# Patient Record
Sex: FEMALE | Race: OTHER | Employment: UNEMPLOYED | URBAN - METROPOLITAN AREA
[De-identification: names, ages, dates, MRNs, and addresses within clinical notes are randomized per-mention and may not be internally consistent; named-entity substitution may affect disease eponyms.]

---

## 2017-02-06 ENCOUNTER — ANESTHESIA EVENT (OUTPATIENT)
Dept: ENDOSCOPY | Age: 37
End: 2017-02-06
Payer: MEDICARE

## 2017-02-07 ENCOUNTER — ANESTHESIA (OUTPATIENT)
Dept: ENDOSCOPY | Age: 37
End: 2017-02-07
Payer: MEDICARE

## 2017-02-07 PROCEDURE — 74011250636 HC RX REV CODE- 250/636

## 2017-02-07 PROCEDURE — 74011000250 HC RX REV CODE- 250

## 2017-02-07 RX ORDER — PROPOFOL 10 MG/ML
INJECTION, EMULSION INTRAVENOUS AS NEEDED
Status: DISCONTINUED | OUTPATIENT
Start: 2017-02-07 | End: 2017-02-07 | Stop reason: HOSPADM

## 2017-02-07 RX ORDER — LIDOCAINE HYDROCHLORIDE 20 MG/ML
INJECTION, SOLUTION EPIDURAL; INFILTRATION; INTRACAUDAL; PERINEURAL AS NEEDED
Status: DISCONTINUED | OUTPATIENT
Start: 2017-02-07 | End: 2017-02-07 | Stop reason: HOSPADM

## 2017-02-07 RX ORDER — GLYCOPYRROLATE 0.2 MG/ML
INJECTION INTRAMUSCULAR; INTRAVENOUS AS NEEDED
Status: DISCONTINUED | OUTPATIENT
Start: 2017-02-07 | End: 2017-02-07 | Stop reason: HOSPADM

## 2017-02-07 RX ADMIN — PROPOFOL 150 MG: 10 INJECTION, EMULSION INTRAVENOUS at 07:53

## 2017-02-07 RX ADMIN — LIDOCAINE HYDROCHLORIDE 40 MG: 20 INJECTION, SOLUTION EPIDURAL; INFILTRATION; INTRACAUDAL; PERINEURAL at 07:44

## 2017-02-07 RX ADMIN — GLYCOPYRROLATE 0.2 MG: 0.2 INJECTION INTRAMUSCULAR; INTRAVENOUS at 07:43

## 2017-02-07 NOTE — ANESTHESIA POSTPROCEDURE EVALUATION
Post-Anesthesia Evaluation and Assessment    Patient: Vandy Sandifer MRN: [id number]  SSN: [id number]    YOB: 1980  Age: 29 y.o. Sex: female       Cardiovascular Function/Vital Signs  Visit Vitals    /65    Pulse 73    Temp 36.8 °C (98.2 °F)    Resp 11    Ht 5' 1\" (1.549 m)    Wt 74.1 kg (163 lb 7 oz)    SpO2 100%    Breastfeeding No    BMI 30.88 kg/m2       Patient is status post general anesthesia for Procedure(s):  ESOPHAGOGASTRODUODENOSCOPY (EGD)  ESOPHAGOGASTRODUODENAL (EGD) BIOPSY. Nausea/Vomiting: None    Postoperative hydration reviewed and adequate. Pain:  Pain Scale 1: Numeric (0 - 10) (02/07/17 0820)  Pain Intensity 1: 0 (02/07/17 0820)   Managed    Neurological Status: At baseline    Mental Status and Level of Consciousness: Arousable    Pulmonary Status:   O2 Device: Room air (02/07/17 0805)   Adequate oxygenation and airway patent    Complications related to anesthesia: None    Post-anesthesia assessment completed.  No concerns    Signed By: Charito Oden MD     February 7, 2017

## 2017-02-21 ENCOUNTER — HOSPITAL ENCOUNTER (OUTPATIENT)
Dept: NUCLEAR MEDICINE | Age: 37
Discharge: HOME | End: 2017-02-21
Attending: SPECIALIST

## 2017-02-21 DIAGNOSIS — K21.9 GERD (GASTROESOPHAGEAL REFLUX DISEASE): ICD-10-CM

## 2017-02-21 DIAGNOSIS — R10.13 EPIGASTRIC ABDOMINAL PAIN: ICD-10-CM

## 2017-02-28 ENCOUNTER — HOSPITAL ENCOUNTER (OUTPATIENT)
Dept: NUCLEAR MEDICINE | Age: 37
Discharge: HOME | End: 2017-02-28
Attending: SPECIALIST
Payer: MEDICARE

## 2017-02-28 VITALS — WEIGHT: 160 LBS | BODY MASS INDEX: 30.23 KG/M2

## 2017-02-28 PROCEDURE — 78227 HEPATOBIL SYST IMAGE W/DRUG: CPT

## 2017-02-28 PROCEDURE — 74011250636 HC RX REV CODE- 250/636

## 2017-02-28 RX ADMIN — SINCALIDE 1.45 MCG: 5 INJECTION, POWDER, LYOPHILIZED, FOR SOLUTION INTRAVENOUS at 10:00

## 2017-03-06 ENCOUNTER — OFFICE VISIT (OUTPATIENT)
Dept: SURGERY | Age: 37
End: 2017-03-06

## 2017-03-06 VITALS
HEART RATE: 68 BPM | SYSTOLIC BLOOD PRESSURE: 121 MMHG | DIASTOLIC BLOOD PRESSURE: 77 MMHG | HEIGHT: 61 IN | OXYGEN SATURATION: 99 % | BODY MASS INDEX: 30.58 KG/M2 | WEIGHT: 162 LBS

## 2017-03-06 DIAGNOSIS — K80.20 CALCULUS OF GALLBLADDER WITHOUT CHOLECYSTITIS WITHOUT OBSTRUCTION: Primary | ICD-10-CM

## 2017-03-06 RX ORDER — RANITIDINE 300 MG/1
300 TABLET ORAL DAILY
COMMUNITY
End: 2018-06-18 | Stop reason: ALTCHOICE

## 2017-03-06 RX ORDER — FLUOXETINE 20 MG/1
20 TABLET ORAL DAILY
COMMUNITY
End: 2018-06-18 | Stop reason: ALTCHOICE

## 2017-03-06 RX ORDER — DEXLANSOPRAZOLE 60 MG/1
60 CAPSULE, DELAYED RELEASE ORAL DAILY
COMMUNITY
End: 2018-06-18 | Stop reason: ALTCHOICE

## 2017-03-06 NOTE — MR AVS SNAPSHOT
Visit Information Date & Time Provider Department Dept. Phone Encounter #  
 3/6/2017  2:10 PM Fred Ahuja MD [facility] [id number] Your Appointments 4/4/2017 10:00 AM  
ESTABLISHED PATIENT with Reuben Sue MD  
Arthritis and [address] [facility] [address]) Appt Note: f/UP  
 [address] [place] [address]  
  
   
 [phone] [id number]  
  
    
 4/6/2017 10:00 AM  
POST OP 10 MIN with Fred Ahuja MD  
[facility] [address] ([address]) Appt Note: Post/op lap sammie on 3/21/17  
 [address], [address] [facility] [address]  
[address] [facility], [address] [facility] [address] [address] Upcoming Health Maintenance Date Due DTaP/Tdap/Td series (1 - Tdap) 7/13/2002 PAP AKA CERVICAL CYTOLOGY 7/13/2002 Pneumococcal 19-64 Highest Risk (3 of 3 - PCV13) 8/29/2017 Allergies as of 3/6/2017  Review Complete On: 3/6/2017 By: Fred Ahuja MD  
  
 Severity Noted Reaction Type Reactions Gluten High 02/29/2012   Side Effect Diarrhea Fiorinal-codeine #3 [Codeine-butalbital-asa-caff]  01/07/2014    Nausea Only Current Immunizations  Reviewed on 10/21/2016 Name Date Influenza Vaccine 8/29/2016, 9/19/2015, 11/17/2014, 10/14/2012 Influenza Vaccine PF 9/30/2013 Pneumococcal Polysaccharide (PPSV-23) 8/29/2016 Pneumococcal Vaccine (Unspecified Type) 10/14/2011 Not reviewed this visit Vitals BP Pulse Height(growth percentile) Weight(growth percentile) SpO2 BMI  
 121/77 68 5' 1\" (1.549 m) 162 lb (73.5 kg) 99% 30.61 kg/m2 OB Status Smoking Status IUD Never Smoker Vitals History BMI and BSA Data Body Mass Index Body Surface Area  
 30.61 kg/m 2 1.78 m 2 Preferred Pharmacy Pharmacy Name Phone [facility]  [facility], [address] [phone] Your Updated Medication List  
  
   
This list is accurate as of: 3/6/17  3:35 PM.  Always use your most recent med list.  
  
  
  
  
 atenolol 50 mg tablet Commonly known as:  TENORMIN Take  by mouth nightly. CELLCEPT 500 mg tablet Generic drug:  mycophenolate Take 1,500 mg by mouth two (2) times a day. DEXILANT 60 mg Cpdb Generic drug:  Dexlansoprazole Take 60 mg by mouth daily. FLUoxetine 20 mg tablet Commonly known as:  PROzac Take 20 mg by mouth daily. hydroxychloroquine 200 mg tablet Commonly known as:  PLAQUENIL  
TAKE ONE TABLET BY MOUTH TWICE DAILY losartan 100 mg tablet Commonly known as:  COZAAR Take 100 mg by mouth nightly. raNITIdine 300 mg tablet Commonly known as:  ZANTAC Take 300 mg by mouth daily. simvastatin 20 mg tablet Commonly known as:  ZOCOR  
TAKE 1 TABLET BY MOUTH ONCE DAILY  
  
 warfarin 5 mg tablet Commonly known as:  COUMADIN Take 6 mg by mouth daily. Patient Instructions Appointment with Dora Denis on 3/15/17 @ 9:45am to start pt on Lovenox. Stop coumadin 5 days prior to surgery. Sop Levenox 24 hrs prior to surgery per Dora Denis NP @ [facility]. Introducing [facility]! Dear Jia Fox: Thank you for requesting a [employer] account. Our records indicate that you already have an active [employer] account. You can access your account anytime at [email or web address] [email or web address] Share. [employer]/Compare And Share Did you know that you can access your hospital and ER discharge instructions at any time in [employer]? You can also review all of your test results from your hospital stay or ER visit. Additional Information If you have questions, please visit the Frequently Asked Questions section of the [employer] website at [email or web address] [employer]/Compare And Share/. Remember, MyChart is NOT to be used for urgent needs. For medical emergencies, dial 911. Now available from your iPhone and Android! Please provide this summary of care documentation to your next provider. Your primary care clinician is listed as Hui Lieberman. If you have any questions after today's visit, please call [phone].

## 2017-03-06 NOTE — LETTER
3/6/2017 3:23 PM 
 
Ms. Braxton Cloud 
[address] [facility] [address] Surgery Instruction Sheet You have been scheduled for surgery on 3/21/17 at 9:15am at [facility]. Please report to the Surgery Center at 7:15am, this is approximately 2 hours prior to your surgery time. The Surgery Center is located on the [address] [place] [address] side of the [facility], just next to the Emergency Room. Reserved parking is available and  parking if lot is full. You will not need to have a pre-op visit before surgery, but the Pre-op Nurse will call you before surgery. The Pre-op nurse will review your medical history, medications and give you additional instructions. They will also confirm your arrival time. Call your physician immediately if you notice a change in your health between the time you saw your physician and the day of surgery. If you take a blood thinner, please let us know. Call your ordering Doctor to make sure you can stop taking it prior to your surgery. STOP YOUR Coumadin 5 DAYS PRIOR TO SURGERY. DO NOT TAKE  IBUPROFEN, ADVIL, MOTRIN, ALEVE, EXCEDRIN, BC POWDER, GOODIES, FISH OIL OR ANY MEDICATION CONTAINING ASPIRIN 5 DAYS PRIOR TO YOUR SURGERY. MAY TAKE TYLENOL. Eat a light dinner the evening before your surgery. DO NOT EAT OR DRINK ANYTHING AFTER MIDNIGHT THE NIGHT BEFORE YOUR SURGERY. This includes water, chewing gum, lifesavers, etc.  The Pre op nurse will check with you about any medication that you may need to take the morning of surgery. Shower with a new bar of anti-bacterial soap (Dial, Safeguard) or solution given to you by Pre-op, the night before surgery. Do not use lotion, powder, deodorant on the skin after showering.   Wear loose, comfortable clothing the day of surgery and bring a container to store your contacts, eyeglasses, dentures, hearing aid, etc.  Do not bring money, valuables, jewelry, etc. to the hospital.   
 
 If you are having outpatient surgery, someone must come with you the morning of surgery to drive you home. You can not drive for 24 hours after any anesthesia. Sometimes it is necessary to stay overnight and leave the next morning. This is still considered outpatient for most insurance deductibles. Someone will still need to drive you home. If you have questions or concerns, please feel free to call /Gui at [phone]. If you need to cancel your surgery, please call as soon as possible. Sincerely, Akua Rolon MD
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
## 2017-03-06 NOTE — PATIENT INSTRUCTIONS
Appointment with Gregorio Barrett on 3/15/17 @ 9:45am to start pt on Lovenox. Stop coumadin 5 days prior to surgery. Sop Levenox 24 hrs prior to surgery per Gregorio Barrett NP @ [facility].

## 2017-03-15 NOTE — PERIOP NOTES
[facility]  Preoperative Instructions        Surgery Date 3/21/17          Time of Arrival 0715    1. On the day of your surgery, please report to the Surgical Services Registration Desk and sign in at your designated time. The Surgery Center is located to the right of the Emergency Room. 2. You must have someone with you to drive you home. You should not drive a car for 24 hours following surgery. Please make arrangements for a friend or family member to stay with you for the first 24 hours after your surgery. 3. Do not have anything to eat or drink (including water, gum, mints, coffee, juice) after midnight 3/20/17              . This may not apply to medications prescribed by your physician. Please note special instructions, if applicable. If you are currently taking Plavix, Coumadin, or other blood-thinning agents, contact your surgeon for instructions. 4. We recommend you do not drink any alcoholic beverages for 24 hours before and after your surgery. 5. Have a list of all current medications, including vitamins, herbal supplements and any other over the counter medications. Stop all Aspirin and non-steroidal anti-inflammatory drugs (I.e. Advil, Aleve), as directed by your surgeon's office. Stop all vitamins and herbal supplements seven days prior to your surgery. 6. Wear comfortable clothes. Wear glasses instead of contacts. Do not bring any money or jewelry. Please bring picture ID, insurance card, and any prearranged co-payment or hospital payment. Do not wear make-up, particularly mascara the morning of your surgery. Do not wear nail polish, particularly if you are having foot /hand surgery. Wear your hair loose or down, no ponytails, buns, rosie pins or clips. All body piercings must be removed.   Please shower with antibacterial soap for three consecutive days before and on the morning of surgery, but do not apply any lotions, powders or deodorants after the shower on the day of surgery. Please use a fresh towels after each shower. Please sleep in clean clothes and change bed linens the night before surgery. Please do not shave for 48 hours prior to surgery. Shaving of the face is acceptable. 7. You should understand that if you do not follow these instructions your surgery may be cancelled. If your physical condition changes (I.e. fever, cold or flu) please contact your surgeon as soon as possible. 8. It is important that you be on time. If a situation occurs where you may be late, please call [phone] (OR Holding Area). 9. If you have any questions and or problems, please call [phone] (Pre-admission Testing). 10. Your surgery time may be subject to change. You will receive a phone call the evening prior if your time changes. 11.  If having outpatient surgery, you must have someone to drive you here, stay with you during the duration of your stay, and to drive you home at time of discharge. Special Instructions:Coumadin per surgeon. MEDICATIONS TO TAKE THE MORNING OF SURGERY WITH A SIP OF WATER:Prozac, Dexilant, Zantac, Plaquenil, Cellcept. I understand a pre-operative phone call will be made to verify my surgery time. In the event that I am not available, I give permission for a message to be left on my answering service and/or with another person?   Yes @ [phone]         ___________________      __________   _________    (Signature of Patient)             (Witness)                (Date and Time)

## 2017-03-20 ENCOUNTER — ANESTHESIA EVENT (OUTPATIENT)
Dept: SURGERY | Age: 37
End: 2017-03-20
Payer: MEDICARE

## 2017-03-20 NOTE — ANESTHESIA PREPROCEDURE EVALUATION
Anesthetic History   No history of anesthetic complications            Review of Systems / Medical History  Patient summary reviewed, nursing notes reviewed and pertinent labs reviewed    Pulmonary                   Neuro/Psych         Psychiatric history    Comments: Depression  Anxiety Cardiovascular    Hypertension: well controlled          Hyperlipidemia    Exercise tolerance: >4 METS     GI/Hepatic/Renal     GERD: well controlled    Renal disease: stones and CRI  PUD and liver disease    Comments: Cholelithiasis  CRI, Stage III  Lupus Nephritis/Diffuse Proliferative Glomerulonephritis  Celiac Disease  Gastritis Endo/Other        Obesity, arthritis and anemia     Other Findings   Comments: Systemic Lupus Erythematosus  Raynaud's Syndrome         Physical Exam    Airway  Mallampati: II  TM Distance: > 6 cm  Neck ROM: normal range of motion   Mouth opening: Normal     Cardiovascular  Regular rate and rhythm,  S1 and S2 normal,  no murmur, click, rub, or gallop             Dental  No notable dental hx       Pulmonary  Breath sounds clear to auscultation               Abdominal  GI exam deferred       Other Findings            Anesthetic Plan    ASA: 3  Anesthesia type: general          Induction: Intravenous  Anesthetic plan and risks discussed with: Patient

## 2017-03-21 ENCOUNTER — APPOINTMENT (OUTPATIENT)
Dept: GENERAL RADIOLOGY | Age: 37
End: 2017-03-21
Attending: SURGERY
Payer: MEDICARE

## 2017-03-21 ENCOUNTER — ANESTHESIA (OUTPATIENT)
Dept: SURGERY | Age: 37
End: 2017-03-21
Payer: MEDICARE

## 2017-03-21 ENCOUNTER — HOSPITAL ENCOUNTER (OUTPATIENT)
Age: 37
Setting detail: OUTPATIENT SURGERY
Discharge: HOME | End: 2017-03-21
Attending: SURGERY | Admitting: SURGERY
Payer: MEDICARE

## 2017-03-21 ENCOUNTER — SURGERY (OUTPATIENT)
Age: 37
End: 2017-03-21

## 2017-03-21 VITALS
BODY MASS INDEX: 30.59 KG/M2 | SYSTOLIC BLOOD PRESSURE: 124 MMHG | OXYGEN SATURATION: 100 % | DIASTOLIC BLOOD PRESSURE: 68 MMHG | WEIGHT: 162.04 LBS | RESPIRATION RATE: 18 BRPM | HEART RATE: 47 BPM | HEIGHT: 61 IN | TEMPERATURE: 98.6 F

## 2017-03-21 LAB
ALBUMIN SERPL BCP-MCNC: 3.5 G/DL (ref 3.5–5)
ALBUMIN/GLOB SERPL: 0.7 {RATIO} (ref 1.1–2.2)
ALP SERPL-CCNC: 61 U/L (ref 45–117)
ALT SERPL-CCNC: 17 U/L (ref 12–78)
ANION GAP BLD CALC-SCNC: 10 MMOL/L (ref 5–15)
AST SERPL W P-5'-P-CCNC: 10 U/L (ref 15–37)
BILIRUB SERPL-MCNC: 0.9 MG/DL (ref 0.2–1)
BUN SERPL-MCNC: 21 MG/DL (ref 6–20)
BUN/CREAT SERPL: 16 (ref 12–20)
CALCIUM SERPL-MCNC: 9.1 MG/DL (ref 8.5–10.1)
CHLORIDE SERPL-SCNC: 108 MMOL/L (ref 97–108)
CO2 SERPL-SCNC: 23 MMOL/L (ref 21–32)
CREAT SERPL-MCNC: 1.28 MG/DL (ref 0.55–1.02)
ERYTHROCYTE [DISTWIDTH] IN BLOOD BY AUTOMATED COUNT: 13 % (ref 11.5–14.5)
GLOBULIN SER CALC-MCNC: 4.8 G/DL (ref 2–4)
GLUCOSE SERPL-MCNC: 85 MG/DL (ref 65–100)
HCG UR QL: NEGATIVE
HCT VFR BLD AUTO: 37.8 % (ref 35–47)
HGB BLD-MCNC: 13.1 G/DL (ref 11.5–16)
INR BLD: 1.3 (ref 0.9–1.2)
LIPASE SERPL-CCNC: 496 U/L (ref 73–393)
MCH RBC QN AUTO: 30.3 PG (ref 26–34)
MCHC RBC AUTO-ENTMCNC: 34.7 G/DL (ref 30–36.5)
MCV RBC AUTO: 87.3 FL (ref 80–99)
PLATELET # BLD AUTO: 226 K/UL (ref 150–400)
POTASSIUM SERPL-SCNC: 4.3 MMOL/L (ref 3.5–5.1)
PROT SERPL-MCNC: 8.3 G/DL (ref 6.4–8.2)
RBC # BLD AUTO: 4.33 M/UL (ref 3.8–5.2)
SODIUM SERPL-SCNC: 141 MMOL/L (ref 136–145)
WBC # BLD AUTO: 7.7 K/UL (ref 3.6–11)

## 2017-03-21 PROCEDURE — 81025 URINE PREGNANCY TEST: CPT

## 2017-03-21 PROCEDURE — 83690 ASSAY OF LIPASE: CPT | Performed by: SURGERY

## 2017-03-21 PROCEDURE — 74011250636 HC RX REV CODE- 250/636

## 2017-03-21 PROCEDURE — 77030011640 HC PAD GRND REM COVD -A: Performed by: SURGERY

## 2017-03-21 PROCEDURE — 74011636320 HC RX REV CODE- 636/320: Performed by: SURGERY

## 2017-03-21 PROCEDURE — 77030020256 HC SOL INJ NACL 0.9%  500ML: Performed by: SURGERY

## 2017-03-21 PROCEDURE — 77030035045 HC TRCR ENDOSC VRSPRT BLDLSS COVD -B: Performed by: SURGERY

## 2017-03-21 PROCEDURE — 85027 COMPLETE CBC AUTOMATED: CPT | Performed by: SURGERY

## 2017-03-21 PROCEDURE — 77030008684 HC TU ET CUF COVD -B: Performed by: NURSE ANESTHETIST, CERTIFIED REGISTERED

## 2017-03-21 PROCEDURE — 76060000033 HC ANESTHESIA 1 TO 1.5 HR: Performed by: SURGERY

## 2017-03-21 PROCEDURE — 77030026438 HC STYL ET INTUB CARD -A: Performed by: NURSE ANESTHETIST, CERTIFIED REGISTERED

## 2017-03-21 PROCEDURE — 77030018875 HC APPL CLP LIG4 J&J -B: Performed by: SURGERY

## 2017-03-21 PROCEDURE — 77030019908 HC STETH ESOPH SIMS -A: Performed by: NURSE ANESTHETIST, CERTIFIED REGISTERED

## 2017-03-21 PROCEDURE — 77030020782 HC GWN BAIR PAWS FLX 3M -B

## 2017-03-21 PROCEDURE — 36415 COLL VENOUS BLD VENIPUNCTURE: CPT | Performed by: SURGERY

## 2017-03-21 PROCEDURE — 77030035048 HC TRCR ENDOSC OPTCL COVD -B: Performed by: SURGERY

## 2017-03-21 PROCEDURE — 77030020263 HC SOL INJ SOD CL0.9% LFCR 1000ML: Performed by: SURGERY

## 2017-03-21 PROCEDURE — 77030008756 HC TU IRR SUC STRY -B: Performed by: SURGERY

## 2017-03-21 PROCEDURE — 77030010507 HC ADH SKN DERMBND J&J -B: Performed by: SURGERY

## 2017-03-21 PROCEDURE — 74011250636 HC RX REV CODE- 250/636: Performed by: ANESTHESIOLOGY

## 2017-03-21 PROCEDURE — 80053 COMPREHEN METABOLIC PANEL: CPT | Performed by: SURGERY

## 2017-03-21 PROCEDURE — 74011250636 HC RX REV CODE- 250/636: Performed by: SURGERY

## 2017-03-21 PROCEDURE — C1758 CATHETER, URETERAL: HCPCS | Performed by: SURGERY

## 2017-03-21 PROCEDURE — 77030002933 HC SUT MCRYL J&J -A: Performed by: SURGERY

## 2017-03-21 PROCEDURE — 77030008603 HC TRCR ENDOSC EPATH J&J -C: Performed by: SURGERY

## 2017-03-21 PROCEDURE — 77030031139 HC SUT VCRL2 J&J -A: Performed by: SURGERY

## 2017-03-21 PROCEDURE — 74011000250 HC RX REV CODE- 250: Performed by: SURGERY

## 2017-03-21 PROCEDURE — 77030032490 HC SLV COMPR SCD KNE COVD -B: Performed by: SURGERY

## 2017-03-21 PROCEDURE — 76210000021 HC REC RM PH II 0.5 TO 1 HR: Performed by: SURGERY

## 2017-03-21 PROCEDURE — 76210000006 HC OR PH I REC 0.5 TO 1 HR: Performed by: SURGERY

## 2017-03-21 PROCEDURE — 77030009852 HC PCH RTVR ENDOSC COVD -B: Performed by: SURGERY

## 2017-03-21 PROCEDURE — 88304 TISSUE EXAM BY PATHOLOGIST: CPT | Performed by: SURGERY

## 2017-03-21 PROCEDURE — 74300 X-RAY BILE DUCTS/PANCREAS: CPT

## 2017-03-21 PROCEDURE — 74011636320 HC RX REV CODE- 636/320

## 2017-03-21 PROCEDURE — 85610 PROTHROMBIN TIME: CPT

## 2017-03-21 PROCEDURE — 74011000250 HC RX REV CODE- 250

## 2017-03-21 PROCEDURE — 76010000149 HC OR TIME 1 TO 1.5 HR: Performed by: SURGERY

## 2017-03-21 RX ORDER — OXYCODONE AND ACETAMINOPHEN 5; 325 MG/1; MG/1
1-2 TABLET ORAL
Qty: 40 TAB | Refills: 0 | Status: SHIPPED | OUTPATIENT
Start: 2017-03-21 | End: 2017-05-31 | Stop reason: ALTCHOICE

## 2017-03-21 RX ORDER — DOCUSATE SODIUM 100 MG/1
100 CAPSULE, LIQUID FILLED ORAL 2 TIMES DAILY
Qty: 30 CAP | Refills: 2 | Status: SHIPPED | OUTPATIENT
Start: 2017-03-21 | End: 2017-04-04

## 2017-03-21 RX ORDER — SUCCINYLCHOLINE CHLORIDE 20 MG/ML
INJECTION INTRAMUSCULAR; INTRAVENOUS AS NEEDED
Status: DISCONTINUED | OUTPATIENT
Start: 2017-03-21 | End: 2017-03-21 | Stop reason: HOSPADM

## 2017-03-21 RX ORDER — SODIUM CHLORIDE, SODIUM LACTATE, POTASSIUM CHLORIDE, CALCIUM CHLORIDE 600; 310; 30; 20 MG/100ML; MG/100ML; MG/100ML; MG/100ML
25 INJECTION, SOLUTION INTRAVENOUS CONTINUOUS
Status: DISCONTINUED | OUTPATIENT
Start: 2017-03-21 | End: 2017-03-21 | Stop reason: HOSPADM

## 2017-03-21 RX ORDER — NEOSTIGMINE METHYLSULFATE 1 MG/ML
INJECTION INTRAVENOUS AS NEEDED
Status: DISCONTINUED | OUTPATIENT
Start: 2017-03-21 | End: 2017-03-21 | Stop reason: HOSPADM

## 2017-03-21 RX ORDER — ROCURONIUM BROMIDE 10 MG/ML
INJECTION, SOLUTION INTRAVENOUS AS NEEDED
Status: DISCONTINUED | OUTPATIENT
Start: 2017-03-21 | End: 2017-03-21 | Stop reason: HOSPADM

## 2017-03-21 RX ORDER — FENTANYL CITRATE 50 UG/ML
INJECTION, SOLUTION INTRAMUSCULAR; INTRAVENOUS AS NEEDED
Status: DISCONTINUED | OUTPATIENT
Start: 2017-03-21 | End: 2017-03-21 | Stop reason: HOSPADM

## 2017-03-21 RX ORDER — SODIUM CHLORIDE 0.9 % (FLUSH) 0.9 %
5-10 SYRINGE (ML) INJECTION EVERY 8 HOURS
Status: DISCONTINUED | OUTPATIENT
Start: 2017-03-21 | End: 2017-03-21 | Stop reason: HOSPADM

## 2017-03-21 RX ORDER — HYDROMORPHONE HYDROCHLORIDE 1 MG/ML
0.2 INJECTION, SOLUTION INTRAMUSCULAR; INTRAVENOUS; SUBCUTANEOUS
Status: DISCONTINUED | OUTPATIENT
Start: 2017-03-21 | End: 2017-03-21 | Stop reason: HOSPADM

## 2017-03-21 RX ORDER — ACETAMINOPHEN 10 MG/ML
INJECTION, SOLUTION INTRAVENOUS AS NEEDED
Status: DISCONTINUED | OUTPATIENT
Start: 2017-03-21 | End: 2017-03-21 | Stop reason: HOSPADM

## 2017-03-21 RX ORDER — DEXAMETHASONE SODIUM PHOSPHATE 4 MG/ML
INJECTION, SOLUTION INTRA-ARTICULAR; INTRALESIONAL; INTRAMUSCULAR; INTRAVENOUS; SOFT TISSUE AS NEEDED
Status: DISCONTINUED | OUTPATIENT
Start: 2017-03-21 | End: 2017-03-21 | Stop reason: HOSPADM

## 2017-03-21 RX ORDER — FENTANYL CITRATE 50 UG/ML
25 INJECTION, SOLUTION INTRAMUSCULAR; INTRAVENOUS
Status: DISCONTINUED | OUTPATIENT
Start: 2017-03-21 | End: 2017-03-21 | Stop reason: HOSPADM

## 2017-03-21 RX ORDER — ONDANSETRON 2 MG/ML
INJECTION INTRAMUSCULAR; INTRAVENOUS AS NEEDED
Status: DISCONTINUED | OUTPATIENT
Start: 2017-03-21 | End: 2017-03-21 | Stop reason: HOSPADM

## 2017-03-21 RX ORDER — PROPOFOL 10 MG/ML
INJECTION, EMULSION INTRAVENOUS AS NEEDED
Status: DISCONTINUED | OUTPATIENT
Start: 2017-03-21 | End: 2017-03-21 | Stop reason: HOSPADM

## 2017-03-21 RX ORDER — CEFAZOLIN SODIUM 1 G/3ML
2 INJECTION, POWDER, FOR SOLUTION INTRAMUSCULAR; INTRAVENOUS ONCE
Status: COMPLETED | OUTPATIENT
Start: 2017-03-21 | End: 2017-03-21

## 2017-03-21 RX ORDER — SODIUM CHLORIDE 0.9 % (FLUSH) 0.9 %
5-10 SYRINGE (ML) INJECTION AS NEEDED
Status: DISCONTINUED | OUTPATIENT
Start: 2017-03-21 | End: 2017-03-21 | Stop reason: HOSPADM

## 2017-03-21 RX ORDER — BUPIVACAINE HYDROCHLORIDE AND EPINEPHRINE 5; 5 MG/ML; UG/ML
30 INJECTION, SOLUTION EPIDURAL; INTRACAUDAL; PERINEURAL ONCE
Status: COMPLETED | OUTPATIENT
Start: 2017-03-21 | End: 2017-03-21

## 2017-03-21 RX ORDER — LIDOCAINE HYDROCHLORIDE 20 MG/ML
INJECTION, SOLUTION EPIDURAL; INFILTRATION; INTRACAUDAL; PERINEURAL AS NEEDED
Status: DISCONTINUED | OUTPATIENT
Start: 2017-03-21 | End: 2017-03-21 | Stop reason: HOSPADM

## 2017-03-21 RX ORDER — DIPHENHYDRAMINE HYDROCHLORIDE 50 MG/ML
12.5 INJECTION, SOLUTION INTRAMUSCULAR; INTRAVENOUS AS NEEDED
Status: DISCONTINUED | OUTPATIENT
Start: 2017-03-21 | End: 2017-03-21 | Stop reason: HOSPADM

## 2017-03-21 RX ORDER — MIDAZOLAM HYDROCHLORIDE 1 MG/ML
INJECTION, SOLUTION INTRAMUSCULAR; INTRAVENOUS AS NEEDED
Status: DISCONTINUED | OUTPATIENT
Start: 2017-03-21 | End: 2017-03-21 | Stop reason: HOSPADM

## 2017-03-21 RX ORDER — LIDOCAINE HYDROCHLORIDE 10 MG/ML
0.1 INJECTION, SOLUTION EPIDURAL; INFILTRATION; INTRACAUDAL; PERINEURAL AS NEEDED
Status: DISCONTINUED | OUTPATIENT
Start: 2017-03-21 | End: 2017-03-21 | Stop reason: HOSPADM

## 2017-03-21 RX ORDER — GLYCOPYRROLATE 0.2 MG/ML
INJECTION INTRAMUSCULAR; INTRAVENOUS AS NEEDED
Status: DISCONTINUED | OUTPATIENT
Start: 2017-03-21 | End: 2017-03-21 | Stop reason: HOSPADM

## 2017-03-21 RX ADMIN — ONDANSETRON 4 MG: 2 INJECTION INTRAMUSCULAR; INTRAVENOUS at 09:55

## 2017-03-21 RX ADMIN — FENTANYL CITRATE 100 MCG: 50 INJECTION, SOLUTION INTRAMUSCULAR; INTRAVENOUS at 09:45

## 2017-03-21 RX ADMIN — SODIUM CHLORIDE, SODIUM LACTATE, POTASSIUM CHLORIDE, AND CALCIUM CHLORIDE: 600; 310; 30; 20 INJECTION, SOLUTION INTRAVENOUS at 10:45

## 2017-03-21 RX ADMIN — MIDAZOLAM HYDROCHLORIDE 2 MG: 1 INJECTION, SOLUTION INTRAMUSCULAR; INTRAVENOUS at 09:36

## 2017-03-21 RX ADMIN — GLYCOPYRROLATE 0.4 MG: 0.2 INJECTION INTRAMUSCULAR; INTRAVENOUS at 10:33

## 2017-03-21 RX ADMIN — LIDOCAINE HYDROCHLORIDE 60 MG: 20 INJECTION, SOLUTION EPIDURAL; INFILTRATION; INTRACAUDAL; PERINEURAL at 09:45

## 2017-03-21 RX ADMIN — FENTANYL CITRATE 50 MCG: 50 INJECTION, SOLUTION INTRAMUSCULAR; INTRAVENOUS at 10:10

## 2017-03-21 RX ADMIN — SUCCINYLCHOLINE CHLORIDE 120 MG: 20 INJECTION INTRAMUSCULAR; INTRAVENOUS at 09:45

## 2017-03-21 RX ADMIN — ROCURONIUM BROMIDE 10 MG: 10 INJECTION, SOLUTION INTRAVENOUS at 09:45

## 2017-03-21 RX ADMIN — IOTHALAMATE MEGLUMINE 28 ML: 600 INJECTION INTRAVASCULAR at 10:45

## 2017-03-21 RX ADMIN — FENTANYL CITRATE 50 MCG: 50 INJECTION, SOLUTION INTRAMUSCULAR; INTRAVENOUS at 10:30

## 2017-03-21 RX ADMIN — SODIUM CHLORIDE, SODIUM LACTATE, POTASSIUM CHLORIDE, AND CALCIUM CHLORIDE 25 ML/HR: 600; 310; 30; 20 INJECTION, SOLUTION INTRAVENOUS at 08:18

## 2017-03-21 RX ADMIN — DEXAMETHASONE SODIUM PHOSPHATE 10 MG: 4 INJECTION, SOLUTION INTRA-ARTICULAR; INTRALESIONAL; INTRAMUSCULAR; INTRAVENOUS; SOFT TISSUE at 09:55

## 2017-03-21 RX ADMIN — PROPOFOL 150 MG: 10 INJECTION, EMULSION INTRAVENOUS at 09:45

## 2017-03-21 RX ADMIN — NEOSTIGMINE METHYLSULFATE 3 MG: 1 INJECTION INTRAVENOUS at 10:33

## 2017-03-21 RX ADMIN — CEFAZOLIN 2 G: 1 INJECTION, POWDER, FOR SOLUTION INTRAMUSCULAR; INTRAVENOUS; PARENTERAL at 09:36

## 2017-03-21 RX ADMIN — ACETAMINOPHEN 1000 MG: 10 INJECTION, SOLUTION INTRAVENOUS at 10:24

## 2017-03-21 RX ADMIN — BUPIVACAINE HYDROCHLORIDE AND EPINEPHRINE BITARTRATE 26 ML: 5; .005 INJECTION, SOLUTION EPIDURAL; INTRACAUDAL; PERINEURAL at 10:43

## 2017-03-21 NOTE — DISCHARGE INSTRUCTIONS
Dr Jayne Ordonez Discharge Instructions after  Laparoscopic Cholecystectomy      Starla Toro [id number] : 1980    Admitted 3/21/2017 Discharged: 3/21/2017     ** restart your Lovenox AND Coumadin tomorrow 3/22/17  ** make an appointment with [facility] on Friday 3/24/17 for an INR check      What to do at Home    Recommended diet: Low Fat Diet for 1 month    Recommended activity: as tolerated, do not drive for 3-5 days while on pain medicine. Follow-up with Dr. Leelee Austin in 2 weeks. Call [phone] for an appointment. Cholecystectomy: What to Expect at [facility]    After your surgery, it is normal to feel weak and tired for several days after you return home. Your belly may be swollen. If you had laparoscopic surgery, you may also have pain in your shoulder for about 24 hours. You may have gas or need to burp a lot at first, and a few people get diarrhea. The diarrhea usually goes away in 2 to 4 weeks, but it may last longer. How quickly you recover depends on whether you had a laparoscopic or open surgery. For a laparoscopic surgery, most people can go back to work or their normal routine in 1 to 2 weeks, but it may take longer, depending on the type of work you do. For an open surgery, it will probably take 4 to 6 weeks before you get back to your normal routine. This care sheet gives you a general idea about how long it will take for you to recover. However, each person recovers at a different pace. Follow the steps below to get better as quickly as possible. How can you care for yourself at home? Activity  Rest when you feel tired. Getting enough sleep will help you recover. Try to walk each day. Start out by walking a little more than you did the day before. Gradually increase the amount you walk. Walking boosts blood flow and helps prevent pneumonia and constipation.   Avoid strenuous activities, such as biking, jogging, weightlifting, and aerobic exercise, for 1-2 weeks. You may shower 24 hours after surgery. Pat the cut (incision) dry. Do not take a bath for the first 2 weeks, or until your doctor tells you it is okay. You may drive when you are no longer taking pain medicine and can quickly move your foot from the gas pedal to the brake. You must also be able to sit comfortably for a long period of time, even if you do not plan to go far. For a laparoscopic surgery, most people can go back to work or their normal routine in 1 to 2 weeks, but it may take longer. For an open surgery, it will probably take 4 to 6 weeks before you get back to your normal routine. Diet  Eat smaller meals more often instead of fewer larger meals. You can eat a normal diet, but avoid eating fatty foods for about 1 month. Fatty foods include hamburger, whole milk, cheese, and many snack foods. If your stomach is upset, try bland, low-fat foods like plain rice, broiled chicken, toast, and yogurt. Drink plenty of fluids (unless your doctor tells you not to). If you have diarrhea, try avoiding spicy foods, dairy products, fatty foods, and alcohol. You can also watch to see if specific foods cause it, and stop eating them. If the diarrhea continues for more than 2 weeks, talk to your doctor. You may notice that your bowel movements are not regular right after your surgery. This is common. Try to avoid constipation and straining with bowel movements. You may want to take a fiber supplement every day. If you have not had a bowel movement after a couple of days, ask your doctor about taking a mild laxative. Medicines  Take pain medicines exactly as directed. If the doctor gave you a prescription medicine for pain, take it as prescribed. If you are not taking a prescription pain medicine, take an over-the-counter medicine such as acetaminophen (Tylenol), ibuprofen (Advil, Motrin), or naproxen (Aleve). Read and follow all instructions on the label.   Do not take two or more pain medicines at the same time unless the doctor told you to. Many pain medicines contain acetaminophen, which is Tylenol. Too much Tylenol can be harmful. If you think your pain medicine is making you sick to your stomach: Take your medicine after meals (unless your doctor tells you not to). Ask your doctor for a different pain medicine. If your doctor prescribed antibiotics, take them as directed. Do not stop taking them just because you feel better. You need to take the full course of antibiotics. Incision care  After 24 to 48 hours, wash the area daily with warm, soapy water, and pat it dry. You may have staples to hold the cut together. Keep them dry until your doctor takes them out. This is usually in 7 to 10 days. Keep the area clean and dry. You may cover it with a gauze bandage if it weeps or rubs against clothing. Change the bandage every day. Ice  To reduce swelling and pain, put ice or a cold pack on your belly for 10 to 15 minutes at a time. Do this every 1 to 2 hours. Put a thin cloth between the ice and your skin. Follow-up care is a key part of your treatment and safety. Be sure to make and go to all appointments, and call your doctor if you are having problems. Its also a good idea to know your test results and keep a list of the medicines you take. When should you call for help? Call 911 anytime you think you may need emergency care. For example, call if:  You pass out (lose consciousness). You have severe trouble breathing. You have sudden chest pain and shortness of breath, or you cough up blood. Call your doctor now or seek immediate medical care if:  You are sick to your stomach and cannot drink fluids. You have pain that does not get better when you take your pain medicine. You have signs of infection, such as: Increased pain, warmth, or excessive (>1inch) redness. Red streaks leading from the incision. Pus draining from the incision.   Swollen lymph nodes in your neck, armpits, or groin. A fever. Your urine turns dark brown or your stool is light-colored or ivy-colored. Your skin turns yellow. Bright red blood has soaked through a large bandage over your incision. You have signs of a blood clot, such as:  Pain in your calf, back of knee, thigh, or groin. Redness and swelling in your leg or groin. You have trouble passing urine or stool, especially if you have mild pain or swelling in your lower belly. Watch closely for any changes in your health, and be sure to contact your doctor if:  You do not have a bowel movement after taking a laxative. DISCHARGE SUMMARY from Nurse    The following personal items are in your possession at time of discharge:    Dental Appliances: None  Visual Aid: Contacts, Glasses (Contacts at home, Glasses to PACU)  Hearing Aids/Status: Does not own  Home Medications: None  Jewelry: Ring (To )  Clothing: Footwear, Socks, Undergarments, Pants, Shirt, Sweater (Sent to PACU)  Other Valuables: Eyeglasses (To PACU)  Personal Items Sent to Safe: Declines          PATIENT INSTRUCTIONS:    After general anesthesia or intravenous sedation, for 24 hours or while taking prescription Narcotics:  · Limit your activities  · Do not drive and operate hazardous machinery  · Do not make important personal or business decisions  · Do  not drink alcoholic beverages  · If you have not urinated within 8 hours after discharge, please contact your surgeon on call. Report the following to your surgeon:  · Excessive pain, swelling, redness or odor of or around the surgical area  · Temperature over 100.5  · Nausea and vomiting lasting longer than 4 hours or if unable to take medications  · Any signs of decreased circulation or nerve impairment to extremity: change in color, persistent  numbness, tingling, coldness or increase pain  · Any questions        What to do at Home:      *  Please give a list of your current medications to your Primary Care Provider.     * Please update this list whenever your medications are discontinued, doses are      changed, or new medications (including over-the-counter products) are added. *  Please carry medication information at all times in case of emergency situations. These are general instructions for a healthy lifestyle:    No smoking/ No tobacco products/ Avoid exposure to second hand smoke    Surgeon General's Warning:  Quitting smoking now greatly reduces serious risk to your health. Obesity, smoking, and sedentary lifestyle greatly increases your risk for illness    A healthy diet, regular physical exercise & weight monitoring are important for maintaining a healthy lifestyle    You may be retaining fluid if you have a history of heart failure or if you experience any of the following symptoms:  Weight gain of 3 pounds or more overnight or 5 pounds in a week, increased swelling in our hands or feet or shortness of breath while lying flat in bed. Please call your doctor as soon as you notice any of these symptoms; do not wait until your next office visit. Recognize signs and symptoms of STROKE:    F-face looks uneven    A-arms unable to move or move unevenly    S-speech slurred or non-existent    T-time-call 911 as soon as signs and symptoms begin-DO NOT go       Back to bed or wait to see if you get better-TIME IS BRAIN. Warning Signs of HEART ATTACK     Call 911 if you have these symptoms:   Chest discomfort. Most heart attacks involve discomfort in the center of the chest that lasts more than a few minutes, or that goes away and comes back. It can feel like uncomfortable pressure, squeezing, fullness, or pain.  Discomfort in other areas of the upper body. Symptoms can include pain or discomfort in one or both arms, the back, neck, jaw, or stomach.  Shortness of breath with or without chest discomfort.  Other signs may include breaking out in a cold sweat, nausea, or lightheadedness.   Don't wait more than five minutes to call 911 - MINUTES MATTER! Fast action can save your life. Calling 911 is almost always the fastest way to get lifesaving treatment. Emergency Medical Services staff can begin treatment when they arrive -- up to an hour sooner than if someone gets to the hospital by car. The discharge information has been reviewed with the patient and caregiver. The patient and caregiver verbalized understanding. Discharge medications reviewed with the patient and caregiver and appropriate educational materials and side effects teaching were provided. How to Care for Your Wound After Its Treated With  DERMABOND* Topical Skin Adhesive  DERMABOND* Topical Skin Adhesive (2-octyl cyanoacrylate) is a sterile, liquid skin adhesive  that holds wound edges together. The film will usually remain in place for 5 to 10 days, then  naturally fall off your skin. The following will answer some of your questions and provide instructions for proper care for your  wound while it is healing:    CHECK WOUND APPEARANCE   Some swelling, redness, and pain are common with all wounds and normally will go away as the  wound heals. If swelling, redness, or pain increases or if the wound feels warm to the touch,  contact a doctor. Also contact a doctor if the wound edges reopen or separate. REPLACE BANDAGES   If your wound is bandaged, keep the bandage dry.  Replace the dressing daily until the adhesive film has fallen off or if the  bandage should become wet, unless otherwise instructed by your  physician.  When changing the dressing, do not place tape directly over the  DERMABOND adhesive film, because removing the tape later may also  remove the film. AVOID TOPICAL MEDICATIONS   Do not apply liquid or ointment medications or any other product to your wound while the  DERMABOND adhesive film is in place. These may loosen the film before your wound is healed.   KEEP WOUND DRY AND PROTECTED   You may occasionally and briefly wet your wound in the shower or bath. Do not soak or scrub  your wound, do not swim, and avoid periods of heavy perspiration until the DERMABOND  adhesive has naturally fallen off. After showering or bathing, gently blot your wound dry with a  soft towel. If a protective dressing is being used, apply a fresh, dry bandage, being sure to keep  the tape off the DERMABOND adhesive film.  Apply a clean, dry bandage over the wound if necessary to protect it.  Protect your wound from injury until the skin has had sufficient time to heal.   Do not scratch, rub, or pick at the DERMABOND adhesive film. This may loosen the film before  your wound is healed.  Protect the wound from prolonged exposure to sunlight or tanning lamps while the film is in  place. If you have any questions or concerns about this product, please consult your doctor. *Trademark ©[employer] 2002      Narcotic-Analgesic/Acetaminophen (By mouth)   Relieves pain. Brand Name(s):Capital w/Codeine, Endocet, Hycet, Lorcet, Lorcet HD, Lorcet Plus, Lortab 10/325, Lortab 5/325, Lortab 7.5/325, Lortab Elixir, Norco, Percocet, Saldaña, Roxicet, Trezix   There may be other brand names for this medicine. When This Medicine Should Not Be Used: You should not use this medicine if you have had an allergic reaction to acetaminophen, codeine, hydrocodone, propoxyphene, or sulfites. You should not use this medicine if you have had an allergic reaction to any other opioid pain medicine. How to Use This Medicine:   Liquid, Tablet, Capsule  · Your doctor will tell you how much medicine to use. Do not use more than directed. · This medicine contains acetaminophen. Read the labels of all other medicines you are using to see if they also contain acetaminophen, or ask your doctor or pharmacist. Abran Sue not use more than 4 grams (4,000 milligrams) total of acetaminophen in one day. · Drink plenty of liquids to help avoid constipation.   If a dose is missed:   · Some of these medicines need to be used on a fixed schedule. If you miss a dose or forget to use your medicine, call your doctor pharmacist for instructions. Do not use extra medicine to make up for a missed dose. How to Store and Dispose of This Medicine:   · Store the medicine in a closed container at room temperature, away from heat, moisture, and direct light. Do not refrigerate or freeze the medicine. · Ask your pharmacist, doctor, or health caregiver about the best way to dispose of any outdated medicine or medicine no longer needed. · Keep all medicine out of the reach of children. Never share your medicine with anyone. Drugs and Foods to Avoid:   Ask your doctor or pharmacist before using any other medicine, including over-the-counter medicines, vitamins, and herbal products. · Make sure your doctor knows if you are using a monoamine oxidase inhibitor (MAOI) medicine, such as Eldepryl®, Marplan®, [place], or Parnate®. Make sure your doctor knows if you are also using a medicine to treat depression, such as amitriptyline, doxepin, nortriptyline, Elavil®, Pamelor®, or Sinequan®. Make sure your doctor knows if you are taking an anticholinergic medicine, such as atropine, methscopolamine, or scopolamine. · Tell your doctor if you use anything else that makes you sleepy. Some examples are allergy medicine, narcotic pain medicine, and alcohol. · Do not drink alcohol while you are using this medicine. Warnings While Using This Medicine:   · Make sure your doctor knows if you are pregnant or breast feeding, or if you have a head injury, or other conditions that may cause an increase in intercranial pressure (pressure inside your head). Make sure your doctor knows if you have severe kidney problems or severe liver problems, or if you have hypothyroidism (lack of thyroid function).  Make sure your doctor knows if you have Frederick's disease (adrenal gland disease), or if you have enlarged prostate or urethral stricture. Make sure your doctor knows if you have any abdominal problems, or if you have lung disease or asthma. · This medicine may make you dizzy or drowsy. Avoid driving, using machines, or anything else that could be dangerous if you are not alert. · This medicine can be habit-forming. Do not use more than your prescribed dose. Call your doctor if you think your medicine is not working. · Tell any doctor or dentist who treats you that you are using this medicine. This medicine may affect certain medical test results. · This medicine may cause constipation, especially with long-term use. Ask your doctor if you should use a laxative to prevent and treat constipation. · When a mother is breastfeeding and takes codeine, there is a very small chance that this medicine could cause serious side effects in the baby. This is because codeine works differently in a few women, so their breast milk contains too much medicine. If you take codeine, be alert for these signs of overdose in your nursing baby: sleeping more than usual, trouble breastfeeding, trouble breathing, or being limp and weak. Call the baby's doctor right away if you think there is a problem. If you cannot talk to the doctor, take the baby to the emergency room or call 911. Possible Side Effects While Using This Medicine:   Call your doctor right away if you notice any of these side effects:  · Allergic reaction: Itching or hives, swelling in your face or hands, swelling or tingling in your mouth or throat, chest tightness, trouble breathing  · Dizziness. · Seeing or hearing things that are not there. · Very slow heartbeat. If you notice these less serious side effects, talk with your doctor:   · Change in how much or how often you urinate. · Cold, clammy skin. · Feeling unusually anxious, excited, fearful, or tired. · Nausea, vomiting, constipation, stomach pain or upset, or heartburn. · Skin rash. · Vision changes.   If you notice other side effects that you think are caused by this medicine, tell your doctor. Call your doctor for medical advice about side effects. You may report side effects to FDA at 2-718-OZE-8631  © 2016 [address] is for End User's use only and may not be sold, redistributed or otherwise used for commercial purposes. The above information is an  only. It is not intended as medical advice for individual conditions or treatments. Talk to your doctor, nurse or pharmacist before following any medical regimen to see if it is safe and effective for you.

## 2017-03-21 NOTE — PERIOP NOTES
Patient: Fred You MRN: [id number]  SSN: [id number]   YOB: 1980  Age: 29 y.o. Sex: female     Patient is status post Procedure(s):  LAPAROSCOPIC CHOLECYSTECTOMY, INTRAOPERATIVE CHOLANGIOGRAMS. Surgeon(s) and Role:     * Rachel Anderson MD - Primary    Local/Dose/Irrigation:  26 LOCAL INJECITON TO ABD PORT SITES                  Peripheral IV 03/21/17 Left Antecubital (Active)   Site Assessment Clean, dry, & intact 3/21/2017  8:17 AM   Phlebitis Assessment 0 3/21/2017  8:17 AM   Infiltration Assessment 0 3/21/2017  8:17 AM   Dressing Status Clean, dry, & intact 3/21/2017  8:17 AM   Dressing Type Tape;Transparent 3/21/2017  8:17 AM   Hub Color/Line Status Green; Infusing 3/21/2017  8:17 AM   Action Taken Blood drawn 3/21/2017  8:17 AM          Orogastric Tube 03/21/17 (Active)                     Dressing/Packing:  Wound Abdomen Mid;Right-DRESSING TYPE: Staples; Topical skin adhesive/glue (03/21/17 1048)  Splint/Cast:  ]    Other:  NONE

## 2017-03-21 NOTE — PERIOP NOTES
1059-Handoff Report from Operating Room to PACU    Report received from [facility] and Juany Hurd CRNA regarding Mark Ashford.      Surgeon(s):  Maksim Lazo MD  And Procedure(s) (LRB):  LAPAROSCOPIC CHOLECYSTECTOMY, INTRAOPERATIVE CHOLANGIOGRAMS (N/A)  confirmed   with allergies and dressings discussed. Anesthesia type, drugs, patient history, complications, estimated blood loss, vital signs, intake and output, and last pain medication, lines, reversal medications and temperature were reviewed. 1140- TRANSFER - OUT REPORT:    Verbal report given to April RN(name) on Mark Ashford  being transferred to Phase II(unit) for routine post - op       Report consisted of patients Situation, Background, Assessment and   Recommendations(SBAR). Information from the following report(s) SBAR, Kardex, OR Summary, Procedure Summary, Intake/Output, MAR and Recent Results was reviewed with the receiving nurse. Opportunity for questions and clarification was provided.       Patient transported with:   Registered Nurse

## 2017-03-21 NOTE — OP NOTES
[facility] OPERATIVE REPORT    3/21/2017    Pre-operative Diagnosis: GALLSTONES    Post-operative Diagnosis: cholelithiasis      OPERATIVE PROCEDURE:  1. Laparoscopic cholecystectomy. 2.  Intraoperative cholangiogram with intraoperative interpritation. Surgeon: Leanna Doll MD    Anesthesia: General plus Local    Estimated Blood Loss: Minimal    FINDINGS:   The patient was noted to have a gallbladder with thin wall, sludge and bile. Intraoperative cholangiogram was obtained. Radiologist was not present during the case. Intraoperative interpretation revealed filling of a normal length cystic duct, a common bile duct, and all proximal and distal structures with free flow of contrast into the duodenum. Initial imagine revealed an air bubble within the duct, but was able to be flushed through with change in position. No additional filling defects noted. The liver appeared normal, and the remaining abdominal cavity appeared normal.    SPECIMEN:    ID Type Source Tests Collected by Time Destination   1 : Jhonathan Mohamud MD 3/21/2017 1024 Pathology        DRAINS:  None. COMPLICATIONS:  None. DESCRIPTION OF PROCEDURE:  After satisfactory induction of general  anesthesia, the patient was kept in the supine position. The patient's  abdomen was prepped and draped sterilely. After infusion of the skin with local anesthetic, a small incision was made  and a supraumbilical 5-mm trocar was placed intra-abdominally under  visualization. Pneumoperitoneum was achieved. The abdomen was explored  with findings noted above. A 12-mm port was placed in the patient's  epigastrium and an additional 5-mm port was placed in the patient's right  abdomen. The gallbladder was retracted superiorly and laterally. The  reflection of the peritoneum was brought down exposing Calot's triangle.   The cystic duct was  from surrounding tissues with the critical view obtained, including the cystic duct and the common bile duct junction. A single clip was placed at the junction of the cystic duct and gallbladder. A small ductotomy was created and intraoperative cholangiogram was obtained with the findings noted above. The  cholangiogram catheter was removed. Three clips were placed just distal to  the ductotomy on the cystic duct and the duct was transected. Next, the  cystic artery was identified,  from its surrounding tissues, 2  clips placed proximally and 1 distally, and was transected as well. Additionally, there were two tiny bleeding vessel draining directly into the  Gallbladder along the bed. Additional clips were used to control this. The spatula-tipped Bovie was used to separate the gallbladder from its bed in  the liver. Once freed, irrigation of the right upper quadrant and final  irrigation running clear, complete hemostasis was noted. Clips noted to be  in place without any extravasation of blood or bile. The pelvis was visualized and  noted to be normal.  Ten mL of 0.5 Marcaine with epinephrine was sprayed over dome of the liver. The gallbladder was retrieved through the epigastric port site using an endopouch. The remaining trocar sites were removed sequentially under visualization, and the pneumoperitoneum was allowed to escape. A figure-of-eight 0 Vicryl suture was used to close the epigastic fascial defect, followed by additional Vicryl in the soft tissue, followed by closure of all skin incisions with subcuticular 4-0 Monocryl and Dermabond. The patient remained stable during my presence in the operating room.       Laura Leon MD

## 2017-03-21 NOTE — IP AVS SNAPSHOT
[address] [facility] 
[phone] Patient: Ariela Dow 
MRN: [id number] LNU:[birth year] You are allergic to the following Allergen Reactions Gluten Diarrhea Fiorinal-Codeine #3 (Codeine-Butalbital-Asa-Caff) Nausea Only Recent Documentation Height Weight BMI OB Status Smoking Status 1.549 m 73.5 kg 30.62 kg/m2 IUD Never Smoker Emergency Contacts Name Discharge Info Relation Home Work Mobile [address] CAREGIVER [3] Spouse [3] [phone] Juli Frazier  Parent [1] [phone] Tresa Frazier  Other Relative [6] [phone] About your hospitalization You were admitted on:  March 21, 2017 You last received care in the:  [facility] PACU You were discharged on:  March 21, 2017 Unit phone number:  [phone] Why you were hospitalized Your primary diagnosis was:  Not on File Providers Seen During Your Hospitalizations Provider Role Specialty Primary office phone Pa Ortega MD Attending Provider General Surgery [phone] Your Primary Care Physician (PCP) Primary Care Physician Office Phone Office Fax [facility] [phone] Follow-up Information Follow up With Details Comments Contact Info Spenser Ames MD   [address] [facility] [address] [facility] [address] 
[phone] Your Appointments Tuesday April 04, 2017 10:00 AM EDT  
ESTABLISHED PATIENT with Frankey Krabbe, MD  
Arthritis and [address] [facility]) [address] [place] [address]  
[phone] Thursday April 06, 2017 10:00 AM EDT  
POST OP 10 MIN with Pa Ortega MD  
[facility] [address] ([facility] [address] [facility] [address], [address] [facility]  
[phone] Current Discharge Medication List  
  
START taking these medications Dose & Instructions Dispensing Information Comments Morning Noon Evening Bedtime  
 docusate sodium 100 mg capsule Commonly known as:  Bianca Okeefe Your last dose was: Your next dose is:    
   
   
 Dose:  100 mg Take 1 Cap by mouth two (2) times a day for 14 days. Stop taking if you have diarrhea Quantity:  30 Cap Refills:  2  
     
   
   
   
  
 oxyCODONE-acetaminophen 5-325 mg per tablet Commonly known as:  PERCOCET Your last dose was: Your next dose is:    
   
   
 Dose:  1-2 Tab Take 1-2 Tabs by mouth every four (4) hours as needed for Pain. Max Daily Amount: 12 Tabs. Quantity:  40 Tab Refills:  0 CONTINUE these medications which have CHANGED Dose & Instructions Dispensing Information Comments Morning Noon Evening Bedtime  
 hydroxychloroquine 200 mg tablet Commonly known as:  PLAQUENIL What changed:  See the new instructions. Your last dose was: Your next dose is: TAKE ONE TABLET BY MOUTH TWICE DAILY Quantity:  60 Tab Refills:  9 CONTINUE these medications which have NOT CHANGED Dose & Instructions Dispensing Information Comments Morning Noon Evening Bedtime  
 atenolol 50 mg tablet Commonly known as:  TENORMIN Your last dose was: Your next dose is: Take  by mouth nightly. Refills:  0  
     
   
   
   
  
 CELLCEPT 500 mg tablet Generic drug:  mycophenolate Your last dose was: Your next dose is:    
   
   
 Dose:  1500 mg Take 1,500 mg by mouth two (2) times a day. Refills:  0 DEXILANT 60 mg Cpdb Generic drug:  Dexlansoprazole Your last dose was: Your next dose is:    
   
   
 Dose:  60 mg Take 60 mg by mouth daily. Refills:  0 FLUoxetine 20 mg tablet Commonly known as:  PROzac Your last dose was: Your next dose is:    
   
   
 Dose:  20 mg Take 20 mg by mouth daily. Refills:  0  
     
   
   
   
  
 losartan 100 mg tablet Commonly known as:  COZAAR Your last dose was: Your next dose is:    
   
   
 Dose:  100 mg Take 100 mg by mouth nightly. Refills:  0 LOVENOX SC Your last dose was: Your next dose is:    
   
   
 by SubCUTAneous route once. Refills:  0  
     
   
   
   
  
 raNITIdine 300 mg tablet Commonly known as:  ZANTAC Your last dose was: Your next dose is:    
   
   
 Dose:  300 mg Take 300 mg by mouth daily. Refills:  0  
     
   
   
   
  
 simvastatin 20 mg tablet Commonly known as:  ZOCOR Your last dose was: Your next dose is: TAKE 1 TABLET BY MOUTH ONCE DAILY Refills:  3  
     
   
   
   
  
 warfarin 5 mg tablet Commonly known as:  COUMADIN Your last dose was: Your next dose is:    
   
   
 Dose:  6 mg Take 6 mg by mouth daily. Refills:  0 Where to Get Your Medications These medications were sent to [address], [address] Phone:  [phone]  
  docusate sodium 100 mg capsule Information on where to get these meds will be given to you by the nurse or doctor. ! Ask your nurse or doctor about these medications  
  oxyCODONE-acetaminophen 5-325 mg per tablet Discharge Instructions Dr Reginald Garcia Discharge Instructions after Laparoscopic Cholecystectomy Cornelia Morin [id number] : 1980 Admitted 3/21/2017 Discharged: 3/21/2017 ** restart your Lovenox AND Coumadin tomorrow 3/22/17 ** make an appointment with [facility] on Friday 3/24/17 for an INR check What to do at [facility] Recommended diet: Low Fat Diet for 1 month Recommended activity: as tolerated, do not drive for 3-5 days while on pain medicine. Follow-up with Dr. Elif Carrera in 2 weeks. Call [phone] for an appointment. Cholecystectomy: What to Expect at [facility] Your Recovery After your surgery, it is normal to feel weak and tired for several days after you return home. Your belly may be swollen. If you had laparoscopic surgery, you may also have pain in your shoulder for about 24 hours. You may have gas or need to burp a lot at first, and a few people get diarrhea. The diarrhea usually goes away in 2 to 4 weeks, but it may last longer. How quickly you recover depends on whether you had a laparoscopic or open surgery. For a laparoscopic surgery, most people can go back to work or their normal routine in 1 to 2 weeks, but it may take longer, depending on the type of work you do. For an open surgery, it will probably take 4 to 6 weeks before you get back to your normal routine. This care sheet gives you a general idea about how long it will take for you to recover. However, each person recovers at a different pace. Follow the steps below to get better as quickly as possible. How can you care for yourself at home? Activity Rest when you feel tired. Getting enough sleep will help you recover. Try to walk each day. Start out by walking a little more than you did the day before. Gradually increase the amount you walk. Walking boosts blood flow and helps prevent pneumonia and constipation. Avoid strenuous activities, such as biking, jogging, weightlifting, and aerobic exercise, for 1-2 weeks. You may shower 24 hours after surgery. Pat the cut (incision) dry. Do not take a bath for the first 2 weeks, or until your doctor tells you it is okay. You may drive when you are no longer taking pain medicine and can quickly move your foot from the gas pedal to the brake.  You must also be able to sit comfortably for a long period of time, even if you do not plan to go far. For a laparoscopic surgery, most people can go back to work or their normal routine in 1 to 2 weeks, but it may take longer. For an open surgery, it will probably take 4 to 6 weeks before you get back to your normal routine. Diet Eat smaller meals more often instead of fewer larger meals. You can eat a normal diet, but avoid eating fatty foods for about 1 month. Fatty foods include hamburger, whole milk, cheese, and many snack foods. If your stomach is upset, try bland, low-fat foods like plain rice, broiled chicken, toast, and yogurt. Drink plenty of fluids (unless your doctor tells you not to). If you have diarrhea, try avoiding spicy foods, dairy products, fatty foods, and alcohol. You can also watch to see if specific foods cause it, and stop eating them. If the diarrhea continues for more than 2 weeks, talk to your doctor. You may notice that your bowel movements are not regular right after your surgery. This is common. Try to avoid constipation and straining with bowel movements. You may want to take a fiber supplement every day. If you have not had a bowel movement after a couple of days, ask your doctor about taking a mild laxative. Medicines Take pain medicines exactly as directed. If the doctor gave you a prescription medicine for pain, take it as prescribed. If you are not taking a prescription pain medicine, take an over-the-counter medicine such as acetaminophen (Tylenol), ibuprofen (Advil, Motrin), or naproxen (Aleve). Read and follow all instructions on the label. Do not take two or more pain medicines at the same time unless the doctor told you to. Many pain medicines contain acetaminophen, which is Tylenol. Too much Tylenol can be harmful. If you think your pain medicine is making you sick to your stomach: Take your medicine after meals (unless your doctor tells you not to). Ask your doctor for a different pain medicine. If your doctor prescribed antibiotics, take them as directed. Do not stop taking them just because you feel better. You need to take the full course of antibiotics. Incision care After 24 to 48 hours, wash the area daily with warm, soapy water, and pat it dry. You may have staples to hold the cut together. Keep them dry until your doctor takes them out. This is usually in 7 to 10 days. Keep the area clean and dry. You may cover it with a gauze bandage if it weeps or rubs against clothing. Change the bandage every day. Ice To reduce swelling and pain, put ice or a cold pack on your belly for 10 to 15 minutes at a time. Do this every 1 to 2 hours. Put a thin cloth between the ice and your skin. Follow-up care is a key part of your treatment and safety. Be sure to make and go to all appointments, and call your doctor if you are having problems. Its also a good idea to know your test results and keep a list of the medicines you take. When should you call for help? Call 911 anytime you think you may need emergency care. For example, call if: You pass out (lose consciousness). You have severe trouble breathing. You have sudden chest pain and shortness of breath, or you cough up blood. Call your doctor now or seek immediate medical care if: 
You are sick to your stomach and cannot drink fluids. You have pain that does not get better when you take your pain medicine. You have signs of infection, such as: Increased pain, warmth, or excessive (>1inch) redness. Red streaks leading from the incision. Pus draining from the incision. Swollen lymph nodes in your neck, armpits, or groin. A fever. Your urine turns dark brown or your stool is light-colored or ivy-colored. Your skin turns yellow. Bright red blood has soaked through a large bandage over your incision. You have signs of a blood clot, such as: Pain in your calf, back of knee, thigh, or groin. Redness and swelling in your leg or groin. You have trouble passing urine or stool, especially if you have mild pain or swelling in your lower belly. Watch closely for any changes in your health, and be sure to contact your doctor if: You do not have a bowel movement after taking a laxative. DISCHARGE SUMMARY from Nurse The following personal items are in your possession at time of discharge: 
 
Dental Appliances: None Visual Aid: Contacts, Glasses (Contacts at home, Glasses to PACU) Hearing Aids/Status: Does not own Home Medications: None Jewelry: Ring (To ) Clothing: Footwear, Socks, Undergarments, Pants, Shirt, Sweater (Sent to PACU) Other Valuables: Eyeglasses (To PACU) Personal Items Sent to Safe: Declines PATIENT INSTRUCTIONS: 
 
After general anesthesia or intravenous sedation, for 24 hours or while taking prescription Narcotics: · Limit your activities · Do not drive and operate hazardous machinery · Do not make important personal or business decisions · Do  not drink alcoholic beverages · If you have not urinated within 8 hours after discharge, please contact your surgeon on call. Report the following to your surgeon: 
· Excessive pain, swelling, redness or odor of or around the surgical area · Temperature over 100.5 · Nausea and vomiting lasting longer than 4 hours or if unable to take medications · Any signs of decreased circulation or nerve impairment to extremity: change in color, persistent  numbness, tingling, coldness or increase pain · Any questions What to do at Home: *  Please give a list of your current medications to your Primary Care Provider. *  Please update this list whenever your medications are discontinued, doses are 
    changed, or new medications (including over-the-counter products) are added.  
 
*  Please carry medication information at all times in case of emergency situations. These are general instructions for a healthy lifestyle: No smoking/ No tobacco products/ Avoid exposure to second hand smoke Surgeon General's Warning:  Quitting smoking now greatly reduces serious risk to your health. Obesity, smoking, and sedentary lifestyle greatly increases your risk for illness A healthy diet, regular physical exercise & weight monitoring are important for maintaining a healthy lifestyle You may be retaining fluid if you have a history of heart failure or if you experience any of the following symptoms:  Weight gain of 3 pounds or more overnight or 5 pounds in a week, increased swelling in our hands or feet or shortness of breath while lying flat in bed. Please call your doctor as soon as you notice any of these symptoms; do not wait until your next office visit. Recognize signs and symptoms of STROKE: 
 
F-face looks uneven A-arms unable to move or move unevenly S-speech slurred or non-existent T-time-call 911 as soon as signs and symptoms begin-DO NOT go Back to bed or wait to see if you get better-TIME IS BRAIN. Warning Signs of HEART ATTACK Call 911 if you have these symptoms: 
? Chest discomfort. Most heart attacks involve discomfort in the center of the chest that lasts more than a few minutes, or that goes away and comes back. It can feel like uncomfortable pressure, squeezing, fullness, or pain. ? Discomfort in other areas of the upper body. Symptoms can include pain or discomfort in one or both arms, the back, neck, jaw, or stomach. ? Shortness of breath with or without chest discomfort. ? Other signs may include breaking out in a cold sweat, nausea, or lightheadedness. Don't wait more than five minutes to call [address]! Fast action can save your life. Calling 911 is almost always the fastest way to get lifesaving treatment.  Emergency Medical Services staff can begin treatment when they arrive  up to an hour sooner than if someone gets to the hospital by car. The discharge information has been reviewed with the patient and caregiver. The patient and caregiver verbalized understanding. Discharge medications reviewed with the patient and caregiver and appropriate educational materials and side effects teaching were provided. How to Care for Your Wound After Its Treated With DERMABOND* Topical Skin Adhesive DERMABOND* Topical Skin Adhesive (2-octyl cyanoacrylate) is a sterile, liquid skin adhesive 
that holds wound edges together. The film will usually remain in place for 5 to 10 days, then 
naturally fall off your skin. The following will answer some of your questions and provide instructions for proper care for your 
wound while it is healing: CHECK WOUND APPEARANCE 
 Some swelling, redness, and pain are common with all wounds and normally will go away as the 
wound heals. If swelling, redness, or pain increases or if the wound feels warm to the touch, 
contact a doctor. Also contact a doctor if the wound edges reopen or separate. REPLACE BANDAGES 
 If your wound is bandaged, keep the bandage dry.  Replace the dressing daily until the adhesive film has fallen off or if the 
bandage should become wet, unless otherwise instructed by your 
physician.  When changing the dressing, do not place tape directly over the DERMABOND adhesive film, because removing the tape later may also 
remove the film. AVOID TOPICAL MEDICATIONS  Do not apply liquid or ointment medications or any other product to your wound while the DERMABOND adhesive film is in place. These may loosen the film before your wound is healed. KEEP WOUND DRY AND PROTECTED  You may occasionally and briefly wet your wound in the shower or bath.  Do not soak or scrub 
your wound, do not swim, and avoid periods of heavy perspiration until the DERMABOND 
 adhesive has naturally fallen off. After showering or bathing, gently blot your wound dry with a 
soft towel. If a protective dressing is being used, apply a fresh, dry bandage, being sure to keep 
the tape off the DERMABOND adhesive film.  Apply a clean, dry bandage over the wound if necessary to protect it.  Protect your wound from injury until the skin has had sufficient time to heal. 
 Do not scratch, rub, or pick at the DERMABOND adhesive film. This may loosen the film before 
your wound is healed.  Protect the wound from prolonged exposure to sunlight or tanning lamps while the film is in 
place. If you have any questions or concerns about this product, please consult your doctor. *Trademark ©[employer] 2002 Narcotic-Analgesic/Acetaminophen (By mouth) Relieves pain. Brand Name(s):Capital w/Codeine, Endocet, Hycet, Lorcet, Lorcet HD, Lorcet Plus, Lortab 10/325, Lortab 5/325, Lortab 7.5/325, Lortab Elixir, Norco, Percocet, [place], [place], Trezix There may be other brand names for this medicine. When This Medicine Should Not Be Used: You should not use this medicine if you have had an allergic reaction to acetaminophen, codeine, hydrocodone, propoxyphene, or sulfites. You should not use this medicine if you have had an allergic reaction to any other opioid pain medicine. How to Use This Medicine:  
Liquid, Tablet, Capsule · Your doctor will tell you how much medicine to use. Do not use more than directed. · This medicine contains acetaminophen. Read the labels of all other medicines you are using to see if they also contain acetaminophen, or ask your doctor or pharmacist. Jonah Sheryl not use more than 4 grams (4,000 milligrams) total of acetaminophen in one day. · Drink plenty of liquids to help avoid constipation. If a dose is missed: · Some of these medicines need to be used on a fixed schedule.  If you miss a dose or forget to use your medicine, call your doctor pharmacist for instructions. Do not use extra medicine to make up for a missed dose. How to Store and Dispose of This Medicine: · Store the medicine in a closed container at room temperature, away from heat, moisture, and direct light. Do not refrigerate or freeze the medicine. · Ask your pharmacist, doctor, or health caregiver about the best way to dispose of any outdated medicine or medicine no longer needed. · Keep all medicine out of the reach of children. Never share your medicine with anyone. Drugs and Foods to Avoid: Ask your doctor or pharmacist before using any other medicine, including over-the-counter medicines, vitamins, and herbal products. · Make sure your doctor knows if you are using a monoamine oxidase inhibitor (MAOI) medicine, such as Eldepryl®, Marplan®, [place], or Parnate®. Make sure your doctor knows if you are also using a medicine to treat depression, such as amitriptyline, doxepin, nortriptyline, Elavil®, Pamelor®, or Sinequan®. Make sure your doctor knows if you are taking an anticholinergic medicine, such as atropine, methscopolamine, or scopolamine. · Tell your doctor if you use anything else that makes you sleepy. Some examples are allergy medicine, narcotic pain medicine, and alcohol. · Do not drink alcohol while you are using this medicine. Warnings While Using This Medicine: · Make sure your doctor knows if you are pregnant or breast feeding, or if you have a head injury, or other conditions that may cause an increase in intercranial pressure (pressure inside your head). Make sure your doctor knows if you have severe kidney problems or severe liver problems, or if you have hypothyroidism (lack of thyroid function). Make sure your doctor knows if you have [place]'s disease (adrenal gland disease), or if you have enlarged prostate or urethral stricture. Make sure your doctor knows if you have any abdominal problems, or if you have lung disease or asthma. · This medicine may make you dizzy or drowsy. Avoid driving, using machines, or anything else that could be dangerous if you are not alert. · This medicine can be habit-forming. Do not use more than your prescribed dose. Call your doctor if you think your medicine is not working. · Tell any doctor or dentist who treats you that you are using this medicine. This medicine may affect certain medical test results. · This medicine may cause constipation, especially with long-term use. Ask your doctor if you should use a laxative to prevent and treat constipation. · When a mother is breastfeeding and takes codeine, there is a very small chance that this medicine could cause serious side effects in the baby. This is because codeine works differently in a few women, so their breast milk contains too much medicine. If you take codeine, be alert for these signs of overdose in your nursing baby: sleeping more than usual, trouble breastfeeding, trouble breathing, or being limp and weak. Call the baby's doctor right away if you think there is a problem. If you cannot talk to the doctor, take the baby to the emergency room or call 911. Possible Side Effects While Using This Medicine:  
Call your doctor right away if you notice any of these side effects: · Allergic reaction: Itching or hives, swelling in your face or hands, swelling or tingling in your mouth or throat, chest tightness, trouble breathing · Dizziness. · Seeing or hearing things that are not there. · Very slow heartbeat. If you notice these less serious side effects, talk with your doctor: · Change in how much or how often you urinate. · Cold, clammy skin. · Feeling unusually anxious, excited, fearful, or tired. · Nausea, vomiting, constipation, stomach pain or upset, or heartburn. · Skin rash. · Vision changes. If you notice other side effects that you think are caused by this medicine, tell your doctor. Call your doctor for medical advice about side effects. You may report side effects to FDA at 9-324-FDA-3634 © 2016 [address] is for End User's use only and may not be sold, redistributed or otherwise used for commercial purposes. The above information is an  only. It is not intended as medical advice for individual conditions or treatments. Talk to your doctor, nurse or pharmacist before following any medical regimen to see if it is safe and effective for you. Discharge Orders None Introducing [facility] [place] [facility]! Dear Ramin Lopez: Thank you for requesting a [employer] account. Our records indicate that you have previously registered for a [employer] account but its currently inactive. Please call our [employer] support line at [phone]. Additional Information If you have questions, please visit the Frequently Asked Questions section of the [employer] website at [email or web address] Remember, [employer] is NOT to be used for urgent needs. For medical emergencies, dial 911. Now available from your iPhone and Android! General Information Please provide this summary of care documentation to your next provider. Patient Signature:  ____________________________________________________________ Date:  ____________________________________________________________  
  
[employer] Provider Signature:  ____________________________________________________________ Date:  ____________________________________________________________

## 2017-03-21 NOTE — ANESTHESIA POSTPROCEDURE EVALUATION
Post-Anesthesia Evaluation and Assessment    Patient: Vandy Sandifer MRN: [id number]  SSN: [id number]    YOB: 1980  Age: 29 y.o. Sex: female       Cardiovascular Function/Vital Signs  Visit Vitals    /61    Pulse (!) 48    Temp 36.7 °C (98 °F)    Resp 20    Ht 5' 1\" (1.549 m)    Wt 73.5 kg (162 lb 0.6 oz)    SpO2 100%    BMI 30.62 kg/m2       Patient is status post general anesthesia for Procedure(s):  LAPAROSCOPIC CHOLECYSTECTOMY, INTRAOPERATIVE CHOLANGIOGRAMS. Nausea/Vomiting: None    Postoperative hydration reviewed and adequate. Pain:  Pain Scale 1: Numeric (0 - 10) (03/21/17 1130)  Pain Intensity 1: 2 (03/21/17 1130)   Managed    Neurological Status:   Neuro (WDL): Exceptions to WDL (03/21/17 1059)  Neuro  Neurologic State: Drowsy (03/21/17 1059)  Orientation Level: Oriented X4 (03/21/17 1059)  Cognition: Follows commands (03/21/17 1059)  Speech: Clear (03/21/17 1059)  LUE Motor Response: Purposeful (03/21/17 1059)  LLE Motor Response: Purposeful (03/21/17 1059)  RUE Motor Response: Purposeful (03/21/17 1059)  RLE Motor Response: Purposeful (03/21/17 1059)   At baseline    Mental Status and Level of Consciousness: Arousable    Pulmonary Status:   O2 Device: Room air (03/21/17 1130)   Adequate oxygenation and airway patent    Complications related to anesthesia: None    Post-anesthesia assessment completed.  No concerns    Signed By: Pilar Neal MD     March 21, 2017

## 2017-03-21 NOTE — INTERVAL H&P NOTE
H&P Update:  Fred You was seen and examined. History and physical has been reviewed. The patient has been examined.  There have been no significant clinical changes since the completion of the originally dated History and Physical.    Signed By: Rachel Anderson MD     March 21, 2017 8:50 AM

## 2017-03-21 NOTE — PERIOP NOTES
0818:  Dr. Noah Duran at bedside    5586:  Phoned for patients  to come to bedside    0853:  Dr. Júnior Herron at bedside. He is aware of labs.
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
## 2017-03-23 ENCOUNTER — HOSPITAL ENCOUNTER (OUTPATIENT)
Dept: GENERAL RADIOLOGY | Age: 37
Discharge: HOME | End: 2017-03-23
Attending: SURGERY

## 2017-03-30 ENCOUNTER — TELEPHONE (OUTPATIENT)
Dept: SURGERY | Age: 37
End: 2017-03-30

## 2017-03-30 NOTE — TELEPHONE ENCOUNTER
Pt c/o small red rash where dermabond is peeling off. S/P lap sammie on 3/21/17. Katherene Means No drainage noted. Pt denies itching or any other symptoms of allergic reaction. Instructed to to take benadryl as directed on label. If symptoms worsen or no better call the office back immediately.

## 2017-04-06 ENCOUNTER — OFFICE VISIT (OUTPATIENT)
Dept: SURGERY | Age: 37
End: 2017-04-06

## 2017-04-06 VITALS
SYSTOLIC BLOOD PRESSURE: 120 MMHG | HEART RATE: 59 BPM | DIASTOLIC BLOOD PRESSURE: 75 MMHG | WEIGHT: 162 LBS | BODY MASS INDEX: 30.58 KG/M2 | HEIGHT: 61 IN

## 2017-04-06 DIAGNOSIS — Z09 POSTOPERATIVE EXAMINATION: Primary | ICD-10-CM

## 2017-04-06 NOTE — MR AVS SNAPSHOT
Visit Information Date & Time Provider Department Dept. Phone Encounter #  
 4/6/2017 10:00 AM Clark Vivas MD [facility] [id number] Your Appointments 4/6/2017 10:00 AM  
POST OP 10 MIN with Clark Vivas MD  
[facility] Dr. Javi Breen [facility] ([address]) Appt Note: Post/op lap sammie on 3/21/17  
 [address], [address] [facility], [address] [address]  
[address], [address], [address] [facility] [address] [address]  
  
    
 5/31/2017 10:00 AM  
ESTABLISHED PATIENT with Jerald Gaytan MD  
Arthritis and [address]) Appt Note: f/UP; f/u  
 [address] [facility] [address]  
[phone]  
  
   
 [address] [facility] [address] [address] Upcoming Health Maintenance Date Due DTaP/Tdap/Td series (1 - Tdap) 7/13/2002 PAP AKA CERVICAL CYTOLOGY 7/13/2002 Pneumococcal 19-64 Highest Risk (3 of 3 - PCV13) 8/29/2017 Allergies as of 4/6/2017  Review Complete On: 3/21/2017 By: Augusta Marin Severity Noted Reaction Type Reactions Gluten High 02/29/2012   Side Effect Diarrhea Fiorinal-codeine #3 [Codeine-butalbital-asa-caff]  01/07/2014    Nausea Only Current Immunizations  Reviewed on 10/21/2016 Name Date Influenza Vaccine 8/29/2016, 9/19/2015, 11/17/2014, 10/14/2012 Influenza Vaccine PF 9/30/2013 Pneumococcal Polysaccharide (PPSV-23) 8/29/2016 Pneumococcal Vaccine (Unspecified Type) 10/14/2011 Not reviewed this visit Vitals OB Status Smoking Status IUD Never Smoker Your Updated Medication List  
  
   
This list is accurate as of: 4/6/17  9:59 AM.  Always use your most recent med list.  
  
  
  
  
 atenolol 50 mg tablet Commonly known as:  TENORMIN Take  by mouth nightly. CELLCEPT 500 mg tablet Generic drug:  mycophenolate Take 1,500 mg by mouth two (2) times a day. DEXILANT 60 mg Cpdb Generic drug:  Dexlansoprazole Take 60 mg by mouth daily. FLUoxetine 20 mg tablet Commonly known as:  PROzac Take 20 mg by mouth daily. hydroxychloroquine 200 mg tablet Commonly known as:  PLAQUENIL  
TAKE ONE TABLET BY MOUTH TWICE DAILY losartan 100 mg tablet Commonly known as:  COZAAR Take 100 mg by mouth nightly. LOVENOX SC  
by SubCUTAneous route once. oxyCODONE-acetaminophen 5-325 mg per tablet Commonly known as:  PERCOCET Take 1-2 Tabs by mouth every four (4) hours as needed for Pain. Max Daily Amount: 12 Tabs. raNITIdine 300 mg tablet Commonly known as:  ZANTAC Take 300 mg by mouth daily. simvastatin 20 mg tablet Commonly known as:  ZOCOR  
TAKE 1 TABLET BY MOUTH ONCE DAILY  
  
 warfarin 5 mg tablet Commonly known as:  COUMADIN Take 6 mg by mouth daily. Introducing hospitals & HEALTH SERVICES! Dear Effie Saba: Thank you for requesting a [employer] account. Our records indicate that you have previously registered for a [employer] account but its currently inactive. Please call our [employer] support line at [phone]. Additional Information If you have questions, please visit the Frequently Asked Questions section of the Fashion[employer] website at [email or web address] Remember, Fashionchick is NOT to be used for urgent needs. For medical emergencies, dial 911. Now available from your iPhone and Android! Please provide this summary of care documentation to your next provider. Your primary care clinician is listed as Hui Lieberman. If you have any questions after today's visit, please call [phone].

## 2017-04-06 NOTE — PROGRESS NOTES
Chief Complaint   Patient presents with    Surgical Follow-up     lap sammie on 03/21     IOC: normal  Path:   Cholelithiasis and mild chronic cholecystitis with cholesterolosis       Tolerating PO  No pain    complete resolution of preoperative symptoms  BMs normal    Physical Exam:   Abdominal exam: soft  non-distended  appropriatly tender. Wound: clean, dry, no drainage    Doing well  Continue restricted activity.   Follow-up: paramjit Whyte MD FACS

## 2017-04-06 NOTE — PROGRESS NOTES
Visit Vitals    /75 (BP 1 Location: Right arm, BP Patient Position: Sitting)    Pulse (!) 59    Ht 5' 1\" (1.549 m)    Wt 73.5 kg (162 lb)    BMI 30.61 kg/m2       1. Have you been to the ER, urgent care clinic since your last visit? Hospitalized since your last visit? yes 03/21    2. Have you seen or consulted any other health care providers outside of the [address] [place] [address] since your last visit? Include any pap smears or colon screening.  Rhuematology,Nephrology,Cancer [facility] for Coumadin

## 2017-05-31 ENCOUNTER — OFFICE VISIT (OUTPATIENT)
Dept: RHEUMATOLOGY | Age: 37
End: 2017-05-31

## 2017-05-31 VITALS
OXYGEN SATURATION: 97 % | SYSTOLIC BLOOD PRESSURE: 122 MMHG | HEIGHT: 61 IN | HEART RATE: 62 BPM | WEIGHT: 163 LBS | TEMPERATURE: 98.2 F | BODY MASS INDEX: 30.78 KG/M2 | RESPIRATION RATE: 18 BRPM | DIASTOLIC BLOOD PRESSURE: 82 MMHG

## 2017-05-31 DIAGNOSIS — M32.14 SYSTEMIC LUPUS ERYTHEMATOSUS WITH GLOMERULAR DISEASE, UNSPECIFIED SLE TYPE (HCC): Primary | Chronic | ICD-10-CM

## 2017-05-31 DIAGNOSIS — Z79.60 LONG-TERM USE OF IMMUNOSUPPRESSANT MEDICATION: ICD-10-CM

## 2017-05-31 DIAGNOSIS — R53.83 FATIGUE, UNSPECIFIED TYPE: ICD-10-CM

## 2017-05-31 DIAGNOSIS — Z79.52 LONG TERM (CURRENT) USE OF SYSTEMIC STEROIDS: ICD-10-CM

## 2017-05-31 DIAGNOSIS — H34.231 BRANCH RETINAL ARTERY OCCLUSION, RIGHT: ICD-10-CM

## 2017-05-31 DIAGNOSIS — R13.10 DYSPHAGIA, UNSPECIFIED TYPE: ICD-10-CM

## 2017-05-31 DIAGNOSIS — M25.571 ACUTE RIGHT ANKLE PAIN: ICD-10-CM

## 2017-05-31 RX ORDER — WARFARIN 6 MG/1
TABLET ORAL
Refills: 2 | COMMUNITY
Start: 2017-05-13 | End: 2018-06-14

## 2017-05-31 RX ORDER — KETOCONAZOLE 20 MG/G
CREAM TOPICAL
COMMUNITY
Start: 2017-03-06 | End: 2018-03-21

## 2017-05-31 RX ORDER — KETOCONAZOLE 20 MG/ML
SHAMPOO TOPICAL
Refills: 6 | COMMUNITY
Start: 2017-03-06 | End: 2018-03-21

## 2017-05-31 RX ORDER — METHYLPREDNISOLONE 4 MG/1
TABLET ORAL
Qty: 1 DOSE PACK | Refills: 0 | Status: SHIPPED | OUTPATIENT
Start: 2017-05-31 | End: 2017-07-10

## 2017-05-31 NOTE — MR AVS SNAPSHOT
Visit Information Date & Time Provider Department Dept. Phone Encounter #  
 5/31/2017 10:00 AM Jerald Gaytan MD [facility] [id number] Follow-up Instructions Return in about 3 months (around 8/31/2017). Upcoming Health Maintenance Date Due DTaP/Tdap/Td series (1 - Tdap) 7/13/2002 PAP AKA CERVICAL CYTOLOGY 7/13/2002 INFLUENZA AGE 9 TO ADULT 8/1/2017 Pneumococcal 19-64 Highest Risk (3 of 3 - PCV13) 8/29/2017 Allergies as of 5/31/2017  Review Complete On: 5/31/2017 By: Jerald Gaytan MD  
  
 Severity Noted Reaction Type Reactions Gluten High 02/29/2012   Side Effect Diarrhea Fiorinal-codeine #3 [Codeine-butalbital-asa-caff]  01/07/2014    Nausea Only Current Immunizations  Reviewed on 5/31/2017 Name Date Influenza Vaccine 8/29/2016, 9/19/2015, 11/17/2014, 10/14/2012 Influenza Vaccine PF 9/30/2013 Pneumococcal Polysaccharide (PPSV-23) 8/29/2016 Pneumococcal Vaccine (Unspecified Type) 10/14/2011 Reviewed by Rogelio Pan LPN on 3/72/8895 at 25:05 AM  
You Were Diagnosed With   
  
 Codes Comments Systemic lupus erythematosus with glomerular disease, unspecified SLE type    -  Primary ICD-10-CM: M32.14 ICD-9-CM: 710.0, 583.81 Long-term use of immunosuppressant medication     ICD-10-CM: Z79.899 ICD-9-CM: V58.69 Branch retinal artery occlusion, right     ICD-10-CM: H34.231 ICD-9-CM: 362.32 Long term (current) use of systemic steroids     ICD-10-CM: Z79.52 
ICD-9-CM: V58.65 Fatigue, unspecified type     ICD-10-CM: R53.83 ICD-9-CM: 780.79 Dysphagia, unspecified type     ICD-10-CM: R13.10 ICD-9-CM: 787.20 Acute right ankle pain     ICD-10-CM: M25.571 ICD-9-CM: 719.47, 338.19 Vitals BP Pulse Temp Resp Height(growth percentile) Weight(growth percentile)  122/82 (BP 1 Location: Right arm, BP Patient Position: Sitting) 62 98.2 °F (36.8 °C) (Oral) 18 5' 1\" (1.549 m) 163 lb (73.9 kg) SpO2 BMI OB Status Smoking Status 97% 30.8 kg/m2 IUD Never Smoker BMI and BSA Data Body Mass Index Body Surface Area  
 30.8 kg/m 2 1.78 m 2 Preferred Pharmacy Pharmacy Name Phone [facility] [address] [facility], [address] [phone] Your Updated Medication List  
  
   
This list is accurate as of: 5/31/17 10:45 AM.  Always use your most recent med list.  
  
  
  
  
 atenolol 50 mg tablet Commonly known as:  TENORMIN Take  by mouth nightly. CELLCEPT 500 mg tablet Generic drug:  mycophenolate Take 1,500 mg by mouth two (2) times a day. DEXILANT 60 mg Cpdb Generic drug:  Dexlansoprazole Take 60 mg by mouth daily. FLUoxetine 20 mg tablet Commonly known as:  PROzac Take 20 mg by mouth daily. hydroxychloroquine 200 mg tablet Commonly known as:  PLAQUENIL  
TAKE ONE TABLET BY MOUTH TWICE DAILY * ketoconazole 2 % topical cream  
Commonly known as:  NIZORAL * ketoconazole 2 % shampoo Commonly known as:  NIZORAL  
USE AS DIRECTED EVERY OTHER DAY - MASSAGE INTO SCALP FOR 2- 3 MINUTES THEN RINSE EXTERNALLY  
  
 losartan 100 mg tablet Commonly known as:  COZAAR Take 100 mg by mouth nightly. methylPREDNISolone 4 mg tablet Commonly known as:  Tilman Blew Take each day's dose in AM with food  
  
 raNITIdine 300 mg tablet Commonly known as:  ZANTAC Take 300 mg by mouth daily. simvastatin 20 mg tablet Commonly known as:  ZOCOR  
TAKE 1 TABLET BY MOUTH ONCE DAILY * warfarin 5 mg tablet Commonly known as:  COUMADIN Take 6 mg by mouth daily. * warfarin 6 mg tablet Commonly known as:  COUMADIN  
TAKE AS DIRECTED BY PROVIDER BASED ON INR  
  
 * Notice: This list has 4 medication(s) that are the same as other medications prescribed for you.  Read the directions carefully, and ask your doctor or other care provider to review them with you. Prescriptions Sent to Pharmacy Refills  
 methylPREDNISolone (MEDROL DOSEPACK) 4 mg tablet 0 Sig: Take each day's dose in AM with food Class: Normal  
 Pharmacy: [address], [address] [place] [address] #: [phone] We Performed the Following CBC WITH AUTOMATED DIFF [97173 CPT(R)] COMPLEMENT, C3 [id number] CPT(R)] COMPLEMENT, C4 [id number] CPT(R)] CORTISOL, AM [id number] CPT(R)] METABOLIC PANEL, COMPREHENSIVE [40480 CPT(R)] PROT+CREATU (RANDOM) [id number] CPT(R)] SED RATE (ESR) [id number] CPT(R)] TSH REFLEX TO T4 [[id number] Custom] URINALYSIS W/ RFLX MICROSCOPIC [94105 CPT(R)] Follow-up Instructions Return in about 3 months (around 8/31/2017). To-Do List   
 05/31/2017 Imaging:  DEXA BONE DENSITY STUDY AXIAL   
  
 05/31/2017 Imaging:  XR ANKLE RT MIN 3 V Patient Instructions Lower Plaquenil to 200 mg AM and 100 mg PM 
 
Labs and xray today Steroid dose pack if no clear fracture. Use soft brace when walking (ankle). Call if not improving Review thyroid exam with PCP Introducing [facility] & HEALTH SERVICES! Dear Keely Lamar: Thank you for requesting a [employer] account. Our records indicate that you have previously registered for a [employer] account but its currently inactive. Please call our Bean[employer] support line at [phone]. Additional Information If you have questions, please visit the Frequently Asked Questions section of the Bean[employer] website at [email or web address] Remember, Bean[employer] is NOT to be used for urgent needs. For medical emergencies, dial 911. Now available from your iPhone and Android! Please provide this summary of care documentation to your next provider. Your primary care clinician is listed as Hui Lieberman. If you have any questions after today's visit, please call [phone].

## 2017-05-31 NOTE — PROGRESS NOTES
HISTORY OF PRESENT ILLNESS  Audrey Chiu is a 29 y.o. female. HPI Patient presents for follow up of SLE. Since last visit, she had a laparoscopic cholecystectomy. Abdominal pain and reflux have improved. The lupus is \"fine. \" She has intermittent pain in the hands with current pain over the right foot (past 2 weeks). She has pain with prolonged walking. No prior trauma reported. No AM stiffness is noted. She has right foot swelling. She has not had edema. She notes intermittent right eye blurred vision (no change). She has not had a malar rash recently. She has intermittent sicca symptoms and uses artifical tears. Raynaud's symptoms are mild at present. She notes persistent, stable fatigue. She notes intermittent dysphagia for pills. She has taken Plaquenil 200 mg twice daily. She has been taking 1500 mg BID CellCept. Regarding warfarin, she is taking 6 mg daily (gets checked next week). Current Outpatient Prescriptions   Medication Sig Dispense Refill    ketoconazole (NIZORAL) 2 % topical cream       ketoconazole (NIZORAL) 2 % shampoo USE AS DIRECTED EVERY OTHER DAY - MASSAGE INTO SCALP FOR 2- 3 MINUTES THEN RINSE EXTERNALLY  6    warfarin (COUMADIN) 6 mg tablet TAKE AS DIRECTED BY PROVIDER BASED ON INR  2    FLUoxetine (PROZAC) 20 mg tablet Take 20 mg by mouth daily.  Dexlansoprazole (DEXILANT) 60 mg CpDB Take 60 mg by mouth daily.  raNITIdine (ZANTAC) 300 mg tablet Take 300 mg by mouth daily.  simvastatin (ZOCOR) 20 mg tablet TAKE 1 TABLET BY MOUTH ONCE DAILY  3    losartan (COZAAR) 100 mg tablet Take 100 mg by mouth nightly.  hydroxychloroquine (PLAQUENIL) 200 mg tablet TAKE ONE TABLET BY MOUTH TWICE DAILY  (Patient taking differently: TAKE ONE TABLET BY MOUTH TWICE DAILY) 60 Tab 9    atenolol (TENORMIN) 50 mg tablet Take  by mouth nightly.  mycophenolate (CELLCEPT) 500 mg tablet Take 1,500 mg by mouth two (2) times a day.       warfarin (COUMADIN) 5 mg tablet Take 6 mg by mouth daily. Allergies   Allergen Reactions    Gluten Diarrhea    Fiorinal-Codeine #3 [Codeine-Butalbital-Asa-Caff] Nausea Only       Review of Systems   Constitutional: Negative for fever. Eyes: Negative for blurred vision. Cardiovascular: Negative for leg swelling. Gastrointestinal: Negative for abdominal pain. Skin: Negative for rash. Physical Exam   Vitals reviewed. Constitutional: She is oriented to person, place, and time. She appears well-developed and well-nourished. No distress.    HENT:    Mouth/Throat: Oropharynx is clear. No oropharyngeal exudate.    Eyes: Conjunctivae are normal.    Neck: Normal range of motion. Neck supple. Mild, diffuse thyromegaly present.    Cardiovascular: Regular rhythm. No murmurs  No edema; no cyanosis    Pulmonary/Chest: Effort normal. No respiratory distress. Good air movement. Lungs clear  Abdominal: Soft. She exhibits no distension. There is no tenderness. No organomegaly   Musculoskeletal:   -Trace swelling and tenderness medial aspect right ankle with FROM  -mild hyperextension of elbows; hyperextension of fingers at pip joints (reducible). peripheral joints otherwise FROM  -gait normal   Lymphadenopathy:   She has no cervical adenopathy. Neurological: She is alert and oriented to person, place, and time. She exhibits normal muscle tone. symmetric  Skin: Skin is warm and dry. No malar erythema;    -skin velvety in texture. Psychiatric: She has a normal mood and affect.  Judgment normal.  Lab Results   Component Value Date/Time    WBC 7.7 03/21/2017 08:09 AM    WBC 6.5 05/29/2012 04:26 PM    HGB 13.1 03/21/2017 08:09 AM    HCT 37.8 03/21/2017 08:09 AM    PLATELET 131 44/73/8063 08:09 AM    MCV 87.3 03/21/2017 08:09 AM     Lab Results   Component Value Date/Time    Sodium 141 03/21/2017 08:09 AM    Potassium 4.3 03/21/2017 08:09 AM    Chloride 108 03/21/2017 08:09 AM    CO2 23 03/21/2017 08:09 AM    Anion gap 10 03/21/2017 08:09 AM    Glucose 85 03/21/2017 08:09 AM    BUN 21 03/21/2017 08:09 AM    Creatinine 1.28 03/21/2017 08:09 AM    BUN/Creatinine ratio 16 03/21/2017 08:09 AM    GFR est AA 58 03/21/2017 08:09 AM    GFR est non-AA 47 03/21/2017 08:09 AM    Calcium 9.1 03/21/2017 08:09 AM    Bilirubin, total 0.9 03/21/2017 08:09 AM    AST (SGOT) 10 03/21/2017 08:09 AM    Alk. phosphatase 61 03/21/2017 08:09 AM    Protein, total 8.3 03/21/2017 08:09 AM    Albumin 3.5 03/21/2017 08:09 AM    Globulin 4.8 03/21/2017 08:09 AM    A-G Ratio 0.7 03/21/2017 08:09 AM    ALT (SGPT) 17 03/21/2017 08:09 AM     Lab Results   Component Value Date/Time    Sed rate (ESR) 21 10/07/2015 12:37 PM     Lab Results   Component Value Date/Time    TSH 2.100 12/02/2013 09:31 AM       ASSESSMENT and PLAN    ICD-10-CM ICD-9-CM    1. Systemic lupus erythematosus with glomerular disease, unspecified SLE type: Diagnosed 2001. Arthritis, malar rash and nephritis. Marked flare with proteinuria during pregnancy in 2011. Positive anti-cardiolipin antibodies/ lupus anticoagulant. In 2013 she had an increase in proteinuria (6+ grams per day equivalent). While initial biopsy showed stage II nephritis, most recent biopsy (1/14) showed Class IV nephritis. She remains on Plaquenil twice daily. Prednisone stopped from 5 mg daily dose last year. CellCept dose is 1500 mg BID; she had received 4 doses of cytoxan i.v., with last dose 4/22/14. M32.14 710.0 COMPLEMENT, C3  CellCept 1500 mg BID  Plaquenil to 200 mg AM and 100 mg PM  Caution in the sun reviewed     583.81 COMPLEMENT, C4      METABOLIC PANEL, COMPREHENSIVE      URINALYSIS W/ RFLX MICROSCOPIC      CBC WITH AUTOMATED DIFF      SED RATE (ESR)      TSH REFLEX TO T4      PROT+CREATU (RANDOM)      XR ANKLE RT MIN 3 V      DEXA BONE DENSITY STUDY AXIAL   2. Long-term use of immunosuppressant medication Z79.899 V58.69 Plaquenil eye monitoring  CBC and CMP   3.  Branch retinal artery occlusion, right H34.231 362.32 Continue current warfarin and monitoring per PCP   4. Long term (current) use of systemic steroids Z79.52 V58.65 CORTISOL, AM  Calcium 1200 mg daily total  Vitamin D3 2000 units daily      DEXA BONE DENSITY STUDY AXIAL   5. Fatigue, unspecified type: stable and persistent R53.83 780.79 TSH REFLEX TO T4  PFTS if not improving      CORTISOL, AM   6. Dysphagia, unspecified type R13.10 787.20 Review with PCP   7. Acute right ankle pain: recent onset.  Slight swelling M25.571 719.47 XR ANKLE RT MIN 3 V  If no abnormalities, consider medrol pack  If persistent, evaluate with orthopedics or podiatry     338.19

## 2017-05-31 NOTE — PATIENT INSTRUCTIONS
Lower Plaquenil to 200 mg AM and 100 mg PM    Labs and xray today    Steroid dose pack if no clear fracture. Use soft brace when walking (ankle).  Call if not improving    Review thyroid exam with PCP

## 2017-06-01 NOTE — PROGRESS NOTES
Mildly reduced kidney function, stable over past 6 months. Persistent protein in the urine. I do not have a value more recent than September: yesterday's check showed increased urine protein. Please forward to renal (Dr. Whit Pope) and have patient review with her (let us know if change in plan for this). Her cortisol is low: this is the stress steroid (and likely related to prior prednisone use). This may contribute to some of fatigue. It would be worth reviewing with endocrinology to assist with any necessary steroid treatment until body's own cortisol improves over time (perhaps months to year).

## 2017-06-02 NOTE — PROGRESS NOTES
Called and spoke with the patient. Informed them of the results. The patient verbalized understanding of the results. Labs forwarded to Dr. Connie Duarte. Pt requested labs be sent to Dr. Anjelica Means as well.

## 2017-06-07 ENCOUNTER — HOSPITAL ENCOUNTER (OUTPATIENT)
Dept: BONE DENSITY | Age: 37
Discharge: HOME | End: 2017-06-07
Attending: INTERNAL MEDICINE
Payer: MEDICARE

## 2017-06-07 DIAGNOSIS — Z79.52 LONG TERM (CURRENT) USE OF SYSTEMIC STEROIDS: ICD-10-CM

## 2017-06-07 DIAGNOSIS — M32.14 SYSTEMIC LUPUS ERYTHEMATOSUS WITH GLOMERULAR DISEASE, UNSPECIFIED SLE TYPE (HCC): Chronic | ICD-10-CM

## 2017-06-07 PROCEDURE — 77080 DXA BONE DENSITY AXIAL: CPT

## 2017-07-10 ENCOUNTER — HOSPITAL ENCOUNTER (EMERGENCY)
Age: 37
Discharge: HOME | End: 2017-07-10
Attending: FAMILY MEDICINE

## 2017-07-10 VITALS
RESPIRATION RATE: 18 BRPM | DIASTOLIC BLOOD PRESSURE: 84 MMHG | OXYGEN SATURATION: 98 % | TEMPERATURE: 98.7 F | HEART RATE: 90 BPM | HEIGHT: 60 IN | BODY MASS INDEX: 32.02 KG/M2 | WEIGHT: 163.1 LBS | SYSTOLIC BLOOD PRESSURE: 122 MMHG

## 2017-07-10 DIAGNOSIS — M62.838 NECK MUSCLE SPASM: Primary | ICD-10-CM

## 2017-07-10 DIAGNOSIS — R07.89 CHEST WALL PAIN: ICD-10-CM

## 2017-07-10 DIAGNOSIS — G44.209 TENSION HEADACHE: ICD-10-CM

## 2017-07-10 RX ORDER — ACETAMINOPHEN 500 MG
500 TABLET ORAL
Qty: 30 TAB | Refills: 0 | Status: SHIPPED | OUTPATIENT
Start: 2017-07-10

## 2017-07-10 RX ORDER — METHOCARBAMOL 500 MG/1
500 TABLET, FILM COATED ORAL
Qty: 20 TAB | Refills: 0 | Status: SHIPPED | OUTPATIENT
Start: 2017-07-10 | End: 2018-03-21

## 2017-07-10 NOTE — DISCHARGE INSTRUCTIONS
Musculoskeletal Chest Pain: Care Instructions  Your Care Instructions  Chest pain is not always a sign that something is wrong with your heart or that you have another serious problem. The doctor thinks your chest pain is caused by strained muscles or ligaments, inflamed chest cartilage, or another problem in your chest, rather than by your heart. You may need more tests to find the cause of your chest pain. Follow-up care is a key part of your treatment and safety. Be sure to make and go to all appointments, and call your doctor if you are having problems. Its also a good idea to know your test results and keep a list of the medicines you take. How can you care for yourself at home? · Take pain medicines exactly as directed. ¨ If the doctor gave you a prescription medicine for pain, take it as prescribed. ¨ If you are not taking a prescription pain medicine, ask your doctor if you can take an over-the-counter medicine. · Rest and protect the sore area. · Stop, change, or take a break from any activity that may be causing your pain or soreness. · Put ice or a cold pack on the sore area for 10 to 20 minutes at a time. Try to do this every 1 to 2 hours for the next 3 days (when you are awake) or until the swelling goes down. Put a thin cloth between the ice and your skin. · After 2 or 3 days, apply a heating pad set on low or a warm cloth to the area that hurts. Some doctors suggest that you go back and forth between hot and cold. · Do not wrap or tape your ribs for support. This may cause you to take smaller breaths, which could increase your risk of lung problems. · Mentholated creams such as Bengay or Icy Hot may soothe sore muscles. Follow the instructions on the package. · Follow your doctor's instructions for exercising. · Gentle stretching and massage may help you get better faster. Stretch slowly to the point just before pain begins, and hold the stretch for at least 15 to 30 seconds.  Do this 3 or 4 times a day. Stretch just after you have applied heat. · As your pain gets better, slowly return to your normal activities. Any increased pain may be a sign that you need to rest a while longer. When should you call for help? Call 911 anytime you think you may need emergency care. For example, call if:  · You have chest pain or pressure. This may occur with:  ¨ Sweating. ¨ Shortness of breath. ¨ Nausea or vomiting. ¨ Pain that spreads from the chest to the neck, jaw, or one or both shoulders or arms. ¨ Dizziness or lightheadedness. ¨ A fast or uneven pulse. After calling 911, chew 1 adult-strength aspirin. Wait for an ambulance. Do not try to drive yourself. · You have sudden chest pain and shortness of breath, or you cough up blood. Call your doctor now or seek immediate medical care if:  · You have any trouble breathing. · Your chest pain gets worse. · Your chest pain occurs consistently with exercise and is relieved by rest.  Watch closely for changes in your health, and be sure to contact your doctor if:  · Your chest pain does not get better after 1 week. Where can you learn more? Go to http://kodak-dodie.info/. Enter V293 in the search box to learn more about \"Musculoskeletal Chest Pain: Care Instructions. \"  Current as of: March 20, 2017  Content Version: 11.3  © 8185-0315 [employer]. Care instructions adapted under license by [employer] (which disclaims liability or warranty for this information). If you have questions about a medical condition or this instruction, always ask your healthcare professional. [place] [address] any warranty or liability for your use of this information. Tension Headache: Care Instructions  Your Care Instructions  Most headaches are tension headaches. These headaches tend to happen again, especially if you are under stress.  A tension headache may cause pain or a feeling of pressure all over your head. You probably can't pinpoint the center of the pain. If you keep getting tension headaches, the best thing you can do to limit them is to find out what is causing them and then make changes in those areas. Follow-up care is a key part of your treatment and safety. Be sure to make and go to all appointments, and call your doctor if you are having problems. Its also a good idea to know your test results and keep a list of the medicines you take. How can you care for yourself at home? · Rest in a quiet, dark room with a cool cloth on your forehead until your headache is gone. Close your eyes, and try to relax or go to sleep. Don't watch TV or read. Avoid using the computer. · Use a warm, moist towel or a heating pad set on low to relax tight shoulder and neck muscles. · Have someone gently massage your neck and shoulders. · Take pain medicines exactly as directed. ¨ If the doctor gave you a prescription medicine for pain, take it as prescribed. ¨ If you are not taking a prescription pain medicine, ask your doctor if you can take an over-the-counter medicine. · Be careful not to take pain medicine more often than the instructions allow, because you may get worse or more frequent headaches when the medicine wears off. · If you get another tension headache, stop what you are doing and sit quietly for a moment. Close your eyes and breathe slowly. Try to relax your head and neck muscles. · Do not ignore new symptoms that occur with a headache, such as fever, weakness or numbness, vision changes, or confusion. These may be signs of a more serious problem. To help prevent headaches  · Keep a headache diary so you can figure out what triggers your headaches. Avoiding triggers may help you prevent headaches. Record when each headache began, how long it lasted, and what the pain was like (throbbing, aching, stabbing, or dull).  List anything that may have triggered the headache, such as being physically or emotionally stressed or being anxious or depressed. Other possible triggers are hunger, anger, fatigue, poor posture, and muscle strain. · Find healthy ways to deal with stress. Headaches are most common during or right after stressful times. Take time to relax before and after you do something that has caused a headache in the past.  · Exercise daily to relieve stress. Relaxation exercises may help reduce tension. · Get plenty of sleep. · Eat regularly and well. Long periods without food can trigger a headache. · Treat yourself to a massage. Some people find that massages are very helpful in relieving tension. · Try to keep your muscles relaxed by keeping good posture. Check your jaw, face, neck, and shoulder muscles for tension, and try to relax them. When sitting at a desk, change positions often, and stretch for 30 seconds each hour. · Reduce eyestrain from computers by blinking frequently and looking away from the computer screen every so often. Make sure you have proper eyewear and that your monitor is set up properly, about an arms length away. When should you call for help? Call 911 anytime you think you may need emergency care. For example, call if:  · You have signs of a stroke. These may include:  ¨ Sudden numbness, paralysis, or weakness in your face, arm, or leg, especially on only one side of your body. ¨ Sudden vision changes. ¨ Sudden trouble speaking. ¨ Sudden confusion or trouble understanding simple statements. ¨ Sudden problems with walking or balance. ¨ A sudden, severe headache that is different from past headaches. Call your doctor now or seek immediate medical care if:  · You have new or worse nausea and vomiting. · You have a new or higher fever. · Your headache gets much worse. Watch closely for changes in your health, and be sure to contact your doctor if:  · You are not getting better after 2 days (48 hours). Where can you learn more?   Go to http://olga.info/. Enter 84 [id number] in the search box to learn more about \"Tension Headache: Care Instructions. \"  Current as of: October 14, 2016  Content Version: 11.3  © 3081-7734 [employer]. Care instructions adapted under license by [employer] (which disclaims liability or warranty for this information). If you have questions about a medical condition or this instruction, always ask your healthcare professional. [place] [address] any warranty or liability for your use of this information. Neck Spasm: Care Instructions  Your Care Instructions  A neck spasm is sudden tightness and pain in your neck muscles. A spasm may be caused by some activities or repeated movements. For example, you may be more likely to have a neck spasm if you slouch, paint a ceiling, work at a computer, or sleep with your neck twisted. But the cause isn't always clear. Home treatment includes using heat or ice, taking over-the-counter (OTC) pain medicines, and avoiding activities that may lead to neck pain. Gentle stretching, or treatments such as massage or manipulation, may also help ease a neck spasm. For a neck spasm that doesn't get better with home care, your doctor may prescribe medicine. He or she may also suggest exercise or physical therapy to help strengthen or relax your neck muscles. Follow-up care is a key part of your treatment and safety. Be sure to make and go to all appointments, and call your doctor if you are having problems. It's also a good idea to know your test results and keep a list of the medicines you take. How can you care for yourself at home? · To relieve pain, use heat or ice (whichever feels better) on the affected area. ¨ Put a warm water bottle, a heating pad set on low, or a warm cloth on your neck. Put a thin cloth between the heating pad and your skin. Do not go to sleep with a heating pad on your skin.   ¨ Try ice or a cold pack on the area for 10 to 20 minutes at a time. Put a thin cloth between the ice and your skin. · Ask your doctor if you can take acetaminophen (such as Tylenol) or nonsteroidal anti-inflammatory drugs, such as ibuprofen or naproxen. Your doctor can prescribe stronger medicines if needed. Be safe with medicines. Read and follow all instructions on the label. · Stretch your muscles every day, especially before and after exercise and at bedtime. Regular stretching can help relax your muscles. · Try to find a pillow and a position in bed that help improve your night's rest.  · Try to stay active. It's best to start activity slowly. If an exercise makes your pain worse, stop doing it. When should you call for help? Call 911 anytime you think you may need emergency care. For example, call if:  · You are unable to move an arm or a leg at all. Call your doctor now or seek immediate medical care if:  · You have new or worse symptoms in your arms, legs, belly, or buttocks. Symptoms may include:  ¨ Numbness or tingling. ¨ Weakness. ¨ Pain. · You lose bladder or bowel control. Watch closely for changes in your health, and be sure to contact your doctor if:  · You do not get better as expected. Where can you learn more? Go to http://kodak-dodie.info/. Enter F933 in the search box to learn more about \"Neck Spasm: Care Instructions. \"  Current as of: March 21, 2017  Content Version: 11.3  © 2715-5177 [employer]. Care instructions adapted under license by [employer] (which disclaims liability or warranty for this information). If you have questions about a medical condition or this instruction, always ask your healthcare professional. [place] [address] any warranty or liability for your use of this information.

## 2017-07-10 NOTE — UC NOTE
Spoke with Trey Paredes from [facility], states pts insurance will not cover medications prescribed today. Spoke with Dr. Avi Leon, prescription changed to Flexeril, 5 mg PO TID, quantity 20 tablets.
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
## 2017-07-12 ENCOUNTER — OFFICE VISIT (OUTPATIENT)
Dept: ENDOCRINOLOGY | Age: 37
End: 2017-07-12

## 2017-07-12 VITALS
BODY MASS INDEX: 31.98 KG/M2 | HEART RATE: 80 BPM | DIASTOLIC BLOOD PRESSURE: 82 MMHG | HEIGHT: 60 IN | SYSTOLIC BLOOD PRESSURE: 120 MMHG | WEIGHT: 162.9 LBS

## 2017-07-12 DIAGNOSIS — E27.49 HYPOCORTISOLEMIA (HCC): Primary | ICD-10-CM

## 2017-07-12 NOTE — PATIENT INSTRUCTIONS
ACTH STIMULATION TEST  We would like to confirm that your adrenal gland is functioning well. Your adrenal gland produces a hormone called cortisol which is required for various critical functions. You have two adrenal glands, one on each side within the abdomen just above the kidneys. They are under the control of your pituitary gland. When your body needs more cortisol, a message from the pituitary gland (ACTH) is released which stimulates the secretion of more cortisol from the adrenal glands. A deficiency in cortisol can result in vague and unclear symptoms including worsening fatigue, weakness, and can result in abnormalities in your electrolytes. We will do the following test to check if your adrenal glands can be stimulated properly. Report to the outpatient infusion center located in [address] [place] [address] at [facility] [address]. (You will receive a call from them to schedule this, if you don't hear from them within 1 week, please let us know)    Step 1. You will have baseline blood test for cortisol & ACTH collected. Any other labs we ordered should also be collected at this time, since the steps below will only be collections for cortisol again. Step 2. Only after a baseline cortisol level is collected, you will then receive an injection of a synthetic ACTH to stimulate your adrenal glands. Step 3. You will then have your cortisol level collected again 30 minutes after the ACTH injection. This is the second cortisol collection. Step 4. You will then have your cortisol level collected again 60 minutes after the ACTH injection (30 minutes after you prior one). This is the third cortisol collection. After the testing is completed, I will discuss the results with you when I receive it. Abnormal results may suggest that you need a supplemental daily glucocorticoid.      *It is important that you be familiar with the steps above before you report to the infusion center, as you will want to make sure your test is being conducted as described here. Also it is critical to make sure the nurse has collected the ACTH level together with the cortisol and any other labs needed during step 1. Plan to return to clinic after 3 months,   Feel free to call with concerns,   We will notify you of your results,     Judie Andrea.  [address] [facility] Endocrinology  [address] [place] [address]

## 2017-07-12 NOTE — MR AVS SNAPSHOT
Visit Information Date & Time Provider Department Dept. Phone Encounter #  
 7/12/2017  1:30 PM Anurag Doss, [address] [facility] Diabetes and Endocrinology [phone] [id number] Follow-up Instructions Return in about 3 months (around 10/12/2017). Your Appointments 11/1/2017 10:00 AM  
ESTABLISHED PATIENT with Ankita Ernandez MD  
Arthritis and [address] [facility] [address] [place] [address] Road) Appt Note: follow up  
 [address] [facility] [address]  
[phone]  
  
   
 [address] [facility] [address] [address] Upcoming Health Maintenance Date Due DTaP/Tdap/Td series (1 - Tdap) 7/13/2002 PAP AKA CERVICAL CYTOLOGY 7/13/2002 INFLUENZA AGE 9 TO ADULT 8/1/2017 Pneumococcal 19-64 Highest Risk (3 of 3 - PCV13) 8/29/2017 Allergies as of 7/12/2017  Review Complete On: 7/12/2017 By: Anurag Doss MD  
  
 Severity Noted Reaction Type Reactions Gluten High 02/29/2012   Side Effect Diarrhea Fiorinal-codeine #3 [Codeine-butalbital-asa-caff]  01/07/2014    Nausea Only Current Immunizations  Reviewed on 5/31/2017 Name Date Influenza Vaccine 8/29/2016, 9/19/2015, 11/17/2014, 10/14/2012 Influenza Vaccine PF 9/30/2013 Pneumococcal Polysaccharide (PPSV-23) 8/29/2016 Pneumococcal Vaccine (Unspecified Type) 10/14/2011 Not reviewed this visit You Were Diagnosed With   
  
 Codes Comments Hypocortisolemia ([facility]    -  Primary ICD-10-CM: E27.49 
ICD-9-CM: 255.41 Vitals BP Pulse Height(growth percentile) Weight(growth percentile) BMI OB Status 120/82 (BP 1 Location: Left arm, BP Patient Position: Sitting) 80 5' (1.524 m) 162 lb 14.4 oz (73.9 kg) 31.81 kg/m2 IUD Smoking Status Never Smoker BMI and BSA Data Body Mass Index Body Surface Area  
 31.81 kg/m 2 1.77 m 2 Preferred Pharmacy Pharmacy Name Phone [facility]  [address] [phone] Your Updated Medication List  
  
   
This list is accurate as of: 7/12/17  2:08 PM.  Always use your most recent med list.  
  
  
  
  
 acetaminophen 500 mg tablet Commonly known as:  [address] Take 1 Tab by mouth every six (6) hours as needed for Pain. atenolol 50 mg tablet Commonly known as:  TENORMIN Take  by mouth nightly. CELLCEPT 500 mg tablet Generic drug:  mycophenolate Take 1,500 mg by mouth two (2) times a day. DEXILANT 60 mg Cpdb Generic drug:  Dexlansoprazole Take 60 mg by mouth daily. FLUoxetine 20 mg tablet Commonly known as:  PROzac Take 20 mg by mouth daily. hydroxychloroquine 200 mg tablet Commonly known as:  PLAQUENIL Take 1 tab po qam and 1/2 tab po qpm  
  
 * ketoconazole 2 % topical cream  
Commonly known as:  NIZORAL * ketoconazole 2 % shampoo Commonly known as:  NIZORAL  
USE AS DIRECTED EVERY OTHER DAY - MASSAGE INTO SCALP FOR 2- 3 MINUTES THEN RINSE EXTERNALLY  
  
 losartan 100 mg tablet Commonly known as:  COZAAR Take 100 mg by mouth nightly. methocarbamol 500 mg tablet Commonly known as:  ROBAXIN Take 1 Tab by mouth three (3) times daily as needed (muscle spasm). raNITIdine 300 mg tablet Commonly known as:  ZANTAC Take 300 mg by mouth daily. simvastatin 20 mg tablet Commonly known as:  ZOCOR  
TAKE 1 TABLET BY MOUTH ONCE DAILY  
  
 warfarin 6 mg tablet Commonly known as:  COUMADIN  
TAKE AS DIRECTED BY PROVIDER BASED ON INR  
  
 * Notice: This list has 2 medication(s) that are the same as other medications prescribed for you. Read the directions carefully, and ask your doctor or other care provider to review them with you. Follow-up Instructions Return in about 3 months (around 10/12/2017). Patient Instructions ACTH STIMULATION TEST 
 We would like to confirm that your adrenal gland is functioning well. Your adrenal gland produces a hormone called cortisol which is required for various critical functions. You have two adrenal glands, one on each side within the abdomen just above the kidneys. They are under the control of your pituitary gland. When your body needs more cortisol, a message from the pituitary gland (ACTH) is released which stimulates the secretion of more cortisol from the adrenal glands. A deficiency in cortisol can result in vague and unclear symptoms including worsening fatigue, weakness, and can result in abnormalities in your electrolytes. We will do the following test to check if your adrenal glands can be stimulated properly. Report to the outpatient infusion center located in [address] [place] [address] at [facility]. (You will receive a call from them to schedule this, if you don't hear from them within 1 week, please let us know) Step 1. You will have baseline blood test for cortisol & ACTH collected. Any other labs we ordered should also be collected at this time, since the steps below will only be collections for cortisol again. Step 2. Only after a baseline cortisol level is collected, you will then receive an injection of a synthetic ACTH to stimulate your adrenal glands. Step 3. You will then have your cortisol level collected again 30 minutes after the ACTH injection. This is the second cortisol collection. Step 4. You will then have your cortisol level collected again 60 minutes after the ACTH injection (30 minutes after you prior one). This is the third cortisol collection. After the testing is completed, I will discuss the results with you when I receive it. Abnormal results may suggest that you need a supplemental daily glucocorticoid.   
 
*It is important that you be familiar with the steps above before you report to the infusion center, as you will want to make sure your test is being conducted as described here. Also it is critical to make sure the nurse has collected the ACTH level together with the cortisol and any other labs needed during step 1. Plan to return to clinic after 3 months, Feel free to call with concerns, We will notify you of your results, Diamond Saxena. [address] [facility] [address] [facility] SERVICES! Dear Tiana Bowman: Thank you for requesting a [employer] account. Our records indicate that you have previously registered for a [employer] account but its currently inactive. Please call our [employer] support line at [phone]. Additional Information If you have questions, please visit the Frequently Asked Questions section of the [employer] website at [email or web address] Remember, [employer] is NOT to be used for urgent needs. For medical emergencies, dial 911. Now available from your iPhone and Android! Please provide this summary of care documentation to your next provider. Your primary care clinician is listed as 535Kylah Lieberman. If you have any questions after today's visit, please call [phone].
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
## 2017-07-13 LAB
ALBUMIN SERPL-MCNC: 4.2 G/DL (ref 3.5–5.5)
ALBUMIN/GLOB SERPL: 1.4 {RATIO} (ref 1.2–2.2)
ALP SERPL-CCNC: 54 IU/L (ref 39–117)
ALT SERPL-CCNC: 17 IU/L (ref 0–32)
APPEARANCE UR: ABNORMAL
AST SERPL-CCNC: 13 IU/L (ref 0–40)
BACTERIA #/AREA URNS HPF: ABNORMAL /[HPF]
BASOPHILS # BLD AUTO: 0 X10E3/UL (ref 0–0.2)
BASOPHILS NFR BLD AUTO: 0 %
BILIRUB SERPL-MCNC: 0.4 MG/DL (ref 0–1.2)
BILIRUB UR QL STRIP: NEGATIVE
BUN SERPL-MCNC: 25 MG/DL (ref 6–20)
BUN/CREAT SERPL: 19 (ref 9–23)
C3 SERPL-MCNC: 129 MG/DL (ref 82–167)
C4 SERPL-MCNC: 19 MG/DL (ref 14–44)
CALCIUM SERPL-MCNC: 9.3 MG/DL (ref 8.7–10.2)
CASTS URNS MICRO: ABNORMAL
CASTS URNS QL MICRO: PRESENT /LPF
CHLORIDE SERPL-SCNC: 101 MMOL/L (ref 96–106)
CO2 SERPL-SCNC: 21 MMOL/L (ref 18–29)
COLOR UR: YELLOW
CORTIS AM PEAK SERPL-MCNC: 4.9 UG/DL (ref 6.2–19.4)
CREAT SERPL-MCNC: 1.3 MG/DL (ref 0.57–1)
CREAT UR-MCNC: 116.6 MG/DL
EOSINOPHIL # BLD AUTO: 0.2 X10E3/UL (ref 0–0.4)
EOSINOPHIL NFR BLD AUTO: 3 %
EPI CELLS #/AREA URNS HPF: ABNORMAL /HPF
ERYTHROCYTE [DISTWIDTH] IN BLOOD BY AUTOMATED COUNT: 13.9 % (ref 12.3–15.4)
ERYTHROCYTE [SEDIMENTATION RATE] IN BLOOD BY WESTERGREN METHOD: 18 MM/HR (ref 0–32)
GLOBULIN SER CALC-MCNC: 3.1 G/DL (ref 1.5–4.5)
GLUCOSE SERPL-MCNC: 79 MG/DL (ref 65–99)
GLUCOSE UR QL: NEGATIVE
HCT VFR BLD AUTO: 37.1 % (ref 34–46.6)
HGB BLD-MCNC: 12.2 G/DL (ref 11.1–15.9)
HGB UR QL STRIP: ABNORMAL
IMM GRANULOCYTES # BLD: 0 X10E3/UL (ref 0–0.1)
IMM GRANULOCYTES NFR BLD: 0 %
KETONES UR QL STRIP: NEGATIVE
LEUKOCYTE ESTERASE UR QL STRIP: NEGATIVE
LYMPHOCYTES # BLD AUTO: 1.7 X10E3/UL (ref 0.7–3.1)
LYMPHOCYTES NFR BLD AUTO: 25 %
MCH RBC QN AUTO: 29.5 PG (ref 26.6–33)
MCHC RBC AUTO-ENTMCNC: 32.9 G/DL (ref 31.5–35.7)
MCV RBC AUTO: 90 FL (ref 79–97)
MICRO URNS: ABNORMAL
MONOCYTES # BLD AUTO: 0.6 X10E3/UL (ref 0.1–0.9)
MONOCYTES NFR BLD AUTO: 8 %
MUCOUS THREADS URNS QL MICRO: PRESENT
NEUTROPHILS # BLD AUTO: 4.5 X10E3/UL (ref 1.4–7)
NEUTROPHILS NFR BLD AUTO: 64 %
NITRITE UR QL STRIP: NEGATIVE
PH UR STRIP: 5.5 [PH] (ref 5–7.5)
PLATELET # BLD AUTO: 258 X10E3/UL (ref 150–379)
POTASSIUM SERPL-SCNC: 5.2 MMOL/L (ref 3.5–5.2)
PROT SERPL-MCNC: 7.3 G/DL (ref 6–8.5)
PROT UR QL STRIP: ABNORMAL
PROT UR-MCNC: 302.4 MG/DL
PROT/CREAT UR: 2593 MG/G CREAT (ref 0–200)
RBC # BLD AUTO: 4.14 X10E6/UL (ref 3.77–5.28)
RBC #/AREA URNS HPF: ABNORMAL /HPF
SODIUM SERPL-SCNC: 136 MMOL/L (ref 134–144)
SP GR UR: 1.02 (ref 1–1.03)
TSH SERPL DL<=0.005 MIU/L-ACNC: 1.61 UIU/ML (ref 0.45–4.5)
UROBILINOGEN UR STRIP-MCNC: 0.2 MG/DL (ref 0.2–1)
WBC # BLD AUTO: 7 X10E3/UL (ref 3.4–10.8)
WBC #/AREA URNS HPF: ABNORMAL /HPF

## 2017-07-31 ENCOUNTER — HOSPITAL ENCOUNTER (OUTPATIENT)
Dept: INFUSION THERAPY | Age: 37
Discharge: HOME | End: 2017-07-31

## 2017-07-31 NOTE — PROGRESS NOTES
[facility] Progress Note    Patient did not show for her 8:00am Cortisol Stim Test today.

## 2017-09-23 ENCOUNTER — HOSPITAL ENCOUNTER (OUTPATIENT)
Dept: MRI IMAGING | Age: 37
Discharge: HOME | End: 2017-09-23
Attending: ORTHOPAEDIC SURGERY
Payer: MEDICARE

## 2017-09-23 DIAGNOSIS — M76.821 POSTERIOR TIBIAL TENDON DYSFUNCTION, RIGHT: ICD-10-CM

## 2017-09-23 PROCEDURE — 73721 MRI JNT OF LWR EXTRE W/O DYE: CPT

## 2018-03-21 ENCOUNTER — OFFICE VISIT (OUTPATIENT)
Dept: FAMILY MEDICINE CLINIC | Age: 38
End: 2018-03-21

## 2018-03-21 VITALS
RESPIRATION RATE: 19 BRPM | DIASTOLIC BLOOD PRESSURE: 95 MMHG | SYSTOLIC BLOOD PRESSURE: 132 MMHG | HEART RATE: 73 BPM | TEMPERATURE: 99.2 F | BODY MASS INDEX: 31.8 KG/M2 | WEIGHT: 162 LBS | OXYGEN SATURATION: 99 % | HEIGHT: 60 IN

## 2018-03-21 DIAGNOSIS — R31.9 HEMATURIA, UNSPECIFIED TYPE: ICD-10-CM

## 2018-03-21 DIAGNOSIS — R35.0 URINARY FREQUENCY: Primary | ICD-10-CM

## 2018-03-21 DIAGNOSIS — N89.8 VAGINAL DRYNESS: ICD-10-CM

## 2018-03-21 DIAGNOSIS — R30.9 URINARY PAIN: ICD-10-CM

## 2018-03-21 LAB
BILIRUB UR QL STRIP: NEGATIVE
GLUCOSE UR-MCNC: NEGATIVE MG/DL
KETONES P FAST UR STRIP-MCNC: NEGATIVE MG/DL
PH UR STRIP: 5.5 [PH] (ref 4.6–8)
PROT UR QL STRIP: NORMAL
SP GR UR STRIP: 1.02 (ref 1–1.03)
UA UROBILINOGEN AMB POC: NORMAL (ref 0.2–1)
URINALYSIS CLARITY POC: CLEAR
URINALYSIS COLOR POC: YELLOW
URINE BLOOD POC: NORMAL
URINE LEUKOCYTES POC: NEGATIVE
URINE NITRITES POC: NEGATIVE

## 2018-03-21 RX ORDER — NITROFURANTOIN 25; 75 MG/1; MG/1
100 CAPSULE ORAL 2 TIMES DAILY
Qty: 10 CAP | Refills: 0 | Status: SHIPPED | OUTPATIENT
Start: 2018-03-21 | End: 2018-06-12

## 2018-03-21 NOTE — PROGRESS NOTES
S: Kinjal Ramsey is a 40 y.o. female who presents for urinary frequency    Assessment/Plan:   1. UTI w/ hematuria  -dx w/UTI last month at GYN office (unsure what abx she was given)  -UA = mod blood  - rx: macrobid bid x5 days  -advised to increase water intake to at least 64oz day  -Urine culture sent. Will call with results. 2. Vaginal dryness  -discussed using moisturizers; lubricants during intercourse    RTC - pt wants to establish care and will RTC for CPE     HPI:  Has kidneys issues - lupus nephritis, sees urology for this  P takes cellcept, warfarin and plaquenil  Hx of lupus, choley 2017  Had a UTI last month - went to OB/GYN - gave her abx ( not sure what and no recent abx in med list)  Would like to establish care d/t PCP no longer takes insurance    +Fever/Chills  No N/V  +Pain/burning  No Abnormal discharge  No itching  + Frequency  + Cloudy/strong smelling urine  +Bloody urine  +UTI in past  +CVA tenderness  +Abdominal pain  Sexually active -yes  Pain with sex - discomfort w/penetration; happens not just when she has UTI  Pt is eating and drinking without problems. No LMP recorded. Patient is not currently having periods (Reason: IUD). . 2011 put in originally - IUD was changed last year      Review of Systems:  - Cardiovascular: no chest pain or palpitations  - Respiratory: no cough or shortness of breath  - Integumentary: no rashes  - : no vaginal discharge, itching, dyspareunia, or recent changes in cycle.     Social history:   Nutrition:   Physical:   Social:  Occupation:   Tobacco:  Drug:   Alcohol:     PHQ over the last two weeks 3/21/2018   Little interest or pleasure in doing things Not at all   Feeling down, depressed or hopeless Not at all   Total Score PHQ 2 0       I reviewed the following:   Past Medical History:   Diagnosis Date    Anemia NEC     Anxiety disorder 3/10/2014    Arthritis     Branch retinal artery occlusion approx 2013    as of 5/26/16 resolved and on Coumadin  Calculus of kidney     Celiac disease     Chronic kidney disease     stage ll    Chronic renal insufficiency, stage II (mild)     secondary to lupus nephritis:  Dr Ramírez Reusing    Depression, major, recurrent ([facility] [address] 3/10/2014    Diffuse proliferative lupus glomerulonephritis ([facility] [address]     Dr Ramírez Reusing     Fatigue     intermittent due to lupus    Gastritis     as of 5/26/16 asymptomatic    GERD (gastroesophageal reflux disease)     Hypertension     Long term current use of anticoagulant therapy     Lupus     as of 5/26/16 \"aching and swelling of joints\" per patient; Dr Milagros Toro (in cc)    Lupus     Memory loss     as of 5/26/16 pt states \"its not that bad, I forget where I put stuff\"    Ovarian cyst     as of 5/26/16; Dr Eliel Crespo Pregnancy induced hypertension     PUD (peptic ulcer disease)        Current Outpatient Prescriptions   Medication Sig Dispense Refill    acetaminophen (ACETAMINOPHEN EXTRA STRENGTH) 500 mg tablet Take 1 Tab by mouth every six (6) hours as needed for Pain. 30 Tab 0    hydroxychloroquine (PLAQUENIL) 200 mg tablet Take 1 tab po qam and 1/2 tab po qpm 45 Tab 9    warfarin (COUMADIN) 6 mg tablet TAKE AS DIRECTED BY PROVIDER BASED ON INR  2    FLUoxetine (PROZAC) 20 mg tablet Take 20 mg by mouth daily.  Dexlansoprazole (DEXILANT) 60 mg CpDB Take 60 mg by mouth daily.  raNITIdine (ZANTAC) 300 mg tablet Take 300 mg by mouth daily.  simvastatin (ZOCOR) 20 mg tablet TAKE 1 TABLET BY MOUTH ONCE DAILY  3    atenolol (TENORMIN) 50 mg tablet Take  by mouth nightly.  mycophenolate (CELLCEPT) 500 mg tablet Take 1,500 mg by mouth two (2) times a day.  methocarbamol (ROBAXIN) 500 mg tablet Take 1 Tab by mouth three (3) times daily as needed (muscle spasm).  20 Tab 0    ketoconazole (NIZORAL) 2 % topical cream       ketoconazole (NIZORAL) 2 % shampoo USE AS DIRECTED EVERY OTHER DAY - MASSAGE INTO SCALP FOR 2- 3 MINUTES THEN RINSE EXTERNALLY  6    losartan (COZAAR) 100 mg tablet Take 100 mg by mouth nightly. Allergies   Allergen Reactions    Gluten Diarrhea    Fiorinal-Codeine #3 [Codeine-Butalbital-Asa-Caff] Nausea Only       O: VS:   Visit Vitals    BP (!) 132/95 (BP 1 Location: Left arm, BP Patient Position: Sitting)    Pulse 73    Temp 99.2 °F (37.3 °C) (Oral)    Resp 19    Ht 5' (1.524 m)    Wt 162 lb (73.5 kg)    SpO2 99%    BMI 31.64 kg/m2       Data Reviewed:   UA: mod blood    PAIN: No complaints of pain today. GENERAL: Berdie Busing is in no acute distress. Non-toxic. Well nourished. Well developed. Appropriately groomed. RESP: Unlabored without SOB. Speaking in full sentences. Breath sounds are symmetrical bilaterally. Clear to auscultation to all fields. No wheezes. No rales or rhonchi. CV: normal rate. Regular rhythm. S1, S2 audible. No murmur noted. No rubs, clicks or gallops noted. ABDOMEN: Flat without bulges or pulsations. Soft and nondistended. No tenderness on palpation. No rebound, rigidity or guarding. BACK: No visible deformities or curvature. Full ROM. No pain on palpation of the spinous processes in the cervical, thoracic, lumbar, sacral regions. CVA tenderness: none  PSYCH: appropriate behavior, dress and thought processes. Good eye contact. Clear and coherent speech. Full affect. Good insight.  ________________________________________________________________  Patient education was done. Advised on nutrition, tobacco, alcohol. Counseling included discussion of diagnosis, differentials, treatment options, prescribed treatment, warning signs, including signs of kidney infections, and follow up. Medication risks/benefits, interactions and alternatives discussed with patient.      Patient verbalized understanding and agreed to plan of care. Follow up in 1-2 weeks if symptoms progress or do not improve.

## 2018-03-21 NOTE — PATIENT INSTRUCTIONS
1) To prevent UTIs   · Drink extra water and other fluids for the next day or two. This may help wash out the bacteria that are causing the infection. (If you have kidney, heart, or liver disease and have to limit fluids, talk with your doctor before you increase your fluid intake.)  You should be drinking at least 64 oz of water daily, on average. · Avoid drinks that are carbonated or have caffeine. They can irritate the bladder. · Urinate often. Try to empty your bladder each time. · To relieve pain, take a hot bath or lay a heating pad set on low over your lower belly or genital area. Never go to sleep with a heating pad in place. · Urinate when you need to. · Urinate right after you have sex. · Change sanitary pads often. · Avoid douches, bubble baths, feminine hygiene sprays, and other feminine hygiene products that have deodorants. · After going to the bathroom, wipe from front to back    2) VAGINAL DRYNESS   Estrogen levels decrease with menopause and is a major contributor to vaginal dryness, itching, burning, discomfort and pain during intercourse or other sexual activity. (These symptoms are referred to as vaginal atrophy). Unlike hot flashes which generally improve with time, vaginal atrophy typically worsens with time due to aging and lack of estrogen over time. Treatment options:   · Vaginal lubricants - reduce discomfort with sexual activity by reducing friction. Water soluble products are best (ie [employer], Astroglide, Sliquid, Good Clean Love, Uberlube)  · Vaginal moisturizers line the wall of the vagina and maintain vaginal moisture. Effective products include Rephresh, Replens and [place] Liquibeads. · Regular sexual stimulation promotes blood flow and secretions.   Sexual stimulation with a partner, alone, or with a device (such as a vibrator) can improve vaginal health  · \"Outercourse\" - alternative methods of sexual intimacy such as extended caressing, mutual masturbation and massage instead of vaginal penetration  · Pelvic Floor Exercises can both strengthen weak vaginal muscles and relax tight ones. · Vaginal estrogen therapy is a low dose local estrogen applied directly to the vagina to increase thickness and elasticity of vaginal tissues, restore a healthy vaginal pH, increase vaginal secretions and relieve vaginal dryness and discomfort with sexual activity. Improvements occur over several months, but usually can be felt within a few weeks. Government approved low does vaginal estrogen are available by prescription as creams (used 2-3 times a week), tablet (2x week) or a vaginal ring (changed every 3 months). All are highly effective. Note: not all vaginal symptoms are related to menopause. Yeast infections, allergic reactions and skin conditions can affect the vagina, so consult your healthcare provider if symptoms do not improve with treatment.

## 2018-03-21 NOTE — MR AVS SNAPSHOT
[facility] 
 
 
 [address] 
[address] [place] [address] 
[phone] Patient: Kelechi Garcia 
MRN: [id number] Visit Information Date & Time Provider Department Dept. Phone Encounter #  
 3/21/2018  1:40 PM Jaclyn Millard NP [facility] [phone] [id number] Your Appointments 3/21/2018  1:40 PM  
New Patient with Jaclyn Millard NP [facility] ([facility]) Appt Note: possible uti [address] [facility] [address]  
[phone]  
  
   
 [address] [facility] [place] [address] [place] [address] Upcoming Health Maintenance Date Due DTaP/Tdap/Td series (1 - Tdap) 7/13/2002 PAP AKA CERVICAL CYTOLOGY 7/13/2002 Influenza Age 5 to Adult 8/1/2017 Pneumococcal 19-64 Highest Risk (3 of 3 - PCV13) 8/29/2017 Allergies as of 3/21/2018  Review Complete On: 3/21/2018 By: Jaclyn Millard NP Severity Noted Reaction Type Reactions Gluten High 02/29/2012   Side Effect Diarrhea Fiorinal-codeine #3 [Codeine-butalbital-asa-caff]  01/07/2014    Nausea Only Current Immunizations  Reviewed on 5/31/2017 Name Date Influenza Vaccine 8/29/2016, 9/19/2015, 11/17/2014, 10/14/2012 Influenza Vaccine PF 9/30/2013 Pneumococcal Polysaccharide (PPSV-23) 8/29/2016 Pneumococcal Vaccine (Unspecified Type) 10/14/2011 Not reviewed this visit You Were Diagnosed With   
  
 Codes Comments Urinary frequency    -  Primary ICD-10-CM: R35.0 ICD-9-CM: 788.41 Urinary pain     ICD-10-CM: R30.9 ICD-9-CM: 788.1 Hematuria, unspecified type     ICD-10-CM: R31.9 ICD-9-CM: 599.70 Vitals BP Pulse Temp Resp Height(growth percentile) Weight(growth percentile) (!) 132/95 (BP 1 Location: Left arm, BP Patient Position: Sitting) 73 99.2 °F (37.3 °C) (Oral) 19 5' (1.524 m) 162 lb (73.5 kg) SpO2 BMI OB Status Smoking Status 99% 31.64 kg/m2 IUD Never Smoker BMI and BSA Data Body Mass Index Body Surface Area  
 31.64 kg/m 2 1.76 m 2 Preferred Pharmacy Pharmacy Name Phone [facility], [address] [phone] Your Updated Medication List  
  
   
This list is accurate as of 3/21/18 11:57 AM.  Always use your most recent med list.  
  
  
  
  
 acetaminophen 500 mg tablet Commonly known as:  [address] Take 1 Tab by mouth every six (6) hours as needed for Pain. atenolol 50 mg tablet Commonly known as:  TENORMIN Take  by mouth nightly. CELLCEPT 500 mg tablet Generic drug:  mycophenolate Take 1,500 mg by mouth two (2) times a day. DEXILANT 60 mg Cpdb Generic drug:  Dexlansoprazole Take 60 mg by mouth daily. FLUoxetine 20 mg tablet Commonly known as:  PROzac Take 20 mg by mouth daily. hydroxychloroquine 200 mg tablet Commonly known as:  PLAQUENIL Take 1 tab po qam and 1/2 tab po qpm  
  
 * ketoconazole 2 % topical cream  
Commonly known as:  NIZORAL * ketoconazole 2 % shampoo Commonly known as:  NIZORAL  
USE AS DIRECTED EVERY OTHER DAY - MASSAGE INTO SCALP FOR 2- 3 MINUTES THEN RINSE EXTERNALLY  
  
 losartan 100 mg tablet Commonly known as:  COZAAR Take 100 mg by mouth nightly. methocarbamol 500 mg tablet Commonly known as:  ROBAXIN Take 1 Tab by mouth three (3) times daily as needed (muscle spasm). nitrofurantoin (macrocrystal-monohydrate) 100 mg capsule Commonly known as:  MACROBID Take 1 Cap by mouth two (2) times a day. raNITIdine 300 mg tablet Commonly known as:  ZANTAC Take 300 mg by mouth daily. simvastatin 20 mg tablet Commonly known as:  ZOCOR  
TAKE 1 TABLET BY MOUTH ONCE DAILY  
  
 warfarin 6 mg tablet Commonly known as:  COUMADIN  
TAKE AS DIRECTED BY PROVIDER BASED ON INR  
  
 * Notice: This list has 2 medication(s) that are the same as other medications prescribed for you. Read the directions carefully, and ask your doctor or other care provider to review them with you. Prescriptions Sent to Pharmacy Refills  
 nitrofurantoin, macrocrystal-monohydrate, (MACROBID) 100 mg capsule 0 Sig: Take 1 Cap by mouth two (2) times a day. Class: Normal  
 Pharmacy: [address], [address] [place] [address] #: [phone] Route: Oral  
  
We Performed the Following AMB POC URINALYSIS DIP STICK AUTO W/O MICRO [11606 CPT(R)] CULTURE, URINE [id number] CPT(R)] Patient Instructions 1) To prevent UTIs · Drink extra water and other fluids for the next day or two. This may help wash out the bacteria that are causing the infection. (If you have kidney, heart, or liver disease and have to limit fluids, talk with your doctor before you increase your fluid intake.)  You should be drinking at least 64 oz of water daily, on average. · Avoid drinks that are carbonated or have caffeine. They can irritate the bladder. · Urinate often. Try to empty your bladder each time. · To relieve pain, take a hot bath or lay a heating pad set on low over your lower belly or genital area. Never go to sleep with a heating pad in place. · Urinate when you need to. · Urinate right after you have sex. · Change sanitary pads often. · Avoid douches, bubble baths, feminine hygiene sprays, and other feminine hygiene products that have deodorants. · After going to the bathroom, wipe from front to back 2) VAGINAL DRYNESS Estrogen levels decrease with menopause and is a major contributor to vaginal dryness, itching, burning, discomfort and pain during intercourse or other sexual activity. (These symptoms are referred to as vaginal atrophy).    Unlike hot flashes which generally improve with time, vaginal atrophy typically worsens with time due to aging and lack of estrogen over time. Treatment options: · Vaginal lubricants - reduce discomfort with sexual activity by reducing friction. Water soluble products are best (ie [employer], Astroglide, Sliquid, Good Clean Love, [place]) · Vaginal moisturizers line the wall of the vagina and maintain vaginal moisture. Effective products include Rephresh, Replens and [place] Liquibeads. · Regular sexual stimulation promotes blood flow and secretions. Sexual stimulation with a partner, alone, or with a device (such as a vibrator) can improve vaginal health · \"Outercourse\" - alternative methods of sexual intimacy such as extended caressing, mutual masturbation and massage instead of vaginal penetration · Pelvic Floor Exercises can both strengthen weak vaginal muscles and relax tight ones. · Vaginal estrogen therapy is a low dose local estrogen applied directly to the vagina to increase thickness and elasticity of vaginal tissues, restore a healthy vaginal pH, increase vaginal secretions and relieve vaginal dryness and discomfort with sexual activity. Improvements occur over several months, but usually can be felt within a few weeks. Government approved low does vaginal estrogen are available by prescription as creams (used 2-3 times a week), tablet (2x week) or a vaginal ring (changed every 3 months). All are highly effective. Note: not all vaginal symptoms are related to menopause. Yeast infections, allergic reactions and skin conditions can affect the vagina, so consult your healthcare provider if symptoms do not improve with treatment. Introducing [address]! Dear Margot Mckeon: Thank you for requesting a [employer] account. Our records indicate that you have previously registered for a [employer] account but its currently inactive. Please call our [employer] support line at [phone]. Additional Information If you have questions, please visit the Frequently Asked Questions section of the [employer] website at [email or web address] Remember, [employer] is NOT to be used for urgent needs. For medical emergencies, dial 911. Now available from your iPhone and Android! Please provide this summary of care documentation to your next provider. Your primary care clinician is listed as Hui Lieberman. If you have any questions after today's visit, please call [phone].

## 2018-03-21 NOTE — PROGRESS NOTES
Chief Complaint   Patient presents with    Urinary Burning     x 1 week along woth cramping    Urgency       [place] Gabriela  Identified pt with two pt identifiers(name and ). Chief Complaint   Patient presents with    Urinary Burning     x 1 week along woth cramping    Urgency       1. Have you been to the ER, urgent care clinic since your last visit? No  Hospitalized since your last visit? NO    2. Have you seen or consulted any other health care providers outside of the [address] [place] [address] since your last visit? Include any pap smears or colon screening. NO    Today's provider has been notified of reason for visit, vitals and flowsheets obtained on patients.      Patient received paperwork for advance directive during previous visit but has not completed at this time     Reviewed record In preparation for visit, huddled with provider and have obtained necessary documentation      Health Maintenance Due   Topic    DTaP/Tdap/Td series (1 - Tdap)    PAP AKA CERVICAL CYTOLOGY     Influenza Age 5 to Adult     Pneumococcal 19-64 Highest Risk (3 of 3 - PCV13)       Wt Readings from Last 3 Encounters:   18 162 lb (73.5 kg)   17 162 lb 14.4 oz (73.9 kg)   07/10/17 163 lb 1.6 oz (74 kg)     Temp Readings from Last 3 Encounters:   18 99.2 °F (37.3 °C) (Oral)   07/10/17 98.7 °F (37.1 °C)   17 98.2 °F (36.8 °C) (Oral)     BP Readings from Last 3 Encounters:   18 (!) 132/95   17 120/82   07/10/17 122/84     Pulse Readings from Last 3 Encounters:   18 73   17 80   07/10/17 90     Vitals:    18 1123   BP: (!) 132/95   Pulse: 73   Resp: 19   Temp: 99.2 °F (37.3 °C)   TempSrc: Oral   SpO2: 99%   Weight: 162 lb (73.5 kg)   Height: 5' (1.524 m)   PainSc:   0 - No pain         Learning Assessment:  :     Learning Assessment 2014   PRIMARY LEARNER Patient Patient   HIGHEST LEVEL OF EDUCATION - PRIMARY LEARNER  GRADUATED HIGH SCHOOL OR GED GRADUATED HIGH SCHOOL OR GED   BARRIERS PRIMARY LEARNER NONE NONE   CO-LEARNER CAREGIVER No No   PRIMARY LANGUAGE ENGLISH ENGLISH    NEED No -   LEARNER PREFERENCE PRIMARY LISTENING PICTURES     READING -   LEARNING SPECIAL TOPICS none -   ANSWERED BY patient patient   RELATIONSHIP SELF SELF       Depression Screening:  :     No flowsheet data found. Fall Risk Assessment:  :     No flowsheet data found. Abuse Screening:  :     No flowsheet data found. ADL Screening:  :     ADL Assessment 3/21/2018   Feeding yourself No Help Needed   Getting from bed to chair No Help Needed   Getting dressed No Help Needed   Bathing or showering No Help Needed   Walk across the room (includes cane/walker) No Help Needed   Using the telphone No Help Needed   Taking your medications No Help Needed   Preparing meals No Help Needed   Managing money (expenses/bills) No Help Needed   Moderately strenuous housework (laundry) No Help Needed   Shopping for personal items (toiletries/medicines) No Help Needed   Shopping for groceries No Help Needed   Driving No Help Needed   Climbing a flight of stairs No Help Needed   Getting to places beyond walking distances No Help Needed                 Medication reconciliation up to date and corrected with patient at this time.

## 2018-03-22 LAB — BACTERIA UR CULT: NO GROWTH

## 2018-06-12 ENCOUNTER — APPOINTMENT (OUTPATIENT)
Dept: CT IMAGING | Age: 38
DRG: 920 | End: 2018-06-12
Attending: EMERGENCY MEDICINE
Payer: MEDICARE

## 2018-06-12 ENCOUNTER — HOSPITAL ENCOUNTER (INPATIENT)
Age: 38
LOS: 2 days | Discharge: HOME | DRG: 920 | End: 2018-06-14
Attending: EMERGENCY MEDICINE | Admitting: INTERNAL MEDICINE
Payer: MEDICARE

## 2018-06-12 DIAGNOSIS — S37.019A PERINEPHRIC HEMATOMA: ICD-10-CM

## 2018-06-12 DIAGNOSIS — D62 ACUTE BLOOD LOSS ANEMIA: Primary | ICD-10-CM

## 2018-06-12 DIAGNOSIS — R33.9 URINARY RETENTION: ICD-10-CM

## 2018-06-12 DIAGNOSIS — R31.0 GROSS HEMATURIA: ICD-10-CM

## 2018-06-12 PROBLEM — K21.9 GERD (GASTROESOPHAGEAL REFLUX DISEASE): Status: ACTIVE | Noted: 2018-06-12

## 2018-06-12 PROBLEM — Z86.718 HISTORY OF THROMBOEMBOLISM: Status: ACTIVE | Noted: 2018-06-12

## 2018-06-12 LAB
ALBUMIN SERPL-MCNC: 2.6 G/DL (ref 3.5–5)
ALBUMIN/GLOB SERPL: 0.8 {RATIO} (ref 1.1–2.2)
ALP SERPL-CCNC: 48 U/L (ref 45–117)
ALT SERPL-CCNC: 44 U/L (ref 12–78)
ANION GAP SERPL CALC-SCNC: 9 MMOL/L (ref 5–15)
APPEARANCE UR: CLEAR
APTT PPP: 28.2 SEC (ref 22.1–32)
AST SERPL-CCNC: 23 U/L (ref 15–37)
BACTERIA URNS QL MICRO: ABNORMAL /HPF
BASOPHILS # BLD: 0.1 K/UL (ref 0–0.1)
BASOPHILS NFR BLD: 0 % (ref 0–1)
BILIRUB SERPL-MCNC: 0.4 MG/DL (ref 0.2–1)
BILIRUB UR QL CFM: NEGATIVE
BUN SERPL-MCNC: 23 MG/DL (ref 6–20)
BUN/CREAT SERPL: 15 (ref 12–20)
CALCIUM SERPL-MCNC: 7.7 MG/DL (ref 8.5–10.1)
CHLORIDE SERPL-SCNC: 110 MMOL/L (ref 97–108)
CO2 SERPL-SCNC: 21 MMOL/L (ref 21–32)
COLOR UR: ABNORMAL
CREAT SERPL-MCNC: 1.53 MG/DL (ref 0.55–1.02)
DIFFERENTIAL METHOD BLD: ABNORMAL
EOSINOPHIL # BLD: 0.3 K/UL (ref 0–0.4)
EOSINOPHIL NFR BLD: 2 % (ref 0–7)
EPITH CASTS URNS QL MICRO: ABNORMAL /LPF
ERYTHROCYTE [DISTWIDTH] IN BLOOD BY AUTOMATED COUNT: 13.9 % (ref 11.5–14.5)
GLOBULIN SER CALC-MCNC: 3.1 G/DL (ref 2–4)
GLUCOSE SERPL-MCNC: 119 MG/DL (ref 65–100)
GLUCOSE UR STRIP.AUTO-MCNC: NEGATIVE MG/DL
HCG UR QL: NEGATIVE
HCT VFR BLD AUTO: 21.9 % (ref 35–47)
HCT VFR BLD AUTO: 23.6 % (ref 35–47)
HCT VFR BLD AUTO: 25.7 % (ref 35–47)
HGB BLD-MCNC: 7.3 G/DL (ref 11.5–16)
HGB BLD-MCNC: 7.9 G/DL (ref 11.5–16)
HGB BLD-MCNC: 8.6 G/DL (ref 11.5–16)
HGB UR QL STRIP: ABNORMAL
IMM GRANULOCYTES # BLD: 0.1 K/UL (ref 0–0.04)
IMM GRANULOCYTES NFR BLD AUTO: 1 % (ref 0–0.5)
INR PPP: 1 (ref 0.9–1.1)
KETONES UR QL STRIP.AUTO: ABNORMAL MG/DL
LACTATE SERPL-SCNC: 0.8 MMOL/L (ref 0.4–2)
LEUKOCYTE ESTERASE UR QL STRIP.AUTO: NEGATIVE
LYMPHOCYTES # BLD: 1.7 K/UL (ref 0.8–3.5)
LYMPHOCYTES NFR BLD: 12 % (ref 12–49)
MCH RBC QN AUTO: 30.3 PG (ref 26–34)
MCHC RBC AUTO-ENTMCNC: 33.5 G/DL (ref 30–36.5)
MCV RBC AUTO: 90.5 FL (ref 80–99)
MONOCYTES # BLD: 1 K/UL (ref 0–1)
MONOCYTES NFR BLD: 7 % (ref 5–13)
NEUTS SEG # BLD: 11.1 K/UL (ref 1.8–8)
NEUTS SEG NFR BLD: 78 % (ref 32–75)
NITRITE UR QL STRIP.AUTO: POSITIVE
NRBC # BLD: 0 K/UL (ref 0–0.01)
NRBC BLD-RTO: 0 PER 100 WBC
PH UR STRIP: 6.5 [PH] (ref 5–8)
PLATELET # BLD AUTO: 263 K/UL (ref 150–400)
PMV BLD AUTO: 8.6 FL (ref 8.9–12.9)
POTASSIUM SERPL-SCNC: 4.4 MMOL/L (ref 3.5–5.1)
PROT SERPL-MCNC: 5.7 G/DL (ref 6.4–8.2)
PROT UR STRIP-MCNC: 100 MG/DL
PROTHROMBIN TIME: 10 SEC (ref 9–11.1)
RBC # BLD AUTO: 2.84 M/UL (ref 3.8–5.2)
RBC #/AREA URNS HPF: >100 /HPF (ref 0–5)
SODIUM SERPL-SCNC: 140 MMOL/L (ref 136–145)
SP GR UR REFRACTOMETRY: 1.02 (ref 1–1.03)
THERAPEUTIC RANGE,PTTT: NORMAL SECS (ref 58–77)
UA: UC IF INDICATED,UAUC: ABNORMAL
UROBILINOGEN UR QL STRIP.AUTO: 1 EU/DL (ref 0.2–1)
WBC # BLD AUTO: 14.2 K/UL (ref 3.6–11)
WBC URNS QL MICRO: ABNORMAL /HPF (ref 0–4)

## 2018-06-12 PROCEDURE — 77030034849

## 2018-06-12 PROCEDURE — 74011250636 HC RX REV CODE- 250/636: Performed by: EMERGENCY MEDICINE

## 2018-06-12 PROCEDURE — 51798 US URINE CAPACITY MEASURE: CPT

## 2018-06-12 PROCEDURE — 85610 PROTHROMBIN TIME: CPT | Performed by: EMERGENCY MEDICINE

## 2018-06-12 PROCEDURE — 51702 INSERT TEMP BLADDER CATH: CPT

## 2018-06-12 PROCEDURE — 36415 COLL VENOUS BLD VENIPUNCTURE: CPT | Performed by: INTERNAL MEDICINE

## 2018-06-12 PROCEDURE — 85018 HEMOGLOBIN: CPT | Performed by: INTERNAL MEDICINE

## 2018-06-12 PROCEDURE — 81025 URINE PREGNANCY TEST: CPT

## 2018-06-12 PROCEDURE — 85025 COMPLETE CBC W/AUTO DIFF WBC: CPT | Performed by: EMERGENCY MEDICINE

## 2018-06-12 PROCEDURE — 74011250637 HC RX REV CODE- 250/637: Performed by: INTERNAL MEDICINE

## 2018-06-12 PROCEDURE — 77030018836 HC SOL IRR NACL ICUM -A

## 2018-06-12 PROCEDURE — 74011000258 HC RX REV CODE- 258: Performed by: EMERGENCY MEDICINE

## 2018-06-12 PROCEDURE — 81001 URINALYSIS AUTO W/SCOPE: CPT | Performed by: EMERGENCY MEDICINE

## 2018-06-12 PROCEDURE — 99285 EMERGENCY DEPT VISIT HI MDM: CPT

## 2018-06-12 PROCEDURE — 65660000000 HC RM CCU STEPDOWN

## 2018-06-12 PROCEDURE — 85730 THROMBOPLASTIN TIME PARTIAL: CPT | Performed by: EMERGENCY MEDICINE

## 2018-06-12 PROCEDURE — 96367 TX/PROPH/DG ADDL SEQ IV INF: CPT

## 2018-06-12 PROCEDURE — 86923 COMPATIBILITY TEST ELECTRIC: CPT | Performed by: EMERGENCY MEDICINE

## 2018-06-12 PROCEDURE — 86900 BLOOD TYPING SEROLOGIC ABO: CPT | Performed by: EMERGENCY MEDICINE

## 2018-06-12 PROCEDURE — 74011250636 HC RX REV CODE- 250/636: Performed by: INTERNAL MEDICINE

## 2018-06-12 PROCEDURE — 74176 CT ABD & PELVIS W/O CONTRAST: CPT

## 2018-06-12 PROCEDURE — 80053 COMPREHEN METABOLIC PANEL: CPT | Performed by: EMERGENCY MEDICINE

## 2018-06-12 PROCEDURE — 83605 ASSAY OF LACTIC ACID: CPT | Performed by: EMERGENCY MEDICINE

## 2018-06-12 PROCEDURE — 87086 URINE CULTURE/COLONY COUNT: CPT | Performed by: EMERGENCY MEDICINE

## 2018-06-12 PROCEDURE — 96365 THER/PROPH/DIAG IV INF INIT: CPT

## 2018-06-12 RX ORDER — PANTOPRAZOLE SODIUM 40 MG/1
40 TABLET, DELAYED RELEASE ORAL
Status: DISCONTINUED | OUTPATIENT
Start: 2018-06-13 | End: 2018-06-14 | Stop reason: HOSPADM

## 2018-06-12 RX ORDER — HYDROXYCHLOROQUINE SULFATE 200 MG/1
100 TABLET, FILM COATED ORAL
Status: DISCONTINUED | OUTPATIENT
Start: 2018-06-12 | End: 2018-06-14 | Stop reason: HOSPADM

## 2018-06-12 RX ORDER — FLUOXETINE HYDROCHLORIDE 20 MG/1
20 CAPSULE ORAL DAILY
Status: DISCONTINUED | OUTPATIENT
Start: 2018-06-13 | End: 2018-06-14 | Stop reason: HOSPADM

## 2018-06-12 RX ORDER — HYDRALAZINE HYDROCHLORIDE 20 MG/ML
20 INJECTION INTRAMUSCULAR; INTRAVENOUS
Status: DISCONTINUED | OUTPATIENT
Start: 2018-06-12 | End: 2018-06-14 | Stop reason: HOSPADM

## 2018-06-12 RX ORDER — ACETAMINOPHEN 325 MG/1
650 TABLET ORAL
Status: DISCONTINUED | OUTPATIENT
Start: 2018-06-12 | End: 2018-06-14 | Stop reason: HOSPADM

## 2018-06-12 RX ORDER — SODIUM CHLORIDE 9 MG/ML
75 INJECTION, SOLUTION INTRAVENOUS CONTINUOUS
Status: DISCONTINUED | OUTPATIENT
Start: 2018-06-12 | End: 2018-06-13

## 2018-06-12 RX ORDER — ATENOLOL 25 MG/1
25 TABLET ORAL DAILY
Status: DISCONTINUED | OUTPATIENT
Start: 2018-06-13 | End: 2018-06-13

## 2018-06-12 RX ORDER — SODIUM CHLORIDE 0.9 % (FLUSH) 0.9 %
5-10 SYRINGE (ML) INJECTION EVERY 8 HOURS
Status: DISCONTINUED | OUTPATIENT
Start: 2018-06-12 | End: 2018-06-14 | Stop reason: HOSPADM

## 2018-06-12 RX ORDER — CLONIDINE HYDROCHLORIDE 0.1 MG/1
0.2 TABLET ORAL
Status: DISCONTINUED | OUTPATIENT
Start: 2018-06-12 | End: 2018-06-14 | Stop reason: HOSPADM

## 2018-06-12 RX ORDER — HYDROXYCHLOROQUINE SULFATE 200 MG/1
200 TABLET, FILM COATED ORAL
Status: DISCONTINUED | OUTPATIENT
Start: 2018-06-13 | End: 2018-06-14 | Stop reason: HOSPADM

## 2018-06-12 RX ORDER — PRAVASTATIN SODIUM 40 MG/1
40 TABLET ORAL
Status: DISCONTINUED | OUTPATIENT
Start: 2018-06-12 | End: 2018-06-14 | Stop reason: HOSPADM

## 2018-06-12 RX ORDER — SODIUM CHLORIDE 0.9 % (FLUSH) 0.9 %
5-10 SYRINGE (ML) INJECTION AS NEEDED
Status: DISCONTINUED | OUTPATIENT
Start: 2018-06-12 | End: 2018-06-14 | Stop reason: HOSPADM

## 2018-06-12 RX ORDER — HYDROCODONE BITARTRATE AND ACETAMINOPHEN 5; 325 MG/1; MG/1
1 TABLET ORAL
Status: DISCONTINUED | OUTPATIENT
Start: 2018-06-12 | End: 2018-06-14 | Stop reason: HOSPADM

## 2018-06-12 RX ORDER — ONDANSETRON 2 MG/ML
4 INJECTION INTRAMUSCULAR; INTRAVENOUS
Status: DISCONTINUED | OUTPATIENT
Start: 2018-06-12 | End: 2018-06-14 | Stop reason: HOSPADM

## 2018-06-12 RX ORDER — MYCOPHENOLATE MOFETIL 250 MG/1
1500 CAPSULE ORAL 2 TIMES DAILY
Status: DISCONTINUED | OUTPATIENT
Start: 2018-06-12 | End: 2018-06-14 | Stop reason: HOSPADM

## 2018-06-12 RX ORDER — FAMOTIDINE 20 MG/1
20 TABLET, FILM COATED ORAL
Status: DISCONTINUED | OUTPATIENT
Start: 2018-06-12 | End: 2018-06-14 | Stop reason: HOSPADM

## 2018-06-12 RX ADMIN — FAMOTIDINE 20 MG: 20 TABLET ORAL at 17:33

## 2018-06-12 RX ADMIN — SODIUM CHLORIDE 75 ML/HR: 900 INJECTION, SOLUTION INTRAVENOUS at 12:04

## 2018-06-12 RX ADMIN — PRAVASTATIN SODIUM 40 MG: 40 TABLET ORAL at 21:38

## 2018-06-12 RX ADMIN — MYCOPHENOLATE MOFETIL 1500 MG: 250 CAPSULE ORAL at 18:31

## 2018-06-12 RX ADMIN — SODIUM CHLORIDE 1000 ML: 900 INJECTION, SOLUTION INTRAVENOUS at 04:39

## 2018-06-12 RX ADMIN — CEFTRIAXONE SODIUM 1 G: 1 INJECTION, POWDER, FOR SOLUTION INTRAMUSCULAR; INTRAVENOUS at 08:10

## 2018-06-12 RX ADMIN — Medication 10 ML: at 17:32

## 2018-06-12 RX ADMIN — Medication 10 ML: at 21:38

## 2018-06-12 NOTE — CONSULTS
Urology Consult    Subjective:     Date of Consultation:  June 12, 2018    Referring Physician: Dennise Boucher    Reason for Consultation:  hematuria    Patient Name: Trupti Encarnacion  MRN: [id number]    History of Present Illness:   Lexis Campbell is a 40 y.o.  female who presents with left sided back pain and noticing blood in urine. As per patient, she had a left renal  biopsy done at [facility] 1 week ago and she was observed overnight after developing gross hematuria requiring a transfusion. She was discharged the next day. Pt had been doing fine since then but today she woke up from sleep at 3 am with left sided back pain, 7/10 in intensity, sharp, non radiating and lasted for 10 minutes. Pt also reported noticing blood in urine since then and in ED she noted to stopped urinating and had urinary retention with 480ml of urine. In ED pt also noted to be anemic and CT I personally visualized showed left moderate side perinephric hematoma. No prior history of hematuria. Not taking ASA or any anticoagulants. Currently pain free.      Past Medical History:   Diagnosis Date    Anemia NEC     Anxiety disorder 3/10/2014    Arthritis     Branch retinal artery occlusion approx 2013    as of 5/26/16 resolved and on Coumadin    Calculus of kidney     Celiac disease     Chronic kidney disease     stage ll    Chronic renal insufficiency, stage II (mild)     secondary to lupus nephritis:  Dr Johnie Vazquez    Depression, major, recurrent ([facility] [address] 3/10/2014    Diffuse proliferative lupus glomerulonephritis ([facility] [address]     Dr Johnie Vazquez     Fatigue     intermittent due to lupus    Gastritis     as of 5/26/16 asymptomatic    GERD (gastroesophageal reflux disease)     Hypertension     Long term current use of anticoagulant therapy     Lupus     as of 5/26/16 \"aching and swelling of joints\" per patient; Dr Jeremias Daniels (in cc)    Lupus     Memory loss     as of 5/26/16 pt states \"its not that bad, I forget where I put stuff\"    Ovarian cyst     as of 16; Dr Olga Payne Pregnancy induced hypertension     PUD (peptic ulcer disease)       Past Surgical History:   Procedure Laterality Date    HX ACL RECONSTRUCTION Left approx     HX  SECTION      x1    HX ORTHOPAEDIC Right     hand to thumb (thumb would pop out of place so they placed a ligament around it) per pt    HX OTHER SURGICAL  14    CT Guided Renal Biopsy with Conscious Sedation    HX OTHER SURGICAL      kidney biopsy; benign    HX OTHER SURGICAL Right 10/22/14    right Posterior Interosseous Nerve Neurectomy, right Anterior Interosseous Nerve Neurectomy       Family History   Problem Relation Age of Onset   [facility] Glaucoma Father     Hypertension Father       Social History   Substance Use Topics    Smoking status: Never Smoker    Smokeless tobacco: Never Used    Alcohol use No     Allergies   Allergen Reactions    Gluten Diarrhea    Fiorinal-Codeine #3 [Codeine-Butalbital-Asa-Caff] Nausea Only      Prior to Admission medications    Medication Sig Start Date End Date Taking? Authorizing Provider   acetaminophen (ACETAMINOPHEN EXTRA STRENGTH) 500 mg tablet Take 1 Tab by mouth every six (6) hours as needed for Pain. 7/10/17  Yes Rosario Hughes MD   hydroxychloroquine (PLAQUENIL) 200 mg tablet Take 1 tab po qam and 1/2 tab po qpm 17  Yes Karli Jalloh PA-C   warfarin (COUMADIN) 6 mg tablet TAKE AS DIRECTED BY PROVIDER BASED ON INR 17  Yes Historical Provider   simvastatin (ZOCOR) 20 mg tablet TAKE 1 TABLET BY MOUTH ONCE DAILY 10/9/16  Yes Historical Provider   losartan (COZAAR) 100 mg tablet Take 100 mg by mouth nightly. Yes Historical Provider   atenolol (TENORMIN) 50 mg tablet Take  by mouth nightly. Yes Historical Provider   mycophenolate (CELLCEPT) 500 mg tablet Take 1,500 mg by mouth two (2) times a day. Yes Historical Provider   FLUoxetine (PROZAC) 20 mg tablet Take 20 mg by mouth daily.     Historical Provider   Dexlansoprazole (DEXILANT) 60 mg CpDB Take 60 mg by mouth daily. Historical Provider   raNITIdine (ZANTAC) 300 mg tablet Take 300 mg by mouth daily. Historical Provider         Review of Systems:  A comprehensive review of systems was negative except for that written in the HPI. Objective:     Data Review (Labs):    Recent Labs      18   0503  18   0431   WBC   --   14.2*   HGB   --   8.6*   MCV   --   90.5   PLT   --   263   NA  140   --    K  4.4   --    CREA  1.53*   --    BUN  23*   --    ALB  2.6*   --    TBILI  0.4   --    SGOT  23   --    ALT  44   --    AP  48   --    INR   --   1.0   IPVITALS(8:)Temp (24hrs), Av °F (37.2 °C), Min:99 °F (37.2 °C), Max:99 °F (37.2 °C)    Temp (24hrs), Av °F (37.2 °C), Min:99 °F (37.2 °C), Max:99 °F (37.2 °C)  RRIOLAST3     Physical Exam:            General:    alert, cooperative, no distress, appears stated age                     Skin:  Normal.   Lymph nodes:  Cervical, supraclavicular, and axillary nodes normal.             Abdomen[de-identified]  soft, non-tender. Bowel sounds normal. No masses,  no organomegaly             Genitalia:  defer exam          Extremities:  negative       Swenson unable to be irrigated. Nursing staff replaced with 22f 3 way but had to remove secondary to discomfort.      Assessment:     Active Problems:    Systemic lupus erythematosus ([address] (2011)      Hypertension (2011)      Depression, major, recurrent ([address] (3/10/2014)      Acute blood loss anemia (2018)      GERD (gastroesophageal reflux disease) (2018)      History of thromboembolism (2018)          Left renal bleed secondary to percutaneous renal biopsy with gross hematuria, clot retention    Plan:     Leave swenson out for now  Timed voids  Follow hg  May need IR angelique embolization if bleeding persists    Signed By: Ximena Baker MD                         2018

## 2018-06-12 NOTE — ROUTINE PROCESS
TRANSFER - OUT REPORT:    Verbal report given to WILBUR Shukla(name) on John Paul Godinez  being transferred to Oncology(unit) for routine progression of care       Report consisted of patients Situation, Background, Assessment and   Recommendations(SBAR). Information from the following report(s) SBAR, ED Summary, [facility] and Recent Results was reviewed with the receiving nurse. Lines:   Peripheral IV 06/12/18 Right Antecubital (Active)   Site Assessment Clean, dry, & intact 6/12/2018  4:30 AM   Phlebitis Assessment 0 6/12/2018  4:30 AM   Infiltration Assessment 0 6/12/2018  4:30 AM   Dressing Status Clean, dry, & intact 6/12/2018  4:30 AM   Dressing Type 4 X 4 6/12/2018  4:30 AM       Peripheral IV 06/12/18 Left Antecubital (Active)   Site Assessment Clean, dry, & intact 6/12/2018  4:47 AM   Phlebitis Assessment 0 6/12/2018  4:47 AM   Infiltration Assessment 0 6/12/2018  4:47 AM   Dressing Status Clean, dry, & intact 6/12/2018  4:47 AM   Dressing Type 4 X 4 6/12/2018  4:47 AM        Opportunity for questions and clarification was provided.

## 2018-06-12 NOTE — ED NOTES
Patient arrives to ED via squad. She is A&O x4. States that she was awoken from her sleep with a sharp severe left flank pain. She felt the urge to urinate and took herself to the bathroom and states that she urinated mina blood without clots. Denies pain on arrival.  Patient had a left kidney biopsy approx 1 week ago and has stage II kidney disease. She has not received results at this time. Denies any complications with her recovery up until now. States that she usually takes coumadin for hx of blood clot in her eye secondary to her chronic lupus. She stopped her coumadin per MD orders 1 week prior to biopsy and was due to restart today. Denies urge to urinate at this time.   No history of kidney stones or ever experiencing symptoms like this before

## 2018-06-12 NOTE — PROGRESS NOTES
Hematuria post left renal bx at [facility]  Was transfused and sent home  Now presents with hematuria, clot passage    Abd benign, no CVAT    CT reviewed-left renal bleed with clot in bladder.      12 f swenson draining clear, unable to irrigate it  Attempted replacement with 22f but she did not tolerate    Will leave swenson out and have pt vpid  NPO in case needs clot evac or IR embolization  Bladder scan post void

## 2018-06-12 NOTE — CONSULTS
Nephrology Consult Note     Seamus Tee     [facility]. [facility]              Phone - [phone]   Patient: Santos Pedraza   YOB: 1980    Date- 6/12/2018  MRN: [id number]   CONSULTING PHYSICIAN: DR. PAT          REASON FOR CONSULTATION: ARF , HEMATURIA  ADMIT DATE:6/12/2018 PATIENT PCP:None     ASSESSMENT:   · moody DUE TO Hypotension, anemia  · Hematuria - left perinephric hematoma after renal bx  · Anemia due to blood loss  · ckd 2  · H/o diffuse proliferative lupus nephritis - h/o cytoxan therapy in past currently CELLCEPT  · UTI  · Proteinuria- worsening requiring repeat bx last week  · H/o lupus  · H/o severe pre eclampsia in July 2011  · Retinal blood clot in 8/12, , requiring coumadin therapy  · Active Problems:  ·   Systemic lupus erythematosus ([facility] [address] (5/19/2011)  ·   ·   Hypertension (6/30/2011)  ·   ·   Depression, major, recurrent ([facility] [address] (3/10/2014)  ·   ·   Acute blood loss anemia (6/12/2018)  ·   ·   GERD (gastroesophageal reflux disease) (6/12/2018)  ·   ·   History of thromboembolism (6/12/2018)  ·   ·   PLAN:   · Continue ivf  · Consult urology  · Check cbc every 8 hours  · If she continue to bleed she will need embolisation by IR  · Hold bp meds  · Continue cellcept  · Hold losartan   · Daily bmp    [x] High complexity decision making was performed  [x] Patient is at high-risk of decompensation with multiple organ involvement    Subjective:   HPI: Santos Pedraza is a 40 y.o.  female. She came to er with hematuria. She had renal bx done last week at [facility]. Her renal bx was complicated with hematuria and renal hematoma. She was discharged home after prbc tx and her hematuria cleared up. She had recurrence of hematuria and she came back to er  Her hb is low  She was hypotensive in er which responded to nacl bolus  Her urine is positive for nitrites  She c/o left sided abdo. Pain  No vomiting  No sob, chest pain    Her hb is 8.3 and cr. 1.5  Her cr.  Is close to 1.2- 1.3  She is on cellcept for her lupus nephritis. She had renal bx done last week for eval for worsening proteinuria  She has received cytoxan in past      Review of Systems:  A 11 point review of system was performed today. Pertinent positives and negatives are mentioned in the HPI. The reminder of the ROS is negative and noncontributory.     Past Medical History:   Diagnosis Date    Anemia NEC     Anxiety disorder 3/10/2014    Arthritis     Branch retinal artery occlusion approx     as of 16 resolved and on Coumadin    Calculus of kidney     Celiac disease     Chronic kidney disease     stage ll    Chronic renal insufficiency, stage II (mild)     secondary to lupus nephritis:  Dr Luther Koch    Depression, major, recurrent ([facility] [address] 3/10/2014    Diffuse proliferative lupus glomerulonephritis ([facility] [address]     Dr Luther Koch     Fatigue     intermittent due to lupus    Gastritis     as of 16 asymptomatic    GERD (gastroesophageal reflux disease)     Hypertension     Long term current use of anticoagulant therapy     Lupus     as of 16 \"aching and swelling of joints\" per patient; Dr Dalia Chacon (in cc)    Lupus     Memory loss     as of 16 pt states \"its not that bad, I forget where I put stuff\"    Ovarian cyst     as of 16; Dr Dirk Grover Pregnancy induced hypertension     PUD (peptic ulcer disease)       Past Surgical History:   Procedure Laterality Date    HX ACL RECONSTRUCTION Left approx     HX  SECTION      x1    HX ORTHOPAEDIC Right     hand to thumb (thumb would pop out of place so they placed a ligament around it) per pt    HX OTHER SURGICAL  14    CT Guided Renal Biopsy with Conscious Sedation    HX OTHER SURGICAL      kidney biopsy; benign    HX OTHER SURGICAL Right 10/22/14    right Posterior Interosseous Nerve Neurectomy, right Anterior Interosseous Nerve Neurectomy       Prior to Admission medications    Medication Sig Start Date End Date Taking? Authorizing Provider   acetaminophen (ACETAMINOPHEN EXTRA STRENGTH) 500 mg tablet Take 1 Tab by mouth every six (6) hours as needed for Pain. 7/10/17  Yes Lonnie Cameron MD   hydroxychloroquine (PLAQUENIL) 200 mg tablet Take 1 tab po qam and 1/2 tab po qpm 6/22/17  Yes Karli Jalloh PA-C   warfarin (COUMADIN) 6 mg tablet TAKE AS DIRECTED BY PROVIDER BASED ON INR 5/13/17  Yes Historical Provider   simvastatin (ZOCOR) 20 mg tablet TAKE 1 TABLET BY MOUTH ONCE DAILY 10/9/16  Yes Historical Provider   losartan (COZAAR) 100 mg tablet Take 100 mg by mouth nightly. Yes Historical Provider   atenolol (TENORMIN) 50 mg tablet Take  by mouth nightly. Yes Historical Provider   mycophenolate (CELLCEPT) 500 mg tablet Take 1,500 mg by mouth two (2) times a day. Yes Historical Provider   FLUoxetine (PROZAC) 20 mg tablet Take 20 mg by mouth daily. Historical Provider   Dexlansoprazole (DEXILANT) 60 mg CpDB Take 60 mg by mouth daily. Historical Provider   raNITIdine (ZANTAC) 300 mg tablet Take 300 mg by mouth daily.     Historical Provider     Allergies   Allergen Reactions    Gluten Diarrhea    Fiorinal-Codeine #3 [Codeine-Butalbital-Asa-Caff] Nausea Only      Social History   Substance Use Topics    Smoking status: Never Smoker    Smokeless tobacco: Never Used    Alcohol use No      Family History   Problem Relation Age of Onset    Glaucoma Father     Hypertension Father         Objective:    Vitals:    Vitals:    06/12/18 0735 06/12/18 0800 06/12/18 0803 06/12/18 0936   BP: 104/71 104/77     Pulse: 82 75 79 70   Resp: 23 18 17 14   Temp:       SpO2: 99%  97% 100%   Weight:       Height:         I&O's:     Physical Exam:  General:Alert, No distress,   Eyes:No scleral icterus, No conjunctival pallor  Neck:Supple,no mass palpable,no thyromegaly  Lungs:Clears to auscultation Bilaterally, normal respiratory effort  CVS:RRR, S1 S2 normal,  No rub,  Abdomen:Soft, Non tender, No hepatosplenomegaly  Extremities: no LE edema  Skin:No rash or lesions, Warm and DRY   Psych: appropriate affect    :  No swenson  Musculoskeletal : no redness, no joint tenderness  NEURO: Normal Speech, Non focal    CODE STATUS:  Care Plan discussed with:  patient and nurse  Chart reviewed.      TOTAL TIME: 74Minutes   y Reviewed previous records   y Discussion with patient and/or family and questions answered   y >50% of visit spent in counseling and coordination of care        ECG[de-identified] Rev:no  Xray/CT/US/MRI REV:yes  FINDINGS:   LUNG BASES: 3 mm right lower lobe pulmonary nodule. INCIDENTALLY IMAGED HEART AND MEDIASTINUM: Unremarkable. LIVER: No mass or biliary dilatation. GALLBLADDER: Surgically absent. SPLEEN: No mass. PANCREAS: No mass or ductal dilatation. ADRENALS: Unremarkable. KIDNEYS/URETERS: No mass, calculus, or hydronephrosis. High attenuation material  within the left renal pelvis. Moderate size left perinephric hematoma. STOMACH: Unremarkable. SMALL BOWEL: No dilatation or wall thickening. COLON: No dilatation or wall thickening. APPENDIX: Normal.  PERITONEUM: No ascites or pneumoperitoneum. RETROPERITONEUM: No lymphadenopathy or aortic aneurysm. REPRODUCTIVE ORGANS: Intrauterine device within the uterus. 2.5 cm right ovarian  cyst.  URINARY BLADDER: Contains layering high attenuation material.  BONES: No destructive bone lesion. ADDITIONAL COMMENTS: N/A     IMPRESSION  IMPRESSION:  Moderate size left perinephric hematoma. High attenuation material within the  left renal pelvis and urinary bladder likely represents clot. No urinary tract  calculi or obstruction. Right ovarian cyst is likely physiologic.       Lab Data Personally Reviewed: (see below)    CBC: 6/12/2018: HCT 25.7 %* (Ref range: 35.0 - 47.0 %); HCT 21.9 %* (Ref range: 35.0 - 47.0 %); HCT 23.6 %* (Ref range: 35.0 - 47.0 %); HGB 8.6 g/dL* (Ref range: 11.5 - 16.0 g/dL); HGB 7.3 g/dL* (Ref range: 11.5 - 16.0 g/dL);  HGB 7.9 g/dL* (Ref range: 11.5 - 16.0 g/dL); PLATELET 571 K/uL (Ref range: 150 - 400 K/uL); RBC 2.84 M/uL* (Ref range: 3.80 - 5.20 M/uL); WBC 14.2 K/uL* (Ref range: 3.6 - 11.0 K/uL)  6/13/2018: HCT 19.2 %* (Ref range: 35.0 - 47.0 %); HCT 22.0 %* (Ref range: 35.0 - 47.0 %); HGB 6.4 g/dL* (Ref range: 11.5 - 16.0 g/dL); HGB 7.2 g/dL* (Ref range: 11.5 - 16.0 g/dL); PLATELET 968 K/uL (Ref range: 150 - 400 K/uL); RBC 2.12 M/uL* (Ref range: 3.80 - 5.20 M/uL); WBC 8.7 K/uL (Ref range: 3.6 - 11.0 K/uL)   BMP: 6/12/2018: BUN 23 MG/DL* (Ref range: 6 - 20 MG/DL); Calcium 7.7 MG/DL* (Ref range: 8.5 - 10.1 MG/DL); Chloride 110 mmol/L* (Ref range: 97 - 108 mmol/L); CO2 21 mmol/L (Ref range: 21 - 32 mmol/L); Creatinine 1.53 MG/DL* (Ref range: 0.55 - 1.02 MG/DL); Glucose 119 mg/dL* (Ref range: 65 - 100 mg/dL); Potassium 4.4 mmol/L (Ref range: 3.5 - 5.1 mmol/L); Sodium 140 mmol/L (Ref range: 136 - 145 mmol/L)  6/13/2018: BUN 16 MG/DL (Ref range: 6 - 20 MG/DL); Calcium 7.6 MG/DL* (Ref range: 8.5 - 10.1 MG/DL); Chloride 114 mmol/L* (Ref range: 97 - 108 mmol/L); CO2 20 mmol/L* (Ref range: 21 - 32 mmol/L); Creatinine 1.31 MG/DL* (Ref range: 0.55 - 1.02 MG/DL); Glucose 91 mg/dL (Ref range: 65 - 100 mg/dL); Potassium 4.3 mmol/L (Ref range: 3.5 - 5.1 mmol/L);  Sodium 143 mmol/L (Ref range: 136 - 145 mmol/L)  Recent Labs      06/12/18   0503  06/12/18   0431   NA  140   --    K  4.4   --    CL  110*   --    CO2  21   --    GLU  119*   --    BUN  23*   --    CREA  1.53*   --    CA  7.7*   --    ALB  2.6*   --    SGOT  23   --    ALT  44   --    INR   --   1.0     Recent Labs      06/12/18 0431   WBC  14.2*   HGB  8.6*   HCT  25.7*   PLT  263     Lab Results   Component Value Date/Time    Color RED 06/12/2018 05:36 AM    Appearance CLEAR 06/12/2018 05:36 AM    Specific gravity 1.018 06/12/2018 05:36 AM    Specific gravity 1.007 11/04/2016 04:37 AM    pH (UA) 6.5 06/12/2018 05:36 AM    Protein 100 (A) 06/12/2018 05:36 AM    Glucose NEGATIVE  06/12/2018 05:36 AM    Ketone TRACE (A) 06/12/2018 05:36 AM    Bilirubin Negative 05/31/2017 11:37 AM    Urobilinogen 1.0 06/12/2018 05:36 AM    Nitrites POSITIVE (A) 06/12/2018 05:36 AM    Leukocyte Esterase NEGATIVE  06/12/2018 05:36 AM    Epithelial cells FEW 06/12/2018 05:36 AM    Bacteria 1+ (A) 06/12/2018 05:36 AM    WBC 0-4 06/12/2018 05:36 AM    RBC >100 (H) 06/12/2018 05:36 AM     Lab Results   Component Value Date/Time    AST (SGOT) 23 06/12/2018 05:03 AM    Alk. phosphatase 48 06/12/2018 05:03 AM    Protein, total 5.7 (L) 06/12/2018 05:03 AM    Albumin 2.6 (L) 06/12/2018 05:03 AM    Globulin 3.1 06/12/2018 05:03 AM     Lab Results   Component Value Date/Time    INR 1.0 06/12/2018 04:31 AM    Prothrombin time 10.0 06/12/2018 04:31 AM    aPTT 28.2 06/12/2018 04:31 AM      No results found for: IRON, FE, TIBC, IBCT, PSAT, FERR  Lab Results   Component Value Date/Time    Culture result: MIXED UROGENITAL RITA ISOLATED 11/04/2016 04:37 AM    Culture result: MIXED SKIN RITA ISOLATED 05/20/2014 03:30 PM    Culture result:  04/28/2014 09:29 AM     STREPTOCOCCI, BETA HEMOLYTIC GROUP B (PREDOMINATING) . Penicillin and ampicillin are drugs of choice for treatment of beta-hemolytic streptococcal infections. Susceptibility testing of penicillins and beta-lactams approved by the FDA for treatment of beta-hemolytic streptococcal infections need not be performed routinely, because nonsusceptible isolates are extremely rare. CLSI 2012  MIXED SKIN RITA ISOLATED (10,000 COLONIES/mL )     Prior to Admission Medications   Prescriptions Last Dose Informant Patient Reported? Taking? Dexlansoprazole (DEXILANT) 60 mg CpDB Not Taking at Unknown time  Yes No   Sig: Take 60 mg by mouth daily. FLUoxetine (PROZAC) 20 mg tablet Not Taking at Unknown time  Yes No   Sig: Take 20 mg by mouth daily.    acetaminophen (ACETAMINOPHEN EXTRA STRENGTH) 500 mg tablet 6/11/2018 at Unknown time  No Yes   Sig: Take 1 Tab by mouth every six (6) hours as needed for Pain. atenolol (TENORMIN) 50 mg tablet 6/11/2018 at 2100  Yes Yes   Sig: Take  by mouth nightly. hydroxychloroquine (PLAQUENIL) 200 mg tablet 6/11/2018 at 0900  No Yes   Sig: Take 1 tab po qam and 1/2 tab po qpm   losartan (COZAAR) 100 mg tablet 6/11/2018 at 2100  Yes Yes   Sig: Take 100 mg by mouth nightly. mycophenolate (CELLCEPT) 500 mg tablet 6/11/2018 at 2100  Yes Yes   Sig: Take 1,500 mg by mouth two (2) times a day. raNITIdine (ZANTAC) 300 mg tablet Not Taking at Unknown time  Yes No   Sig: Take 300 mg by mouth daily. simvastatin (ZOCOR) 20 mg tablet 6/11/2018 at 2100  Yes Yes   Sig: TAKE 1 TABLET BY MOUTH ONCE DAILY   warfarin (COUMADIN) 6 mg tablet 5/27/2018 at Unknown time  Yes Yes   Sig: TAKE AS DIRECTED BY PROVIDER BASED ON INR      Facility-Administered Medications: None     Imaging:    Medications list Personally Reviewed   [x]      Yes     []               No    Thank you for allowing us to participate in the care this patient. We will follow patient with you. Signed By: Jessica Phelps MD  [facility]  [facility] [place] [address], [facility], [address]  Phone - [phone]         Fax - [phone] [facility] Office  [address] [place] [address]  Phone - [phone]        Fax - [phone]     www. [email or web address]

## 2018-06-12 NOTE — H&P
Hospitalist Admission Note    NAME: Gema Sue   :  1980   MRN:  [id number]     Date/Time:  2018 8:53 AM    Patient PCP: None  ______________________________________________________________________  Given the patient's current clinical presentation, I have a high level of concern for decompensation if discharged from the emergency department. Complex decision making was performed, which includes reviewing the patient's available past medical records, laboratory results, and x-ray films. My assessment of this patient's clinical condition and my plan of care is as follows. Assessment / Plan:  Acute blood loss anemia POA  Hypotension (episode of SBP in 80s per ED physician)  In settings of Hematuria and Moderate size left perinephric hematoma  Post Renal BiopSY for Lupus  Admit  Monitor H&H  Urology consult  ED consulted nephrology already for recommendation  PRn transfusion  IVF  UA with +1 baceria but no leukocyte estrase or WBCs, doesn't look infected.  Given IV rocephin in ED, f/u urine cultures  CT A/P with Moderate size left perinephric hematoma    Urinary retention with 480ml of urine due to Cloth related to hematuria  Will order simple irrigation till urine clears up  Urology consult as above    Mild Nani with baseline CKD3  Recent Cr of 1.16 at [place] doctor's  IVF and monitor lytes    Systemic lupus erythematosus  Continue home meds including cellcept    Hypertension   Hold BP meds today due to episode of hypotension  Will resume atenolol tomorrow at half dose with BP and HR parameters  Holding ARBs  PRN nitropaste    Depression  Continue antidepressants    GERD (gastroesophageal reflux disease)   Continue PPIs and H2 blockers    History of thromboembolism in eye  She report abnormal hypercoagulable workup but doesn't remember exact Dx  Follows Dr Anais Pinto as an OP  Holding coumadin since biopsy, will continue to hold due to above    Code Status: Full  Surrogate Decision Maker:     DVT Prophylaxis: SCDs    Baseline: functional      Subjective:   CHIEF COMPLAINT: back pain and blood in urine    HISTORY OF PRESENT ILLNESS:     Dru Richardson is a 40 y.o.  female who presents with left sided back pain and noticing blood in urine. As per patient, she had a biopsy done at [place] [facility]'s 1 week ago and she was observed overnight and discharged home next day. Pt had been doing fine since then but today she woke up from sleep at 3 am with left sided back pain, 7/10 in intensity, sharp, non radiating and lasted for 10 minutes. Pt also reported noticing blood in urine since then and in ED she noted to stopped urinating and had urinary retention with 480ml of urine. In ED pt also noted to be anemic and CT showed left moderate side perinephric hematoma. We were asked to admit for work up and evaluation of the above problems.      Past Medical History:   Diagnosis Date    Anemia NEC     Anxiety disorder 3/10/2014    Arthritis     Branch retinal artery occlusion approx 2013    as of 5/26/16 resolved and on Coumadin    Calculus of kidney     Celiac disease     Chronic kidney disease     stage ll    Chronic renal insufficiency, stage II (mild)     secondary to lupus nephritis:  Dr Claudette Meeks    Depression, major, recurrent ([facility] [address] 3/10/2014    Diffuse proliferative lupus glomerulonephritis ([facility] [address]     Dr Claudette Meeks     Fatigue     intermittent due to lupus    Gastritis     as of 5/26/16 asymptomatic    GERD (gastroesophageal reflux disease)     Hypertension     Long term current use of anticoagulant therapy     Lupus     as of 5/26/16 \"aching and swelling of joints\" per patient; Dr Vonda Duggan (in cc)    Lupus     Memory loss     as of 5/26/16 pt states \"its not that bad, I forget where I put stuff\"    Ovarian cyst     as of 5/26/16; Dr Tisha Montano Pregnancy induced hypertension     PUD (peptic ulcer disease)         Past Surgical History:   Procedure Laterality Date    HX ACL RECONSTRUCTION Left approx     HX  SECTION      x1    HX ORTHOPAEDIC Right     hand to thumb (thumb would pop out of place so they placed a ligament around it) per pt    HX OTHER SURGICAL  14    CT Guided Renal Biopsy with Conscious Sedation    HX OTHER SURGICAL      kidney biopsy; benign    HX OTHER SURGICAL Right 10/22/14    right Posterior Interosseous Nerve Neurectomy, right Anterior Interosseous Nerve Neurectomy        Social History   Substance Use Topics    Smoking status: Never Smoker    Smokeless tobacco: Never Used    Alcohol use No        Family History   Problem Relation Age of Onset    Glaucoma Father     Hypertension Father      Allergies   Allergen Reactions    Gluten Diarrhea    Fiorinal-Codeine #3 [Codeine-Butalbital-Asa-Caff] Nausea Only        Prior to Admission medications    Medication Sig Start Date End Date Taking? Authorizing Provider   acetaminophen (ACETAMINOPHEN EXTRA STRENGTH) 500 mg tablet Take 1 Tab by mouth every six (6) hours as needed for Pain. 7/10/17   Lisseth Hdz MD   hydroxychloroquine (PLAQUENIL) 200 mg tablet Take 1 tab po qam and 1/2 tab po qpm 17   Karli Jalloh PA-C   warfarin (COUMADIN) 6 mg tablet TAKE AS DIRECTED BY PROVIDER BASED ON INR 17   Historical Provider   FLUoxetine (PROZAC) 20 mg tablet Take 20 mg by mouth daily. Historical Provider   Dexlansoprazole (DEXILANT) 60 mg CpDB Take 60 mg by mouth daily. Historical Provider   raNITIdine (ZANTAC) 300 mg tablet Take 300 mg by mouth daily. Historical Provider   simvastatin (ZOCOR) 20 mg tablet TAKE 1 TABLET BY MOUTH ONCE DAILY 10/9/16   Historical Provider   losartan (COZAAR) 100 mg tablet Take 100 mg by mouth nightly. Historical Provider   atenolol (TENORMIN) 50 mg tablet Take  by mouth nightly. Historical Provider   mycophenolate (CELLCEPT) 500 mg tablet Take 1,500 mg by mouth two (2) times a day.     Historical Provider       REVIEW OF SYSTEMS:     I am not able to complete the review of systems because: The patient is intubated and sedated    The patient has altered mental status due to his acute medical problems    The patient has baseline aphasia from prior stroke(s)    The patient has baseline dementia and is not reliable historian    The patient is in acute medical distress and unable to provide information           Total of 12 systems reviewed as follows:       POSITIVE= underlined text  Negative = text not underlined  General:  fever, chills, sweats, generalized weakness, weight loss/gain,      loss of appetite   Eyes:    blurred vision, eye pain, loss of vision, double vision  ENT:    rhinorrhea, pharyngitis   Respiratory:   cough, sputum production, SOB, CORBETT, wheezing, pleuritic pain   Cardiology:   chest pain, palpitations, orthopnea, PND, edema, syncope   Gastrointestinal:  abdominal pain , N/V, diarrhea, dysphagia, constipation, bleeding   Genitourinary:  frequency, urgency, dysuria, hematuria, incontinence, urinary retention  Muskuloskeletal :  arthralgia, myalgia, back pain  Hematology:  easy bruising, nose or gum bleeding, lymphadenopathy   Dermatological: rash, ulceration, pruritis, color change / jaundice  Endocrine:   hot flashes or polydipsia   Neurological:  headache, dizziness, confusion, focal weakness, paresthesia,     Speech difficulties, memory loss, gait difficulty  Psychological: Feelings of anxiety, depression, agitation    Objective:   VITALS:    Visit Vitals    /70 (BP 1 Location: Left arm, BP Patient Position: At rest)    Pulse 74    Temp 99 °F (37.2 °C)    Resp 18    Ht 5' 1\" (1.549 m)    Wt 72.6 kg (160 lb)    SpO2 100%    BMI 30.23 kg/m2       PHYSICAL EXAM:    General:    Alert, cooperative, no distress, appears stated age.      HEENT: Atraumatic, anicteric sclerae, pink conjunctivae     No oral ulcers, mucosa moist, throat clear, dentition fair  Neck:  Supple, symmetrical,  thyroid: non tender  Lungs:   Clear to auscultation bilaterally. No Wheezing or Rhonchi. No rales. Chest wall:  No tenderness  No Accessory muscle use. Heart:   Regular  rhythm,  No  murmur   No edema  Abdomen:   Soft, non-tender. Not distended. Bowel sounds normal  Extremities: No cyanosis. No clubbing,      Skin turgor normal, Capillary refill normal, Radial dial pulse 2+  Skin:     Not pale. Not Jaundiced  No rashes   Psych:  Good insight. Not depressed. Not anxious or agitated. Neurologic: EOMs intact. No facial asymmetry. No aphasia or slurred speech. Symmetrical strength, Sensation grossly intact. Alert and oriented X 4.     _______________________________________________________________________  Care Plan discussed with:    Comments   Patient y    Family      RN y    Care Manager                    Consultant:  [facility] ED physician   _______________________________________________________________________  Expected  Disposition:   Home with Family y   HH/PT/OT/RN    SNF/LTC    SUMIT    ________________________________________________________________________  TOTAL TIME: 61 Minutes    Critical Care Provided     Minutes non procedure based      Comments    y Reviewed previous records   >50% of visit spent in counseling and coordination of care y Discussion with patient and family questions answered       ________________________________________________________________________  Signed: Robin Jamil MD    Procedures: see electronic medical records for all procedures/Xrays and details which were not copied into this note but were reviewed prior to creation of Plan.     LAB DATA REVIEWED:    Recent Results (from the past 24 hour(s))   CBC WITH AUTOMATED DIFF    Collection Time: 06/12/18  4:31 AM   Result Value Ref Range    WBC 14.2 (H) 3.6 - 11.0 K/uL    RBC 2.84 (L) 3.80 - 5.20 M/uL    HGB 8.6 (L) 11.5 - 16.0 g/dL    HCT 25.7 (L) 35.0 - 47.0 %    MCV 90.5 80.0 - 99.0 FL    MCH 30.3 26.0 - 34.0 PG    MCHC 33.5 30.0 - 36.5 g/dL RDW 13.9 11.5 - 14.5 %    PLATELET 859 803 - 930 K/uL    MPV 8.6 (L) 8.9 - 12.9 FL    NRBC 0.0 0  WBC    ABSOLUTE NRBC 0.00 0.00 - 0.01 K/uL    NEUTROPHILS 78 (H) 32 - 75 %    LYMPHOCYTES 12 12 - 49 %    MONOCYTES 7 5 - 13 %    EOSINOPHILS 2 0 - 7 %    BASOPHILS 0 0 - 1 %    IMMATURE GRANULOCYTES 1 (H) 0.0 - 0.5 %    ABS. NEUTROPHILS 11.1 (H) 1.8 - 8.0 K/UL    ABS. LYMPHOCYTES 1.7 0.8 - 3.5 K/UL    ABS. MONOCYTES 1.0 0.0 - 1.0 K/UL    ABS. EOSINOPHILS 0.3 0.0 - 0.4 K/UL    ABS. BASOPHILS 0.1 0.0 - 0.1 K/UL    ABS. IMM. GRANS. 0.1 (H) 0.00 - 0.04 K/UL    DF AUTOMATED     TYPE & SCREEN    Collection Time: 06/12/18  4:31 AM   Result Value Ref Range    Crossmatch Expiration 06/15/2018     ABO/Rh(D) Jose Manuel Rad POSITIVE     Antibody screen NEG    PROTHROMBIN TIME + INR    Collection Time: 06/12/18  4:31 AM   Result Value Ref Range    INR 1.0 0.9 - 1.1      Prothrombin time 10.0 9.0 - 11.1 sec   PTT    Collection Time: 06/12/18  4:31 AM   Result Value Ref Range    aPTT 28.2 22.1 - 32.0 sec    aPTT, therapeutic range     58.0 - 77.0 SECS   LACTIC ACID    Collection Time: 06/12/18  4:48 AM   Result Value Ref Range    Lactic acid 0.8 0.4 - 2.0 MMOL/L   METABOLIC PANEL, COMPREHENSIVE    Collection Time: 06/12/18  5:03 AM   Result Value Ref Range    Sodium 140 136 - 145 mmol/L    Potassium 4.4 3.5 - 5.1 mmol/L    Chloride 110 (H) 97 - 108 mmol/L    CO2 21 21 - 32 mmol/L    Anion gap 9 5 - 15 mmol/L    Glucose 119 (H) 65 - 100 mg/dL    BUN 23 (H) 6 - 20 MG/DL    Creatinine 1.53 (H) 0.55 - 1.02 MG/DL    BUN/Creatinine ratio 15 12 - 20      GFR est AA 47 (L) >60 ml/min/1.73m2    GFR est non-AA 38 (L) >60 ml/min/1.73m2    Calcium 7.7 (L) 8.5 - 10.1 MG/DL    Bilirubin, total 0.4 0.2 - 1.0 MG/DL    ALT (SGPT) 44 12 - 78 U/L    AST (SGOT) 23 15 - 37 U/L    Alk.  phosphatase 48 45 - 117 U/L    Protein, total 5.7 (L) 6.4 - 8.2 g/dL    Albumin 2.6 (L) 3.5 - 5.0 g/dL    Globulin 3.1 2.0 - 4.0 g/dL    A-G Ratio 0.8 (L) 1.1 - 2.2     HCG URINE, QL. - POC    Collection Time: 06/12/18  5:34 AM   Result Value Ref Range    Pregnancy test,urine (POC) NEGATIVE  NEG     URINALYSIS W/ REFLEX CULTURE    Collection Time: 06/12/18  5:36 AM   Result Value Ref Range    Color RED      Appearance CLEAR CLEAR      Specific gravity 1.018 1.003 - 1.030      pH (UA) 6.5 5.0 - 8.0      Protein 100 (A) NEG mg/dL    Glucose NEGATIVE  NEG mg/dL    Ketone TRACE (A) NEG mg/dL    Blood LARGE (A) NEG      Urobilinogen 1.0 0.2 - 1.0 EU/dL    Nitrites POSITIVE (A) NEG      Leukocyte Esterase NEGATIVE  NEG      Bilirubin UA, confirm NEGATIVE  NEG      WBC 0-4 0 - 4 /hpf    RBC >100 (H) 0 - 5 /hpf    Epithelial cells FEW FEW /lpf    Bacteria 1+ (A) NEG /hpf    UA:UC IF INDICATED URINE CULTURE ORDERED (A) CNI

## 2018-06-12 NOTE — ED PROVIDER NOTES
EMERGENCY DEPARTMENT HISTORY AND PHYSICAL EXAM          Date: 2018  Patient Name: Jaskaran Gee    History of Presenting Illness     Chief Complaint   Patient presents with    Blood in Urine    Back Pain       History Provided By: Patient    HPI: Jaskaran Gee is a 40 y.o. female, pmhx lupus / anemia / CKD, who presents ambulatory to the ED c/o sudden onset sharp transient L flank pain that woke her from her sleep PTA this morning. Pt reports additional hematuria and urinary frequency, stating \"I felt like I couldn't stop urinating. \" She denies any modifying factors. Pt notes she has a hx of DVT and reports she was taken off of Coumadin on 18 prior to having a L kidney biopsy on 18. Pt reports having a follow up with her nephrologist on 18. She states she was advised to start taking the Coumadin again later today. Pt denies any recent medications for her current sxs. She otherwise specifically denies any recent fever, chills, nausea, vomiting, diarrhea, CP, SOB, lightheadedness, dizziness, numbness, weakness, tingling, BLE swelling, HA, heart palpitations, dysuria, changes in BM, changes in PO intake, melena, hematochezia, cough, or congestion. PCP: None  Oncology: Deedee Orozco  Nephrology: Ana Loja  Rheumatology: Selina Fitzpatrick MD    PMHx: Significant for lupus, anemia, celiac disease, anxiety, depression, chronic renal insufficiency, arthritis, PUD, GERD, CKD, ovarian cyst  PSHx: Significant for , renal biopsy, ACL reconstruction, orthopedic surgery  Social Hx: -tobacco, -EtOH, -Illicit Drugs    There are no other complaints, changes, or physical findings at this time.      Current Facility-Administered Medications   Medication Dose Route Frequency Provider Last Rate Last Dose    cefTRIAXone (ROCEPHIN) 1 g in 0.9% sodium chloride (MBP/ADV) 50 mL  1 g IntraVENous NOW  MD Rafia         Current Outpatient Prescriptions   Medication Sig Dispense Refill    nitrofurantoin, macrocrystal-monohydrate, (MACROBID) 100 mg capsule Take 1 Cap by mouth two (2) times a day. 10 Cap 0    acetaminophen (ACETAMINOPHEN EXTRA STRENGTH) 500 mg tablet Take 1 Tab by mouth every six (6) hours as needed for Pain. 30 Tab 0    hydroxychloroquine (PLAQUENIL) 200 mg tablet Take 1 tab po qam and 1/2 tab po qpm 45 Tab 9    warfarin (COUMADIN) 6 mg tablet TAKE AS DIRECTED BY PROVIDER BASED ON INR  2    FLUoxetine (PROZAC) 20 mg tablet Take 20 mg by mouth daily.  Dexlansoprazole (DEXILANT) 60 mg CpDB Take 60 mg by mouth daily.  raNITIdine (ZANTAC) 300 mg tablet Take 300 mg by mouth daily.  simvastatin (ZOCOR) 20 mg tablet TAKE 1 TABLET BY MOUTH ONCE DAILY  3    losartan (COZAAR) 100 mg tablet Take 100 mg by mouth nightly.  atenolol (TENORMIN) 50 mg tablet Take  by mouth nightly.  mycophenolate (CELLCEPT) 500 mg tablet Take 1,500 mg by mouth two (2) times a day.          Past History     Past Medical History:  Past Medical History:   Diagnosis Date    Anemia NEC     Anxiety disorder 3/10/2014    Arthritis     Branch retinal artery occlusion approx 2013    as of 5/26/16 resolved and on Coumadin    Calculus of kidney     Celiac disease     Chronic kidney disease     stage ll    Chronic renal insufficiency, stage II (mild)     secondary to lupus nephritis:  Dr Yulisa Hannah    Depression, major, recurrent ([facility] [address] 3/10/2014    Diffuse proliferative lupus glomerulonephritis ([facility] [address]     Dr Yulisa Hannah     Fatigue     intermittent due to lupus    Gastritis     as of 5/26/16 asymptomatic    GERD (gastroesophageal reflux disease)     Hypertension     Long term current use of anticoagulant therapy     Lupus     as of 5/26/16 \"aching and swelling of joints\" per patient; Dr Delores Teran (in cc)    Lupus     Memory loss     as of 5/26/16 pt states \"its not that bad, I forget where I put stuff\"    Ovarian cyst     as of 5/26/16; Dr Narciso Echavarria Pregnancy induced hypertension     PUD (peptic ulcer disease)        Past Surgical History:  Past Surgical History:   Procedure Laterality Date    HX ACL RECONSTRUCTION Left approx     HX  SECTION      x1    HX ORTHOPAEDIC Right     hand to thumb (thumb would pop out of place so they placed a ligament around it) per pt    HX OTHER SURGICAL  14    CT Guided Renal Biopsy with Conscious Sedation    HX OTHER SURGICAL      kidney biopsy; benign    HX OTHER SURGICAL Right 10/22/14    right Posterior Interosseous Nerve Neurectomy, right Anterior Interosseous Nerve Neurectomy        Family History:  Family History   Problem Relation Age of Onset   [address] [facility] Gl[facility] Father     Hypertension Father        Social History:  Social History   Substance Use Topics    Smoking status: Never Smoker    Smokeless tobacco: Never Used    Alcohol use No       Allergies: Allergies   Allergen Reactions    Gluten Diarrhea    Fiorinal-Codeine #3 [Codeine-Butalbital-Asa-Caff] Nausea Only         Review of Systems   Review of Systems   Constitutional: Negative for chills and fever. HENT: Negative for congestion, ear pain, rhinorrhea and sore throat. Respiratory: Negative for cough and shortness of breath. Cardiovascular: Negative for chest pain, palpitations and leg swelling. Gastrointestinal: Negative for abdominal pain, constipation, diarrhea, nausea and vomiting. No melena  No hematochezia   Endocrine: Negative for polyuria. Genitourinary: Positive for flank pain (L flank), frequency and hematuria. Negative for dysuria. Neurological: Negative for dizziness, weakness, light-headedness, numbness and headaches. No tingling   All other systems reviewed and are negative. Physical Exam   Physical Exam   Nursing note and vitals reviewed.     General appearance - Overweight, well appearing, and in no distress  Eyes - pupils equal and reactive, extraocular eye movements intact  ENT - mucous membranes moist, pharynx normal without lesions  Neck - supple, no significant adenopathy; non-tender to palpation  Chest - clear to auscultation, no wheezes, rales or rhonchi; non-tender to palpation  Heart - normal rate and regular rhythm, S1 and S2 normal, no murmurs noted  Abdomen - soft, mild L flank tenderness to palpation, nondistended, no masses or organomegaly  Musculoskeletal - no joint tenderness, deformity or swelling; normal ROM  Extremities - peripheral pulses normal, no pedal edema  Skin - normal coloration and turgor, no rashes  Neurological - alert, oriented x3, normal speech, no focal findings or movement disorder noted  Written by Ila Schuler ED Scribe, as dictated by Raudel Marquis MD        Diagnostic Study Results     Labs -     Recent Results (from the past 12 hour(s))   CBC WITH AUTOMATED DIFF    Collection Time: 06/12/18  4:31 AM   Result Value Ref Range    WBC 14.2 (H) 3.6 - 11.0 K/uL    RBC 2.84 (L) 3.80 - 5.20 M/uL    HGB 8.6 (L) 11.5 - 16.0 g/dL    HCT 25.7 (L) 35.0 - 47.0 %    MCV 90.5 80.0 - 99.0 FL    MCH 30.3 26.0 - 34.0 PG    MCHC 33.5 30.0 - 36.5 g/dL    RDW 13.9 11.5 - 14.5 %    PLATELET 559 094 - 333 K/uL    MPV 8.6 (L) 8.9 - 12.9 FL    NRBC 0.0 0  WBC    ABSOLUTE NRBC 0.00 0.00 - 0.01 K/uL    NEUTROPHILS 78 (H) 32 - 75 %    LYMPHOCYTES 12 12 - 49 %    MONOCYTES 7 5 - 13 %    EOSINOPHILS 2 0 - 7 %    BASOPHILS 0 0 - 1 %    IMMATURE GRANULOCYTES 1 (H) 0.0 - 0.5 %    ABS. NEUTROPHILS 11.1 (H) 1.8 - 8.0 K/UL    ABS. LYMPHOCYTES 1.7 0.8 - 3.5 K/UL    ABS. MONOCYTES 1.0 0.0 - 1.0 K/UL    ABS. EOSINOPHILS 0.3 0.0 - 0.4 K/UL    ABS. BASOPHILS 0.1 0.0 - 0.1 K/UL    ABS. IMM.  GRANS. 0.1 (H) 0.00 - 0.04 K/UL    DF AUTOMATED     TYPE & SCREEN    Collection Time: 06/12/18  4:31 AM   Result Value Ref Range    Crossmatch Expiration 06/15/2018     ABO/Rh(D) T[place] Norah POSITIVE     Antibody screen NEG    PROTHROMBIN TIME + INR    Collection Time: 06/12/18  4:31 AM   Result Value Ref Range    INR 1.0 0.9 - 1.1      Prothrombin time 10.0 9.0 - 11.1 sec   PTT    Collection Time: 06/12/18  4:31 AM   Result Value Ref Range    aPTT 28.2 22.1 - 32.0 sec    aPTT, therapeutic range     58.0 - 77.0 SECS   LACTIC ACID    Collection Time: 06/12/18  4:48 AM   Result Value Ref Range    Lactic acid 0.8 0.4 - 2.0 MMOL/L   METABOLIC PANEL, COMPREHENSIVE    Collection Time: 06/12/18  5:03 AM   Result Value Ref Range    Sodium 140 136 - 145 mmol/L    Potassium 4.4 3.5 - 5.1 mmol/L    Chloride 110 (H) 97 - 108 mmol/L    CO2 21 21 - 32 mmol/L    Anion gap 9 5 - 15 mmol/L    Glucose 119 (H) 65 - 100 mg/dL    BUN 23 (H) 6 - 20 MG/DL    Creatinine 1.53 (H) 0.55 - 1.02 MG/DL    BUN/Creatinine ratio 15 12 - 20      GFR est AA 47 (L) >60 ml/min/1.73m2    GFR est non-AA 38 (L) >60 ml/min/1.73m2    Calcium 7.7 (L) 8.5 - 10.1 MG/DL    Bilirubin, total 0.4 0.2 - 1.0 MG/DL    ALT (SGPT) 44 12 - 78 U/L    AST (SGOT) 23 15 - 37 U/L    Alk.  phosphatase 48 45 - 117 U/L    Protein, total 5.7 (L) 6.4 - 8.2 g/dL    Albumin 2.6 (L) 3.5 - 5.0 g/dL    Globulin 3.1 2.0 - 4.0 g/dL    A-G Ratio 0.8 (L) 1.1 - 2.2     HCG URINE, QL. - POC    Collection Time: 06/12/18  5:34 AM   Result Value Ref Range    Pregnancy test,urine (POC) NEGATIVE  NEG     URINALYSIS W/ REFLEX CULTURE    Collection Time: 06/12/18  5:36 AM   Result Value Ref Range    Color RED      Appearance CLEAR CLEAR      Specific gravity 1.018 1.003 - 1.030      pH (UA) 6.5 5.0 - 8.0      Protein 100 (A) NEG mg/dL    Glucose NEGATIVE  NEG mg/dL    Ketone TRACE (A) NEG mg/dL    Blood LARGE (A) NEG      Urobilinogen 1.0 0.2 - 1.0 EU/dL    Nitrites POSITIVE (A) NEG      Leukocyte Esterase NEGATIVE  NEG      Bilirubin UA, confirm NEGATIVE  NEG      WBC 0-4 0 - 4 /hpf    RBC >100 (H) 0 - 5 /hpf    Epithelial cells FEW FEW /lpf    Bacteria 1+ (A) NEG /hpf    UA:UC IF INDICATED URINE CULTURE ORDERED (A) CNI         Radiologic Studies -   CT ABD PELV WO CONT   Final Result        CT Results  (Last 48 hours)               06/12/18 0553  CT ABD PELV WO CONT Final result    Impression:  IMPRESSION:   Moderate size left perinephric hematoma. High attenuation material within the   left renal pelvis and urinary bladder likely represents clot. No urinary tract   calculi or obstruction. Right ovarian cyst is likely physiologic. Narrative:  EXAM:  CT ABD PELV WO CONT       INDICATION: Flank pain, stone disease suspected; hematuria, recent kidney bx,   anemia         COMPARISON: None       CONTRAST:  None. TECHNIQUE:    Thin axial images were obtained through the abdomen and pelvis. Coronal and   sagittal reconstructions were generated. Oral contrast was not administered. CT   dose reduction was achieved through use of a standardized protocol tailored for   this examination and automatic exposure control for dose modulation. The absence of intravenous contrast material reduces the sensitivity for   evaluation of the solid parenchymal organs of the abdomen. FINDINGS:    LUNG BASES: 3 mm right lower lobe pulmonary nodule. INCIDENTALLY IMAGED HEART AND MEDIASTINUM: Unremarkable. LIVER: No mass or biliary dilatation. GALLBLADDER: Surgically absent. SPLEEN: No mass. PANCREAS: No mass or ductal dilatation. ADRENALS: Unremarkable. KIDNEYS/URETERS: No mass, calculus, or hydronephrosis. High attenuation material   within the left renal pelvis. Moderate size left perinephric hematoma. STOMACH: Unremarkable. SMALL BOWEL: No dilatation or wall thickening. COLON: No dilatation or wall thickening. APPENDIX: Normal.   PERITONEUM: No ascites or pneumoperitoneum. RETROPERITONEUM: No lymphadenopathy or aortic aneurysm. REPRODUCTIVE ORGANS: Intrauterine device within the uterus. 2.5 cm right ovarian   cyst.   URINARY BLADDER: Contains layering high attenuation material.   BONES: No destructive bone lesion.    ADDITIONAL COMMENTS: N/A               CXR Results  (Last 48 hours)    None            Medical Decision Making   I am the first provider for this patient. I reviewed the vital signs, available nursing notes, past medical history, past surgical history, family history and social history. Vital Signs-Reviewed the patient's vital signs. Patient Vitals for the past 12 hrs:   Temp Pulse Resp BP SpO2   06/12/18 0710 - 74 18 114/70 100 %   06/12/18 0645 - 77 - 112/76 100 %   06/12/18 0630 - 82 - 121/80 94 %   06/12/18 0600 - 83 - 117/77 100 %   06/12/18 0515 - 75 - 116/75 -   06/12/18 0500 - 76 - 106/80 -   06/12/18 0430 - - - 95/55 100 %   06/12/18 0415 - - - 104/67 100 %   06/12/18 0413 99 °F (37.2 °C) 79 18 104/67 100 %       Pulse Oximetry Analysis - 100% on RA    Cardiac Monitor:   Rate: 86bpm  Rhythm: Normal Sinus Rhythm       Records Reviewed: Nursing Notes, Old Medical Records, Previous Radiology Studies and Previous Laboratory Studies    Provider Notes (Medical Decision Making):     DDx: kidney stone, UTI, pyelonephritis, renal hematoma    ED Course:   Initial assessment performed. The patients presenting problems have been discussed, and they are in agreement with the care plan formulated and outlined with them. I have encouraged them to ask questions as they arise throughout their visit. PROGRESS NOTE:  4:32 AM  Pt reevaluated. Pt hypotensive to 80 systolic on initial exam. Will give IVF and continue to monitor. Written by Rance Cooks, ED Scribe, as dictated by Roxana Benitez MD    PROGRESS NOTE:  6:18 AM  Pt reevaluated. Pt updated all available lab and imaging findings at this time. She states her pain is improved, but not resolved. Discussed need for further evaluation with CT abd. Pt agrees with plan as. She now states she had blood in her urine with a low HGB post-op. Pt now reports she was admitted for observation and a blood transfusion after having surgery on 6/04/18.  Will obtain medical records from [facility].   Written by Rance Cooks, ED Scribe, as dictated by Roxana Benitez MD    PROGRESS NOTE:  7:29 AM  Per chart review from [address] [place] [address] pt admitted for outpatient biopsy on 6/04/18 with HGB 13.6 in pre-op. Pt noted to have HGB of 11 following biopsy with hematuria on reevaluation. [facility] obtained CT abdomen that reported a L lower renal hematoma surrounding the lower pole of the L kidney. Subsequent angiogram notes \"Patent L renal artery with no stenosis, no evidence of extravasation or pseudoaneurysm in the L kidney\". Pt admitted for observation and given 1 unit PRBCs with a HGB of 9 the following day prior to discharge. Will consult with nephrology to discuss further disposition. Written by Rance Cooks, ED Scribe, as dictated by Roxana Benitez MD    CONSULT NOTE:   7:44 AM  Augusta Ge MD spoke with Escobar Briscoe MD,   Specialty: Nephrology  Discussed pt's hx, disposition, and available diagnostic and imaging results. Reviewed care plans. Consultant advises admission through hospitalist.   Written by Rance Cooks, ED Scribe, as dictated by Roxana Benitez MD.    CONSULT NOTE:   7:54 AM  Roxana Benitez MD spoke with Dr. Jm Gomez,   Specialty: Hospitalist  Discussed pt's hx, disposition, and available diagnostic and imaging results. Reviewed care plans. Consultant will evaluate pt for admission. Written by Rance Cooks, ED Scribe, as dictated by Augusta Morataya MD        Diagnosis     Clinical Impression:   1. Acute blood loss anemia    2. Perinephric hematoma    3. Gross hematuria    4. Urinary retention        PLAN:  1. Admit to Hospitalist, serial hgb, ivf, swenson     Disposition:    ADMISSION NOTE:  7:54 AM  Patient is being admitted to the hospital by Dr. Jm Gomez. The results of their tests and reasons for their admission have been discussed with them and/or available family. They convey agreement and understanding for the need to be admitted and for their admission diagnosis.    Written by XIAO Epperson, as dictated by Augusta ABARCA MD Rafia.          Attestations: This note is prepared by Lorin Enrique, acting as Scribe for MD Augusta Tirado MD : The scribe's documentation has been prepared under my direction and personally reviewed by me in its entirety. I confirm that the note above accurately reflects all work, treatment, procedures, and medical decision making performed by me. This note will not be viewable in [address]

## 2018-06-12 NOTE — ED NOTES
In room to hang fluids, patient states \"I feel better now\". Color has returned to her face. BP 97/68.

## 2018-06-12 NOTE — CONSULTS
Seamus Tee     NAME:Kimberley Ochoa  MMN:[id number]   BZL:[birth year]           Pt seen and examined  arf  ckd   Lupus nephritis  Renal hematoma - post bx  Hematuria  Urinary retention    Plan  h and h q 8  ivf  Hold losartan                        Katia [facility]  [facility]  [address], [address]  [place], [address]  Phone - [phone]         Fax - [phone] [facility] Office  [address] [place] [address]  Phone - [phone]        Fax - [phone]     www. [facility]

## 2018-06-12 NOTE — PROGRESS NOTES
Hg 7.3  Pt feels a bit \"funny\"  No pain  Voiding well, passing old clots      Type and cross 2 units-available if needed  Watch VS as well as hg closely

## 2018-06-12 NOTE — IP AVS SNAPSHOT
[address] [facility] 
[phone] Patient: Nehal Colvin 
MRN: [id number] ODR:8/17/6706 You are allergic to the following Allergen Reactions Gluten Diarrhea Fiorinal-Codeine #3 (Codeine-Butalbital-Asa-Caff) Nausea Only Recent Documentation Height Weight BMI OB Status Smoking Status 1.549 m 72.6 kg 30.23 kg/m2 IUD Never Smoker Emergency Contacts  (Rel.) Home Phone Work Phone Mobile Phone Deven Ochoa [phone] -- [phone] Catalina Nguyễn (Mother) [phone] -- -- Nikki Arellano (Sister) [phone] -- [phone] About your hospitalization You were admitted on:  June 12, 2018 You last received care in the:  [address] You were discharged on:  June 14, 2018 Why you were hospitalized Your primary diagnosis was:  Not on File Your diagnoses also included:  Acute Blood Loss Anemia, Systemic Lupus Erythematosus (Hcc), Hypertension, Depression, Major, Recurrent (Hcc), Gerd (Gastroesophageal Reflux Disease), History Of Thromboembolism Providers Seen During Your Hospitalization Provider Specialty Primary office phone April Milton Lemon MD Emergency Medicine [phone] Maria Dolores Zapata MD Internal Medicine [phone] Juan Luis Castillo MD Hospitalist [phone] Your Primary Care Physician (PCP) Primary Care Physician Office Phone Office Fax NONE ** None ** ** None ** Follow-up Information Follow up With Details Comments Contact Info MD Mehrdad Valerio [address] [facility] 
[phone] None   None ([phone]) Patient stated that they have no PCP  Kae Emms, NP Go on 6/18/2018 Hospital follow-up scheduled at 2:00pm, Please arrive 15 minutes early with Insurance ID and copayment ( If you have questions or need to reschedule please call Gabbie Ramos [address] [place] [address] 
[phone] Appointments for Next 14 days 6/18/2018  2:00 PM  ROUTINE CARE [address] My Medications STOP taking these medications   
 warfarin 6 mg tablet Commonly known as:  COUMADIN  
   
  
  
TAKE these medications as instructed Instructions Each Dose to Equal  
 Morning Noon Evening Bedtime  
 acetaminophen 500 mg tablet Commonly known as:  [address] Your last dose was: Your next dose is: Take 1 Tab by mouth every six (6) hours as needed for Pain. 500 mg  
    
   
   
   
  
 atenolol 50 mg tablet Commonly known as:  TENORMIN Your last dose was: Your next dose is: Take  by mouth nightly. CELLCEPT 500 mg tablet Generic drug:  mycophenolate Your last dose was: Your next dose is: Take 1,500 mg by mouth two (2) times a day. 1500 mg DEXILANT 60 mg Cpdb Generic drug:  Dexlansoprazole Your last dose was: Your next dose is: Take 60 mg by mouth daily. 60 mg  
    
   
   
   
  
 ferrous sulfate 325 mg (65 mg iron) tablet Your last dose was: Your next dose is: Take 1 Tab by mouth two (2) times daily (with meals). 325 mg FLUoxetine 20 mg tablet Commonly known as:  PROzac Your last dose was: Your next dose is: Take 20 mg by mouth daily. 20 mg  
    
   
   
   
  
 folic acid 1 mg tablet Commonly known as:  [employer] Your last dose was: Your next dose is: Take 1 Tab by mouth daily. 1 mg HYDROcodone-acetaminophen 5-325 mg per tablet Commonly known as:  Alba Costello Your last dose was: Your next dose is: Take 1 Tab by mouth every six (6) hours as needed. Max Daily Amount: 4 Tabs. 1 Tab  
    
   
   
   
  
 hydroxychloroquine 200 mg tablet Commonly known as:  PLAQUENIL Your last dose was: Your next dose is: Take 1 tab po qam and 1/2 tab po qpm  
     
   
   
   
  
 losartan 100 mg tablet Commonly known as:  COZAAR Your last dose was: Your next dose is: Take 100 mg by mouth nightly. 100 mg  
    
   
   
   
  
 raNITIdine 300 mg tablet Commonly known as:  ZANTAC Your last dose was: Your next dose is: Take 300 mg by mouth daily. 300 mg  
    
   
   
   
  
 simvastatin 20 mg tablet Commonly known as:  ZOCOR Your last dose was: Your next dose is: TAKE 1 TABLET BY MOUTH ONCE DAILY Where to Get Your Medications Information on where to get these meds will be given to you by the nurse or doctor. ! Ask your nurse or doctor about these medications  
  ferrous sulfate 325 mg (65 mg iron) tablet  
 folic acid 1 mg tablet HYDROcodone-acetaminophen 5-325 mg per tablet Discharge Instructions HOSPITALIST DISCHARGE INSTRUCTIONS 
NAME: Azalia Newman  
:  1980 MRN:  [id number] Date/Time:  2018 9:03 AM 
 
ADMIT DATE: 2018 DISCHARGE DATE: 2018 DIAGNOSIS:  Anemia, Hematuria s/p Kidney Biopsy (Perinephric Hematoma),  BERNARD on CKD, SLE, HTN, Depression, GERD, Hypercoagulable state, Obesity. MEDICATIONS: 
  
 
         As per medication reconciliation · It is important that you take the medication exactly as they are prescribed. · Keep your medication in the bottles provided by the pharmacist and keep a list of the medication names, dosages, and times to be taken in your wallet. · Do not take other medications without consulting your doctor. Pain Management: per above medications What to do at [facility] Recommended diet:  Cardiac Diet Recommended activity: Activity as tolerated and No driving while on analgesics If you experience any of the following symptoms then please call your primary care physician or return to the emergency room if you cannot get hold of your doctor: 
Fever, chills, nausea, vomiting, diarrhea, change in mentation, falling, bleeding, shortness of breath. Follow Up: 
 PCP you are to call and set up an appointment to see them in 2 week. F/U Urology F/U Nephrology F/U Hematology Information obtained by : 
I understand that if any problems occur once I am at home I am to contact my physician. I understand and acknowledge receipt of the instructions indicated above. Physician's or R.N.'s Signature                                                                  Date/Time Patient or Representative Signature                                                          Date/Time Discharge Orders None [employer] Announcement We are excited to announce that we are making your provider's discharge notes available to you in [employer]. You will see these notes when they are completed and signed by the physician that discharged you from your recent hospital stay. If you have any questions or concerns about any information you see in [employer], please call the Health Information Department where you were seen or reach out to your Primary Care Provider for more information about your plan of care. Introducing \[facility] [place] [facility]\"" & HEALTH SERVICES!    
 Carol Navarrete introduces [employer] patient portal. Now you can access parts of your medical record, email your doctor's office, and request medication refills online. 1. In your internet browser, go to [email or web address] 2. Click on the First Time User? Click Here link in the Sign In box. You will see the New Member Sign Up page. 3. Enter your [employer] Access Code exactly as it appears below. You will not need to use this code after youve completed the sign-up process. If you do not sign up before the expiration date, you must request a new code. · Gam[employer] Access Code: [email or web address] Expires: 9/10/2018  4:12 AM 
 
4. Enter the last four digits of your Social Security Number (xxxx) and Date of Birth (mm/dd/yyyy) as indicated and click Submit. You will be taken to the next sign-up page. 5. Create a Gamemaster ID. This will be your Gam[employer]aster login ID and cannot be changed, so think of one that is secure and easy to remember. 6. Create a Gamemaster password. You can change your password at any time. 7. Enter your Password Reset Question and Answer. This can be used at a later time if you forget your password. 8. Enter your e-mail address. You will receive e-mail notification when new information is available in [address] 9. Click Sign Up. You can now view and download portions of your medical record. 10. Click the Download Summary menu link to download a portable copy of your medical information. If you have questions, please visit the Frequently Asked Questions section of the [employer] website. Remember, [employer] is NOT to be used for urgent needs. For medical emergencies, dial 911. Now available from your iPhone and Android! General Information Please provide this summary of care documentation to your next provider. Patient Signature:  ____________________________________________________________ Date:  ____________________________________________________________  
  
Darinel Shackle Provider Signature:  ____________________________________________________________ Date:  ____________________________________________________________

## 2018-06-12 NOTE — ED NOTES
Bedside and Verbal shift change report given to Xiomy Jaime RN (oncoming nurse) by Harvey Anaya RN (offgoing nurse). Report included the following information SBAR, ED Summary, MAR and Recent Results.

## 2018-06-12 NOTE — PROGRESS NOTES
2798: Report received from Ivon Eagle, [address], [facility], ED SUMMARY, [facility], RECENT RESULTS were discussed.     Livia Trotter RN

## 2018-06-12 NOTE — ED NOTES
Post-void bladder scan performed. 483mL of urine noted in bladder. Dr. Hiren Bentley notified and verbal orders given for indwelling swenson catheter.

## 2018-06-13 ENCOUNTER — APPOINTMENT (OUTPATIENT)
Dept: CT IMAGING | Age: 38
DRG: 920 | End: 2018-06-13
Attending: UROLOGY
Payer: MEDICARE

## 2018-06-13 LAB
ALBUMIN SERPL-MCNC: 2.5 G/DL (ref 3.5–5)
ALBUMIN/GLOB SERPL: 0.8 {RATIO} (ref 1.1–2.2)
ALP SERPL-CCNC: 44 U/L (ref 45–117)
ALT SERPL-CCNC: 44 U/L (ref 12–78)
ANION GAP SERPL CALC-SCNC: 9 MMOL/L (ref 5–15)
AST SERPL-CCNC: 25 U/L (ref 15–37)
BACTERIA SPEC CULT: NORMAL
BASOPHILS # BLD: 0 K/UL (ref 0–0.1)
BASOPHILS NFR BLD: 0 % (ref 0–1)
BILIRUB SERPL-MCNC: 0.4 MG/DL (ref 0.2–1)
BUN SERPL-MCNC: 16 MG/DL (ref 6–20)
BUN/CREAT SERPL: 12 (ref 12–20)
CALCIUM SERPL-MCNC: 7.6 MG/DL (ref 8.5–10.1)
CC UR VC: NORMAL
CHLORIDE SERPL-SCNC: 114 MMOL/L (ref 97–108)
CO2 SERPL-SCNC: 20 MMOL/L (ref 21–32)
CREAT SERPL-MCNC: 1.31 MG/DL (ref 0.55–1.02)
DIFFERENTIAL METHOD BLD: ABNORMAL
EOSINOPHIL # BLD: 0.1 K/UL (ref 0–0.4)
EOSINOPHIL NFR BLD: 2 % (ref 0–7)
ERYTHROCYTE [DISTWIDTH] IN BLOOD BY AUTOMATED COUNT: 14.5 % (ref 11.5–14.5)
GLOBULIN SER CALC-MCNC: 3.2 G/DL (ref 2–4)
GLUCOSE SERPL-MCNC: 91 MG/DL (ref 65–100)
HCT VFR BLD AUTO: 19.2 % (ref 35–47)
HCT VFR BLD AUTO: 22 % (ref 35–47)
HCT VFR BLD AUTO: 23.5 % (ref 35–47)
HGB BLD-MCNC: 6.4 G/DL (ref 11.5–16)
HGB BLD-MCNC: 7.2 G/DL (ref 11.5–16)
HGB BLD-MCNC: 7.8 G/DL (ref 11.5–16)
IMM GRANULOCYTES # BLD: 0.1 K/UL (ref 0–0.04)
IMM GRANULOCYTES NFR BLD AUTO: 1 % (ref 0–0.5)
LYMPHOCYTES # BLD: 1.5 K/UL (ref 0.8–3.5)
LYMPHOCYTES NFR BLD: 17 % (ref 12–49)
MCH RBC QN AUTO: 30.2 PG (ref 26–34)
MCHC RBC AUTO-ENTMCNC: 33.3 G/DL (ref 30–36.5)
MCV RBC AUTO: 90.6 FL (ref 80–99)
MONOCYTES # BLD: 0.8 K/UL (ref 0–1)
MONOCYTES NFR BLD: 9 % (ref 5–13)
NEUTS SEG # BLD: 6.3 K/UL (ref 1.8–8)
NEUTS SEG NFR BLD: 72 % (ref 32–75)
NRBC # BLD: 0 K/UL (ref 0–0.01)
NRBC BLD-RTO: 0 PER 100 WBC
PLATELET # BLD AUTO: 178 K/UL (ref 150–400)
PMV BLD AUTO: 8.8 FL (ref 8.9–12.9)
POTASSIUM SERPL-SCNC: 4.3 MMOL/L (ref 3.5–5.1)
PROT SERPL-MCNC: 5.7 G/DL (ref 6.4–8.2)
RBC # BLD AUTO: 2.12 M/UL (ref 3.8–5.2)
SERVICE CMNT-IMP: NORMAL
SODIUM SERPL-SCNC: 143 MMOL/L (ref 136–145)
WBC # BLD AUTO: 8.7 K/UL (ref 3.6–11)

## 2018-06-13 PROCEDURE — 85025 COMPLETE CBC W/AUTO DIFF WBC: CPT | Performed by: INTERNAL MEDICINE

## 2018-06-13 PROCEDURE — P9016 RBC LEUKOCYTES REDUCED: HCPCS | Performed by: EMERGENCY MEDICINE

## 2018-06-13 PROCEDURE — 74176 CT ABD & PELVIS W/O CONTRAST: CPT

## 2018-06-13 PROCEDURE — 30233N1 TRANSFUSION OF NONAUTOLOGOUS RED BLOOD CELLS INTO PERIPHERAL VEIN, PERCUTANEOUS APPROACH: ICD-10-PCS | Performed by: INTERNAL MEDICINE

## 2018-06-13 PROCEDURE — 36415 COLL VENOUS BLD VENIPUNCTURE: CPT | Performed by: INTERNAL MEDICINE

## 2018-06-13 PROCEDURE — 74011250637 HC RX REV CODE- 250/637: Performed by: INTERNAL MEDICINE

## 2018-06-13 PROCEDURE — 74011000258 HC RX REV CODE- 258: Performed by: INTERNAL MEDICINE

## 2018-06-13 PROCEDURE — 74011250636 HC RX REV CODE- 250/636: Performed by: INTERNAL MEDICINE

## 2018-06-13 PROCEDURE — 85018 HEMOGLOBIN: CPT | Performed by: INTERNAL MEDICINE

## 2018-06-13 PROCEDURE — 36430 TRANSFUSION BLD/BLD COMPNT: CPT

## 2018-06-13 PROCEDURE — 65660000000 HC RM CCU STEPDOWN

## 2018-06-13 PROCEDURE — 80053 COMPREHEN METABOLIC PANEL: CPT | Performed by: INTERNAL MEDICINE

## 2018-06-13 RX ADMIN — FAMOTIDINE 20 MG: 20 TABLET ORAL at 17:18

## 2018-06-13 RX ADMIN — HYDROXYCHLOROQUINE SULFATE 200 MG: 200 TABLET, FILM COATED ORAL at 10:46

## 2018-06-13 RX ADMIN — MYCOPHENOLATE MOFETIL 1500 MG: 250 CAPSULE ORAL at 10:46

## 2018-06-13 RX ADMIN — MYCOPHENOLATE MOFETIL 1500 MG: 250 CAPSULE ORAL at 17:19

## 2018-06-13 RX ADMIN — FLUOXETINE 20 MG: 20 CAPSULE ORAL at 10:46

## 2018-06-13 RX ADMIN — Medication 10 ML: at 15:50

## 2018-06-13 RX ADMIN — Medication 10 ML: at 05:51

## 2018-06-13 RX ADMIN — ACETAMINOPHEN 650 MG: 325 TABLET ORAL at 17:22

## 2018-06-13 RX ADMIN — HYDROXYCHLOROQUINE SULFATE 100 MG: 200 TABLET, FILM COATED ORAL at 17:18

## 2018-06-13 RX ADMIN — PRAVASTATIN SODIUM 40 MG: 40 TABLET ORAL at 21:33

## 2018-06-13 RX ADMIN — Medication 10 ML: at 21:33

## 2018-06-13 RX ADMIN — PANTOPRAZOLE SODIUM 40 MG: 40 TABLET, DELAYED RELEASE ORAL at 10:46

## 2018-06-13 RX ADMIN — ACETAMINOPHEN 650 MG: 325 TABLET ORAL at 10:50

## 2018-06-13 RX ADMIN — CEFTRIAXONE SODIUM 1 G: 1 INJECTION, POWDER, FOR SOLUTION INTRAMUSCULAR; INTRAVENOUS at 10:47

## 2018-06-13 NOTE — PROGRESS NOTES
Hospitalist Progress Note    NAME: Joel Hartman   :  1980   MRN:  [id number]       Assessment / Plan:  Acute blood loss anemia POA: HH dropping, will transfuse 1 unit PRBC today and monitor. Hypotension: BP is improved but still in the low side, monitor. Hematuria and Moderate size left perinephric hematoma: as per Urology repeat CT today to evaluate size of hematoma if increasing will need IR intervention., U/A is dirty will c/w CTX and F/U cultures. Post Renal Biopsy for Lupus: Renal in the case, F/U Pathology  Urinary retention: Urology int he case, monitor  Mild Nani with baseline CKD3: creatinine trending down, monitor  Systemic lupus erythematosus Continue home meds including cellcept  Hypertension  Hold BP meds today due to episode of hypotension  Depression Continue antidepressants  GERD (gastroesophageal reflux disease)  Continue PPIs and H2 blockers  History of thromboembolism in eye She report abnormal hypercoagulable workup but doesn't remember exact Dx Follows Dr Musa Vital as an OP,Holding coumadin since biopsy, will continue to hold due to above  Obesity: BMI 30.23, counseled  Surrogate Decision Maker:   Baseline: functional  Code status: Full  Prophylaxis: SCD's  Recommended Disposition: Home w/Family     Subjective:     Chief Complaint / Reason for Physician Visit  \"I feel OK\". Discussed with RN events overnight. Review of Systems:  Symptom Y/N Comments  Symptom Y/N Comments   Fever/Chills    Chest Pain     Poor Appetite    Edema     Cough    Abdominal Pain     Sputum    Joint Pain     SOB/CORBETT    Pruritis/Rash     Nausea/vomit    Tolerating PT/OT     Diarrhea    Tolerating Diet y    Constipation    Other       Could NOT obtain due to:      Objective:     VITALS:   Last 24hrs VS reviewed since prior progress note.  Most recent are:  Patient Vitals for the past 24 hrs:   Temp Pulse Resp BP SpO2   18 0744 98.6 °F (37 °C) 87 16 99/60 100 %   18 2341 98.7 °F (37.1 °C) 81 16 111/71 100 %   06/12/18 1918 98.7 °F (37.1 °C) 89 16 102/66 100 %   06/12/18 1714 - - - 109/67 -   06/12/18 1520 98.6 °F (37 °C) 79 14 117/70 100 %   06/12/18 0936 - 70 14 - 100 %       Intake/Output Summary (Last 24 hours) at 06/13/18 0828  Last data filed at 06/12/18 1205   Gross per 24 hour   Intake             1.25 ml   Output                0 ml   Net             1.25 ml        PHYSICAL EXAM:  General: WD, WN. Alert, cooperative, no acute distress    EENT:  EOMI. Anicteric, pale sclerae. MMM  Resp:  Coarse BS  CV:  Regular  rhythm,  No edema  GI:  Soft, Non distended, Non tender.  +Bowel sounds  Neurologic:  Alert and oriented X 3, normal speech,   Psych:   Good insight. Not anxious nor agitated  Skin:  No rashes. No jaundice    Reviewed most current lab test results and cultures  YES  Reviewed most current radiology test results   YES  Review and summation of old records today    NO  Reviewed patient's current orders and MAR    YES  PMH/SH reviewed - no change compared to H&P  ________________________________________________________________________  Care Plan discussed with:    Comments   Patient y    Family  y    RN y    Care Manager     Consultant  y Urology                     Multidiciplinary team rounds were held today with , nursing, pharmacist and clinical coordinator. Patient's plan of care was discussed; medications were reviewed and discharge planning was addressed.      ________________________________________________________________________  Total NON critical care TIME: 35   Minutes    Total CRITICAL CARE TIME Spent:   Minutes non procedure based      Comments   >50% of visit spent in counseling and coordination of care y    ________________________________________________________________________  Vance Draper MD     Procedures: see electronic medical records for all procedures/Xrays and details which were not copied into this note but were reviewed prior to creation of Plan.      LABS:  I reviewed today's most current labs and imaging studies.   Pertinent labs include:  Recent Labs      06/13/18   0515  06/12/18   2231  06/12/18   1439  06/12/18   0431   WBC  8.7   --    --   14.2*   HGB  6.4*  7.9*  7.3*  8.6*   HCT  19.2*  23.6*  21.9*  25.7*   PLT  178   --    --   263     Recent Labs      06/13/18   0515  06/12/18   0503  06/12/18   0431   NA  143  140   --    K  4.3  4.4   --    CL  114*  110*   --    CO2  20*  21   --    GLU  91  119*   --    BUN  16  23*   --    CREA  1.31*  1.53*   --    CA  7.6*  7.7*   --    ALB  2.5*  2.6*   --    TBILI  0.4  0.4   --    SGOT  25  23   --    ALT  44  44   --    INR   --    --   1.0       Signed: Britney De León MD

## 2018-06-13 NOTE — PROGRESS NOTES
No complaints  Denies dizziness or palpatations    HD stable  Voiding well, no hematuria    Hg 6    Transfuse x 1 unit  Re CT to determine size of hematoma  May need IR input  Follow hg

## 2018-06-13 NOTE — PROGRESS NOTES
Nephrology Progress Note     Seamus Tee     [email or web address]                  Phone - [phone]     Patient: Kaiser Lopez     YOB: 1980      Date- 6/13/2018     CC: Follow up for arf, hematuria          Subjective: Interval History:   -  Hb dropping  bp stable  Cr. Improved  No more gross hematuria. Urology input noted. Stewart removed due to pain  S/P CT abdo this am  ROS-No c/o sob, fever. No c/o nausea or vomiting  No c/o chest pain     Assessment & Plan: BERNARD due to pre renal factors- hypotension, anemia  Improved cr  Stop ivf    Hematuria, left perinephric hematoma after renal bx at [facility]  Urology input appreciated  Check hb now. If hb low consult IR for intervention  REPEAT CT showed same size hematoma  ? Lab error with drop in HB    H/o lupus nephritis  Continue cellcept    H/o hypertension - bp low on admission  Hold bp meds    H/o abnormal hypercoag work up  Coumadin on hold    [address] discussed with: patient and dr. Arvin Ward  Review of Systems: Pertinent items are noted in HPI. Physical Exam:   GEN: NAD  NECK- Supple, no thyromegaly  RESP: Clear b/l, no wheezing, No accessory muscle use  CVS: RRR,S1,S2    SKIN: No Rash, No Jaundice  ABDO: soft , non tender, No hepatosplenomegaly  EXT: No Edema   NEURO: non focal, normal speech    Objective:   Patient Vitals for the past 8 hrs:   BP Temp Pulse Resp SpO2   06/13/18 1107 131/86 98.1 °F (36.7 °C) 85 16 100 %   06/13/18 0744 99/60 98.6 °F (37 °C) 87 16 100 %     Last 3 Recorded Weights in this Encounter    06/12/18 0413   Weight: 72.6 kg (160 lb)        06/11 1901 - 06/13 0700  In: 1.3 [I.V.:1.3]  Out: -   Chart reviewed. Pertinent Notes reviewed.        Medications list  reviewed   Current Facility-Administered Medications   Medication    cefTRIAXone (ROCEPHIN) 1 g in 0.9% sodium chloride (MBP/ADV) 50 mL    pantoprazole (PROTONIX) tablet 40 mg    FLUoxetine (PROzac) capsule 20 mg    hydroxychloroquine (PLAQUENIL) tablet 200 mg    hydroxychloroquine (PLAQUENIL) tablet 100 mg    mycophenolate mofetil (CELLCEPT) capsule 1,500 mg    famotidine (PEPCID) tablet 20 mg    pravastatin (PRAVACHOL) tablet 40 mg    nitroglycerin (NITROBID) 2 % ointment 1 Inch    sodium chloride (NS) flush 5-10 mL    sodium chloride (NS) flush 5-10 mL    acetaminophen (TYLENOL) tablet 650 mg    ondansetron (ZOFRAN) injection 4 mg    HYDROcodone-acetaminophen (NORCO) 5-325 mg per tablet 1 Tab    hydrALAZINE (APRESOLINE) 20 mg/mL injection 20 mg    cloNIDine HCl (CATAPRES) tablet 0.2 mg           Data Review :  Recent Labs      06/13/18   0515  06/12/18   0503  06/12/18   0431   NA  143  140   --    K  4.3  4.4   --    CL  114*  110*   --    CO2  20*  21   --    GLU  91  119*   --    BUN  16  23*   --    CREA  1.31*  1.53*   --    CA  7.6*  7.7*   --    ALB  2.5*  2.6*   --    SGOT  25  23   --    ALT  44  44   --    INR   --    --   1.0     Recent Labs      06/13/18   0515  06/12/18   2231  06/12/18   1439  06/12/18   0431   WBC  8.7   --    --   14.2*   HGB  6.4*  7.9*  7.3*  8.6*   HCT  19.2*  23.6*  21.9*  25.7*   PLT  178   --    --   263     Lab Results   Component Value Date/Time    Specimen Description: URINE 04/28/2014 09:29 AM    Specimen Description: URINE 06/30/2011 09:30 AM    Specimen Description: URINE 05/19/2011 01:22 PM     No results for input(s): FE, TIBC, PSAT, FERR in the last 72 hours.   Lab Results   Component Value Date/Time    Sodium urine, random 101 07/01/2011 12:15 PM    Creatinine, urine 116.6 05/31/2017 11:37 AM       Mariely Malcolm MD                             [facility]                                 [email or web address]  [address], [address]  [place], [address] [place]  Phone - [phone]         Fax - [phone]  [facility]  [address] [address]  Phone - [phone]        Fax - [phone]

## 2018-06-13 NOTE — PROGRESS NOTES
Oncology Nursing Communication Tool  6:55 PM  6/13/2018     Bedside shift change report given to Johana Morales RN (incoming nurse) by Rosa Mora (outgoing nurse) on Noe Saravia. Report included the following information SBAR, Kardex, Intake/Output, MAR and Recent Results. Shift Summary: transfused 1U RBCs      Issues for physician to address: Oncology Shift Note   Admission Date 6/12/2018   Admission Diagnosis Acute blood loss anemia   Code Status Full Code   Consults IP CONSULT TO NEPHROLOGY  IP CONSULT TO UROLOGY      Cardiac Monitoring [] Yes [] No      Purposeful Hourly Rounding [] Yes    Iesha Score Total Score: 0   Iesha score 3 or > [] Bed Alarm [] Avasys [] 1:1 sitter [] Patient refused (Place signed refusal form in chart)      Pain Managed [] Yes [] No    Key Pain Meds             acetaminophen (ACETAMINOPHEN EXTRA STRENGTH) 500 mg tablet  (Taking) Take 1 Tab by mouth every six (6) hours as needed for Pain. Influenza Vaccine Received Flu Vaccine for Current Season (usually Sept-March): Not Flu Season           Oxygen needs?  [] Room air Oxygen @  []1L    []2L    []3L   []4L    []5L   []6L     Use home O2? [] Yes [] No  Perform O2 challenge test using  smartphrase (.oxygenchallenge)      Last bowel movement    bowel movement      Urinary Catheter [REMOVED] Urinary Catheter 06/12/18 2- way-Indications for Use: Acute urinary retention/bladder outlet obstruction           LDAs               Peripheral IV 06/12/18 Right Antecubital (Active)   Site Assessment Clean, dry, & intact 6/13/2018  3:00 PM   Phlebitis Assessment 0 6/13/2018  3:00 PM   Infiltration Assessment 0 6/13/2018  3:00 PM   Dressing Status Clean, dry, & intact 6/13/2018  3:00 PM   Dressing Type Tape;Transparent 6/13/2018  3:00 PM   Hub Color/Line Status Pink;Patent 6/13/2018  3:00 PM       Peripheral IV 06/12/18 Left Antecubital (Active)   Site Assessment Clean, dry, & intact 6/13/2018  3:00 PM Phlebitis Assessment 0 6/13/2018  3:00 PM   Infiltration Assessment 0 6/13/2018  3:00 PM   Dressing Status Clean, dry, & intact 6/13/2018  3:00 PM   Dressing Type Tape;Transparent 6/13/2018  3:00 PM   Hub Color/Line Status Pink; Infusing 6/13/2018  3:00 PM                         Readmission Risk Assessment Tool Score Low Risk            12       Total Score        3 Has Seen PCP in Last 6 Months (Yes=3, No=0)    9 Pt. Coverage (Medicare=5 , Medicaid, or Self-Pay=4)        Criteria that do not apply:    . Living with Significant Other. Assisted Living. LTAC. SNF.  or   Rehab    Patient Length of Stay (>5 days = 3)    IP Visits Last 12 Months (1-3=4, 4=9, >4=11)    Charlson Comorbidity Score (Age + Comorbid Conditions)       Expected Length of Stay 3d 0h   Actual Length of Stay [address]

## 2018-06-14 VITALS
HEART RATE: 92 BPM | WEIGHT: 160 LBS | DIASTOLIC BLOOD PRESSURE: 87 MMHG | OXYGEN SATURATION: 100 % | TEMPERATURE: 98.7 F | BODY MASS INDEX: 30.21 KG/M2 | HEIGHT: 61 IN | RESPIRATION RATE: 16 BRPM | SYSTOLIC BLOOD PRESSURE: 118 MMHG

## 2018-06-14 LAB
ABO + RH BLD: NORMAL
ALBUMIN SERPL-MCNC: 2.8 G/DL (ref 3.5–5)
ALBUMIN/GLOB SERPL: 0.8 {RATIO} (ref 1.1–2.2)
ALP SERPL-CCNC: 52 U/L (ref 45–117)
ALT SERPL-CCNC: 45 U/L (ref 12–78)
ANION GAP SERPL CALC-SCNC: 8 MMOL/L (ref 5–15)
AST SERPL-CCNC: 24 U/L (ref 15–37)
BASOPHILS # BLD: 0 K/UL (ref 0–0.1)
BASOPHILS NFR BLD: 0 % (ref 0–1)
BILIRUB SERPL-MCNC: 0.5 MG/DL (ref 0.2–1)
BLD PROD TYP BPU: NORMAL
BLOOD GROUP ANTIBODIES SERPL: NORMAL
BPU ID: NORMAL
BUN SERPL-MCNC: 12 MG/DL (ref 6–20)
BUN/CREAT SERPL: 10 (ref 12–20)
CALCIUM SERPL-MCNC: 8.2 MG/DL (ref 8.5–10.1)
CHLORIDE SERPL-SCNC: 112 MMOL/L (ref 97–108)
CO2 SERPL-SCNC: 19 MMOL/L (ref 21–32)
CREAT SERPL-MCNC: 1.25 MG/DL (ref 0.55–1.02)
CROSSMATCH RESULT,%XM: NORMAL
DIFFERENTIAL METHOD BLD: ABNORMAL
EOSINOPHIL # BLD: 0.2 K/UL (ref 0–0.4)
EOSINOPHIL NFR BLD: 2 % (ref 0–7)
ERYTHROCYTE [DISTWIDTH] IN BLOOD BY AUTOMATED COUNT: 14.1 % (ref 11.5–14.5)
GLOBULIN SER CALC-MCNC: 3.5 G/DL (ref 2–4)
GLUCOSE SERPL-MCNC: 93 MG/DL (ref 65–100)
HCT VFR BLD AUTO: 22.8 % (ref 35–47)
HCT VFR BLD AUTO: 22.8 % (ref 35–47)
HGB BLD-MCNC: 7.7 G/DL (ref 11.5–16)
HGB BLD-MCNC: 7.7 G/DL (ref 11.5–16)
IMM GRANULOCYTES # BLD: 0.1 K/UL (ref 0–0.04)
IMM GRANULOCYTES NFR BLD AUTO: 1 % (ref 0–0.5)
LYMPHOCYTES # BLD: 1.3 K/UL (ref 0.8–3.5)
LYMPHOCYTES NFR BLD: 14 % (ref 12–49)
MAGNESIUM SERPL-MCNC: 1.9 MG/DL (ref 1.6–2.4)
MCH RBC QN AUTO: 30.4 PG (ref 26–34)
MCHC RBC AUTO-ENTMCNC: 33.8 G/DL (ref 30–36.5)
MCV RBC AUTO: 90.1 FL (ref 80–99)
MONOCYTES # BLD: 0.8 K/UL (ref 0–1)
MONOCYTES NFR BLD: 8 % (ref 5–13)
NEUTS SEG # BLD: 7.1 K/UL (ref 1.8–8)
NEUTS SEG NFR BLD: 75 % (ref 32–75)
NRBC # BLD: 0 K/UL (ref 0–0.01)
NRBC BLD-RTO: 0 PER 100 WBC
PLATELET # BLD AUTO: 136 K/UL (ref 150–400)
POTASSIUM SERPL-SCNC: 4.4 MMOL/L (ref 3.5–5.1)
PROT SERPL-MCNC: 6.3 G/DL (ref 6.4–8.2)
RBC # BLD AUTO: 2.53 M/UL (ref 3.8–5.2)
RBC MORPH BLD: ABNORMAL
SODIUM SERPL-SCNC: 139 MMOL/L (ref 136–145)
SPECIMEN EXP DATE BLD: NORMAL
STATUS OF UNIT,%ST: NORMAL
UNIT DIVISION, %UDIV: 0
WBC # BLD AUTO: 9.5 K/UL (ref 3.6–11)

## 2018-06-14 PROCEDURE — 74011250636 HC RX REV CODE- 250/636: Performed by: INTERNAL MEDICINE

## 2018-06-14 PROCEDURE — 85018 HEMOGLOBIN: CPT | Performed by: INTERNAL MEDICINE

## 2018-06-14 PROCEDURE — 36415 COLL VENOUS BLD VENIPUNCTURE: CPT | Performed by: INTERNAL MEDICINE

## 2018-06-14 PROCEDURE — 80053 COMPREHEN METABOLIC PANEL: CPT | Performed by: INTERNAL MEDICINE

## 2018-06-14 PROCEDURE — 76937 US GUIDE VASCULAR ACCESS: CPT

## 2018-06-14 PROCEDURE — 74011250637 HC RX REV CODE- 250/637: Performed by: INTERNAL MEDICINE

## 2018-06-14 PROCEDURE — 74011000258 HC RX REV CODE- 258: Performed by: INTERNAL MEDICINE

## 2018-06-14 PROCEDURE — 85025 COMPLETE CBC W/AUTO DIFF WBC: CPT | Performed by: INTERNAL MEDICINE

## 2018-06-14 PROCEDURE — 83735 ASSAY OF MAGNESIUM: CPT | Performed by: INTERNAL MEDICINE

## 2018-06-14 RX ORDER — HYDROCODONE BITARTRATE AND ACETAMINOPHEN 5; 325 MG/1; MG/1
1 TABLET ORAL
Qty: 20 TAB | Refills: 0 | Status: SHIPPED | OUTPATIENT
Start: 2018-06-14 | End: 2018-10-10 | Stop reason: ALTCHOICE

## 2018-06-14 RX ORDER — LANOLIN ALCOHOL/MO/W.PET/CERES
325 CREAM (GRAM) TOPICAL 2 TIMES DAILY WITH MEALS
Qty: 60 TAB | Refills: 1 | Status: SHIPPED | OUTPATIENT
Start: 2018-06-14 | End: 2019-09-24 | Stop reason: ALTCHOICE

## 2018-06-14 RX ORDER — FOLIC ACID 1 MG/1
1 TABLET ORAL DAILY
Qty: 30 TAB | Refills: 1 | Status: SHIPPED | OUTPATIENT
Start: 2018-06-14 | End: 2018-10-10 | Stop reason: ALTCHOICE

## 2018-06-14 RX ADMIN — Medication 10 ML: at 05:18

## 2018-06-14 RX ADMIN — IRON SUCROSE 200 MG: 20 INJECTION, SOLUTION INTRAVENOUS at 12:06

## 2018-06-14 RX ADMIN — PANTOPRAZOLE SODIUM 40 MG: 40 TABLET, DELAYED RELEASE ORAL at 09:02

## 2018-06-14 RX ADMIN — MYCOPHENOLATE MOFETIL 1500 MG: 250 CAPSULE ORAL at 09:02

## 2018-06-14 RX ADMIN — HYDROXYCHLOROQUINE SULFATE 200 MG: 200 TABLET, FILM COATED ORAL at 09:02

## 2018-06-14 RX ADMIN — Medication 10 ML: at 12:09

## 2018-06-14 RX ADMIN — FLUOXETINE 20 MG: 20 CAPSULE ORAL at 09:02

## 2018-06-14 RX ADMIN — CEFTRIAXONE SODIUM 1 G: 1 INJECTION, POWDER, FOR SOLUTION INTRAMUSCULAR; INTRAVENOUS at 12:04

## 2018-06-14 NOTE — PROGRESS NOTES
Nephrology Progress Note     Seamus Tee     www. [facility]                  Phone - [phone]     Patient: Jade Ball     YOB: 1980      Date- 6/14/2018     CC: Follow up for arf, hematuria          Subjective: Interval History:   -  Hb stable today at 7.7  bp stable  Cr. Improved  No more gross hematuria. Urology input noted. Stewart removed due to pain    The patient is feeling well and denies any complaints       Assessment & Plan: BERNARD due to pre renal factors- hypotension, anemia  Renal function back to baseline    Hematuria, left perinephric hematoma after renal bx at [facility]  Urology input appreciated. H/o lupus nephritis. Final biopsy report has not been received yet. Continue cellcept    H/o hypertension - bp low on admission    H/o abnormal hypercoag work up  Coumadin on hold    PLAN:     Will give 200 mg IV iron prior to discharge. Told the patient to take OTC iron as tolerated: 1-2 tabs bid. I will recheck her CBC next week. Told her to call the office, so that we can reschedule her F/U berta./        Care Plan discussed with: patient and her nurse  Review of Systems: Pertinent items are noted in HPI. Physical Exam:   GEN: middle-aged woman in no distress  NECK- Supple, no thyromegaly  RESP: lungs clear; No accessory muscle use  CVS: RRR;   SKIN: No Rash, No Jaundice  ABDO: soft , non tender, No hepatosplenomegaly  EXT: No Edema   NEURO: non focal, normal speech    Objective:   Patient Vitals for the past 8 hrs:   BP Temp Pulse Resp SpO2   06/14/18 0727 125/87 99.4 °F (37.4 °C) 95 16 100 %   06/14/18 0307 136/79 98.1 °F (36.7 °C) 92 16 100 %     Last 3 Recorded Weights in this Encounter    06/12/18 0413   Weight: 72.6 kg (160 lb)           Chart reviewed. Pertinent Notes reviewed.        Medications list  reviewed   Current Facility-Administered Medications   Medication    iron sucrose (VENOFER) 100 mg iron/5 mL injection 200 mg    cefTRIAXone (ROCEPHIN) 1 g in 0.9% sodium chloride (MBP/ADV) 50 mL    pantoprazole (PROTONIX) tablet 40 mg    FLUoxetine (PROzac) capsule 20 mg    hydroxychloroquine (PLAQUENIL) tablet 200 mg    hydroxychloroquine (PLAQUENIL) tablet 100 mg    mycophenolate mofetil (CELLCEPT) capsule 1,500 mg    famotidine (PEPCID) tablet 20 mg    pravastatin (PRAVACHOL) tablet 40 mg    nitroglycerin (NITROBID) 2 % ointment 1 Inch    sodium chloride (NS) flush 5-10 mL    sodium chloride (NS) flush 5-10 mL    acetaminophen (TYLENOL) tablet 650 mg    ondansetron (ZOFRAN) injection 4 mg    HYDROcodone-acetaminophen (NORCO) 5-325 mg per tablet 1 Tab    hydrALAZINE (APRESOLINE) 20 mg/mL injection 20 mg    cloNIDine HCl (CATAPRES) tablet 0.2 mg           Data Review :  Recent Labs      06/14/18   0254  06/13/18   0515  06/12/18   0503  06/12/18   0431   NA  139  143  140   --    K  4.4  4.3  4.4   --    CL  112*  114*  110*   --    CO2  19*  20*  21   --    GLU  93  91  119*   --    BUN  12  16  23*   --    CREA  1.25*  1.31*  1.53*   --    CA  8.2*  7.6*  7.7*   --    MG  1.9   --    --    --    ALB  2.8*  2.5*  2.6*   --    SGOT  24  25  23   --    ALT  45  44  44   --    INR   --    --    --   1.0     Recent Labs      06/14/18   0254  06/13/18   1901  06/13/18   1137  06/13/18   0515   06/12/18   0431   WBC  9.5   --    --   8.7   --   14.2*   HGB  7.7*  7.7*  7.8*  7.2*  6.4*   < >  8.6*   HCT  22.8*  22.8*  23.5*  22.0*  19.2*   < >  25.7*   PLT  136*   --    --   178   --   263    < > = values in this interval not displayed. Lab Results   Component Value Date/Time    Specimen Description: URINE 04/28/2014 09:29 AM    Specimen Description: URINE 06/30/2011 09:30 AM    Specimen Description: URINE 05/19/2011 01:22 PM     No results for input(s): FE, TIBC, PSAT, FERR in the last 72 hours.   Lab Results   Component Value Date/Time    Sodium urine, random 101 07/01/2011 12:15 PM    Creatinine, urine 116.6 05/31/2017 11:37 AM       Kallie Chambers, [address] [place] [address] [facility]                                 [email or web address]  [facility] [address], [address]  [place], [address]  Phone - [phone]         Fax - [phone]  [facility] Office  [address] [place] [address]  Phone - [phone]        Fax - [phone]

## 2018-06-14 NOTE — DISCHARGE SUMMARY
Hospitalist Discharge Summary     Patient ID:  Ry Crespo  [id number]  70 y.o.  1980    PCP on record: None    Admit date: 6/12/2018  Discharge date and time: 6/14/2018      DISCHARGE DIAGNOSIS:    Anemia, Hematuria s/p Kidney Biopsy (Perinephric Hematoma),  BERNARD on CKD, SLE, HTN, Depression, GERD, Hypercoagulable state, Obesity. CONSULTATIONS:  IP CONSULT TO NEPHROLOGY  IP CONSULT TO UROLOGY    Excerpted HPI from H&P of Marti Montanez MD:  Jesús Mahan is a 40 y.o.  female who presents with left sided back pain and noticing blood in urine. As per patient, she had a biopsy done at [facility] 1 week ago and she was observed overnight and discharged home next day. Pt had been doing fine since then but today she woke up from sleep at 3 am with left sided back pain, 7/10 in intensity, sharp, non radiating and lasted for 10 minutes. Pt also reported noticing blood in urine since then and in ED she noted to stopped urinating and had urinary retention with 480ml of urine. In ED pt also noted to be anemic and CT showed left moderate side perinephric hematoma. We were asked to admit for work up and evaluation of the above problems. ______________________________________________________________________  DISCHARGE SUMMARY/HOSPITAL COURSE:  for full details see H&P, daily progress notes, labs, consult notes. Acute blood loss anemia POA: HH dropping, will transfuse 1 unit PRBC today and monitor. HH so far stable will Dc/ on Iron and Folic Acid, no further bleeding  Hypotension: BP is improved may resume BP meds  Hematuria and Moderate size left perinephric hematoma: as per Urology repeat CT today to evaluate size of hematoma if increasing will need IR intervention., U/A is dirty will c/w CTX and F/U cultures so far no growth. Repeated CT shows no increase in size of hematoma.   Post Renal Biopsy for Lupus: Renal in the case, F/U Pathology  Urinary retention: Urology int he case, monitor  Mild Nani with baseline CKD3: creatinine trending down, monitor  Systemic lupus erythematosus Continue home meds including cellcept  Hypertension  resume BP meds on D/C  Depression Continue antidepressants  GERD (gastroesophageal reflux disease)  Continue PPIs and H2 blockers  History of thromboembolism in eye She report abnormal hypercoagulable workup but doesn't remember exact Dx Follows Dr Agustin Douglas as an OP,Holding coumadin since biopsy, will continue to hold due to above, Coumadin need to be held for 1 week and may resume after that  She will F/U with Hematology this week  Obesity: BMI 30.23, counseled  Surrogate Decision Maker:   Baseline: functional  Code status: Full  Prophylaxis: SCD's  Recommended Disposition: Home w/Family    D/c Home and F/U with PCP, Urology, Nephrology and Hematology  _______________________________________________________________________  Patient seen and examined by me on discharge day. Pertinent Findings:  Gen:    Not in distress  Chest: Clear lungs  CVS:   Regular rhythm. No edema  Abd:  Soft, not distended, not tender  Neuro:  Alert, GCS 15  _______________________________________________________________________  DISCHARGE MEDICATIONS:   Current Discharge Medication List      START taking these medications    Details   HYDROcodone-acetaminophen (NORCO) 5-325 mg per tablet Take 1 Tab by mouth every six (6) hours as needed. Max Daily Amount: 4 Tabs. Qty: 20 Tab, Refills: 0    Associated Diagnoses: Perinephric hematoma      ferrous sulfate 325 mg (65 mg iron) tablet Take 1 Tab by mouth two (2) times daily (with meals). Qty: 60 Tab, Refills: 1      folic acid (FOLVITE) 1 mg tablet Take 1 Tab by mouth daily. Qty: 30 Tab, Refills: 1         CONTINUE these medications which have NOT CHANGED    Details   acetaminophen (ACETAMINOPHEN EXTRA STRENGTH) 500 mg tablet Take 1 Tab by mouth every six (6) hours as needed for Pain.   Qty: 30 Tab, Refills: 0 hydroxychloroquine (PLAQUENIL) 200 mg tablet Take 1 tab po qam and 1/2 tab po qpm  Qty: 45 Tab, Refills: 9      simvastatin (ZOCOR) 20 mg tablet TAKE 1 TABLET BY MOUTH ONCE DAILY  Refills: 3      losartan (COZAAR) 100 mg tablet Take 100 mg by mouth nightly. atenolol (TENORMIN) 50 mg tablet Take  by mouth nightly. mycophenolate (CELLCEPT) 500 mg tablet Take 1,500 mg by mouth two (2) times a day. FLUoxetine (PROZAC) 20 mg tablet Take 20 mg by mouth daily. Dexlansoprazole (DEXILANT) 60 mg CpDB Take 60 mg by mouth daily. raNITIdine (ZANTAC) 300 mg tablet Take 300 mg by mouth daily. STOP taking these medications       warfarin (COUMADIN) 6 mg tablet Comments:   Reason for Stopping:               My Recommended Diet, Activity, Wound Care, and follow-up labs are listed in the patient's Discharge Insturctions which I have personally completed and reviewed.     ______________________________________________________________________    Risk of deterioration: Moderate    Condition at Discharge:  Stable  ______________________________________________________________________    Disposition  Home with family, no needs  ______________________________________________________________________    Care Plan discussed with:   Patient, Family, RN, Care Manager, Consultant    ______________________________________________________________________    Code Status: Full Code  ______________________________________________________________________      Follow up with:   PCP : None  Follow-up Information     Follow up With Details Comments Contact Stephie Hollins. [facility], [address] [facility] [address]  [phone]      None   None (209) Patient stated that they have no PCP        F/U PCP  F/U Urology  F/U Nephrology  F/U Hematology        Total time in minutes spent coordinating this discharge (includes going over instructions, follow-up, prescriptions, and preparing report for sign off to her PCP) :  35 minutes    Signed:  Francois Mayfield MD

## 2018-06-14 NOTE — ROUTINE PROCESS
PCP [facility] appt scheduled with VERONICA Rodarte on 6/18/2018 at 2:00pm . Appt added to [address], [address] Leung Specialist

## 2018-06-14 NOTE — PROGRESS NOTES
Patient awaiting discharge. Needs to be seen by urology before patient is able to be discharged per Doctor Isela Nelson. Called Dr. Marcin Hollis office to advise. The nurse is to page Dr. Joseph Mcdaniel. Will wait for his return call.

## 2018-06-14 NOTE — PROGRESS NOTES
No complaints    HD stable    Urine clear    Hg stable    Home today  Avoid anticoagulants, push fluids

## 2018-06-14 NOTE — DISCHARGE INSTRUCTIONS
HOSPITALIST DISCHARGE INSTRUCTIONS  NAME: Trupti Encarnacion   :  1980   MRN:  [id number]     Date/Time:  2018 9:03 AM    ADMIT DATE: 2018     DISCHARGE DATE: 2018     DIAGNOSIS:  Anemia, Hematuria s/p Kidney Biopsy (Perinephric Hematoma),  BERNARD on CKD, SLE, HTN, Depression, GERD, Hypercoagulable state, Obesity. MEDICATIONS:                As per medication reconciliation    · It is important that you take the medication exactly as they are prescribed. · Keep your medication in the bottles provided by the pharmacist and keep a list of the medication names, dosages, and times to be taken in your wallet. · Do not take other medications without consulting your doctor. Pain Management: per above medications    What to do at Home    Recommended diet:  Cardiac Diet    Recommended activity: Activity as tolerated and No driving while on analgesics    If you experience any of the following symptoms then please call your primary care physician or return to the emergency room if you cannot get hold of your doctor:  Fever, chills, nausea, vomiting, diarrhea, change in mentation, falling, bleeding, shortness of breath. Follow Up:   PCP you are to call and set up an appointment to see them in 2 week. F/U Urology  F/U Nephrology  F/U Hematology      Information obtained by :  I understand that if any problems occur once I am at home I am to contact my physician. I understand and acknowledge receipt of the instructions indicated above.                                                                                                                                            Physician's or R.N.'s Signature                                                                  Date/Time                                                                                                                                              Patient or Representative Signature Date/Time

## 2018-06-14 NOTE — PROGRESS NOTES
Reason for Admission:   Anemia - lupus                   RRAT Score:          8           Plan for utilizing home health:      No current needs for home health - patient independent with all ADL's and IADL's. Has follow-up appts scheduled and transportation                    Likelihood of Readmission:  Low per RRAT score                         Transition of Care Plan:                    37 y.o.   female who presents with left sided back pain and noticing blood in urine. As per patient, she had a biopsy done at [facility] 1 week ago and she was observed overnight and discharged home next day. Patient has follow-up urology appt for biopsy results scheduled for today - 6/14/2018 at 1:20pm and want to make that appointment. Patient is independent at home - no DME - family supportive and can provide assistance as needed. Patient uses [facility] on [address] for pharmacy and has [employer] so minimal co-pays for medications. Patient denies any concerns for discharge. Patient states she has seen MD at [facility] in the past and would like to follow-up there - will attempt to make PCP follow-up appt and place on AVS for patient. Care Management Interventions  PCP Verified by CM: Yes ([employer])  Mode of Transport at Discharge: Other (see comment) (FAmily to transport home)  Transition of Care Consult (CM Consult):  Other, Discharge Planning (Initial Assessment and D/C planning)  MyChart Signup: No  Discharge Durable Medical Equipment: No  Physical Therapy Consult: No  Occupational Therapy Consult: No  Speech Therapy Consult: No  Confirm Follow Up Transport: Self (Patient drives herself - family to assist if needed)  Plan discussed with Pt/Family/Caregiver: Yes  Discharge Location  Discharge Placement: Home (Home)    Evern Boas, RN, BSN, [facility]   - Medical Oncology  [phone]

## 2018-06-18 ENCOUNTER — OFFICE VISIT (OUTPATIENT)
Dept: FAMILY MEDICINE CLINIC | Age: 38
End: 2018-06-18

## 2018-06-18 VITALS
TEMPERATURE: 98.3 F | BODY MASS INDEX: 32.45 KG/M2 | HEIGHT: 61 IN | DIASTOLIC BLOOD PRESSURE: 92 MMHG | RESPIRATION RATE: 17 BRPM | OXYGEN SATURATION: 95 % | WEIGHT: 171.9 LBS | SYSTOLIC BLOOD PRESSURE: 130 MMHG | HEART RATE: 97 BPM

## 2018-06-18 DIAGNOSIS — D62 ACUTE BLOOD LOSS ANEMIA: ICD-10-CM

## 2018-06-18 DIAGNOSIS — R31.9 HEMATURIA, UNSPECIFIED TYPE: ICD-10-CM

## 2018-06-18 DIAGNOSIS — K21.9 GASTROESOPHAGEAL REFLUX DISEASE WITHOUT ESOPHAGITIS: ICD-10-CM

## 2018-06-18 DIAGNOSIS — R51.9 ACUTE NONINTRACTABLE HEADACHE, UNSPECIFIED HEADACHE TYPE: ICD-10-CM

## 2018-06-18 DIAGNOSIS — M32.14 LUPUS NEPHRITIS (HCC): ICD-10-CM

## 2018-06-18 RX ORDER — ENOXAPARIN SODIUM 100 MG/ML
INJECTION SUBCUTANEOUS
Refills: 0 | COMMUNITY
Start: 2018-05-24 | End: 2018-06-18 | Stop reason: ALTCHOICE

## 2018-06-18 RX ORDER — WARFARIN 6 MG/1
6 TABLET ORAL
COMMUNITY
End: 2018-06-18 | Stop reason: ALTCHOICE

## 2018-06-18 NOTE — PATIENT INSTRUCTIONS
Gastroesophageal Reflux Disease (GERD): Care Instructions  Your Care Instructions    Gastroesophageal reflux disease (GERD) is the backward flow of stomach acid into the esophagus. The esophagus is the tube that leads from your throat to your stomach. A one-way valve prevents the stomach acid from moving up into this tube. When you have GERD, this valve does not close tightly enough. If you have mild GERD symptoms including heartburn, you may be able to control the problem with antacids or over-the-counter medicine. Changing your diet, losing weight, and making other lifestyle changes can also help reduce symptoms. Follow-up care is a key part of your treatment and safety. Be sure to make and go to all appointments, and call your doctor if you are having problems. It's also a good idea to know your test results and keep a list of the medicines you take. How can you care for yourself at home? · Take your medicines exactly as prescribed. Call your doctor if you think you are having a problem with your medicine. · Your doctor may recommend over-the-counter medicine. For mild or occasional indigestion, antacids, such as Tums, Gaviscon, Mylanta, or Maalox, may help. Your doctor also may recommend over-the-counter acid reducers, such as Pepcid AC, Tagamet HB, Zantac 75, or Prilosec. Read and follow all instructions on the label. If you use these medicines often, talk with your doctor. · Change your eating habits. ¨ It's best to eat several small meals instead of two or three large meals. ¨ After you eat, wait 2 to 3 hours before you lie down. ¨ Chocolate, mint, and alcohol can make GERD worse. ¨ Spicy foods, foods that have a lot of acid (like tomatoes and oranges), and coffee can make GERD symptoms worse in some people. If your symptoms are worse after you eat a certain food, you may want to stop eating that food to see if your symptoms get better.   · Do not smoke or chew tobacco. Smoking can make GERD worse. If you need help quitting, talk to your doctor about stop-smoking programs and medicines. These can increase your chances of quitting for good. · If you have GERD symptoms at night, raise the head of your bed 6 to 8 inches by putting the frame on blocks or placing a foam wedge under the head of your mattress. (Adding extra pillows does not work.)  · Do not wear tight clothing around your middle. · Lose weight if you need to. Losing just 5 to 10 pounds can help. When should you call for help? Call your doctor now or seek immediate medical care if:  ? · You have new or different belly pain. ? · Your stools are black and tarlike or have streaks of blood. ? Watch closely for changes in your health, and be sure to contact your doctor if:  ? · Your symptoms have not improved after 2 days. ? · Food seems to catch in your throat or chest.   Where can you learn more? Go to http://kodak-dodie.info/. Enter E840 in the search box to learn more about \"Gastroesophageal Reflux Disease (GERD): Care Instructions. \"  Current as of: May 12, 2017  Content Version: 11.4  © 7283-3461 [employer]. Care instructions adapted under license by [employer] (which disclaims liability or warranty for this information). If you have questions about a medical condition or this instruction, always ask your healthcare professional. [address] any warranty or liability for your use of this information. Lupus: Care Instructions  Your Care Instructions  Lupus is a long-term disease that can cause inflammation, pain, and tissue damage in your body. It is an autoimmune disease. This means the immune system attacks its own tissues. Lupus may cause problems with your skin, kidneys, heart, lungs, nerves, or blood cells. This information is about systemic lupus erythematosus (SLE). SLE is the most common and most serious type of lupus.  But there are other types of lupus, such as discoid or cutaneous lupus, drug-induced systemic lupus, and  lupus. When you have lupus symptoms, you are having flares or relapses. When your symptoms get better, you are in remission. Lupus may get worse very quickly. There is no way to tell when a flare will happen or how bad it will be. When you have a lupus flare, you may have new symptoms as well as symptoms you have had in the past.  Learn your body's signs of a flare, such as joint pain, a rash, a fever, or being more tired. When you see any of these signs, take steps to control your symptoms. Follow-up care is a key part of your treatment and safety. Be sure to make and go to all appointments, and call your doctor if you are having problems. It's also a good idea to know your test results and keep a list of the medicines you take. How can you care for yourself at home? Reduce stress and tiredness  · Keep your daily schedule as simple as possible. · Keep your list of things to do as short as you can. · Exercise regularly. A daily walk or swim, for example, can lower stress, clear your head, improve your mood, and help fight tiredness. · Use meditation, yoga, or guided imagery to relax. · Get plenty of rest. Some people with lupus need up to 12 hours of sleep every night. · Pace yourself. Do not do too many activities. · Ask others for help. Do not try to do everything yourself. · Take short breaks from your usual activities. Think about cutting down on work hours when your symptoms are severe. · If you think that depression or anxiety is making you feel more tired, talk to your doctor, a mental health professional, or both. Take care of your skin  · Ask your doctor about the use of corticosteroid creams for skin symptoms. · If you are bothered by the way a lupus rash looks on your face or if you have scars from lupus, you can try makeup, such as Covermark, to cover the rash or scars.   · Stay out of the sun, especially when the sun's rays are the strongest, usually between 10 a.m. and 4 p.m. If you must be in the sun, cover your arms and legs, and wear a hat. Make sure to use a broad-spectrum sunscreen that has a sun protection factor (SPF) of 50 or higher. Put more sunscreen on after swimming, sweating, or toweling off. Practice good self-care  · Learn more about lupus and how to take care of yourself. · Take your medicines exactly as prescribed. Call your doctor if you have any problems with your medicine. · Do not smoke. If you need help quitting, talk to your doctor about stop-smoking programs and medicines. These can increase your chances of quitting for good. · Eat a healthy, balanced diet. A balanced diet includes whole grains, dairy, fruits and vegetables, and protein. Eat a variety of foods from each of those groups so you get all the nutrients you need. · Avoid other people who are sick with colds or the flu. These illnesses can cause lupus flares. Talk to your doctor about flu shots and pneumococcal vaccinations. If you do get sick or think you are getting an infection, talk with your doctor so you can treat your symptoms right away. · Brush and floss your teeth each day. See your dentist two times a year. · Get regular eye exams. · Build a support system of family, friends, and health professionals. When should you call for help? Call 911 anytime you think you may need emergency care. For example, call if:  ? · You have symptoms of a heart attack. These may include:  ¨ Chest pain or pressure, or a strange feeling in the chest.  ¨ Sweating. ¨ Shortness of breath. ¨ Nausea or vomiting. ¨ Pain, pressure, or a strange feeling in the back, neck, jaw, or upper belly or in one or both shoulders or arms. ¨ Lightheadedness or sudden weakness. ¨ A fast or irregular heartbeat. ? After you call 911, the  may tell you to chew 1 adult-strength or 2 to 4 low-dose aspirin. Wait for an ambulance.  Do not try to drive yourself. ?Call your doctor now or seek immediate medical care if:  ? · You are short of breath. ? · You have blood in your urine or are urinating less often and in smaller amounts than usual.   ? · You have a fever. ? · You feel depressed or notice any changes in your behavior or thinking. ? · You are dizzy or have muscle weakness. ? · You have swelling of the lower legs or feet. ? Watch closely for changes in your health, and be sure to contact your doctor if:  ? · Your symptoms get worse or you develop any new symptoms. These may include aching or swollen joints, increased fatigue, loss of appetite, hair loss, skin rashes, or new sores in your mouth or nose. Where can you learn more? Go to http://kodak-dodie.info/. Enter Z104 in the search box to learn more about \"Lupus: Care Instructions. \"  Current as of: October 31, 2016  Content Version: 11.4  © 9320-4112 [employer]. Care instructions adapted under license by [employer] (which disclaims liability or warranty for this information). If you have questions about a medical condition or this instruction, always ask your healthcare professional. [address] any warranty or liability for your use of this information.

## 2018-06-18 NOTE — PROGRESS NOTES
Hospitalized for: Acute Blood Loss, Anemia, Hematuria s/p Kidney Biopsy (Perinephric Hematoma), BERNARD on CKD, SLE, HTN, Depresson, GERD, Hypercoag State, Obesity    Pt presents today feeling well today. She has a slight headache. She has intermittent fatigue with activity. She reports no other complaints today in the office. Admission on 06/12/2018, Discharged on 06/14/2018   Component Date Value Ref Range Status    WBC 06/12/2018 14.2* 3.6 - 11.0 K/uL Final    Comment: Due to mathematical rounding between the [address], and the new [employer] Hematology analyzers, the reported automated differential may vary by up to +/- 0.5% per cell line. This finding may produce a result that is 100% +/- 3%, which is clinically insignificant.  RBC 06/12/2018 2.84* 3.80 - 5.20 M/uL Final    HGB 06/12/2018 8.6* 11.5 - 16.0 g/dL Final    HCT 06/12/2018 25.7* 35.0 - 47.0 % Final    MCV 06/12/2018 90.5  80.0 - 99.0 FL Final    MCH 06/12/2018 30.3  26.0 - 34.0 PG Final    MCHC 06/12/2018 33.5  30.0 - 36.5 g/dL Final    RDW 06/12/2018 13.9  11.5 - 14.5 % Final    PLATELET 58/69/4551 763  150 - 400 K/uL Final    MPV 06/12/2018 8.6* 8.9 - 12.9 FL Final    NRBC 06/12/2018 0.0  0  WBC Final    ABSOLUTE NRBC 06/12/2018 0.00  0.00 - 0.01 K/uL Final    NEUTROPHILS 06/12/2018 78* 32 - 75 % Final    LYMPHOCYTES 06/12/2018 12  12 - 49 % Final    MONOCYTES 06/12/2018 7  5 - 13 % Final    EOSINOPHILS 06/12/2018 2  0 - 7 % Final    BASOPHILS 06/12/2018 0  0 - 1 % Final    IMMATURE GRANULOCYTES 06/12/2018 1* 0.0 - 0.5 % Final    ABS. NEUTROPHILS 06/12/2018 11.1* 1.8 - 8.0 K/UL Final    ABS. LYMPHOCYTES 06/12/2018 1.7  0.8 - 3.5 K/UL Final    ABS. MONOCYTES 06/12/2018 1.0  0.0 - 1.0 K/UL Final    ABS. EOSINOPHILS 06/12/2018 0.3  0.0 - 0.4 K/UL Final    ABS. BASOPHILS 06/12/2018 0.1  0.0 - 0.1 K/UL Final    ABS. IMM.  GRANS. 06/12/2018 0.1* 0.00 - 0.04 K/UL Final    DF 06/12/2018 AUTOMATED    Final    Crossmatch Expiration 06/12/2018 06/15/2018   Final    ABO/Rh(D) 06/12/2018 O POSITIVE   Final    Antibody screen 06/12/2018 NEG   Final    Unit number 06/12/2018 [id number]   Final    Blood component type 06/12/2018 RC LR   Final    Unit division 06/12/2018 00   Final    Status of unit 06/12/2018 TRANSFUSED   Final    Crossmatch result 06/12/2018 Compatible   Final    INR 06/12/2018 1.0  0.9 - 1.1   Final    A single therapeutic range for Vit K antagonists may not be optimal for all indications - see June, 2008 issue of Chest, American College of Chest Physicians Evidence-Based Clinical Practice Guidelines, 8th Edition.  Prothrombin time 06/12/2018 10.0  9.0 - 11.1 sec Final    aPTT 06/12/2018 28.2  22.1 - 32.0 sec Final    In addition to factor deficiency, monitoring heparin therapy, etc., evaluation of a prolonged aPTT result should include consideration of preanalytic variables such as heparin flush contamination, specimen integrity issues, etc.    aPTT, therapeutic range 06/12/2018      58.0 - 77.0 SECS Final    Lactic acid 06/12/2018 0.8  0.4 - 2.0 MMOL/L Final    Color 06/12/2018 RED    Final    Color Reference Range: Straw, Yellow or Dark Yellow    Appearance 06/12/2018 CLEAR  CLEAR   Final    Specific gravity 06/12/2018 1.018  1.003 - 1.030   Final    pH (UA) 06/12/2018 6.5  5.0 - 8.0   Final    Protein 06/12/2018 100* NEG mg/dL Final    Glucose 06/12/2018 NEGATIVE   NEG mg/dL Final    Reflex testing of urine samples from children less than two years of age for reducing substances has been discontinued. If the clinical setting warrants, urine reducing substance testing is available upon specific physician order.     Ketone 06/12/2018 TRACE* NEG mg/dL Final    Blood 06/12/2018 LARGE* NEG   Final    Urobilinogen 06/12/2018 1.0  0.2 - 1.0 EU/dL Final    Nitrites 06/12/2018 POSITIVE* NEG   Final    Leukocyte Esterase 06/12/2018 NEGATIVE   NEG   Final    Bilirubin UA, confirm 06/12/2018 NEGATIVE   NEG   Final    WBC 06/12/2018 0-4  0 - 4 /hpf Final    RBC 06/12/2018 >100* 0 - 5 /hpf Final    Epithelial cells 06/12/2018 FEW  FEW /lpf Final    Epithelial cell category consists of squamous cells and /or transitional urothelial cells. Renal tubular cells, if present, are separately identified as such.  Bacteria 06/12/2018 1+* NEG /hpf Final    UA:UC IF INDICATED 06/12/2018 URINE CULTURE ORDERED* CNI   Final    Sodium 06/12/2018 140  136 - 145 mmol/L Final    Potassium 06/12/2018 4.4  3.5 - 5.1 mmol/L Final    Chloride 06/12/2018 110* 97 - 108 mmol/L Final    CO2 06/12/2018 21  21 - 32 mmol/L Final    Anion gap 06/12/2018 9  5 - 15 mmol/L Final    Glucose 06/12/2018 119* 65 - 100 mg/dL Final    BUN 06/12/2018 23* 6 - 20 MG/DL Final    Creatinine 06/12/2018 1.53* 0.55 - 1.02 MG/DL Final    BUN/Creatinine ratio 06/12/2018 15  12 - 20   Final    GFR est AA 06/12/2018 47* >60 ml/min/1.73m2 Final    GFR est non-AA 06/12/2018 38* >60 ml/min/1.73m2 Final    Comment: Estimated GFR is calculated using the IDMS-traceable Modification of Diet in Renal Disease (MDRD) Study equation, reported for both  Americans (GFRAA) and non- Americans (GFRNA), and normalized to 1.73m2 body surface area. The physician must decide which value applies to the patient. The MDRD study equation should only be used in individuals age 25 or older. It has not been validated for the following: pregnant women, patients with serious comorbid conditions, or on certain medications, or persons with extremes of body size, muscle mass, or nutritional status.  Calcium 06/12/2018 7.7* 8.5 - 10.1 MG/DL Final    Bilirubin, total 06/12/2018 0.4  0.2 - 1.0 MG/DL Final    ALT (SGPT) 06/12/2018 44  12 - 78 U/L Final    AST (SGOT) 06/12/2018 23  15 - 37 U/L Final    Alk.  phosphatase 06/12/2018 48  45 - 117 U/L Final    Protein, total 06/12/2018 5.7* 6.4 - 8.2 g/dL Final    Albumin 06/12/2018 2.6* 3.5 - 5.0 g/dL Final    Globulin 06/12/2018 3.1  2.0 - 4.0 g/dL Final    A-G Ratio 06/12/2018 0.8* 1.1 - 2.2   Final    Pregnancy test,urine (POC) 06/12/2018 NEGATIVE   NEG   Final    Special Requests: 06/12/2018     Final                    Value:NO SPECIAL REQUESTS  Reflexed from [id number]      Lowpoint Count 06/12/2018     Final                    Value:<10,000  COLONIES/mL      Culture result: 06/12/2018 NO SIGNIFICANT GROWTH    Final    HGB 06/12/2018 7.3* 11.5 - 16.0 g/dL Final    HCT 06/12/2018 21.9* 35.0 - 47.0 % Final    HGB 06/12/2018 7.9* 11.5 - 16.0 g/dL Final    HCT 06/12/2018 23.6* 35.0 - 47.0 % Final    Sodium 06/13/2018 143  136 - 145 mmol/L Final    Potassium 06/13/2018 4.3  3.5 - 5.1 mmol/L Final    Chloride 06/13/2018 114* 97 - 108 mmol/L Final    CO2 06/13/2018 20* 21 - 32 mmol/L Final    Anion gap 06/13/2018 9  5 - 15 mmol/L Final    Glucose 06/13/2018 91  65 - 100 mg/dL Final    BUN 06/13/2018 16  6 - 20 MG/DL Final    Creatinine 06/13/2018 1.31* 0.55 - 1.02 MG/DL Final    BUN/Creatinine ratio 06/13/2018 12  12 - 20   Final    GFR est AA 06/13/2018 56* >60 ml/min/1.73m2 Final    GFR est non-AA 06/13/2018 46* >60 ml/min/1.73m2 Final    Calcium 06/13/2018 7.6* 8.5 - 10.1 MG/DL Final    Bilirubin, total 06/13/2018 0.4  0.2 - 1.0 MG/DL Final    ALT (SGPT) 06/13/2018 44  12 - 78 U/L Final    AST (SGOT) 06/13/2018 25  15 - 37 U/L Final    Alk. phosphatase 06/13/2018 44* 45 - 117 U/L Final    Protein, total 06/13/2018 5.7* 6.4 - 8.2 g/dL Final    Albumin 06/13/2018 2.5* 3.5 - 5.0 g/dL Final    Globulin 06/13/2018 3.2  2.0 - 4.0 g/dL Final    A-G Ratio 06/13/2018 0.8* 1.1 - 2.2   Final    WBC 06/13/2018 8.7  3.6 - 11.0 K/uL Final    Comment: Due to mathematical rounding between the [address], and the new [employer] Hematology analyzers, the reported automated differential may vary by up to +/- 0.5% per cell line.   This finding may produce a result that is 100% +/- 3%, which is clinically insignificant.  RBC 06/13/2018 2.12* 3.80 - 5.20 M/uL Final    HGB 06/13/2018 6.4* 11.5 - 16.0 g/dL Final    HCT 06/13/2018 19.2* 35.0 - 47.0 % Final    MCV 06/13/2018 90.6  80.0 - 99.0 FL Final    MCH 06/13/2018 30.2  26.0 - 34.0 PG Final    MCHC 06/13/2018 33.3  30.0 - 36.5 g/dL Final    RDW 06/13/2018 14.5  11.5 - 14.5 % Final    PLATELET 27/07/7488 363  150 - 400 K/uL Final    MPV 06/13/2018 8.8* 8.9 - 12.9 FL Final    NRBC 06/13/2018 0.0  0  WBC Final    ABSOLUTE NRBC 06/13/2018 0.00  0.00 - 0.01 K/uL Final    NEUTROPHILS 06/13/2018 72  32 - 75 % Final    LYMPHOCYTES 06/13/2018 17  12 - 49 % Final    MONOCYTES 06/13/2018 9  5 - 13 % Final    EOSINOPHILS 06/13/2018 2  0 - 7 % Final    BASOPHILS 06/13/2018 0  0 - 1 % Final    IMMATURE GRANULOCYTES 06/13/2018 1* 0.0 - 0.5 % Final    ABS. NEUTROPHILS 06/13/2018 6.3  1.8 - 8.0 K/UL Final    ABS. LYMPHOCYTES 06/13/2018 1.5  0.8 - 3.5 K/UL Final    ABS. MONOCYTES 06/13/2018 0.8  0.0 - 1.0 K/UL Final    ABS. EOSINOPHILS 06/13/2018 0.1  0.0 - 0.4 K/UL Final    ABS. BASOPHILS 06/13/2018 0.0  0.0 - 0.1 K/UL Final    ABS. IMM. GRANS. 06/13/2018 0.1* 0.00 - 0.04 K/UL Final    DF 06/13/2018 AUTOMATED    Final    HGB 06/13/2018 7.2* 11.5 - 16.0 g/dL Final    HCT 06/13/2018 22.0* 35.0 - 47.0 % Final    HGB 06/13/2018 7.8* 11.5 - 16.0 g/dL Final    HCT 06/13/2018 23.5* 35.0 - 47.0 % Final    WBC 06/14/2018 9.5  3.6 - 11.0 K/uL Final    Comment: Due to mathematical rounding between the [address], and the new [employer] Hemat[facility] analyzers, the reported automated differential may vary by up to +/- 0.5% per cell line. This finding may produce a result that is 100% +/- 3%, which is clinically insignificant.       RBC 06/14/2018 2.53* 3.80 - 5.20 M/uL Final    HGB 06/14/2018 7.7* 11.5 - 16.0 g/dL Final    HCT 06/14/2018 22.8* 35.0 - 47.0 % Final    MCV 06/14/2018 90.1  80.0 - 99.0 FL Final    MCH 06/14/2018 30.4  26.0 - 34.0 PG Final    MCHC 06/14/2018 33.8  30.0 - 36.5 g/dL Final    RDW 06/14/2018 14.1  11.5 - 14.5 % Final    PLATELET 09/87/9410 340* 150 - 400 K/uL Final    RESULTS VERIFIED BY SMEAR    NRBC 06/14/2018 0.0  0  WBC Final    ABSOLUTE NRBC 06/14/2018 0.00  0.00 - 0.01 K/uL Final    NEUTROPHILS 06/14/2018 75  32 - 75 % Final    LYMPHOCYTES 06/14/2018 14  12 - 49 % Final    MONOCYTES 06/14/2018 8  5 - 13 % Final    EOSINOPHILS 06/14/2018 2  0 - 7 % Final    BASOPHILS 06/14/2018 0  0 - 1 % Final    IMMATURE GRANULOCYTES 06/14/2018 1* 0.0 - 0.5 % Final    ABS. NEUTROPHILS 06/14/2018 7.1  1.8 - 8.0 K/UL Final    ABS. LYMPHOCYTES 06/14/2018 1.3  0.8 - 3.5 K/UL Final    ABS. MONOCYTES 06/14/2018 0.8  0.0 - 1.0 K/UL Final    ABS. EOSINOPHILS 06/14/2018 0.2  0.0 - 0.4 K/UL Final    ABS. BASOPHILS 06/14/2018 0.0  0.0 - 0.1 K/UL Final    ABS. IMM. GRANS. 06/14/2018 0.1* 0.00 - 0.04 K/UL Final    DF 06/14/2018 SMEAR SCANNED    Final    RBC COMMENTS 06/14/2018 NORMOCYTIC, NORMOCHROMIC    Final    Magnesium 06/14/2018 1.9  1.6 - 2.4 mg/dL Final    Sodium 06/14/2018 139  136 - 145 mmol/L Final    Potassium 06/14/2018 4.4  3.5 - 5.1 mmol/L Final    Chloride 06/14/2018 112* 97 - 108 mmol/L Final    CO2 06/14/2018 19* 21 - 32 mmol/L Final    Anion gap 06/14/2018 8  5 - 15 mmol/L Final    Glucose 06/14/2018 93  65 - 100 mg/dL Final    BUN 06/14/2018 12  6 - 20 MG/DL Final    Creatinine 06/14/2018 1.25* 0.55 - 1.02 MG/DL Final    BUN/Creatinine ratio 06/14/2018 10* 12 - 20   Final    GFR est AA 06/14/2018 59* >60 ml/min/1.73m2 Final    GFR est non-AA 06/14/2018 48* >60 ml/min/1.73m2 Final    Calcium 06/14/2018 8.2* 8.5 - 10.1 MG/DL Final    Bilirubin, total 06/14/2018 0.5  0.2 - 1.0 MG/DL Final    ALT (SGPT) 06/14/2018 45  12 - 78 U/L Final    AST (SGOT) 06/14/2018 24  15 - 37 U/L Final    Alk.  phosphatase 06/14/2018 52  45 - 117 U/L Final    Protein, total 06/14/2018 6.3* 6.4 - 8.2 g/dL Final    Albumin 06/14/2018 2.8* 3.5 - 5.0 g/dL Final    Globulin 06/14/2018 3.5  2.0 - 4.0 g/dL Final    A-G Ratio 06/14/2018 0.8* 1.1 - 2.2   Final    HGB 06/14/2018 7.7* 11.5 - 16.0 g/dL Final    HCT 06/14/2018 22.8* 35.0 - 47.0 % Final   Office Visit on 03/21/2018   Component Date Value Ref Range Status    Color (UA POC) 03/21/2018 Yellow   Final    Clarity (UA POC) 03/21/2018 Clear   Final    Glucose (UA POC) 03/21/2018 Negative  Negative Final    Bilirubin (UA POC) 03/21/2018 Negative  Negative Final    Ketones (UA POC) 03/21/2018 Negative  Negative Final    Specific gravity (UA POC) 03/21/2018 1.025  1.001 - 1.035 Final    Blood (UA POC) 03/21/2018   Negative Final    moderate    pH (UA POC) 03/21/2018 5.5  4.6 - 8.0 Final    Protein (UA POC) 03/21/2018   Negative Final    >300 mg/dl    Urobilinogen (UA POC) 03/21/2018 0.2 mg/dL  0.2 - 1 Final    Nitrites (UA POC) 03/21/2018 Negative  Negative Final    Leukocyte esterase (UA POC) 03/21/2018 Negative  Negative Final    Urine Culture, Routine 03/21/2018 No growth   Final       Medication Review:  Discrepencies: none  Pt's family  manages medications at home. Patient uses pill box: yes    Patient Active Problem List   Diagnosis Code    Pregnancy Z34.90    Systemic lupus erythematosus ([facility] [address] M32.9    Lupus nephritis. Chronic non-nephrotic proteinuria for years through 3/11. Now proteinuria up to 12 grams. M32.14    Hypertension I10    Acute renal failure secondary to lupus nephritis and possibly ATN as well.   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    Hypermobility syndrome M35.7    Branch retinal artery occlusion H34.239  Depression, major, recurrent ([facility] F33.9    Anxiety disorder F41.9    Diplopia H53.2    Carpal instability of left wrist with volar intercalated segment instability M25.332    Carpal instability of right wrist M25.331    Raynaud's syndrome I73.00    Lupus L93.0    Acute blood loss anemia D62    GERD (gastroesophageal reflux disease) K21.9    History of thromboembolism Z86.718     Patient Active Problem List    Diagnosis Date Noted    Acute blood loss anemia 06/12/2018    GERD (gastroesophageal reflux disease) 06/12/2018    History of thromboembolism 06/12/2018    Lupus     Raynaud's syndrome 10/07/2015    Carpal instability of right wrist 11/06/2014    Carpal instability of left wrist with volar intercalated segment instability 09/05/2014    Diplopia 08/08/2014    Depression, major, recurrent ([facility] 03/10/2014    Anxiety disorder 03/10/2014    Branch retinal artery occlusion 06/28/2013    Hypermobility syndrome 09/06/2012    Lupus nephritis ([facility] 06/26/2012    Long-term use of immunosuppressant medication 05/29/2012    Current use of steroid medication 05/29/2012    Anemia 07/11/2011    Cholestasis of pregnancy 07/06/2011    Hypertension, malignant 07/06/2011    Nephrotic syndrome 07/06/2011    Lupus nephritis. Chronic non-nephrotic proteinuria for years through 3/11. Now proteinuria up to 12 grams. 06/30/2011    Hypertension 06/30/2011    Acute renal failure secondary to lupus nephritis and possibly ATN as well. In 3/11 the Bun/creat was 10/0.7 06/30/2011    Pregnancy 05/19/2011    Systemic lupus erythematosus ([facility] 05/19/2011     Current Outpatient Prescriptions   Medication Sig Dispense Refill    HYDROcodone-acetaminophen (NORCO) 5-325 mg per tablet Take 1 Tab by mouth every six (6) hours as needed. Max Daily Amount: 4 Tabs. 20 Tab 0    ferrous sulfate 325 mg (65 mg iron) tablet Take 1 Tab by mouth two (2) times daily (with meals).  60 Tab 1    folic acid (FOLVITE) 1 mg tablet Take 1 Tab by mouth daily. 30 Tab 1    acetaminophen (ACETAMINOPHEN EXTRA STRENGTH) 500 mg tablet Take 1 Tab by mouth every six (6) hours as needed for Pain. 30 Tab 0    hydroxychloroquine (PLAQUENIL) 200 mg tablet Take 1 tab po qam and 1/2 tab po qpm 45 Tab 9    simvastatin (ZOCOR) 20 mg tablet TAKE 1 TABLET BY MOUTH ONCE DAILY  3    losartan (COZAAR) 100 mg tablet Take 100 mg by mouth nightly.  atenolol (TENORMIN) 50 mg tablet Take  by mouth nightly.  mycophenolate (CELLCEPT) 500 mg tablet Take 1,500 mg by mouth two (2) times a day.        Allergies   Allergen Reactions    Gluten Diarrhea    Fiorinal-Codeine #3 [Codeine-Butalbital-Asa-Caff] Nausea Only     Past Medical History:   Diagnosis Date    Anemia NEC     Anxiety disorder 3/10/2014    Arthritis     Branch retinal artery occlusion approx     as of 17 resolved and on Coumadin    Calculus of kidney     Celiac disease     Chronic kidney disease     stage ll    Chronic renal insufficiency, stage II (mild)     secondary to lupus nephritis:  Dr Jesus Tim    Depression, major, recurrent ([facility] 3/10/2014    Diffuse proliferative lupus glomerulonephritis ([facility]     Dr Jesus Tim     Fatigue     intermittent due to lupus    Gastritis     as of 17 asymptomatic    GERD (gastroesophageal reflux disease)     Hypertension     Long term current use of anticoagulant therapy     Lupus     as of 16 \"aching and swelling of joints\" per patient; Dr Emy Sánchez (in cc)    Lupus     Memory loss     as of 16 pt states \"its not that bad, I forget where I put stuff\"    Ovarian cyst     as of 16; Dr Mertha Schilder Pregnancy induced hypertension     PUD (peptic ulcer disease)      Past Surgical History:   Procedure Laterality Date    HX ACL RECONSTRUCTION Left approx     HX  SECTION      x1    HX ORTHOPAEDIC Right     hand to thumb (thumb would pop out of place so they placed a ligament around it) per pt    HX OTHER SURGICAL  1/7/14    CT Guided Renal Biopsy with Conscious Sedation    HX OTHER SURGICAL  2012    kidney biopsy; benign    HX OTHER SURGICAL Right 10/22/14    right Posterior Interosseous Nerve Neurectomy, right Anterior Interosseous Nerve Neurectomy      Family History   Problem Relation Age of Onset   Marvel Kirkpatrick Glaucoma Father     Hypertension Father      Social History   Substance Use Topics    Smoking status: Never Smoker    Smokeless tobacco: Never Used    Alcohol use No        Care Management:  Was home health ordered: no, home with family  Was DME ordered: NA  Is there informal support at home: Yes  Barriers to care (homelessness, transportation, financial) Yes  Medication adjustments:  1. Stopped Coumadin, Lovenox    Biggest Risk for Readmission: Multiple Comobidities    Items to follow-up on:   -Seeing Dr. Robert Kunz tomorrow (Renal)  -Seeing Hematology on Wednesday, ? Restart blood thinners eventually.  -Seeing Urology Dr. Laury Stroud - has not made an appointment yet, will give referral today. Diagnoses and all orders for this visit:    1. Transition of care performed with sharing of clinical summary    2. Acute blood loss anemia    3. Lupus nephritis ([facility]    4. Gastroesophageal reflux disease without esophagitis    5. Hematuria, unspecified type  -     REFERRAL TO UROLOGY    6. Acute nonintractable headache, unspecified headache type    Follow-up Disposition:  Return in about 4 weeks (around 7/16/2018) for F/U with FREDRICK Singh. Pt to take PRN Tylenol for HAs, pt will f/u with Nephrology to discuss other options tomorrow.

## 2018-06-18 NOTE — MR AVS SNAPSHOT
[address] [place] [address] 
[address] [place] [address] 
[phone] Patient: Elroy Carrera 
MRN: [id number] Visit Information Date & Time Provider Department Dept. Phone Encounter #  
 6/18/2018  2:00 PM Steve Shook NP [facility] [address] [phone] [id number] Follow-up Instructions Return in about 4 weeks (around 7/16/2018) for F/U with FREDRICK Vela. Upcoming Health Maintenance Date Due DTaP/Tdap/Td series (1 - Tdap) 7/13/2002 Pneumococcal 19-64 Highest Risk (3 of 3 - PCV13) 7/30/2019* PAP AKA CERVICAL CYTOLOGY 11/30/2019* Influenza Age 5 to Adult 8/1/2018 *Topic was postponed. The date shown is not the original due date. Allergies as of 6/18/2018  Review Complete On: 6/18/2018 By: Steve Shook NP Severity Noted Reaction Type Reactions Gluten High 02/29/2012   Side Effect Diarrhea Fiorinal-codeine #3 [Codeine-butalbital-asa-caff]  01/07/2014    Nausea Only Current Immunizations  Reviewed on 6/18/2018 Name Date Influenza Vaccine 8/29/2016, 9/19/2015, 11/17/2014, 10/14/2012 Influenza Vaccine PF 9/30/2013 Pneumococcal Polysaccharide (PPSV-23) 8/29/2016 Pneumococcal Vaccine (Unspecified Type) 10/14/2011 Reviewed by Elli Bee LPN on 0/43/7241 at  2:34 PM  
You Were Diagnosed With   
  
 Codes Comments Transition of care performed with sharing of clinical summary    -  Primary ICD-10-CM: Z91.89 ICD-9-CM: V15.89 Acute blood loss anemia     ICD-10-CM: D62 
ICD-9-CM: 285.1 Lupus nephritis ([facility] [address]     ICD-10-CM: M32.14 ICD-9-CM: 710.0, 583.81 Gastroesophageal reflux disease without esophagitis     ICD-10-CM: K21.9 ICD-9-CM: 530.81 Hematuria, unspecified type     ICD-10-CM: R31.9 ICD-9-CM: 599.70 Acute nonintractable headache, unspecified headache type     ICD-10-CM: R51 ICD-9-CM: 034. 0 Vitals BP Pulse Temp Resp Height(growth percentile) (!) 130/92 (BP 1 Location: Right arm, BP Patient Position: Sitting) 97 98.3 °F (36.8 °C) (Temporal) 17 5' 1\" (1.549 m) Weight(growth percentile) SpO2 BMI OB Status Smoking Status 171 lb 14.4 oz (78 kg) 95% 32.48 kg/m2 IUD Never Smoker BMI and BSA Data Body Mass Index Body Surface Area  
 32.48 kg/m 2 1.83 m 2 Preferred Pharmacy Pharmacy Name Phone [facility], [address] [phone] Your Updated Medication List  
  
   
This list is accurate as of 6/18/18  3:51 PM.  Always use your most recent med list.  
  
  
  
  
 acetaminophen 500 mg tablet Commonly known as:  [address] Take 1 Tab by mouth every six (6) hours as needed for Pain. atenolol 50 mg tablet Commonly known as:  TENORMIN Take  by mouth nightly. CELLCEPT 500 mg tablet Generic drug:  mycophenolate Take 1,500 mg by mouth two (2) times a day. ferrous sulfate 325 mg (65 mg iron) tablet Take 1 Tab by mouth two (2) times daily (with meals). folic acid 1 mg tablet Commonly known as:  [employer] Take 1 Tab by mouth daily. HYDROcodone-acetaminophen 5-325 mg per tablet Commonly known as:  Dacia Batman Take 1 Tab by mouth every six (6) hours as needed. Max Daily Amount: 4 Tabs. hydroxychloroquine 200 mg tablet Commonly known as:  PLAQUENIL Take 1 tab po qam and 1/2 tab po qpm  
  
 losartan 100 mg tablet Commonly known as:  COZAAR Take 100 mg by mouth nightly. simvastatin 20 mg tablet Commonly known as:  ZOCOR  
TAKE 1 TABLET BY MOUTH ONCE DAILY We Performed the Following REFERRAL TO UROLOGY [HZR062 Custom] Follow-up Instructions Return in about 4 weeks (around 7/16/2018) for F/U with FREDRICK Overton. Referral Information Referral ID Referred By Referred To  
  
 [id number] [facility] [address] [place], [address] [facility] Visits Status Start Date End Date 1 New Request 6/18/18 6/18/19 If your referral has a status of pending review or denied, additional information will be sent to support the outcome of this decision. Patient Instructions Gastroesophageal Reflux Disease (GERD): Care Instructions Your Care Instructions Gastroesophageal reflux disease (GERD) is the backward flow of stomach acid into the esophagus. The esophagus is the tube that leads from your throat to your stomach. A one-way valve prevents the stomach acid from moving up into this tube. When you have GERD, this valve does not close tightly enough. If you have mild GERD symptoms including heartburn, you may be able to control the problem with antacids or over-the-counter medicine. Changing your diet, losing weight, and making other lifestyle changes can also help reduce symptoms. Follow-up care is a key part of your treatment and safety. Be sure to make and go to all appointments, and call your doctor if you are having problems. It's also a good idea to know your test results and keep a list of the medicines you take. How can you care for yourself at home? · Take your medicines exactly as prescribed. Call your doctor if you think you are having a problem with your medicine. · Your doctor may recommend over-the-counter medicine. For mild or occasional indigestion, antacids, such as Tums, Gaviscon, Mylanta, or Maalox, may help. Your doctor also may recommend over-the-counter acid reducers, such as Pepcid AC, Tagamet HB, Zantac 75, or Prilosec. Read and follow all instructions on the label. If you use these medicines often, talk with your doctor. · Change your eating habits. ¨ It's best to eat several small meals instead of two or three large meals. ¨ After you eat, wait 2 to 3 hours before you lie down. ¨ Chocolate, mint, and alcohol can make GERD worse. ¨ Spicy foods, foods that have a lot of acid (like tomatoes and oranges), and coffee can make GERD symptoms worse in some people. If your symptoms are worse after you eat a certain food, you may want to stop eating that food to see if your symptoms get better. · Do not smoke or chew tobacco. Smoking can make GERD worse. If you need help quitting, talk to your doctor about stop-smoking programs and medicines. These can increase your chances of quitting for good. · If you have GERD symptoms at night, raise the head of your bed 6 to 8 inches by putting the frame on blocks or placing a foam wedge under the head of your mattress. (Adding extra pillows does not work.) · Do not wear tight clothing around your middle. · Lose weight if you need to. Losing just 5 to 10 pounds can help. When should you call for help? Call your doctor now or seek immediate medical care if: 
? · You have new or different belly pain. ? · Your stools are black and tarlike or have streaks of blood. ? Watch closely for changes in your health, and be sure to contact your doctor if: 
? · Your symptoms have not improved after 2 days. ? · Food seems to catch in your throat or chest.  
Where can you learn more? Go to http://kodak-dodie.info/. Enter X895 in the search box to learn more about \"Gastroesophageal Reflux Disease (GERD): Care Instructions. \" Current as of: May 12, 2017 Content Version: 11.4 © 3424-3194 [employer]. Care instructions adapted under license by [employer] (which disclaims liability or warranty for this information). If you have questions about a medical condition or this instruction, always ask your healthcare professional. [place] [address] any warranty or liability for your use of this information. Lupus: Care Instructions Your Care Instructions Lupus is a long-term disease that can cause inflammation, pain, and tissue damage in your body. It is an autoimmune disease. This means the immune system attacks its own tissues. Lupus may cause problems with your skin, kidneys, heart, lungs, nerves, or blood cells. This information is about systemic lupus erythematosus (SLE). SLE is the most common and most serious type of lupus. But there are other types of lupus, such as discoid or cutaneous lupus, drug-induced systemic lupus, and  lupus. When you have lupus symptoms, you are having flares or relapses. When your symptoms get better, you are in remission. Lupus may get worse very quickly. There is no way to tell when a flare will happen or how bad it will be. When you have a lupus flare, you may have new symptoms as well as symptoms you have had in the past. 
Learn your body's signs of a flare, such as joint pain, a rash, a fever, or being more tired. When you see any of these signs, take steps to control your symptoms. Follow-up care is a key part of your treatment and safety. Be sure to make and go to all appointments, and call your doctor if you are having problems. It's also a good idea to know your test results and keep a list of the medicines you take. How can you care for yourself at home? Reduce stress and tiredness · Keep your daily schedule as simple as possible. · Keep your list of things to do as short as you can. · Exercise regularly. A daily walk or swim, for example, can lower stress, clear your head, improve your mood, and help fight tiredness. · Use meditation, yoga, or guided imagery to relax. · Get plenty of rest. Some people with lupus need up to 12 hours of sleep every night. · Pace yourself. Do not do too many activities. · Ask others for help. Do not try to do everything yourself. · Take short breaks from your usual activities. Think about cutting down on work hours when your symptoms are severe.  
· If you think that depression or anxiety is making you feel more tired, talk to your doctor, a mental health professional, or both. Take care of your skin · Ask your doctor about the use of corticosteroid creams for skin symptoms. · If you are bothered by the way a lupus rash looks on your face or if you have scars from lupus, you can try makeup, such as Covermark, to cover the rash or scars. · Stay out of the sun, especially when the sun's rays are the strongest, usually between 10 a.m. and 4 p.m. If you must be in the sun, cover your arms and legs, and wear a hat. Make sure to use a broad-spectrum sunscreen that has a sun protection factor (SPF) of 50 or higher. Put more sunscreen on after swimming, sweating, or toweling off. Practice good self-care · Learn more about lupus and how to take care of yourself. · Take your medicines exactly as prescribed. Call your doctor if you have any problems with your medicine. · Do not smoke. If you need help quitting, talk to your doctor about stop-smoking programs and medicines. These can increase your chances of quitting for good. · Eat a healthy, balanced diet. A balanced diet includes whole grains, dairy, fruits and vegetables, and protein. Eat a variety of foods from each of those groups so you get all the nutrients you need. · Avoid other people who are sick with colds or the flu. These illnesses can cause lupus flares. Talk to your doctor about flu shots and pneumococcal vaccinations. If you do get sick or think you are getting an infection, talk with your doctor so you can treat your symptoms right away. · Brush and floss your teeth each day. See your dentist two times a year. · Get regular eye exams. · Build a support system of family, friends, and health professionals. When should you call for help? Call 911 anytime you think you may need emergency care. For example, call if: 
? · You have symptoms of a heart attack. These may include: ¨ Chest pain or pressure, or a strange feeling in the chest. 
¨ Sweating. ¨ Shortness of breath. ¨ Nausea or vomiting. ¨ Pain, pressure, or a strange feeling in the back, neck, jaw, or upper belly or in one or both shoulders or arms. ¨ Lightheadedness or sudden weakness. ¨ A fast or irregular heartbeat. ? After you call 911, the  may tell you to chew 1 adult-strength or 2 to 4 low-dose aspirin. Wait for an ambulance. Do not try to drive yourself. ?Call your doctor now or seek immediate medical care if: 
? · You are short of breath. ? · You have blood in your urine or are urinating less often and in smaller amounts than usual.  
? · You have a fever. ? · You feel depressed or notice any changes in your behavior or thinking. ? · You are dizzy or have muscle weakness. ? · You have swelling of the lower legs or feet. ? Watch closely for changes in your health, and be sure to contact your doctor if: 
? · Your symptoms get worse or you develop any new symptoms. These may include aching or swollen joints, increased fatigue, loss of appetite, hair loss, skin rashes, or new sores in your mouth or nose. Where can you learn more? Go to http://kodak-dodie.info/. Enter C960 in the search box to learn more about \"Lupus: Care Instructions. \" Current as of: October 31, 2016 Content Version: 11.4 © 7803-4890 [employer]. Care instructions adapted under license by [employer] (which disclaims liability or warranty for this information). If you have questions about a medical condition or this instruction, always ask your healthcare professional. [place] [address] any warranty or liability for your use of this information. Introducing [address]! Amelie Hutson introduces Tier 3 patient portal. Now you can access parts of your medical record, email your doctor's office, and request medication refills online. 1. In your internet browser, go to [email or web address] 2. Click on the First Time User? Click Here link in the Sign In box. You will see the New Member Sign Up page. 3. Enter your Snapflow Access Code exactly as it appears below. You will not need to use this code after youve completed the sign-up process. If you do not sign up before the expiration date, you must request a new code. · Snapflow Access Code: [email or web address] Expires: 9/10/2018  4:12 AM 
 
4. Enter the last four digits of your Social Security Number (xxxx) and Date of Birth (mm/dd/yyyy) as indicated and click Submit. You will be taken to the next sign-up page. 5. Create a Snapflow ID. This will be your Snapflow login ID and cannot be changed, so think of one that is secure and easy to remember. 6. Create a Snapflow password. You can change your password at any time. 7. Enter your Password Reset Question and Answer. This can be used at a later time if you forget your password. 8. Enter your e-mail address. You will receive e-mail notification when new information is available in [address] 9. Click Sign Up. You can now view and download portions of your medical record. 10. Click the Download Summary menu link to download a portable copy of your medical information. If you have questions, please visit the Frequently Asked Questions section of the Snapflow website. Remember, Snapflow is NOT to be used for urgent needs. For medical emergencies, dial 911. Now available from your iPhone and Android! Please provide this summary of care documentation to your next provider. Your primary care clinician is listed as Olivia Arriola. If you have any questions after today's visit, please call [phone].

## 2018-06-18 NOTE — PROGRESS NOTES
Aster Ochoa  Identified pt with two pt identifiers(name and ). Chief Complaint   Patient presents with    Follow-up     Rm 6: [facility] [address] [facility] for acute blood loss 18-18        1. Have you been to the ER, urgent care clinic since your last visit? Hospitalized since your last visit? no    2. Have you seen or consulted any other health care providers outside of the [address] [place] [address] since your last visit? Include any pap smears or colon screening. no    Today's provider has been notified of reason for visit, vitals and flowsheets obtained on patients.      Patient received paperwork for advance directive during previous visit but has not completed at this time     Reviewed record In preparation for visit, huddled with provider and have obtained necessary documentation      Health Maintenance Due   Topic    DTaP/Tdap/Td series (1 - Tdap)       Wt Readings from Last 3 Encounters:   18 171 lb 14.4 oz (78 kg)   18 160 lb (72.6 kg)   18 162 lb (73.5 kg)     Temp Readings from Last 3 Encounters:   18 98.3 °F (36.8 °C) (Temporal)   18 98.7 °F (37.1 °C)   18 99.2 °F (37.3 °C) (Oral)     BP Readings from Last 3 Encounters:   18 (!) 130/92   18 118/87   18 (!) 132/95     Pulse Readings from Last 3 Encounters:   18 97   18 92   18 73     Vitals:    18 1439   BP: (!) 130/92   Pulse: 97   Resp: 17   Temp: 98.3 °F (36.8 °C)   TempSrc: Temporal   SpO2: 95%   Weight: 171 lb 14.4 oz (78 kg)   Height: 5' 1\" (1.549 m)   PainSc:   2   PainLoc: Head         Learning Assessment:  :     Learning Assessment 2014   PRIMARY LEARNER Patient Patient   HIGHEST LEVEL OF EDUCATION - PRIMARY LEARNER  GRADUATED HIGH SCHOOL OR GED GRADUATED HIGH SCHOOL OR GED   BARRIERS PRIMARY LEARNER NONE NONE   CO-LEARNER CAREGIVER No No   PRIMARY LANGUAGE ENGLISH ENGLISH    NEED No -   LEARNER PREFERENCE PRIMARY LISTENING PICTURES READING -   LEARNING SPECIAL TOPICS none -   ANSWERED BY patient patient   RELATIONSHIP SELF SELF       Depression Screening:  :     PHQ over the last two weeks 3/21/2018   Little interest or pleasure in doing things Not at all   Feeling down, depressed or hopeless Not at all   Total Score PHQ 2 0           ADL Screening:  :     ADL Assessment 3/21/2018   Feeding yourself No Help Needed   Getting from bed to chair No Help Needed   Getting dressed No Help Needed   Bathing or showering No Help Needed   Walk across the room (includes cane/walker) No Help Needed   Using the telphone No Help Needed   Taking your medications No Help Needed   Preparing meals No Help Needed   Managing money (expenses/bills) No Help Needed   Moderately strenuous housework (laundry) No Help Needed   Shopping for personal items (toiletries/medicines) No Help Needed   Shopping for groceries No Help Needed   Driving No Help Needed   Climbing a flight of stairs No Help Needed   Getting to places beyond walking distances No Help Needed                 Medication reconciliation up to date and corrected with patient at this time.

## 2018-10-05 NOTE — PROGRESS NOTES
Called and spoke with the patient. Informed them of the results. The patient verbalized understanding of the results. right normal/left normal

## 2018-10-10 ENCOUNTER — OFFICE VISIT (OUTPATIENT)
Dept: FAMILY MEDICINE CLINIC | Age: 38
End: 2018-10-10

## 2018-10-10 VITALS
RESPIRATION RATE: 20 BRPM | HEIGHT: 61 IN | HEART RATE: 69 BPM | WEIGHT: 172.5 LBS | DIASTOLIC BLOOD PRESSURE: 84 MMHG | SYSTOLIC BLOOD PRESSURE: 117 MMHG | BODY MASS INDEX: 32.57 KG/M2 | TEMPERATURE: 99.1 F | OXYGEN SATURATION: 99 %

## 2018-10-10 DIAGNOSIS — R35.0 URINARY FREQUENCY: Primary | ICD-10-CM

## 2018-10-10 DIAGNOSIS — N30.90 CYSTITIS: ICD-10-CM

## 2018-10-10 DIAGNOSIS — R31.9 HEMATURIA, UNSPECIFIED TYPE: ICD-10-CM

## 2018-10-10 LAB
BILIRUB UR QL STRIP: NEGATIVE
GLUCOSE UR-MCNC: NEGATIVE MG/DL
KETONES P FAST UR STRIP-MCNC: NEGATIVE MG/DL
PH UR STRIP: 5.5 [PH] (ref 4.6–8)
PROT UR QL STRIP: NORMAL
SP GR UR STRIP: 1.03 (ref 1–1.03)
UA UROBILINOGEN AMB POC: NORMAL (ref 0.2–1)
URINALYSIS CLARITY POC: NORMAL
URINALYSIS COLOR POC: YELLOW
URINE BLOOD POC: NORMAL
URINE LEUKOCYTES POC: NORMAL
URINE NITRITES POC: NEGATIVE

## 2018-10-10 RX ORDER — PHENAZOPYRIDINE HYDROCHLORIDE 200 MG/1
200 TABLET, FILM COATED ORAL
Qty: 9 TAB | Refills: 1 | Status: SHIPPED | OUTPATIENT
Start: 2018-10-10 | End: 2018-10-13

## 2018-10-10 RX ORDER — WARFARIN 6 MG/1
4 TABLET ORAL DAILY
COMMUNITY
End: 2020-07-08 | Stop reason: CLARIF

## 2018-10-10 RX ORDER — NITROFURANTOIN 25; 75 MG/1; MG/1
100 CAPSULE ORAL 2 TIMES DAILY
Qty: 10 CAP | Refills: 0 | Status: SHIPPED | OUTPATIENT
Start: 2018-10-10 | End: 2018-10-15

## 2018-10-10 NOTE — PROGRESS NOTES
Chief Complaint   Patient presents with    Urinary Frequency    Urinary Burning     [address]  Identified pt with two pt identifiers(name and ). Chief Complaint   Patient presents with    Urinary Frequency    Urinary Burning       1. Have you been to the ER, urgent care clinic since your last visit? n  Hospitalized since your last visit? n    2. Have you seen or consulted any other health care providers outside of the [address] [place] [address] since your last visit?n   Include any pap smears or colon screening.  n      Advance Care Planning    In the event something were to happen to you and you were unable to speak on your behalf, do you have an Advance Directive/ Living Will in place stating your wishes? NO    If yes, do we have a copy on file  NO    If no, would you like information YES    Medication reconciliation up to date and corrected with patient at this time. Today's provider has been notified of reason for visit, vitals and flowsheets obtained on patients. Reviewed record in preparation for visit, huddled with provider and have obtained necessary documentation.       Health Maintenance Due   Topic    DTaP/Tdap/Td series (1 - Tdap)    Influenza Age 5 to Adult        Wt Readings from Last 3 Encounters:   10/10/18 172 lb 8 oz (78.2 kg)   18 171 lb 14.4 oz (78 kg)   18 160 lb (72.6 kg)     Temp Readings from Last 3 Encounters:   10/10/18 99.1 °F (37.3 °C) (Oral)   18 98.3 °F (36.8 °C) (Temporal)   18 98.7 °F (37.1 °C)     BP Readings from Last 3 Encounters:   10/10/18 117/84   18 (!) 130/92   18 118/87     Pulse Readings from Last 3 Encounters:   10/10/18 69   18 97   18 92     Vitals:    10/10/18 0844   BP: 117/84   Pulse: 69   Resp: 20   Temp: 99.1 °F (37.3 °C)   TempSrc: Oral   SpO2: 99%   Weight: 172 lb 8 oz (78.2 kg)   Height: 5' 1\" (1.549 m)   PainSc:   2         Learning Assessment:  :     Learning Assessment 2014 PRIMARY LEARNER Patient Patient   HIGHEST LEVEL OF EDUCATION - PRIMARY LEARNER  GRADUATED HIGH SCHOOL OR GED GRADUATED HIGH SCHOOL OR GED   BARRIERS PRIMARY LEARNER NONE NONE   CO-LEARNER CAREGIVER No No   PRIMARY LANGUAGE ENGLISH ENGLISH    NEED No -   LEARNER PREFERENCE PRIMARY LISTENING PICTURES     READING -   LEARNING SPECIAL TOPICS none -   ANSWERED BY patient patient   RELATIONSHIP SELF SELF       Depression Screening:  :     PHQ over the last two weeks 10/10/2018   Little interest or pleasure in doing things Not at all   Feeling down, depressed, irritable, or hopeless Not at all   Total Score PHQ 2 0       Fall Risk Assessment:  :     No flowsheet data found. Abuse Screening:  :     No flowsheet data found. ADL Screening:  :     ADL Assessment 10/10/2018   Feeding yourself No Help Needed   Getting from bed to chair No Help Needed   Getting dressed No Help Needed   Bathing or showering No Help Needed   Walk across the room (includes cane/walker) No Help Needed   Using the telphone No Help Needed   Taking your medications No Help Needed   Preparing meals No Help Needed   Managing money (expenses/bills) No Help Needed   Moderately strenuous housework (laundry) No Help Needed   Shopping for personal items (toiletries/medicines) No Help Needed   Shopping for groceries No Help Needed   Driving No Help Needed   Climbing a flight of stairs No Help Needed   Getting to places beyond walking distances No Help Needed                 Medication reconciliation up to date and corrected with patient at this time.

## 2018-10-10 NOTE — PATIENT INSTRUCTIONS
Blood in the Urine: Care Instructions  Your Care Instructions    Blood in the urine, or hematuria, may make the urine look red, brown, or pink. There may be blood every time you urinate or just from time to time. You cannot always see blood in the urine, but it will show up in a urine test.  Blood in the urine may be serious. It should always be checked by a doctor. Your doctor may recommend more tests, including an X-ray, a CT scan, or a cystoscopy (which lets a doctor look inside the urethra and bladder). Blood in the urine can be a sign of another problem. Common causes are bladder infections and kidney stones. An injury to your groin or your genital area can also cause bleeding in the urinary tract. Very hard exercise--such as running a marathon--can cause blood in the urine. Blood in the urine can also be a sign of kidney disease or cancer in the bladder or kidney. Many cases of blood in the urine are caused by a harmless condition that runs in families. This is called benign familial hematuria. It does not need any treatment. Sometimes your urine may look red or brown even though it does not contain blood. For example, not getting enough fluids (dehydration), taking certain medicines, or having a liver problem can change the color of your urine. Eating foods such as beets, rhubarb, or blackberries or foods with red food coloring can make your urine look red or pink. Follow-up care is a key part of your treatment and safety. Be sure to make and go to all appointments, and call your doctor if you are having problems. It's also a good idea to know your test results and keep a list of the medicines you take. When should you call for help? Call your doctor now or seek immediate medical care if:    · You have symptoms of a urinary infection. For example:  ¨ You have pus in your urine. ¨ You have pain in your back just below your rib cage. This is called flank pain.   ¨ You have a fever, chills, or body aches.  ¨ It hurts to urinate. ¨ You have groin or belly pain.     · You have more blood in your urine.    Watch closely for changes in your health, and be sure to contact your doctor if:    · You have new urination problems.     · You do not get better as expected. Where can you learn more? Go to http://kodak-dodie.info/. Enter P938 in the search box to learn more about \"Blood in the Urine: Care Instructions. \"  Current as of: March 21, 2018  Content Version: 11.8  © 0209-8625 [employer]. Care instructions adapted under license by [employer] (which disclaims liability or warranty for this information). If you have questions about a medical condition or this instruction, always ask your healthcare professional. [place] [address] any warranty or liability for your use of this information. Urinary Tract Infection in Women: Care Instructions  Your Care Instructions    A urinary tract infection, or UTI, is a general term for an infection anywhere between the kidneys and the urethra (where urine comes out). Most UTIs are bladder infections. They often cause pain or burning when you urinate. UTIs are caused by bacteria and can be cured with antibiotics. Be sure to complete your treatment so that the infection goes away. Follow-up care is a key part of your treatment and safety. Be sure to make and go to all appointments, and call your doctor if you are having problems. It's also a good idea to know your test results and keep a list of the medicines you take. How can you care for yourself at home? · Take your antibiotics as directed. Do not stop taking them just because you feel better. You need to take the full course of antibiotics. · Drink extra water and other fluids for the next day or two. This may help wash out the bacteria that are causing the infection.  (If you have kidney, heart, or liver disease and have to limit fluids, talk with your doctor before you increase your fluid intake.)  · Avoid drinks that are carbonated or have caffeine. They can irritate the bladder. · Urinate often. Try to empty your bladder each time. · To relieve pain, take a hot bath or lay a heating pad set on low over your lower belly or genital area. Never go to sleep with a heating pad in place. To prevent UTIs  · Drink plenty of water each day. This helps you urinate often, which clears bacteria from your system. (If you have kidney, heart, or liver disease and have to limit fluids, talk with your doctor before you increase your fluid intake.)  · Urinate when you need to. · Urinate right after you have sex. · Change sanitary pads often. · Avoid douches, bubble baths, feminine hygiene sprays, and other feminine hygiene products that have deodorants. · After going to the bathroom, wipe from front to back. When should you call for help? Call your doctor now or seek immediate medical care if:    · Symptoms such as fever, chills, nausea, or vomiting get worse or appear for the first time.     · You have new pain in your back just below your rib cage. This is called flank pain.     · There is new blood or pus in your urine.     · You have any problems with your antibiotic medicine.    Watch closely for changes in your health, and be sure to contact your doctor if:    · You are not getting better after taking an antibiotic for 2 days.     · Your symptoms go away but then come back. Where can you learn more? Go to http://kodak-dodie.info/. Enter E631 in the search box to learn more about \"Urinary Tract Infection in Women: Care Instructions. \"  Current as of: March 21, 2018  Content Version: 11.8  © 6877-6949 "[employer]. Care instructions adapted under license by [employer] (which disclaims liability or warranty for this information).  If you have questions about a medical condition or this instruction, always ask your healthcare professional. [address] any warranty or liability for your use of this information.

## 2018-10-10 NOTE — MR AVS SNAPSHOT
[address] [place] [address] 
[address] [place] [address] 
[phone] Patient: Mimi Hael 
MRN: [id number] UOT:6/80/[id number] Visit Information Date & Time Provider Department Dept. Phone Encounter #  
 10/10/2018  8:30 AM Susannah Haines NP [facility] [phone] [id number] Follow-up Instructions Return if symptoms worsen or fail to improve. Upcoming Health Maintenance Date Due DTaP/Tdap/Td series (1 - Tdap) 7/13/2002 MEDICARE YEARLY EXAM 10/10/2018 Pneumococcal 19-64 Highest Risk (3 of 3 - PCV13) 7/30/2019* PAP AKA CERVICAL CYTOLOGY 11/30/2019* *Topic was postponed. The date shown is not the original due date. Allergies as of 10/10/2018  Review Complete On: 10/10/2018 By: Susannah Haines NP Severity Noted Reaction Type Reactions Gluten High 02/29/2012   Side Effect Diarrhea Fiorinal-codeine #3 [Codeine-butalbital-asa-caff]  01/07/2014    Nausea Only Current Immunizations  Reviewed on 6/18/2018 Name Date Influenza Vaccine 9/19/2018, 8/29/2016, 9/19/2015, 11/17/2014, 10/14/2012 Influenza Vaccine PF 9/30/2013 Pneumococcal Polysaccharide (PPSV-23) 8/29/2016 Pneumococcal Vaccine (Unspecified Type) 10/14/2011 Not reviewed this visit You Were Diagnosed With   
  
 Codes Comments Urinary frequency    -  Primary ICD-10-CM: R35.0 ICD-9-CM: 788.41 Cystitis     ICD-10-CM: N30.90 ICD-9-CM: 595.9 Hematuria, unspecified type     ICD-10-CM: R31.9 ICD-9-CM: 599.70 Vitals BP Pulse Temp Resp Height(growth percentile) Weight(growth percentile) 117/84 (BP 1 Location: Left arm, BP Patient Position: Sitting) 69 99.1 °F (37.3 °C) (Oral) 20 5' 1\" (1.549 m) 172 lb 8 oz (78.2 kg) SpO2 BMI OB Status Smoking Status 99% 32.59 kg/m2 IUD Never Smoker BMI and BSA Data Body Mass Index Body Surface Area  32.59 kg/m 2 1.83 m 2  
  
  
 Preferred Pharmacy Pharmacy Name Phone [facility], [place] [address] [phone] Your Updated Medication List  
  
   
This list is accurate as of 10/10/18  9:05 AM.  Always use your most recent med list.  
  
  
  
  
 acetaminophen 500 mg tablet Commonly known as:  [address] Take 1 Tab by mouth every six (6) hours as needed for Pain. atenolol 50 mg tablet Commonly known as:  TENORMIN Take  by mouth nightly. CELLCEPT 500 mg tablet Generic drug:  mycophenolate Take 1,500 mg by mouth two (2) times a day. ferrous sulfate 325 mg (65 mg iron) tablet Take 1 Tab by mouth two (2) times daily (with meals). hydroxychloroquine 200 mg tablet Commonly known as:  PLAQUENIL Take 1 tab po qam and 1/2 tab po qpm  
  
 losartan 100 mg tablet Commonly known as:  COZAAR Take 100 mg by mouth nightly. nitrofurantoin (macrocrystal-monohydrate) 100 mg capsule Commonly known as:  MACROBID Take 1 Cap by mouth two (2) times a day for 5 days. phenazopyridine 200 mg tablet Commonly known as:  PYRIDIUM Take 1 Tab by mouth three (3) times daily as needed for Pain for up to 3 days. simvastatin 20 mg tablet Commonly known as:  ZOCOR  
TAKE 1 TABLET BY MOUTH ONCE DAILY  
  
 warfarin 6 mg tablet Commonly known as:  COUMADIN Take 6 mg by mouth daily. Prescriptions Sent to Pharmacy Refills  
 nitrofurantoin, macrocrystal-monohydrate, (MACROBID) 100 mg capsule 0 Sig: Take 1 Cap by mouth two (2) times a day for 5 days. Class: Normal  
 Pharmacy: [address] Ph #: [phone] Route: Oral  
 phenazopyridine (PYRIDIUM) 200 mg tablet 1 Sig: Take 1 Tab by mouth three (3) times daily as needed for Pain for up to 3 days. Class: Normal  
 Pharmacy: [address], [address] [place] [address] Ph #: [phone]  Route: Oral  
  
 We Performed the Following AMB POC URINALYSIS DIP STICK AUTO W/O MICRO [23797 CPT(R)] CULTURE, URINE [id number] CPT(R)] Follow-up Instructions Return if symptoms worsen or fail to improve. Patient Instructions Blood in the Urine: Care Instructions Your Care Instructions Blood in the urine, or hematuria, may make the urine look red, brown, or pink. There may be blood every time you urinate or just from time to time. You cannot always see blood in the urine, but it will show up in a urine test. 
Blood in the urine may be serious. It should always be checked by a doctor. Your doctor may recommend more tests, including an X-ray, a CT scan, or a cystoscopy (which lets a doctor look inside the urethra and bladder). Blood in the urine can be a sign of another problem. Common causes are bladder infections and kidney stones. An injury to your groin or your genital area can also cause bleeding in the urinary tract. Very hard exercisesuch as running a marathoncan cause blood in the urine. Blood in the urine can also be a sign of kidney disease or cancer in the bladder or kidney. Many cases of blood in the urine are caused by a harmless condition that runs in families. This is called benign familial hematuria. It does not need any treatment. Sometimes your urine may look red or brown even though it does not contain blood. For example, not getting enough fluids (dehydration), taking certain medicines, or having a liver problem can change the color of your urine. Eating foods such as beets, rhubarb, or blackberries or foods with red food coloring can make your urine look red or pink. Follow-up care is a key part of your treatment and safety. Be sure to make and go to all appointments, and call your doctor if you are having problems. It's also a good idea to know your test results and keep a list of the medicines you take. When should you call for help? Call your doctor now or seek immediate medical care if: 
  · You have symptoms of a urinary infection. For example: ¨ You have pus in your urine. ¨ You have pain in your back just below your rib cage. This is called flank pain. ¨ You have a fever, chills, or body aches. ¨ It hurts to urinate. ¨ You have groin or belly pain.  
  · You have more blood in your urine.  
 Watch closely for changes in your health, and be sure to contact your doctor if: 
  · You have new urination problems.  
  · You do not get better as expected. Where can you learn more? Go to http://kodak-dodie.info/. Enter C496 in the search box to learn more about \"Blood in the Urine: Care Instructions. \" Current as of: March 21, 2018 Content Version: 11.8 © 3404-9552 [employer]. Care instructions adapted under license by [employer] (which disclaims liability or warranty for this information). If you have questions about a medical condition or this instruction, always ask your healthcare professional. [place] [address] any warranty or liability for your use of this information. Urinary Tract Infection in Women: Care Instructions Your Care Instructions A urinary tract infection, or UTI, is a general term for an infection anywhere between the kidneys and the urethra (where urine comes out). Most UTIs are bladder infections. They often cause pain or burning when you urinate. UTIs are caused by bacteria and can be cured with antibiotics. Be sure to complete your treatment so that the infection goes away. Follow-up care is a key part of your treatment and safety. Be sure to make and go to all appointments, and call your doctor if you are having problems. It's also a good idea to know your test results and keep a list of the medicines you take. How can you care for yourself at home? · Take your antibiotics as directed.  Do not stop taking them just because you feel better. You need to take the full course of antibiotics. · Drink extra water and other fluids for the next day or two. This may help wash out the bacteria that are causing the infection. (If you have kidney, heart, or liver disease and have to limit fluids, talk with your doctor before you increase your fluid intake.) · Avoid drinks that are carbonated or have caffeine. They can irritate the bladder. · Urinate often. Try to empty your bladder each time. · To relieve pain, take a hot bath or lay a heating pad set on low over your lower belly or genital area. Never go to sleep with a heating pad in place. To prevent UTIs · Drink plenty of water each day. This helps you urinate often, which clears bacteria from your system. (If you have kidney, heart, or liver disease and have to limit fluids, talk with your doctor before you increase your fluid intake.) · Urinate when you need to. · Urinate right after you have sex. · Change sanitary pads often. · Avoid douches, bubble baths, feminine hygiene sprays, and other feminine hygiene products that have deodorants. · After going to the bathroom, wipe from front to back. When should you call for help? Call your doctor now or seek immediate medical care if: 
  · Symptoms such as fever, chills, nausea, or vomiting get worse or appear for the first time.  
  · You have new pain in your back just below your rib cage. This is called flank pain.  
  · There is new blood or pus in your urine.  
  · You have any problems with your antibiotic medicine.  
 Watch closely for changes in your health, and be sure to contact your doctor if: 
  · You are not getting better after taking an antibiotic for 2 days.  
  · Your symptoms go away but then come back. Where can you learn more? Go to http://kodak-dodie.info/. Enter R060 in the search box to learn more about \"Urinary Tract Infection in Women: Care Instructions. \" 
 Current as of: March 21, 2018 Content Version: 11.8 © 8591-5495 [employer]. Care instructions adapted under license by [employer] (which disclaims liability or warranty for this information). If you have questions about a medical condition or this instruction, always ask your healthcare professional. [address] any warranty or liability for your use of this information. Introducing [facility]! [facility] introduces kingsky patient portal. Now you can access parts of your medical record, email your doctor's office, and request medication refills online. 1. In your internet browser, go to [email or web address] 2. Click on the First Time User? Click Here link in the Sign In box. You will see the New Member Sign Up page. 3. Enter your kingsky Access Code exactly as it appears below. You will not need to use this code after youve completed the sign-up process. If you do not sign up before the expiration date, you must request a new code. · kingsky Access Code: [email or web address] Expires: 1/8/2019  8:39 AM 
 
4. Enter the last four digits of your Social Security Number (xxxx) and Date of Birth (mm/dd/yyyy) as indicated and click Submit. You will be taken to the next sign-up page. 5. Create a kingsky ID. This will be your kingsky login ID and cannot be changed, so think of one that is secure and easy to remember. 6. Create a kingsky password. You can change your password at any time. 7. Enter your Password Reset Question and Answer. This can be used at a later time if you forget your password. 8. Enter your e-mail address. You will receive e-mail notification when new information is available in [address] 9. Click Sign Up. You can now view and download portions of your medical record. 10. Click the Download Summary menu link to download a portable copy of your medical information. If you have questions, please visit the Frequently Asked Questions section of the [email or web address] website. Remember, [facility] is NOT to be used for urgent needs. For medical emergencies, dial 911. Now available from your iPhone and Android! Please provide this summary of care documentation to your next provider. Your primary care clinician is listed as Thomas Bah. If you have any questions after today's visit, please call [phone].

## 2018-10-10 NOTE — PROGRESS NOTES
Chief Complaint   Patient presents with    Urinary Frequency    Urinary Burning       HPI:   Clabe Castleman is a 41 y.o. female who complains of urinary symptoms. The patient reports urinary frequency, urgency and dysuria x 7 days, denies flank pain, fever, chills. No LMP recorded. Patient is not currently having periods (Reason: IUD). .  Denies associated vaginal symptoms including abnormal discharge, itching or pain with sex. Denies abdominal pain, nausea, vomiting or diarrhea. Denies severe CVA tenderness. Denies myalgias, weakness, fatigue, lower extremity symptoms and other systemic symptoms. Pt states she is eating and drinking as usual without problems. She reports she has had to get up in the night and urinate, sometimes twice during the night. She denies hx of kidney stones. Cloudy/strong smelling urine: no  Blood in urine: no  Have you had UTI in past? yes  Sexually active? yes  Pain with intercourse? no  Pregnant: no    Agree with nurses note. Patient Active Problem List   Diagnosis Code    Pregnancy Z34.90    Systemic lupus erythematosus ([facility] [address] M32.9    Lupus nephritis. Chronic non-nephrotic proteinuria for years through 3/11. Now proteinuria up to 12 grams. M32.14    Hypertension I10    Acute renal failure secondary to lupus nephritis and possibly ATN as well.   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    Hypermobility syndrome M35.7    Branch retinal artery occlusion H34.239    Depression, major, recurrent (HCC) F33.9    Anxiety disorder F41.9    Diplopia H53.2    Carpal instability of left wrist with volar intercalated segment instability M25.332    Carpal instability of right wrist M25.331    Raynaud's syndrome I73.00    Lupus L93.0    Acute blood loss anemia D62    GERD (gastroesophageal reflux disease) K21.9    History of thromboembolism Z86.718       Past Medical History:   Diagnosis Date    Anemia NEC     Anxiety disorder 3/10/2014    Arthritis     Branch retinal artery occlusion approx     as of 17 resolved and on Coumadin    Calculus of kidney     Celiac disease     Chronic kidney disease     stage ll    Chronic renal insufficiency, stage II (mild)     secondary to lupus nephritis:  Dr Toyin Thomson    Depression, major, recurrent ([facility] [address] 3/10/2014    Diffuse proliferative lupus glomerulonephritis ([facility] [address]     Dr Toyin Thomson     Fatigue     intermittent due to lupus    Gastritis     as of 17 asymptomatic    GERD (gastroesophageal reflux disease)     Hypertension     Long term current use of anticoagulant therapy     Lupus     as of 16 \"aching and swelling of joints\" per patient; Dr Dusty Deluca (in cc)    Lupus     Memory loss     as of 16 pt states \"its not that bad, I forget where I put stuff\"    Ovarian cyst     as of 16; Dr Saul Castaneda Pregnancy induced hypertension     PUD (peptic ulcer disease)        Past Surgical History:   Procedure Laterality Date    HX ACL RECONSTRUCTION Left approx     HX  SECTION      x1    HX ORTHOPAEDIC Right     hand to thumb (thumb would pop out of place so they placed a ligament around it) per pt    HX OTHER SURGICAL  14    CT Guided Renal Biopsy with Conscious Sedation    HX OTHER SURGICAL      kidney biopsy; benign    HX OTHER SURGICAL Right 10/22/14    right Posterior Interosseous Nerve Neurectomy, right Anterior Interosseous Nerve Neurectomy        Social History   Substance Use Topics    Smoking status: Never Smoker    Smokeless tobacco: Never Used    Alcohol use No       Family History   Problem Relation Age of Onset   [employer] Glaucoma Father     Hypertension Father        Outpatient Prescriptions Marked as Taking for the 10/10/18 encounter (Office Visit) with Behzad George NP   Medication Sig Dispense Refill    warfarin (COUMADIN) 6 mg tablet Take 6 mg by mouth daily.  nitrofurantoin, macrocrystal-monohydrate, (MACROBID) 100 mg capsule Take 1 Cap by mouth two (2) times a day for 5 days. 10 Cap 0    phenazopyridine (PYRIDIUM) 200 mg tablet Take 1 Tab by mouth three (3) times daily as needed for Pain for up to 3 days. 9 Tab 1    ferrous sulfate 325 mg (65 mg iron) tablet Take 1 Tab by mouth two (2) times daily (with meals). 60 Tab 1    acetaminophen (ACETAMINOPHEN EXTRA STRENGTH) 500 mg tablet Take 1 Tab by mouth every six (6) hours as needed for Pain. 30 Tab 0    hydroxychloroquine (PLAQUENIL) 200 mg tablet Take 1 tab po qam and 1/2 tab po qpm 45 Tab 9    simvastatin (ZOCOR) 20 mg tablet TAKE 1 TABLET BY MOUTH ONCE DAILY  3    losartan (COZAAR) 100 mg tablet Take 100 mg by mouth nightly.  atenolol (TENORMIN) 50 mg tablet Take  by mouth nightly.  mycophenolate (CELLCEPT) 500 mg tablet Take 1,500 mg by mouth two (2) times a day. Allergies   Allergen Reactions    Gluten Diarrhea    Fiorinal-Codeine #3 [Codeine-Butalbital-Asa-Caff] Nausea Only       PHQ over the last two weeks 10/10/2018   Little interest or pleasure in doing things Not at all   Feeling down, depressed, irritable, or hopeless Not at all   Total Score PHQ 2 0       O: VS:   Visit Vitals    /84 (BP 1 Location: Left arm, BP Patient Position: Sitting)    Pulse 69    Temp 99.1 °F (37.3 °C) (Oral)    Resp 20    Ht 5' 1\" (1.549 m)    Wt 172 lb 8 oz (78.2 kg)    SpO2 99%    BMI 32.59 kg/m2     PAIN: No other complaints of pain today except for HPI findings  GENERAL: Anisha Reyna is sitting on the table in no acute distress. Non-toxic. Well nourished. Well developed. Appropriately groomed. RESP:  Breath sounds are symmetrical bilaterally. Unlabored without SOB. Speaking in full sentences.  Clear to auscultation bilaterally anteriorly and posteriorly. No wheezes. No rales or rhonchi. CV: normal rate. Regular rhythm. S1, S2 audible. No murmur noted. No rubs, clicks or gallops noted. ABDOMEN: Flat without bulges or pulsations. Soft and nondistended. No tenderness on palpation. No masses or organomegaly. No rebound, rigidity or guarding. Bowel sounds normal x 4 quadrants. No inguinal adenopathy noted. BACK: No visible deformities or curvature. Full ROM. No pain on palpation of the spinous processes in the cervical, thoracic, lumbar, sacral regions. No CVA tenderness. PSYCH: appropriate behavior, dress and thought processes. Good eye contact. Clear and coherent speech. Full affect. Good insight. UA:   Results for orders placed or performed in visit on 10/10/18   AMB POC URINALYSIS DIP STICK AUTO W/O MICRO     Status: None   Result Value Ref Range Status    Color (UA POC) Yellow  Final    Clarity (UA POC) Slightly Cloudy  Final    Glucose (UA POC) Negative Negative Final    Bilirubin (UA POC) Negative Negative Final    Ketones (UA POC) Negative Negative Final    Specific gravity (UA POC) 1.030 1.001 - 1.035 Final    Blood (UA POC) 2+ Negative Final    pH (UA POC) 5.5 4.6 - 8.0 Final    Protein (UA POC) 3+ Negative Final    Urobilinogen (UA POC) 0.2 mg/dL 0.2 - 1 Final    Nitrites (UA POC) Negative Negative Final    Leukocyte esterase (UA POC) Trace Negative Final         ICD-10-CM ICD-9-CM    1. Urinary frequency R35.0 788.41 AMB POC URINALYSIS DIP STICK AUTO W/O MICRO      CULTURE, URINE      nitrofurantoin, macrocrystal-monohydrate, (MACROBID) 100 mg capsule      phenazopyridine (PYRIDIUM) 200 mg tablet   2. Cystitis N30.90 595.9 CULTURE, URINE      nitrofurantoin, macrocrystal-monohydrate, (MACROBID) 100 mg capsule      phenazopyridine (PYRIDIUM) 200 mg tablet   3.  Hematuria, unspecified type R31.9 599.70 CULTURE, URINE      nitrofurantoin, macrocrystal-monohydrate, (MACROBID) 100 mg capsule     Follow-up Disposition:  Return if symptoms worsen or fail to improve.  lab results and schedule of future lab studies reviewed with patient  reviewed diet, exercise and weight control  reviewed medications and side effects in detail    Health Maintenance reviewed - flu vaccine given at her drug store, documented in immunization hx, otherwise deferred to PCP. Urine culture sent, Will call with results  Reviewed warning signs of kidney infections  Push fluids, may use Pyridium OTC prn. Call or return to clinic prn if these symptoms worsen or fail to improve as anticipated. Recommended healthy diet low in carbohydrates, fats, sodium and cholesterol. Recommended regular cardiovascular exercise 3-6 times per week for 30-60 minutes daily. Chart is reviewed and updated today in the office. Records requested for other providers patient has seen and is currently seeing. Verbal and written instructions (see AVS) provided. Patient expresses understanding of diagnosis and treatment plan.

## 2018-10-11 LAB — BACTERIA UR CULT: NORMAL

## 2018-10-19 ENCOUNTER — OFFICE VISIT (OUTPATIENT)
Dept: FAMILY MEDICINE CLINIC | Age: 38
End: 2018-10-19

## 2018-10-19 VITALS
BODY MASS INDEX: 33.77 KG/M2 | HEIGHT: 60 IN | DIASTOLIC BLOOD PRESSURE: 93 MMHG | SYSTOLIC BLOOD PRESSURE: 139 MMHG | OXYGEN SATURATION: 99 % | TEMPERATURE: 98.4 F | RESPIRATION RATE: 18 BRPM | HEART RATE: 72 BPM | WEIGHT: 172 LBS

## 2018-10-19 DIAGNOSIS — D64.9 ANEMIA, UNSPECIFIED TYPE: ICD-10-CM

## 2018-10-19 DIAGNOSIS — E55.9 VITAMIN D DEFICIENCY: ICD-10-CM

## 2018-10-19 DIAGNOSIS — E78.00 HYPERCHOLESTEROLEMIA: ICD-10-CM

## 2018-10-19 DIAGNOSIS — E66.09 CLASS 1 OBESITY DUE TO EXCESS CALORIES WITH BODY MASS INDEX (BMI) OF 33.0 TO 33.9 IN ADULT, UNSPECIFIED WHETHER SERIOUS COMORBIDITY PRESENT: ICD-10-CM

## 2018-10-19 DIAGNOSIS — N04.9 NEPHROTIC SYNDROME: ICD-10-CM

## 2018-10-19 DIAGNOSIS — Z13.1 DIABETES MELLITUS SCREENING: ICD-10-CM

## 2018-10-19 DIAGNOSIS — R53.83 FATIGUE, UNSPECIFIED TYPE: ICD-10-CM

## 2018-10-19 DIAGNOSIS — E27.49 HYPOCORTISOLEMIA (HCC): ICD-10-CM

## 2018-10-19 DIAGNOSIS — M32.14 LUPUS NEPHRITIS (HCC): ICD-10-CM

## 2018-10-19 DIAGNOSIS — D69.6 THROMBOCYTOPENIA (HCC): ICD-10-CM

## 2018-10-19 DIAGNOSIS — R79.9 ABNORMAL FINDING OF BLOOD CHEMISTRY: ICD-10-CM

## 2018-10-19 DIAGNOSIS — F33.42 RECURRENT MAJOR DEPRESSIVE DISORDER, IN FULL REMISSION (HCC): ICD-10-CM

## 2018-10-19 DIAGNOSIS — I10 HYPERTENSION, MALIGNANT: Primary | ICD-10-CM

## 2018-10-19 DIAGNOSIS — M32.14 LUPUS NEPHRITIS (HCC): Chronic | ICD-10-CM

## 2018-10-19 NOTE — PROGRESS NOTES
Brissa Ochoa  Identified pt with two pt identifiers(name and ). Chief Complaint   Patient presents with    Weight Management     Rm 6       1. Have you been to the ER, urgent care clinic since your last visit?n   Hospitalized since your last visit? 2. Have you seen or consulted any other health care providers outside of the [address] [place] [address] since your last visit?n   Include any pap smears or colon screening. Advance Care Planning    In the event something were to happen to you and you were unable to speak on your behalf, do you have an Advance Directive/ Living Will in place stating your wishes? NO    If yes, do we have a copy on file NO    If no, would you like information NO    Medication reconciliation up to date and corrected with patient at this time. Today's provider has been notified of reason for visit, vitals and flowsheets obtained on patients. Reviewed record in preparation for visit, huddled with provider and have obtained necessary documentation.       Health Maintenance Due   Topic    MEDICARE YEARLY EXAM        Wt Readings from Last 3 Encounters:   10/19/18 172 lb (78 kg)   10/10/18 172 lb 8 oz (78.2 kg)   18 171 lb 14.4 oz (78 kg)     Temp Readings from Last 3 Encounters:   10/19/18 98.4 °F (36.9 °C) (Oral)   10/10/18 99.1 °F (37.3 °C) (Oral)   18 98.3 °F (36.8 °C) (Temporal)     BP Readings from Last 3 Encounters:   10/19/18 (!) 139/93   10/10/18 117/84   18 (!) 130/92     Pulse Readings from Last 3 Encounters:   10/19/18 72   10/10/18 69   18 97     Vitals:    10/19/18 1130   BP: (!) 139/93   Pulse: 72   Resp: 18   Temp: 98.4 °F (36.9 °C)   TempSrc: Oral   SpO2: 99%   Weight: 172 lb (78 kg)   Height: 5' (1.524 m)   PainSc:   0 - No pain         Learning Assessment:  :     Learning Assessment 2014   PRIMARY LEARNER Patient Patient   HIGHEST LEVEL OF EDUCATION - PRIMARY LEARNER  GRADUATED HIGH SCHOOL OR GED GRADUATED HIGH SCHOOL OR GED   BARRIERS PRIMARY LEARNER NONE NONE   CO-LEARNER CAREGIVER No No   PRIMARY LANGUAGE ENGLISH ENGLISH    NEED No -   LEARNER PREFERENCE PRIMARY LISTENING PICTURES     READING -   LEARNING SPECIAL TOPICS none -   ANSWERED BY patient patient   RELATIONSHIP SELF SELF       Depression Screening:  :     PHQ over the last two weeks 10/19/2018   Little interest or pleasure in doing things Not at all   Feeling down, depressed, irritable, or hopeless Not at all   Total Score PHQ 2 0       Fall Risk Assessment:  :     No flowsheet data found. Abuse Screening:  :     No flowsheet data found. ADL Screening:  :     ADL Assessment 10/10/2018   Feeding yourself No Help Needed   Getting from bed to chair No Help Needed   Getting dressed No Help Needed   Bathing or showering No Help Needed   Walk across the room (includes cane/walker) No Help Needed   Using the telphone No Help Needed   Taking your medications No Help Needed   Preparing meals No Help Needed   Managing money (expenses/bills) No Help Needed   Moderately strenuous housework (laundry) No Help Needed   Shopping for personal items (toiletries/medicines) No Help Needed   Shopping for groceries No Help Needed   Driving No Help Needed   Climbing a flight of stairs No Help Needed   Getting to places beyond walking distances No Help Needed           BMI:  Weight Metrics 10/19/2018 10/10/2018 6/18/2018 6/12/2018 3/21/2018 7/12/2017 7/10/2017   Weight 172 lb 172 lb 8 oz 171 lb 14.4 oz 160 lb 162 lb 162 lb 14.4 oz 163 lb 1.6 oz   BMI 33.59 kg/m2 32.59 kg/m2 32.48 kg/m2 30.23 kg/m2 31.64 kg/m2 31.81 kg/m2 31.85 kg/m2           Medication reconciliation up to date and corrected with patient at this time.

## 2018-10-19 NOTE — PATIENT INSTRUCTIONS
DIET AND LIFESTYLE CHANGES   Practice putting your fork and spoon down between bites.  Begin using smaller plates, bowls, and utensils. Ex: Salad Plate instead of dinner plate, salad fork instead of regular fork.  EAT SLOWLY! It should take you at least 20-30 minutes to finish a meal.      Please begin taking a Complete Multivitamin every day     It is recommended that you purchase kitchen measuring cups if you do not already have a set     Guidelines for Success    *Foods Not recommended*     Carbohydrates   o They do not contain protein and will prevent weight loss. Breads Rice  Cereal Crackers Corn                   Pasta  Grains Chips  Cakes  Muffins     Pretzels Popcorn Grits  Oatmeal  Peas               Tortillas        Granola Waffles Pancakes      Biscuits                        Cream of Wheat   Potatoes    Cookies    Cornbread    Bagels       Sugary Foods     Cookies Cakes  Chocolate Ice Cream Milkshakes     Juice  Jelly  Sauces BBQ Sauce Candy      Fatty Foods   Pizza       Fried Foods    Creamy Sauces/Gravy                  Cheesecake      Full Fat Alvarado          Full Fat Salad Dressing                              Peanut Butter    Creamy Soups     PROTEIN LIST - What you need to know    Protein is essential for growth and development. It is part of every living cell and makes up tissue, skin, bone, hair, blood and muscle. It is especially important for those having bariatric surgery to follow a high protein, low carbohydrate diet. Please Note     Lean protein sources that are naturally dry in texture may be more difficult tolerate.  (ex: chicken breast)   It is important to avoid over cooking lean proteins (ie chicken breast, beef/pork tenderloin)   Small bites and thorough chewing are essential for the tolerance of protein rich foods.    Sometimes small sips of water may be needed to help food go down       Foods High in Protein         [place]ym[place]es [place] ([facility])   Chicken Breast     Chicken Thigh or Leg   Fish       Canned Tuna  Clams/Mussels/Oysters      Crab Meat     Pork Tenderloin     Ground Sirloin    Noyola       Venison      Low Fat Cheese     Low Fat Cottage Cheese    Egg       Greek Yogurt     Non Greek Yogurt        FLUID INTAKE      NOT recommended - beverages with sugar, carbonation (ie bubbles)   Soda                                      Fruit Juice/V8             Sparkling water   Diet soda                              Sports Drinks   Lemonade                            Energy Drinks      Sweet Tea                            Gourmet Coffee Beverages (ie Starbucks)   Smoothies                            Alcoholic Beverages     Recommended - sugar free, non-carbonated liquids   Water                                     Unsweet Tea-may use sugar substitute   Crystal Light                          Diet Debbie Green Tea; Diet [place] Tea   [place]                                         Decaf Coffee/Decaf Tea: use sugar free creamer   Diet Lemonade                     Propel   Diet Ocean Spray Juice       Sugar Free Jell-O- Limit to 1 cup per day   Diet V8 Splash                      Sugar Free Popsicles- Limit to 2 per day                                                  * Avoid No Added Sugar Fudgesicles   Diet Snapple                         Clear Broth (Chicken, Vegetable, Beef)   Fruit 2 O                                Vitamin Water Zero/PowerAde Zero  Hint Water     Recommended Meal Replacement/Protein Shakes         Premier Protein - Found at [place], [employer], [employer], [employer]   EAS AdvantEdge Carb Control Shake   Slimfast High Protein - this is the only acceptable Slimfast Product   Ensure High Protein- This is the only Acceptable Ensure Product    AtEssentia Health Advantage   Bariatric Advantage High Protein Meal Replacement (online www.bariatricadvantage. com)     Muscle Milk 100 calorie    Pure Protein Drinks    Boost Glucose Control - this is the ONLY acceptable Boost Product   [facility] Protein Powders - Egg based protein powder - does not contain whey   GNC Pro Performance 100% Soy Isolate - does not contain whey    If you decide to choose a shake that is not on the above list. Please be sure it has the following:     Less than 230 calories    At least 15 or more grams of protein    Less than 20 grams of carbohydrates    Less than 10 grams of fat    Please read the nutrition label carefully! Nutrition Resources     These website and apps allow you to track your food, fluid, and activity:  o My Fitness Pal  o Lose It  o Fit Day  o My Calorie Counter     This website offers a FREE search engine and has a giant food and restaurant data base: wwwAria Systems      CHECK YOUR SMART PHONE!!   Most phones today have free online tracker apps built in! Websites with Recipes   www.Materia  - this site also contains a recipe calculator    www. NowSpots  CookBooks   Deliciously Healthy Recipes from [place] by the Time Mannie. Can be purchased at [employer]     LETS GET MOVING!!   A pedometer is an easy, inexpensive way to challenge yourself daily to increase activity. Track your steps day one and aim to increase by 250 daily! Helpful Links and Resources  The following websites may provide you with helpful information and resources. www. CalorieKing. com   \"Calorie Luke\" is an amazing resource where you can type in any food item you can think of whether at home or at restaurant and they will immediately provide you with detailed nutrition information regarding calories, fat, carbohydrate, protein, and sodium to name a few. We encourage you to start looking at the nutritional details of the foods that you regularly or periodically eat. You might be amazed by what you find.     www. dentalDoctors. Vidit  The Financial Transaction Services (or smartphone berta) is a useful tool to find the lowest prices of generic medications at your local pharmacies.  On the website type in the name of your medication, choose the pharmacy, and print the coupon to bring to the pharmacy. On the smartphone berta type in the name of the medication, choose the pharmacy, and show the coupon code to the pharmacy staff to receive the discount.     www. ObesityAction. org  The Obesity Action Coalition [facility] is a more than 54,000 member non-profit organization dedicated to giving a voice to individuals affected by the disease of obesity. The [facility] helps individuals along their journey toward better health through education, advocacy, and support. Membership is $10 per year and whether you are a member or not the [address] [place] [address] has amazing educational material and resources available on [employer]. Joust. NetPayment  Links to free online workout videos of any variety including chair exercises, resistance bands, strength, cardio, and many other exercise video options    http://NutritionData. [employer].Maven7  \"Self Nutrition Data\" is an extremely detailed resource where you can look up the detailed nutritional information of essentially any food. Not only does it break down the \"macronutrients \"such carbohydrate, proteins, fats and fibers, but it also lists all the detailed \"micronutrient\" breakdowns of all vitamins and minerals to a degree not seen in other websites. www. Snooth Mediaometer. com  \"Map Pedometer\" is an interactive Google Map where you can accurately measure the distance of a walking or running route that you take. When you get to the website, at the top of the page, type in your starting point (street address, city, and state). Once your location is found, click on \"MAP\" on the top right of the Google Map so that you can see the streets around you. You can zoom in and out on the map using your scroll wheel. Your starting location is marked with a \"yellow jose\" . .. from there double click on the points on street map tracing your route.  This will tell you the exact distance of your route and can also help you plan future running/walking/biking routes. www. FitBit. com  The FitBit is an amazing electronic device that you wear which accurately counts your steps, your distance, your vertical steps up stairs, and even measures your sleep. This device syncs to your computer, iPhone, or Smart Phone and you can accurately review all your data over time, and even share it with others to form a \"friend\" group.     www. ObesityMedicine. org  The Obesity Medicine Association (KRISTIN) is the largest medical society for physicians and healthcare providers that specialize in the field of weight loss (obesity medicine / bariatric medicine). This website also lists all the physicians in your area that specialize in weight loss.    www. MadmagzPal.com  MyFitnessPal is a smartphone ap that allows you to accurately log your food intake and even uses the smartphone camera to scan food package barcodes. It has an extensive food database and can also be synced to your regular computer as well. This ap can also sync with the FitBit to track both your food intake (MyFitnessPal) and exercise (Fitbit) information. www. Diabetes. org  This website is for the American Diabetes Association (ADA) and has extensive information about diabetes in general, living with diabetes, nutrition recommendations, and detailed information about all things pertaining to diabetes. www. EatRight. org  This website is for the American Dietetic Association (ADA) and is a vast wealth if information regarding nutrition and \"'eating right. \" I would encourage you to explore their website to find information that you can find helpful, practical, and enjoyable. REMEMBER    The most effective way to track your intake is by keeping a Daily Food Journal.  Writing down the items you eat with their calorie, carbohydrate, and protein totals, is key to your life long success. GETTING STARTED CHECKLIST    ? Clean House!   Eliminate carbohydrates and liquid calories from your diet and your environment. ? Read all food labels! ? Record your daily intake in a food diary. ? Begin planning your meals and snacks in advance. ? Begin using smaller plates and utensils - this will assist in decreasing your portion sizes of food. ? Practice chewing foods slowly prior to swallowing. ? Include a lean protein source at all meals. ? Sip 48-64 oz (6-8 cups) of fluids throughout the day for hydration. ? Limit restaurant meals. When dining out, ask for a box with the delivery of your meal.  Only put the appropriate portion on your plate, place the rest in the box or share with a . ? Add movement to your day! Whether swimming, walking, or biking activity is essential to meet and maintain your weight loss goals. ? Set reasonable and attainable goals. 2 lbs weekly weight loss is excellent! ? Surround yourself with positive influences and supportive individuals. ? Begin taking a Multivitamin Supplement Daily  ? Take a deep breath and smile!!  REMEMBER - YOU CAN DO THIS!!! These are the initial steps to what will be a positive, healthy, and amazing life change. Weight Mgmt Goals:  1. Eat breakfast daily even if just a protein shake (pending nephrology approval)  2. Use My Fitness Pal  3.  Weigh weekly

## 2018-10-19 NOTE — PROGRESS NOTES
Chief Complaint   Patient presents with    Weight Management     Rm 6     HPI:  The patient is a 41 y.o. female who presents today for a follow up appointment. No hospital, ER or specialist visits since last primary care visit except as noted below. The patient is a 41y.o. year old female who presents today for initial medical weight loss visit. Pt's initial weight is 172 pounds with a BMI of 33.59. Patient's goal weight is 130 pounds. The patient's bariatric comorbidities include HTN, lupus, nephrotic syndrome, anxiety, depression, arthritis. The patient's reason for medical weight loss is feel better, help with lupus related comorbidities, help with all of her medical problems. The patient's quality of life goals are to be able to do \"a lot of things that she used to do before her diagnosis of lupus at the age of 20\". The patient's diet typically includes: B - eggs, coffee (every once in a while), beans, rice, fruit, sometimes skip breakfast; L - chicken, salad, rice, tortilla, steak, soups, S - not really a snacker, D - does not eat much for dinner, small sandwich, fruit, salad         The patient is drinking the following: soda (just cut these out, occasionally but not even when out to eat, coffee, mostly water, OJ infrequently, cranberry juice for urinary symptoms - has one glass/day         The patient's exercise at this time includes: walking (tracking her steps on her phone)         The patient is currently weighing: \"I try not to\". She feels she is usually in the same range. The patient has not tried appetite suppressants in the past, but she has tried some food interventions. She has previously tried the following medications: \"fit teas, honey/cinnnamon teas\", apple cider vinegar. She has tried restrictive diets. Review of Systems  A comprehensive review of systems was negative except for that written in the HPI.     Patient Active Problem List   Diagnosis Code    Pregnancy Z34.90    Systemic lupus erythematosus ([facility] M32.9    Lupus nephritis. Chronic non-nephrotic proteinuria for years through 3/11. Now proteinuria up to 12 grams. M32.14    Hypertension I10    Acute renal failure secondary to lupus nephritis and possibly ATN as well.   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    Hypermobility syndrome M35.7    Branch retinal artery occlusion H34.239    Depression, major, recurrent ([facility]) F33.9    Anxiety disorder F41.9    Diplopia H53.2    Carpal instability of left wrist with volar intercalated segment instability M25.332    Carpal instability of right wrist M25.331    Raynaud's syndrome I73.00    Lupus L93.0    Acute blood loss anemia D62    GERD (gastroesophageal reflux disease) K21.9    History of thromboembolism Z86.718    Class 1 obesity due to excess calories with body mass index (BMI) of 33.0 to 33.9 in adult E66.09, Z68.33    Hypocortisolemia ([facility]) E27.49    Fatigue R53.83    Hypercholesterolemia E78.00    Vitamin D deficiency E55.9    Thrombocytopenia ([facility])  D69.6       Past Medical History:   Diagnosis Date    Anemia NEC     Anxiety disorder 3/10/2014    Arthritis     Branch retinal artery occlusion approx 2013    as of 5/26/16 resolved and on Coumadin    Calculus of kidney     Celiac disease     Chronic kidney disease     stage ll    Chronic renal insufficiency, stage II (mild)     secondary to lupus nephritis:  Dr Jeromy Lassiter    Depression, major, recurrent ([facility] [address] 3/10/2014    Diffuse proliferative lupus glomerulonephritis ([facility] [address]     Dr Jeromy Lassiter     Fatigue     intermittent due to lupus    Gastritis     as of 5/26/16 asymptomatic    GERD (gastroesophageal reflux disease)     Hypertension     Long term current use of anticoagulant therapy     Lupus     as of 16 \"aching and swelling of joints\" per patient; Dr Lorne Tran (in cc)    Lupus     Memory loss     as of 16 pt states \"its not that bad, I forget where I put stuff\"    Ovarian cyst     as of 16; Dr Waylon Verdugo Pregnancy induced hypertension     PUD (peptic ulcer disease)        Past Surgical History:   Procedure Laterality Date    HX ACL RECONSTRUCTION Left approx     HX  SECTION      x1    HX ORTHOPAEDIC Right     hand to thumb (thumb would pop out of place so they placed a ligament around it) per pt    HX OTHER SURGICAL  14    CT Guided Renal Biopsy with Conscious Sedation    HX OTHER SURGICAL      kidney biopsy; benign    HX OTHER SURGICAL Right 10/22/14    right Posterior Interosseous Nerve Neurectomy, right Anterior Interosseous Nerve Neurectomy        Social History     Tobacco Use    Smoking status: Never Smoker    Smokeless tobacco: Never Used   Substance Use Topics    Alcohol use: No    Drug use: No       Family History   Problem Relation Age of Onset    Glaucoma Father     Hypertension Father        Outpatient Medications Marked as Taking for the 10/19/18 encounter (Office Visit) with Tan Pinzon NP   Medication Sig Dispense Refill    warfarin (COUMADIN) 6 mg tablet Take 6 mg by mouth daily.  ferrous sulfate 325 mg (65 mg iron) tablet Take 1 Tab by mouth two (2) times daily (with meals). 60 Tab 1    acetaminophen (ACETAMINOPHEN EXTRA STRENGTH) 500 mg tablet Take 1 Tab by mouth every six (6) hours as needed for Pain. 30 Tab 0    hydroxychloroquine (PLAQUENIL) 200 mg tablet Take 1 tab po qam and 1/2 tab po qpm 45 Tab 9    simvastatin (ZOCOR) 20 mg tablet TAKE 1 TABLET BY MOUTH ONCE DAILY  3    losartan (COZAAR) 100 mg tablet Take 100 mg by mouth nightly.  atenolol (TENORMIN) 50 mg tablet Take  by mouth nightly.       mycophenolate (CELLCEPT) 500 mg tablet Take 1,500 mg by mouth two (2) times a day. Allergies   Allergen Reactions    Gluten Diarrhea    Fiorinal-Codeine #3 [Codeine-Butalbital-Asa-Caff] Nausea Only       PE:  Visit Vitals  BP (!) 139/93 (BP 1 Location: Left arm, BP Patient Position: Sitting)   Pulse 72   Temp 98.4 °F (36.9 °C) (Oral)   Resp 18   Ht 5' (1.524 m)   Wt 172 lb (78 kg)   SpO2 99%   BMI 33.59 kg/m²     Gen: alert, oriented, no acute distress  Head: normocephalic, atraumatic  Ears: external auditory canals clear, TMs without erythema or effusion  Eyes: pupils equal round reactive to light, sclera clear, conjunctiva clear  Oral: moist mucus membranes, no oral lesions, no pharyngeal inflammation or exudate  Neck: symmetric normal sized thyroid, no carotid bruits, no jugular vein distention  Resp: no increase work of breathing, lungs clear to ausculation bilaterally, no wheezing, rales or rhonchi  CV: S1, S2 normal.  No murmurs, rubs, or gallops. Abd: soft, not tender, not distended. No hepatosplenomegaly. Normal bowel sounds. No hernias. No abdominal or renal bruits. Neuro: cranial nerves intact, normal strength and movement in all extremities, reflexes and sensation intact and symmetric. Skin: no lesion or rash  Extremities: no cyanosis or edema    Assessment/Plan:    ICD-10-CM ICD-9-CM    1. Hypertension, malignant I10 401.0 AMB POC EKG ROUTINE W/ 12 LEADS, INTER & REP      URINALYSIS W/ RFLX MICROSCOPIC      METABOLIC PANEL, COMPREHENSIVE      TSH 3RD GENERATION   2.  Class 1 obesity due to excess calories with body mass index (BMI) of 33.0 to 33.9 in adult, unspecified whether serious comorbidity present E66.09 278.00 AMB POC EKG ROUTINE W/ 12 LEADS, INTER & REP    Z68.33 V85.33 URINALYSIS W/ RFLX MICROSCOPIC      LIPID PANEL      METABOLIC PANEL, COMPREHENSIVE      TSH 3RD GENERATION      VITAMIN D, 25 HYDROXY      HEMOGLOBIN A1C WITH EAG      CBC WITH AUTOMATED DIFF      VITAMIN B12      FERRITIN      IRON PROFILE INSULIN, FREE & TOTAL      CORTISOL   3. Lupus nephritis. Chronic non-nephrotic proteinuria for years through 3/11. Now proteinuria up to 12 grams. M32.14 710.0 AMB POC EKG ROUTINE W/ 12 LEADS, INTER & REP     583.81 URINALYSIS W/ RFLX MICROSCOPIC   4. Nephrotic syndrome N04.9 581.9 URINALYSIS W/ RFLX MICROSCOPIC   5. Lupus nephritis (HCC) M32.14 710.0 URINALYSIS W/ RFLX MICROSCOPIC     583.81    6. Recurrent major depressive disorder, in full remission ([facility] F33.42 296.36 TSH 3RD GENERATION      CORTISOL   7. Hypocortisolemia (HCC) E27.49 255.41 AMB POC EKG ROUTINE W/ 12 LEADS, INTER & REP      CORTISOL   8. Anemia, unspecified type D64.9 285.9 VITAMIN B12      FERRITIN      IRON PROFILE   9. Diabetes mellitus screening Z13.1 V77.1 URINALYSIS W/ RFLX MICROSCOPIC      METABOLIC PANEL, COMPREHENSIVE      HEMOGLOBIN A1C WITH EAG      INSULIN, FREE & TOTAL   10. Fatigue, unspecified type R53.83 780.79 URINALYSIS W/ RFLX MICROSCOPIC      LIPID PANEL      METABOLIC PANEL, COMPREHENSIVE      TSH 3RD GENERATION      VITAMIN D, 25 HYDROXY      HEMOGLOBIN A1C WITH EAG      CBC WITH AUTOMATED DIFF      VITAMIN B12      FERRITIN      IRON PROFILE      INSULIN, FREE & TOTAL      CORTISOL   11. Hypercholesterolemia E78.00 272.0 LIPID PANEL   12. Vitamin D deficiency E55.9 268.9 VITAMIN D, 25 HYDROXY   13. Abnormal finding of blood chemistry  R79.9 790.6 HEMOGLOBIN A1C WITH EAG   14. Thrombocytopenia ([facility]  D69.6 287.5 VITAMIN B12     Follow-up Disposition:  Return in about 4 weeks (around 11/16/2018), or Lab F/U, Nephrology F/U, for Medication Check, Weight Mgmt.  lab results and schedule of future lab studies reviewed with patient - done today  reviewed diet, exercise and weight control - labs and EKG done today, started weight mgmt program today.   Will confer with Nephrologist before starting an appetite suppressant, she will see Dr. Yudelka Clemons in late October to discuss any limitations with medications and limitations in daily protein intake. Request records from Dr. Faith Evart, [address].  reviewed medications and side effects in detail    Weight Mgmt Goals:  1. Eat breakfast daily even if just a protein shake (pending nephrology approval)  2. Use My Fitness Pal  3. Weigh weekly    Health Maintenance reviewed - deferred to PCP. Recommended healthy diet low in carbohydrates, fats, sodium and cholesterol. Recommended regular cardiovascular exercise 3-6 times per week for 30-60 minutes daily. Chart is reviewed and updated today in the office. Records requested for other providers patient has seen and is currently seeing. Patient was offered a choice/choices in the treatment plan today. Patient expresses understanding of the plan and agrees with recommendations. Verbal and written instructions (see AVS) provided. See patient instructions for more. Patient expresses understanding of diagnosis and treatment plan.

## 2018-10-25 LAB
25(OH)D3+25(OH)D2 SERPL-MCNC: 33.1 NG/ML (ref 30–100)
ALBUMIN SERPL-MCNC: 3.7 G/DL (ref 3.5–5.5)
ALBUMIN/GLOB SERPL: 1.3 {RATIO} (ref 1.2–2.2)
ALP SERPL-CCNC: 61 IU/L (ref 39–117)
ALT SERPL-CCNC: 13 IU/L (ref 0–32)
APPEARANCE UR: CLEAR
AST SERPL-CCNC: 9 IU/L (ref 0–40)
BACTERIA #/AREA URNS HPF: ABNORMAL /[HPF]
BASOPHILS # BLD AUTO: 0 X10E3/UL (ref 0–0.2)
BASOPHILS NFR BLD AUTO: 0 %
BILIRUB SERPL-MCNC: 0.5 MG/DL (ref 0–1.2)
BILIRUB UR QL STRIP: NEGATIVE
BUN SERPL-MCNC: 20 MG/DL (ref 6–20)
BUN/CREAT SERPL: 15 (ref 9–23)
CALCIUM SERPL-MCNC: 8.7 MG/DL (ref 8.7–10.2)
CASTS URNS MICRO: ABNORMAL
CASTS URNS QL MICRO: PRESENT /LPF
CHLORIDE SERPL-SCNC: 103 MMOL/L (ref 96–106)
CHOLEST SERPL-MCNC: 176 MG/DL (ref 100–199)
CO2 SERPL-SCNC: 23 MMOL/L (ref 20–29)
COLOR UR: YELLOW
CORTIS SERPL-MCNC: 6.1 UG/DL
CREAT SERPL-MCNC: 1.32 MG/DL (ref 0.57–1)
EOSINOPHIL # BLD AUTO: 0.2 X10E3/UL (ref 0–0.4)
EOSINOPHIL NFR BLD AUTO: 3 %
EPI CELLS #/AREA URNS HPF: >10 /HPF
ERYTHROCYTE [DISTWIDTH] IN BLOOD BY AUTOMATED COUNT: 14.5 % (ref 12.3–15.4)
EST. AVERAGE GLUCOSE BLD GHB EST-MCNC: 105 MG/DL
FERRITIN SERPL-MCNC: 126 NG/ML (ref 15–150)
GLOBULIN SER CALC-MCNC: 2.8 G/DL (ref 1.5–4.5)
GLUCOSE SERPL-MCNC: 83 MG/DL (ref 65–99)
GLUCOSE UR QL: NEGATIVE
HBA1C MFR BLD: 5.3 % (ref 4.8–5.6)
HCT VFR BLD AUTO: 39.4 % (ref 34–46.6)
HDLC SERPL-MCNC: 35 MG/DL
HGB BLD-MCNC: 13.1 G/DL (ref 11.1–15.9)
HGB UR QL STRIP: ABNORMAL
IMM GRANULOCYTES # BLD: 0 X10E3/UL (ref 0–0.1)
IMM GRANULOCYTES NFR BLD: 0 %
INSULIN FREE SERPL-ACNC: 6.4 UU/ML
INSULIN SERPL-ACNC: 6.6 UU/ML
INTERPRETATION, 910389: NORMAL
INTERPRETATION: NORMAL
IRON SATN MFR SERPL: 32 % (ref 15–55)
IRON SERPL-MCNC: 67 UG/DL (ref 27–159)
KETONES UR QL STRIP: NEGATIVE
LDLC SERPL CALC-MCNC: 95 MG/DL (ref 0–99)
LEUKOCYTE ESTERASE UR QL STRIP: NEGATIVE
LYMPHOCYTES # BLD AUTO: 1.5 X10E3/UL (ref 0.7–3.1)
LYMPHOCYTES NFR BLD AUTO: 21 %
MCH RBC QN AUTO: 29.7 PG (ref 26.6–33)
MCHC RBC AUTO-ENTMCNC: 33.2 G/DL (ref 31.5–35.7)
MCV RBC AUTO: 89 FL (ref 79–97)
MICRO URNS: ABNORMAL
MONOCYTES # BLD AUTO: 0.5 X10E3/UL (ref 0.1–0.9)
MONOCYTES NFR BLD AUTO: 8 %
MUCOUS THREADS URNS QL MICRO: PRESENT
NEUTROPHILS # BLD AUTO: 4.6 X10E3/UL (ref 1.4–7)
NEUTROPHILS NFR BLD AUTO: 68 %
NITRITE UR QL STRIP: NEGATIVE
PDF IMAGE, 910387: NORMAL
PH UR STRIP: 6 [PH] (ref 5–7.5)
PLATELET # BLD AUTO: 230 X10E3/UL (ref 150–379)
POTASSIUM SERPL-SCNC: 4.2 MMOL/L (ref 3.5–5.2)
PROT SERPL-MCNC: 6.5 G/DL (ref 6–8.5)
PROT UR QL STRIP: ABNORMAL
RBC # BLD AUTO: 4.41 X10E6/UL (ref 3.77–5.28)
RBC #/AREA URNS HPF: ABNORMAL /HPF
SODIUM SERPL-SCNC: 139 MMOL/L (ref 134–144)
SP GR UR: 1.02 (ref 1–1.03)
TIBC SERPL-MCNC: 211 UG/DL (ref 250–450)
TRIGL SERPL-MCNC: 231 MG/DL (ref 0–149)
TSH SERPL DL<=0.005 MIU/L-ACNC: 1.91 UIU/ML (ref 0.45–4.5)
UIBC SERPL-MCNC: 144 UG/DL (ref 131–425)
UROBILINOGEN UR STRIP-MCNC: 0.2 MG/DL (ref 0.2–1)
VIT B12 SERPL-MCNC: 694 PG/ML (ref 232–1245)
VLDLC SERPL CALC-MCNC: 46 MG/DL (ref 5–40)
WBC # BLD AUTO: 6.9 X10E3/UL (ref 3.4–10.8)
WBC #/AREA URNS HPF: ABNORMAL /HPF

## 2018-11-11 ENCOUNTER — HOSPITAL ENCOUNTER (EMERGENCY)
Age: 38
Discharge: HOME | End: 2018-11-11
Attending: EMERGENCY MEDICINE | Admitting: EMERGENCY MEDICINE
Payer: MEDICARE

## 2018-11-11 VITALS
BODY MASS INDEX: 32.38 KG/M2 | HEART RATE: 52 BPM | HEIGHT: 61 IN | SYSTOLIC BLOOD PRESSURE: 160 MMHG | OXYGEN SATURATION: 100 % | TEMPERATURE: 98.1 F | RESPIRATION RATE: 17 BRPM | DIASTOLIC BLOOD PRESSURE: 95 MMHG | WEIGHT: 171.52 LBS

## 2018-11-11 DIAGNOSIS — N17.9 AKI (ACUTE KIDNEY INJURY) (HCC): Primary | ICD-10-CM

## 2018-11-11 LAB
ALBUMIN SERPL-MCNC: 3.7 G/DL (ref 3.5–5)
ALBUMIN/GLOB SERPL: 0.8 {RATIO} (ref 1.1–2.2)
ALP SERPL-CCNC: 65 U/L (ref 45–117)
ALT SERPL-CCNC: 28 U/L (ref 12–78)
ANION GAP SERPL CALC-SCNC: 9 MMOL/L (ref 5–15)
APPEARANCE UR: CLEAR
AST SERPL-CCNC: 14 U/L (ref 15–37)
BACTERIA URNS QL MICRO: NEGATIVE /HPF
BASOPHILS # BLD: 0 K/UL (ref 0–0.1)
BASOPHILS NFR BLD: 0 % (ref 0–1)
BILIRUB SERPL-MCNC: 0.6 MG/DL (ref 0.2–1)
BILIRUB UR QL: NEGATIVE
BUN SERPL-MCNC: 28 MG/DL (ref 6–20)
BUN/CREAT SERPL: 16 (ref 12–20)
CALCIUM SERPL-MCNC: 8.3 MG/DL (ref 8.5–10.1)
CHLORIDE SERPL-SCNC: 104 MMOL/L (ref 97–108)
CO2 SERPL-SCNC: 23 MMOL/L (ref 21–32)
COLOR UR: ABNORMAL
COMMENT, HOLDF: NORMAL
CREAT SERPL-MCNC: 1.76 MG/DL (ref 0.55–1.02)
DIFFERENTIAL METHOD BLD: ABNORMAL
EOSINOPHIL # BLD: 0.1 K/UL (ref 0–0.4)
EOSINOPHIL NFR BLD: 1 % (ref 0–7)
EPITH CASTS URNS QL MICRO: ABNORMAL /LPF
ERYTHROCYTE [DISTWIDTH] IN BLOOD BY AUTOMATED COUNT: 12.9 % (ref 11.5–14.5)
GLOBULIN SER CALC-MCNC: 4.7 G/DL (ref 2–4)
GLUCOSE SERPL-MCNC: 103 MG/DL (ref 65–100)
GLUCOSE UR STRIP.AUTO-MCNC: NEGATIVE MG/DL
HCT VFR BLD AUTO: 42.7 % (ref 35–47)
HGB BLD-MCNC: 14.4 G/DL (ref 11.5–16)
HGB UR QL STRIP: ABNORMAL
IMM GRANULOCYTES # BLD: 0 K/UL (ref 0–0.04)
IMM GRANULOCYTES NFR BLD AUTO: 0 % (ref 0–0.5)
INR PPP: 2.6 (ref 0.9–1.1)
KETONES UR QL STRIP.AUTO: NEGATIVE MG/DL
LEUKOCYTE ESTERASE UR QL STRIP.AUTO: NEGATIVE
LYMPHOCYTES # BLD: 1.6 K/UL (ref 0.8–3.5)
LYMPHOCYTES NFR BLD: 19 % (ref 12–49)
MCH RBC QN AUTO: 30.3 PG (ref 26–34)
MCHC RBC AUTO-ENTMCNC: 33.7 G/DL (ref 30–36.5)
MCV RBC AUTO: 89.7 FL (ref 80–99)
MONOCYTES # BLD: 0.5 K/UL (ref 0–1)
MONOCYTES NFR BLD: 6 % (ref 5–13)
NEUTS SEG # BLD: 6 K/UL (ref 1.8–8)
NEUTS SEG NFR BLD: 73 % (ref 32–75)
NITRITE UR QL STRIP.AUTO: NEGATIVE
NRBC # BLD: 0 K/UL (ref 0–0.01)
NRBC BLD-RTO: 0 PER 100 WBC
PH UR STRIP: 6 [PH] (ref 5–8)
PLATELET # BLD AUTO: 231 K/UL (ref 150–400)
PMV BLD AUTO: 8.7 FL (ref 8.9–12.9)
POTASSIUM SERPL-SCNC: 3.9 MMOL/L (ref 3.5–5.1)
PROT SERPL-MCNC: 8.4 G/DL (ref 6.4–8.2)
PROT UR STRIP-MCNC: 100 MG/DL
PROTHROMBIN TIME: 25.1 SEC (ref 9–11.1)
RBC # BLD AUTO: 4.76 M/UL (ref 3.8–5.2)
RBC #/AREA URNS HPF: ABNORMAL /HPF (ref 0–5)
SAMPLES BEING HELD,HOLD: NORMAL
SODIUM SERPL-SCNC: 136 MMOL/L (ref 136–145)
SP GR UR REFRACTOMETRY: <1.005 (ref 1–1.03)
UA: UC IF INDICATED,UAUC: ABNORMAL
UROBILINOGEN UR QL STRIP.AUTO: 0.2 EU/DL (ref 0.2–1)
WBC # BLD AUTO: 8.3 K/UL (ref 3.6–11)
WBC URNS QL MICRO: ABNORMAL /HPF (ref 0–4)

## 2018-11-11 PROCEDURE — 36415 COLL VENOUS BLD VENIPUNCTURE: CPT

## 2018-11-11 PROCEDURE — 80053 COMPREHEN METABOLIC PANEL: CPT

## 2018-11-11 PROCEDURE — 85610 PROTHROMBIN TIME: CPT

## 2018-11-11 PROCEDURE — 99283 EMERGENCY DEPT VISIT LOW MDM: CPT

## 2018-11-11 PROCEDURE — 85025 COMPLETE CBC W/AUTO DIFF WBC: CPT

## 2018-11-11 PROCEDURE — 81001 URINALYSIS AUTO W/SCOPE: CPT

## 2018-11-12 NOTE — ED NOTES
Discharge instructions given to patient by Alison Isabel DO . Pt verbalized understanding of discharge instructions. Pt discharged without difficulty. Pt discharged in stable condition via ambulation, accompanied by self.

## 2018-11-12 NOTE — ED TRIAGE NOTES
Pt in waiting room but arrived by squad. Pt states that she was at the store this evening and \"just didn't feel right\". Pt says that she got home and took her blood pressure and it was elevated and that she felt \"weird\". Pt complains of a headache at this time. Pt states that she did not miss any of her bp meds.

## 2018-11-12 NOTE — ED PROVIDER NOTES
EMERGENCY DEPARTMENT HISTORY AND PHYSICAL EXAM 
 
 
Date: 11/11/2018 Patient Name: Sarina Carmona 
 
History of Presenting Illness Chief Complaint Patient presents with  Hypertension  Headache History Provided By: Patient HPI: Sarina Carmona, 41 y.o. female with PMHx significant for lupus, CKD, GERD, anemia, depression, anxiety, presents via EMS to the ED with cc of new onset generalized fatigue and with mild HA around 1600. The pt reports that she \"felt weird\" and \"cannot explain\" her symptoms. She notes that she had chills and felt \"very shaky. \" The pt states that then took her blood pressure, which was approximately 150's/90's. She notes that she took her blood pressure medication, Losartan and atenolol, but \"still wasn't feeling right. \" The pt notes her symptoms were on and off all afternoon. She additionally notes mild abdominal pain that began last night. She notes that she called the EMS after her symptoms persisted for a few hours today. On exam, the pt notes she feels normal and her HA has been alleviate. She conveys that she is taking Coumadin for a blood clot in her right eye, which she has not yet taken today. She notes she was admitted to the hospital in June 2018 for kidney biopsy with no specific issues since then. The pt adds that she has an IUD. She denies any recent falls or trauma. She specifically denies any nausea, vomiting, chest pain and SOB. Social Hx: -EtOH, -Illicit Drugs There are no other complaints, changes, or physical findings at this time. PCP: Savannah Sever, NP Current Outpatient Medications Medication Sig Dispense Refill  warfarin (COUMADIN) 6 mg tablet Take 6 mg by mouth daily.  ferrous sulfate 325 mg (65 mg iron) tablet Take 1 Tab by mouth two (2) times daily (with meals). 60 Tab 1  
 acetaminophen (ACETAMINOPHEN EXTRA STRENGTH) 500 mg tablet Take 1 Tab by mouth every six (6) hours as needed for Pain.  30 Tab 0  
  hydroxychloroquine (PLAQUENIL) 200 mg tablet Take 1 tab po qam and 1/2 tab po qpm 45 Tab 9  
 simvastatin (ZOCOR) 20 mg tablet TAKE 1 TABLET BY MOUTH ONCE DAILY  3  
 losartan (COZAAR) 100 mg tablet Take 100 mg by mouth nightly.  atenolol (TENORMIN) 50 mg tablet Take  by mouth nightly.  mycophenolate (CELLCEPT) 500 mg tablet Take 1,500 mg by mouth two (2) times a day. Past History Past Medical History: 
Past Medical History:  
Diagnosis Date  Anemia NEC  Anxiety disorder 3/10/2014  Arthritis  Branch retinal artery occlusion approx   
 as of 16 resolved and on Coumadin  Calculus of kidney  Celiac disease  Chronic kidney disease   
 stage ll  Chronic renal insufficiency, stage II (mild) secondary to lupus nephritis:  Dr Dada Amin  Depression, major, recurrent ([facility] [address] 3/10/2014  Diffuse proliferative lupus glomerulonephritis ([facility] [address] Dr Dada Amin  Fatigue   
 intermittent due to lupus  Gastritis   
 as of 16 asymptomatic  GERD (gastroesophageal reflux disease)  Hypertension  Long term current use of anticoagulant therapy  Lupus   
 as of 16 \"aching and swelling of joints\" per patient; Dr Nicholas Rodney (in cc)  Lupus  Memory loss   
 as of 16 pt states \"its not that bad, I forget where I put stuff\"  Ovarian cyst   
 as of 16; Dr Analy Benitez  Pregnancy induced hypertension  PUD (peptic ulcer disease) Past Surgical History: 
Past Surgical History:  
Procedure Laterality Date  HX ACL RECONSTRUCTION Left approx   HX  SECTION    
 x1  
 HX ORTHOPAEDIC Right   
 hand to thumb (thumb would pop out of place so they placed a ligament around it) per pt  HX OTHER SURGICAL  14 CT Guided Renal Biopsy with Conscious Sedation  HX OTHER SURGICAL    
 kidney biopsy; benign  HX OTHER SURGICAL Right 10/22/14  
 right Posterior Interosseous Nerve Neurectomy, right Anterior Interosseous Nerve Neurectomy Family History: 
Family History Problem Relation Age of Onset  Glaucoma Father  Hypertension Father Social History: 
Social History Tobacco Use  Smoking status: Never Smoker  Smokeless tobacco: Never Used Substance Use Topics  Alcohol use: No  
 Drug use: No  
 
 
Allergies: Allergies Allergen Reactions  Gluten Diarrhea  Fiorinal-Codeine #3 [Codeine-Butalbital-Asa-Caff] Nausea Only Review of Systems Review of Systems Constitutional: Positive for chills. Negative for fever. HENT: Negative for congestion and sore throat. Eyes: Negative for visual disturbance. Respiratory: Negative for cough and shortness of breath. Cardiovascular: Negative for chest pain and leg swelling. Gastrointestinal: Positive for abdominal pain. Negative for blood in stool, nausea and vomiting. Endocrine: Negative for polyuria. Genitourinary: Negative for dysuria, flank pain, vaginal bleeding and vaginal discharge. Musculoskeletal: Negative for myalgias. Skin: Negative for rash. Allergic/Immunologic: Negative for immunocompromised state. Neurological: Positive for headaches (mild). Negative for weakness. Psychiatric/Behavioral: Negative for confusion. Physical Exam  
Physical Exam  
Constitutional: She is oriented to person, place, and time. She appears well-developed and well-nourished. HENT:  
Head: Normocephalic and atraumatic. Moist mucous membranes Eyes: Conjunctivae are normal. Pupils are equal, round, and reactive to light. Right eye exhibits no discharge. Left eye exhibits no discharge. Neck: Normal range of motion. Neck supple. No tracheal deviation present. Cardiovascular: Normal rate, regular rhythm and normal heart sounds. No murmur heard. Pulmonary/Chest: Effort normal and breath sounds normal. No respiratory distress. She has no wheezes. She has no rales. Abdominal: Soft. Bowel sounds are normal. There is no tenderness. There is no rebound and no guarding. Musculoskeletal: Normal range of motion. She exhibits no edema, tenderness or deformity. Neurological: She is alert and oriented to person, place, and time. Skin: Skin is warm and dry. No rash noted. No erythema. Psychiatric: Her behavior is normal.  
Nursing note and vitals reviewed. Diagnostic Study Results Labs - Recent Results (from the past 12 hour(s)) CBC WITH AUTOMATED DIFF Collection Time: 11/11/18  7:35 PM  
Result Value Ref Range WBC 8.3 3.6 - 11.0 K/uL  
 RBC 4.76 3.80 - 5.20 M/uL  
 HGB 14.4 11.5 - 16.0 g/dL HCT 42.7 35.0 - 47.0 % MCV 89.7 80.0 - 99.0 FL  
 MCH 30.3 26.0 - 34.0 PG  
 MCHC 33.7 30.0 - 36.5 g/dL  
 RDW 12.9 11.5 - 14.5 % PLATELET 317 076 - 947 K/uL MPV 8.7 (L) 8.9 - 12.9 FL  
 NRBC 0.0 0  WBC ABSOLUTE NRBC 0.00 0.00 - 0.01 K/uL NEUTROPHILS 73 32 - 75 % LYMPHOCYTES 19 12 - 49 % MONOCYTES 6 5 - 13 % EOSINOPHILS 1 0 - 7 % BASOPHILS 0 0 - 1 % IMMATURE GRANULOCYTES 0 0.0 - 0.5 % ABS. NEUTROPHILS 6.0 1.8 - 8.0 K/UL  
 ABS. LYMPHOCYTES 1.6 0.8 - 3.5 K/UL  
 ABS. MONOCYTES 0.5 0.0 - 1.0 K/UL  
 ABS. EOSINOPHILS 0.1 0.0 - 0.4 K/UL  
 ABS. BASOPHILS 0.0 0.0 - 0.1 K/UL  
 ABS. IMM. GRANS. 0.0 0.00 - 0.04 K/UL  
 DF AUTOMATED METABOLIC PANEL, COMPREHENSIVE Collection Time: 11/11/18  7:35 PM  
Result Value Ref Range Sodium 136 136 - 145 mmol/L Potassium 3.9 3.5 - 5.1 mmol/L Chloride 104 97 - 108 mmol/L  
 CO2 23 21 - 32 mmol/L Anion gap 9 5 - 15 mmol/L Glucose 103 (H) 65 - 100 mg/dL BUN 28 (H) 6 - 20 MG/DL Creatinine 1.76 (H) 0.55 - 1.02 MG/DL  
 BUN/Creatinine ratio 16 12 - 20 GFR est AA 39 (L) >60 ml/min/1.73m2 GFR est non-AA 32 (L) >60 ml/min/1.73m2 Calcium 8.3 (L) 8.5 - 10.1 MG/DL  Bilirubin, total 0.6 0.2 - 1.0 MG/DL  
 ALT (SGPT) 28 12 - 78 U/L  
 AST (SGOT) 14 (L) 15 - 37 U/L  
 Alk. phosphatase 65 45 - 117 U/L Protein, total 8.4 (H) 6.4 - 8.2 g/dL Albumin 3.7 3.5 - 5.0 g/dL Globulin 4.7 (H) 2.0 - 4.0 g/dL A-G Ratio 0.8 (L) 1.1 - 2.2 PROTHROMBIN TIME + INR Collection Time: 11/11/18  7:35 PM  
Result Value Ref Range INR 2.6 (H) 0.9 - 1.1 Prothrombin time 25.1 (H) 9.0 - 11.1 sec SAMPLES BEING HELD Collection Time: 11/11/18  7:35 PM  
Result Value Ref Range SAMPLES BEING HELD 1RED COMMENT Add-on orders for these samples will be processed based on acceptable specimen integrity and analyte stability, which may vary by analyte. URINALYSIS W/ REFLEX CULTURE Collection Time: 11/11/18  9:25 PM  
Result Value Ref Range Color YELLOW/STRAW Appearance CLEAR CLEAR Specific gravity <1.005 1.003 - 1.030  
 pH (UA) 6.0 5.0 - 8.0 Protein 100 (A) NEG mg/dL Glucose NEGATIVE  NEG mg/dL Ketone NEGATIVE  NEG mg/dL Bilirubin NEGATIVE  NEG Blood SMALL (A) NEG Urobilinogen 0.2 0.2 - 1.0 EU/dL Nitrites NEGATIVE  NEG Leukocyte Esterase NEGATIVE  NEG    
 WBC 0-4 0 - 4 /hpf  
 RBC 0-5 0 - 5 /hpf Epithelial cells FEW FEW /lpf Bacteria NEGATIVE  NEG /hpf  
 UA:UC IF INDICATED CULTURE NOT INDICATED BY UA RESULT CNI Medical Decision Making I am the first provider for this patient. I reviewed the vital signs, available nursing notes, past medical history, past surgical history, family history and social history. Vital Signs-Reviewed the patient's vital signs. Patient Vitals for the past 12 hrs: 
 Temp Pulse Resp BP SpO2  
11/11/18 1943    (!) 160/95 100 % 11/11/18 1919 98.1 °F (36.7 °C) (!) 52 17 (!) 152/117 100 % Pulse Oximetry Analysis - 100% on RA Records Reviewed: Nursing Notes and Old Medical Records Provider Notes (Medical Decision Making):  
Patient here with vague complaints without obvious cause on physical exam. Will obtain labs. DDX could include electrolyte disturbance, uti. Doubt acs. Labs show worsening CKD, patient will follow up with her nephrologist. She will return if symptoms get worse. ED Course:  
Initial assessment performed. The patients presenting problems have been discussed, and they are in agreement with the care plan formulated and outlined with them. I have encouraged them to ask questions as they arise throughout their visit. Critical Care Time:  
none Disposition: 
Discharge Note: 
10:17 PM 
The pt is ready for discharge. The pt's signs, symptoms, diagnosis, and discharge instructions have been discussed and pt has conveyed their understanding. The pt is to follow up as recommended or return to ER should their symptoms worsen. Plan has been discussed and pt is in agreement. PLAN: 
1. Discharge Current Discharge Medication List  
  
 
2. Follow-up Information Follow up With Specialties Details Why Contact Info Rebecca Cunningham NP Nurse Practitioner   [address] [facility] 
[address] [facility] [address] [phone] Carmen Oates MD Nephrology Schedule an appointment as soon as possible for a visit Regarding worsening renal function [address]ne[address] 
[phone] [facility] EMERGENCY DEPT Emergency Medicine  If symptoms worsen [address] [facility] 
[phone] Return to ED if worse Diagnosis Clinical Impression: 1. BERNARD (acute kidney injury) ([facility] Attestations: This note is prepared by Elier Mayfield, acting as Scribe for DO Kaley Valentine DO: The scribe's documentation has been prepared under my direction and personally reviewed by me in its entirety. I confirm that the note above accurately reflects all work, treatment, procedures, and medical decision making performed by me.

## 2018-12-05 ENCOUNTER — OFFICE VISIT (OUTPATIENT)
Dept: FAMILY MEDICINE CLINIC | Age: 38
End: 2018-12-05

## 2018-12-05 VITALS
BODY MASS INDEX: 31.63 KG/M2 | DIASTOLIC BLOOD PRESSURE: 90 MMHG | TEMPERATURE: 97.7 F | RESPIRATION RATE: 18 BRPM | WEIGHT: 167.5 LBS | SYSTOLIC BLOOD PRESSURE: 125 MMHG | HEART RATE: 52 BPM | HEIGHT: 61 IN | OXYGEN SATURATION: 98 %

## 2018-12-05 DIAGNOSIS — F33.42 RECURRENT MAJOR DEPRESSIVE DISORDER, IN FULL REMISSION (HCC): ICD-10-CM

## 2018-12-05 DIAGNOSIS — I73.00 RAYNAUD'S DISEASE WITHOUT GANGRENE: ICD-10-CM

## 2018-12-05 DIAGNOSIS — N28.89 HYPERTENSION SECONDARY TO OTHER RENAL DISORDERS: Primary | ICD-10-CM

## 2018-12-05 DIAGNOSIS — M32.14 SYSTEMIC LUPUS ERYTHEMATOSUS WITH GLOMERULAR DISEASE, UNSPECIFIED SLE TYPE (HCC): ICD-10-CM

## 2018-12-05 DIAGNOSIS — Z79.60 LONG-TERM USE OF IMMUNOSUPPRESSANT MEDICATION: ICD-10-CM

## 2018-12-05 DIAGNOSIS — D62 ACUTE BLOOD LOSS ANEMIA: ICD-10-CM

## 2018-12-05 DIAGNOSIS — H34.231 RETINAL ARTERY BRANCH OCCLUSION OF RIGHT EYE: ICD-10-CM

## 2018-12-05 DIAGNOSIS — R53.82 CHRONIC FATIGUE: ICD-10-CM

## 2018-12-05 DIAGNOSIS — I15.1 HYPERTENSION SECONDARY TO OTHER RENAL DISORDERS: Primary | ICD-10-CM

## 2018-12-05 DIAGNOSIS — K27.9 PUD (PEPTIC ULCER DISEASE): ICD-10-CM

## 2018-12-05 DIAGNOSIS — F41.1 GAD (GENERALIZED ANXIETY DISORDER): ICD-10-CM

## 2018-12-05 DIAGNOSIS — K90.0 CELIAC DISEASE: ICD-10-CM

## 2018-12-05 DIAGNOSIS — E27.49 HYPOCORTISOLEMIA (HCC): ICD-10-CM

## 2018-12-05 DIAGNOSIS — D62 ANEMIA ASSOCIATED WITH ACUTE BLOOD LOSS: ICD-10-CM

## 2018-12-05 DIAGNOSIS — R12 HEARTBURN: ICD-10-CM

## 2018-12-05 DIAGNOSIS — E55.9 VITAMIN D DEFICIENCY: ICD-10-CM

## 2018-12-05 RX ORDER — FLUOXETINE HYDROCHLORIDE 20 MG/1
20 CAPSULE ORAL
COMMUNITY
End: 2018-12-05 | Stop reason: ALTCHOICE

## 2018-12-05 NOTE — PROGRESS NOTES
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 out. Your RESTING HEART RATE is the number of times your heart beats per minute when you are not exerting yourself. The more fit you are, the lower your resting heart rate will be. Your MAXIMUM HEART RATE is the number of times per minute your heart pumps when it is working at 100% capacity. NEVER EXERCISE AT YOUR MAXIMUM HEART RATE. MAX HEART RATE = 220 - your age    For example, in a 48year old, the maximum heart rate is 220-50 = 170 beats per minute    Your [place] is the number of beats per minute our heart should pump during aerobic exercise. Reaching your target heart rate indicates that your body is receiving maximum cardiovascular and fat burning benefits.      If you are fit, your TARGET HEART RATE = 70-85% of your maximum Heart rate      For a 48year old with vigorous intensity physical activity,         Target heart rate= 170 bpm x .70 = 119 bpm     Target heart rate = 170 bpm x .85=  145 bpm        Therefore, your target heart rate during physical activity is 119-145      If you are not fit, TARGET HEART RATE = 50-70% of your maximum Heart rate    MODERATE CONSISTENT AEROBIC EXERCISE OR WALKING FOR AT LEAST 150 MINUTES WEEKLY IS AN ESSENTIAL PART OF ANY WEIGHT LOSS OF WEIGHT MAINTENANCE PROGRAM.     During WEIGHT LOSS PHASE, at least 75% of your exercise needs to be walking or moderate aerobic activity and 25% or 0% can be Isometric or Resistance type exercises (the latter can be deferred to the weight maintenance phase.)     During WEIGHT MAINTENANCE PHASE, at least 50% of your exercise needs to be aerobic and 50% Isometric or Resistance type exercises. BENEFITS OF MODERATE INTENSITY EXERCISE MOST DAYS OF THE WEEK:     1. Insulin Resistance improves 30-85%   2. Abdominal Fat decreases by 30%   3. Inflammatory Markers decrease by 30% (therefore pain decreases)   4. Systolic and Diastolic Blood Pressure decrease by 4 mmHg   5. HDL improves by 5%   6. Triglycerides decrease by 15%   7. Shift from small dense LDL to large dense LDL (therefore decrease Insulin Resistance)   8.   Decrease coagulability

## 2018-12-05 NOTE — PATIENT INSTRUCTIONS
1.Try Sleepytime or Chamomile tea for sleep. 2.Use Premier Protein shakes instead of skipping meals (Available at [employer], [address], etc). 3.Work towards drinking 4-5 bottles of water daily. 4.Increase sleep to 7-8 hours nightly. Starting a Weight Loss Plan: Care Instructions  Your Care Instructions    If you are thinking about losing weight, it can be hard to know where to start. Your doctor can help you set up a weight loss plan that best meets your needs. You may want to take a class on nutrition or exercise, or join a weight loss support group. If you have questions about how to make changes to your eating or exercise habits, ask your doctor about seeing a registered dietitian or an exercise specialist.  It can be a big challenge to lose weight. But you do not have to make huge changes at once. Make small changes, and stick with them. When those changes become habit, add a few more changes. If you do not think you are ready to make changes right now, try to pick a date in the future. Make an appointment to see your doctor to discuss whether the time is right for you to start a plan. Follow-up care is a key part of your treatment and safety. Be sure to make and go to all appointments, and call your doctor if you are having problems. It's also a good idea to know your test results and keep a list of the medicines you take. How can you care for yourself at home? · Set realistic goals. Many people expect to lose much more weight than is likely. A weight loss of 5% to 10% of your body weight may be enough to improve your health. · Get family and friends involved to provide support. Talk to them about why you are trying to lose weight, and ask them to help. They can help by participating in exercise and having meals with you, even if they may be eating something different. · Find what works best for you.  If you do not have time or do not like to cook, a program that offers meal replacement bars or shakes may be better for you. Or if you like to prepare meals, finding a plan that includes daily menus and recipes may be best.  · Ask your doctor about other health professionals who can help you achieve your weight loss goals. ? A dietitian can help you make healthy changes in your diet. ? An exercise specialist or  can help you develop a safe and effective exercise program.  ? A counselor or psychiatrist can help you cope with issues such as depression, anxiety, or family problems that can make it hard to focus on weight loss. · Consider joining a support group for people who are trying to lose weight. Your doctor can suggest groups in your area. Where can you learn more? Go to http://kodakTreeveododie.info/. Enter G204 in the search box to learn more about \"Starting a Weight Loss Plan: Care Instructions. \"  Current as of: June 26, 2018  Content Version: 11.8  © 4599-8811 [employer]. Care instructions adapted under license by [employer] (which disclaims liability or warranty for this information). If you have questions about a medical condition or this instruction, always ask your healthcare professional. [place] [address] any warranty or liability for your use of this information. WEIGHT LOSS PLAN    1. Read food labels and count calories. Goal 8103-9761 calories daily for women and 8907-5583 calories daily for men. Achieve this by decreasing caloric intake by 500 calories by weekly increments. NOTE:  1 pound = 3500 calories! Www.loseit. com  Www.myfitnesspal.com  Www.PaymentWorks. Ryan  Www.healthfinder. gov  Weight watchers mobile    Calories needed to lose 1-2 pounds a week = 10 x your weight in pounds    2. Increase water intake. Per [employer] [facility] Program, it is important to drink 1/2 your body weight in ounces of water daily.   Decrease your water consumption 2-3 hours before bedtime to prevent sleep disturbance from frequent urination. 3.  Decrease sugary beverages. Each can or glass of soda increases your risk of obesity by 60%. Can lose 10 pounds in a month by avoiding any soda. 12 oz can of soda = 140 calories, 16 oz cup of sweet tea = 200 calories    16 oz orange juice = 200 calories, 10 oz apple juice = 150 calories   32 oz sports drink = 200 calories, 16 oz punch = 240 calories   3.5 oz alcohol = 100-150 calories     4. Avoid High Fructose Corn Syrup products. This ingredient makes products highly addictive! 5. Exercise 30 minutes daily 5 days weekly, minimally. If you burn 3500 calories that equals a pound! Use a pedometer to count steps. Visit www. [employer] for a free pedometer and diet recommendations. Your maximum heart rate = 220 - your age    Never exercise at your maximum heart rate. See handout for target heart rate. 6.  Decrease carbohydrates (white bread, pasta, rice, potatoes, sweet foods and sweet drinks like soda, tea, coffee, juice and sports drinks). Increase fiber and protein. Goal:   calories daily of carbohydrates     Try CHROMIUM PICONATE 200 MG THREE TIMES DAILY,    to decrease Premenstrual carbohydrate cravings. 7.  Eat 3-5 small meals daily, include lots of protein (beans/legumes, nuts, lean meat, eggs) and green vegetables with each. (Breakfast, lunch, dinner with 2 healthy snacks)    8. Get proper rest 7-8 hours uninterrupted. When you get less than 6 hours, it triggers hunger by affecting your [place]:Leptin ratio and this results in weight gain. 9.  Watch your food portions. Green leafy vegetables should cover 1/2 of the plate, lean meat 1/4 of the plate, starchy vegetable 1/4 of the plate. Use smaller plates. 10.  Do not eat until you are full. Eat until you are no longer hungry. If you are not sure, try drinking a glass of water before getting your second serving of food. 11.  Do not weigh yourself daily.   Wait until your next office visit. Use how you feel and  how your clothes fit as measurements of success. 12.  Address your spirituality to draw strength from above during your journey. Remember \"I am fearfully and wonderfully made. Marvelous in His eyes. \"    13. Set realistic, appropriate and achievable weight loss goals:     RECOMMENDED TARGET WEIGHT LOSS:  Initial weight loss of 5-10% of your initial body weight achieved over 6 months or a decrease of 2 BMI units. MINIMUM GOAL OF WEIGHT LOSS:  Reduce body weight and maintain a lower body weight. Prevent weight gain. RELATED WEBSITES:      Www.obesityaction. org  (and consider joining the Obesity [employer] Coalition for $25/year. The [address] mission is to elevated and empower those affected by obesity through education, advocacy and support. Quarterly journals included in membership fee. )    Www.[employer]U (formerly [employer])  www.Acision     RELATED DIETS:  Dr. Litzy Kelly Weight loss challenge, Mediterranean diet, [address] [place] [address] Watchers, Paleo Diet (anti-inflammatory diet)      EXERCISE 150 MINUTES WEEKLY. THIS CAN BE ACHIEVED BY WORKING OUT OR WALKING A MINIMUM OF 30 MINUTES FOR 5 DAYS WEEKLY. YOU CAN EXERCISE IN INCREMENTS OF 10-15 MINUTES UP TO 3 TIMES A DAY. Consider performing \"brainless exercise. \"  Choose your favorite tv program.  Five minutes before and for 5 minutes after the tv program, stretch your body. While the program is on, walk in place watching the show. When commercials come on, rest or walk around the house to do other things. When the program begins, return to walking in place. When you are able keep walking during the commercials and add light weights to your ankles or hands. By the end of the show, you would have walked 30 minutes. If you need shorter spurts of exercise, walk during the commercials and rest during the show.     Drink to glasses of water prior to any exercise to prevent dehydration and to improve the results of the work out. Your RESTING HEART RATE is the number of times your heart beats per minute when you are not exerting yourself. The more fit you are, the lower your resting heart rate will be. Your MAXIMUM HEART RATE is the number of times per minute your heart pumps when it is working at 100% capacity. NEVER EXERCISE AT YOUR MAXIMUM HEART RATE. MAX HEART RATE = 220 - your age    For example, in a 48year old, the maximum heart rate is 220-50 = 170 beats per minute    Your [place] is the number of beats per minute our heart should pump during aerobic exercise. Reaching your target heart rate indicates that your body is receiving maximum cardiovascular and fat burning benefits. If you are fit, your TARGET HEART RATE = 70-85% of your maximum Heart rate      For a 49year old with vigorous intensity physical activity,         Target heart rate= 170 bpm x .70 = 119 bpm     Target heart rate = 170 bpm x .85=  145 bpm        Therefore, your target heart rate during physical activity is 119-145      If you are not fit, TARGET HEART RATE = 50-70% of your maximum Heart rate    MODERATE CONSISTENT AEROBIC EXERCISE OR WALKING FOR AT LEAST 150 MINUTES WEEKLY IS AN ESSENTIAL PART OF ANY WEIGHT LOSS OF WEIGHT MAINTENANCE PROGRAM.     During WEIGHT LOSS PHASE, at least 75% of your exercise needs to be walking or moderate aerobic activity and 25% or 0% can be Isometric or Resistance type exercises (the latter can be deferred to the weight maintenance phase.)     During WEIGHT MAINTENANCE PHASE, at least 50% of your exercise needs to be aerobic and 50% Isometric or Resistance type exercises. BENEFITS OF MODERATE INTENSITY EXERCISE MOST DAYS OF THE WEEK:     1. Insulin Resistance improves 30-85%   2. Abdominal Fat decreases by 30%   3. Inflammatory Markers decrease by 30% (therefore pain decreases)   4. Systolic and Diastolic Blood Pressure decrease by 4 mmHg   5.   HDL improves by 5%   6. Triglycerides decrease by 15%   7. Shift from small dense LDL to large dense LDL (therefore decrease Insulin Resistance)   8.   Decrease coagulability

## 2018-12-05 NOTE — PROGRESS NOTES
Ziyad Ochoa  Identified pt with two pt identifiers(name and ). Chief Complaint   Patient presents with    Weight Management         1. Have you been to the ER, urgent care clinic since your last visit? Hospitalized since your last visit? YES \[facility]\""C High BP and was not feeling well 2018    2. Have you seen or consulted any other health care providers outside of the [address] [place] [address] since your last visit? Include any pap smears or colon screening. NO      Advance Care Planning    In the event something were to happen to you and you were unable to speak on your behalf, do you have an Advance Directive/ Living Will in place stating your wishes? NO    If yes, do we have a copy on file NO    If no, would you like information NO    My Chart     My chart gives you direct online access to portions of the electronic medical record (EMR) where your doctor stores your health information (ie, lab results, appointment information, medications, immunizations, and more. It is free. Would you like to set up your my chart? NO    [unfilled]    Weight Metrics 2018 2018 2018 10/19/2018 10/10/2018 2018 2018   Weight - 167 lb 8 oz 171 lb 8.3 oz 172 lb 172 lb 8 oz 171 lb 14.4 oz 160 lb   Waist Measure Inches 34 - - - - - -   Body Fat % 34.1 - - - - - -   BMI - 31.65 kg/m2 32.41 kg/m2 33.59 kg/m2 32.59 kg/m2 32.48 kg/m2 30.23 kg/m2       MMW: 30.4  BWW: 47.4  BMR: 1374    Medication reconciliation up to date and corrected with patient at this time. Today's provider has been notified of reason for visit, vitals and flowsheets obtained on patients. Reviewed record in preparation for visit, huddled with provider and have obtained necessary documentation. There are no preventive care reminders to display for this patient.     Wt Readings from Last 3 Encounters:   18 167 lb 8 oz (76 kg)   18 171 lb 8.3 oz (77.8 kg)   10/19/18 172 lb (78 kg)     Temp Readings from Last 3 Encounters:   12/05/18 97.7 °F (36.5 °C) (Temporal)   11/11/18 98.1 °F (36.7 °C)   10/19/18 98.4 °F (36.9 °C) (Oral)     BP Readings from Last 3 Encounters:   12/05/18 125/90   11/11/18 (!) 160/95   10/19/18 (!) 139/93     Pulse Readings from Last 3 Encounters:   12/05/18 (!) 52   11/11/18 (!) 52   10/19/18 72     Vitals:    12/05/18 1122   BP: 125/90   Pulse: (!) 52   Resp: 18   Temp: 97.7 °F (36.5 °C)   TempSrc: Temporal   SpO2: 98%   Weight: 167 lb 8 oz (76 kg)   Height: 5' 1\" (1.549 m)   PainSc:   0 - No pain         Learning Assessment:  :     Learning Assessment 8/22/2014 5/1/2014   PRIMARY LEARNER Patient Patient   HIGHEST LEVEL OF EDUCATION - PRIMARY LEARNER  GRADUATED HIGH SCHOOL OR GED GRADUATED HIGH SCHOOL OR GED   BARRIERS PRIMARY LEARNER NONE NONE   CO-LEARNER CAREGIVER No No   PRIMARY LANGUAGE ENGLISH ENGLISH    NEED No -   LEARNER PREFERENCE PRIMARY LISTENING PICTURES     READING -   LEARNING SPECIAL TOPICS none -   ANSWERED BY patient patient   RELATIONSHIP SELF SELF       Depression Screening:  :     PHQ over the last two weeks 10/19/2018   Little interest or pleasure in doing things Not at all   Feeling down, depressed, irritable, or hopeless Not at all   Total Score PHQ 2 0       Fall Risk Assessment:  :     No flowsheet data found. Abuse Screening:  :     No flowsheet data found.     ADL Screening:  :     ADL Assessment 10/10/2018   Feeding yourself No Help Needed   Getting from bed to chair No Help Needed   Getting dressed No Help Needed   Bathing or showering No Help Needed   Walk across the room (includes cane/walker) No Help Needed   Using the telphone No Help Needed   Taking your medications No Help Needed   Preparing meals No Help Needed   Managing money (expenses/bills) No Help Needed   Moderately strenuous housework (laundry) No Help Needed   Shopping for personal items (toiletries/medicines) No Help Needed   Shopping for groceries No Help Needed   Driving No Help Needed Climbing a flight of stairs No Help Needed   Getting to places beyond walking distances No Help Needed

## 2019-01-21 ENCOUNTER — OFFICE VISIT (OUTPATIENT)
Dept: FAMILY MEDICINE CLINIC | Age: 39
End: 2019-01-21

## 2019-01-21 VITALS
OXYGEN SATURATION: 99 % | SYSTOLIC BLOOD PRESSURE: 114 MMHG | TEMPERATURE: 98.5 F | HEIGHT: 61 IN | BODY MASS INDEX: 30.79 KG/M2 | DIASTOLIC BLOOD PRESSURE: 80 MMHG | HEART RATE: 61 BPM | WEIGHT: 163.1 LBS | RESPIRATION RATE: 18 BRPM

## 2019-01-21 DIAGNOSIS — F41.1 GAD (GENERALIZED ANXIETY DISORDER): ICD-10-CM

## 2019-01-21 DIAGNOSIS — K59.04 CHRONIC IDIOPATHIC CONSTIPATION: ICD-10-CM

## 2019-01-21 DIAGNOSIS — J30.89 SEASONAL ALLERGIC RHINITIS DUE TO OTHER ALLERGIC TRIGGER: ICD-10-CM

## 2019-01-21 DIAGNOSIS — Z86.718 HISTORY OF THROMBOEMBOLISM: ICD-10-CM

## 2019-01-21 DIAGNOSIS — M32.14 SYSTEMIC LUPUS ERYTHEMATOSUS WITH GLOMERULAR DISEASE, UNSPECIFIED SLE TYPE (HCC): ICD-10-CM

## 2019-01-21 DIAGNOSIS — E55.9 VITAMIN D DEFICIENCY: ICD-10-CM

## 2019-01-21 DIAGNOSIS — N04.9 NEPHROTIC SYNDROME: ICD-10-CM

## 2019-01-21 DIAGNOSIS — I15.1 HYPERTENSION SECONDARY TO OTHER RENAL DISORDERS: Primary | ICD-10-CM

## 2019-01-21 DIAGNOSIS — E66.09 CLASS 1 OBESITY DUE TO EXCESS CALORIES WITH SERIOUS COMORBIDITY AND BODY MASS INDEX (BMI) OF 33.0 TO 33.9 IN ADULT: ICD-10-CM

## 2019-01-21 DIAGNOSIS — N28.89 HYPERTENSION SECONDARY TO OTHER RENAL DISORDERS: Primary | ICD-10-CM

## 2019-01-21 DIAGNOSIS — R63.4 WEIGHT LOSS: ICD-10-CM

## 2019-01-21 DIAGNOSIS — Z79.60 LONG-TERM USE OF IMMUNOSUPPRESSANT MEDICATION: ICD-10-CM

## 2019-01-21 DIAGNOSIS — H34.231 RETINAL ARTERY BRANCH OCCLUSION OF RIGHT EYE: ICD-10-CM

## 2019-01-21 RX ORDER — PHENDIMETRAZINE TARTRATE 105 MG/1
1 CAPSULE, EXTENDED RELEASE ORAL
Qty: 30 CAP | Refills: 0 | Status: SHIPPED | OUTPATIENT
Start: 2019-01-21 | End: 2019-02-18 | Stop reason: ALTCHOICE

## 2019-01-21 RX ORDER — FLUTICASONE PROPIONATE 50 MCG
2 SPRAY, SUSPENSION (ML) NASAL DAILY
Qty: 1 BOTTLE | Refills: 5 | Status: SHIPPED | OUTPATIENT
Start: 2019-01-21 | End: 2019-09-24 | Stop reason: SDUPTHER
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
## 2019-01-21 NOTE — PATIENT INSTRUCTIONS
Constipation: Care Instructions  Your Care Instructions    Constipation means that you have a hard time passing stools (bowel movements). People pass stools from 3 times a day to once every 3 days. What is normal for you may be different. Constipation may occur with pain in the rectum and cramping. The pain may get worse when you try to pass stools. Sometimes there are small amounts of bright red blood on toilet paper or the surface of stools. This is because of enlarged veins near the rectum (hemorrhoids). A few changes in your diet and lifestyle may help you avoid ongoing constipation. Your doctor may also prescribe medicine to help loosen your stool. Some medicines can cause constipation. These include pain medicines and antidepressants. Tell your doctor about all the medicines you take. Your doctor may want to make a medicine change to ease your symptoms. Follow-up care is a key part of your treatment and safety. Be sure to make and go to all appointments, and call your doctor if you are having problems. It's also a good idea to know your test results and keep a list of the medicines you take. How can you care for yourself at home? · Drink plenty of fluids, enough so that your urine is light yellow or clear like water. If you have kidney, heart, or liver disease and have to limit fluids, talk with your doctor before you increase the amount of fluids you drink. · Include high-fiber foods in your diet each day. These include fruits, vegetables, beans, and whole grains. · Get at least 30 minutes of exercise on most days of the week. Walking is a good choice. You also may want to do other activities, such as running, swimming, cycling, or playing tennis or team sports. · Take a fiber supplement, such as Citrucel or Metamucil, every day. Read and follow all instructions on the label. · Schedule time each day for a bowel movement. A daily routine may help.  Take your time having your bowel movement. · Support your feet with a small step stool when you sit on the toilet. This helps flex your hips and places your pelvis in a squatting position. · Your doctor may recommend an over-the-counter laxative to relieve your constipation. Examples are Milk of Magnesia and MiraLax. Read and follow all instructions on the label. Do not use laxatives on a long-term basis. When should you call for help? Call your doctor now or seek immediate medical care if:    · You have new or worse belly pain.     · You have new or worse nausea or vomiting.     · You have blood in your stools.    Watch closely for changes in your health, and be sure to contact your doctor if:    · Your constipation is getting worse.     · You do not get better as expected. Where can you learn more? Go to http://kodak-dodie.info/. Enter [phone] in the search box to learn more about \"Constipation: Care Instructions. \"  Current as of: September 23, 2018  Content Version: 11.9  © 4474-5078 [employer]. Care instructions adapted under license by [employer] (which disclaims liability or warranty for this information). If you have questions about a medical condition or this instruction, always ask your healthcare professional. [place] [address] any warranty or liability for your use of this information. Saline Nasal Washes: Care Instructions  Your Care Instructions  Saline nasal washes help keep the nasal passages open by washing out thick or dried mucus. This simple remedy can help relieve symptoms of allergies, sinusitis, and colds. It also can make the nose feel more comfortable by keeping the mucous membranes moist. You may notice a little burning sensation in your nose the first few times you use the solution, but this usually gets better in a few days. Follow-up care is a key part of your treatment and safety.  Be sure to make and go to all appointments, and call your doctor if you are having problems. It's also a good idea to know your test results and keep a list of the medicines you take. How can you care for yourself at home? · You can buy premixed saline solution in a squeeze bottle or other sinus rinse products at a drugstore. Read and follow the instructions on the label. · You also can make your own saline solution by adding 1 teaspoon of salt and 1 teaspoon of baking soda to 2 cups of distilled water. · If you use a homemade solution, pour a small amount into a clean bowl. Using a rubber bulb syringe, squeeze the syringe and place the tip in the salt water. Pull a small amount of the salt water into the syringe by relaxing your hand. · Sit down with your head tilted slightly back. Do not lie down. Put the tip of the bulb syringe or the squeeze bottle a little way into one of your nostrils. Gently drip or squirt a few drops into the nostril. Repeat with the other nostril. Some sneezing and gagging are normal at first.  · Gently blow your nose. · Wipe the syringe or bottle tip clean after each use. · Repeat this 2 or 3 times a day. · Use nasal washes gently if you have nosebleeds often. When should you call for help? Watch closely for changes in your health, and be sure to contact your doctor if:    · You often get nosebleeds.     · You have problems doing the nasal washes. Where can you learn more? Go to http://kodak-dodie.info/. Enter [phone] in the search box to learn more about \"Saline Nasal Washes: Care Instructions. \"  Current as of: March 27, 2018  Content Version: 11.9  © 2903-7866 [employer]. Care instructions adapted under license by [employer] (which disclaims liability or warranty for this information). If you have questions about a medical condition or this instruction, always ask your healthcare professional. [address] any warranty or liability for your use of this information. Advised protocol for clearing congestion:  Increase fluid intake, especially water to thin mucous and boost the immune system. Avoid sugar and dairy while congested since they thicken mucous. Get plenty of rest!  Gargle 3 times daily and as needed in Listerine or warm salt water vinegar solutions (1 tsp salt, 1 tsp vinegar in 1 cup lukewarm water.)  Use OTC nasal saline spray up each nostril twice daily. Use humidifier at bedtime. Use OTC Mucinex 600 mg twice daily to loosen mucous. Use OTC Tylenol Arthritis or Ibuprofen up to 800 mg up to 3 times daily as needed for pain, fever or headaches. Avoid decongestants and Ibuprofen if you have high blood pressure! If mucous is consistently discolored yellow or green throughout the day for more than a week, call the doctor for an evaluation. To relieve or prevent constipation, increase your water intake, fiber, and walking to prevent constipation. Use over the counter Smooth Move Tea ([employer] tea section),  fiber or stool softener as needed. Consider OTC Miralax or Milk of Magnesia if no bowel movement in 3 days.

## 2019-01-21 NOTE — PROGRESS NOTES
Juanjo Ochoa  Identified pt with two pt identifiers(name and ). Chief Complaint   Patient presents with    Weight Management         1. Have you been to the ER, urgent care clinic since your last visit? Hospitalized since your last visit? NO    2. Have you seen or consulted any other health care providers outside of the [address] [place] [address] since your last visit? Include any pap smears or colon screening. NO      Advance Care Planning    In the event something were to happen to you and you were unable to speak on your behalf, do you have an Advance Directive/ Living Will in place stating your wishes? YES    If yes, do we have a copy on file YES    If no, would you like information YES    My Chart     My chart gives you direct online access to portions of the electronic medical record (EMR) where your doctor stores your health information (ie, lab results, appointment information, medications, immunizations, and more. It is free. Would you like to set up your my chart? NO    [unfilled]    Weight Metrics 2019 2018 2018 2018 10/19/2018 10/10/2018 2018   Weight 163 lb 1.6 oz - 167 lb 8 oz 171 lb 8.3 oz 172 lb 172 lb 8 oz 171 lb 14.4 oz   Waist Measure Inches 36.5 34 - - - - -   Body Fat % 40.6 34.1 - - - - -   BMI 30.82 kg/m2 - 31.65 kg/m2 32.41 kg/m2 33.59 kg/m2 32.59 kg/m2 32.48 kg/m2         Medication reconciliation up to date and corrected with patient at this time. Today's provider has been notified of reason for visit, vitals and flowsheets obtained on patients. Reviewed record in preparation for visit, huddled with provider and have obtained necessary documentation. There are no preventive care reminders to display for this patient.     Wt Readings from Last 3 Encounters:   19 163 lb 1.6 oz (74 kg)   18 167 lb 8 oz (76 kg)   18 171 lb 8.3 oz (77.8 kg)     Temp Readings from Last 3 Encounters:   19 98.5 °F (36.9 °C) (Oral)   18 97.7 °F (36.5 °C) (Temporal)   11/11/18 98.1 °F (36.7 °C)     BP Readings from Last 3 Encounters:   01/21/19 114/80   12/05/18 125/90   11/11/18 (!) 160/95     Pulse Readings from Last 3 Encounters:   01/21/19 61   12/05/18 (!) 52   11/11/18 (!) 52     Vitals:    01/21/19 0914   BP: 114/80   Pulse: 61   Resp: 18   Temp: 98.5 °F (36.9 °C)   TempSrc: Oral   SpO2: 99%   Weight: 163 lb 1.6 oz (74 kg)   Height: 5' 1\" (1.549 m)   PainSc:   0 - No pain         Learning Assessment:  :     Learning Assessment 8/22/2014 5/1/2014   PRIMARY LEARNER Patient Patient   HIGHEST LEVEL OF EDUCATION - PRIMARY LEARNER  GRADUATED HIGH SCHOOL OR GED GRADUATED HIGH SCHOOL OR GED   BARRIERS PRIMARY LEARNER NONE NONE   CO-LEARNER CAREGIVER No No   PRIMARY LANGUAGE ENGLISH ENGLISH    NEED No -   LEARNER PREFERENCE PRIMARY LISTENING PICTURES     READING -   LEARNING SPECIAL TOPICS none -   ANSWERED BY patient patient   RELATIONSHIP SELF SELF       Depression Screening:  :     PHQ over the last two weeks 10/19/2018   Little interest or pleasure in doing things Not at all   Feeling down, depressed, irritable, or hopeless Not at all   Total Score PHQ 2 0       Fall Risk Assessment:  :     No flowsheet data found. Abuse Screening:  :     No flowsheet data found.     ADL Screening:  :     ADL Assessment 10/10/2018   Feeding yourself No Help Needed   Getting from bed to chair No Help Needed   Getting dressed No Help Needed   Bathing or showering No Help Needed   Walk across the room (includes cane/walker) No Help Needed   Using the telphone No Help Needed   Taking your medications No Help Needed   Preparing meals No Help Needed   Managing money (expenses/bills) No Help Needed   Moderately strenuous housework (laundry) No Help Needed   Shopping for personal items (toiletries/medicines) No Help Needed   Shopping for groceries No Help Needed   Driving No Help Needed   Climbing a flight of stairs No Help Needed   Getting to places beyond walking distances No Help Needed

## 2019-02-18 ENCOUNTER — OFFICE VISIT (OUTPATIENT)
Dept: FAMILY MEDICINE CLINIC | Age: 39
End: 2019-02-18

## 2019-02-18 VITALS
HEART RATE: 65 BPM | DIASTOLIC BLOOD PRESSURE: 76 MMHG | BODY MASS INDEX: 31.81 KG/M2 | TEMPERATURE: 98.6 F | RESPIRATION RATE: 20 BRPM | OXYGEN SATURATION: 98 % | SYSTOLIC BLOOD PRESSURE: 114 MMHG | WEIGHT: 168.5 LBS | HEIGHT: 61 IN

## 2019-02-18 DIAGNOSIS — F33.42 RECURRENT MAJOR DEPRESSIVE DISORDER, IN FULL REMISSION (HCC): ICD-10-CM

## 2019-02-18 DIAGNOSIS — N28.89 HYPERTENSION SECONDARY TO OTHER RENAL DISORDERS: Primary | ICD-10-CM

## 2019-02-18 DIAGNOSIS — Z79.60 LONG-TERM USE OF IMMUNOSUPPRESSANT MEDICATION: ICD-10-CM

## 2019-02-18 DIAGNOSIS — M32.14 LUPUS NEPHRITIS (HCC): Chronic | ICD-10-CM

## 2019-02-18 DIAGNOSIS — I15.1 HYPERTENSION SECONDARY TO OTHER RENAL DISORDERS: Primary | ICD-10-CM

## 2019-02-18 DIAGNOSIS — E66.09 CLASS 1 OBESITY DUE TO EXCESS CALORIES WITH SERIOUS COMORBIDITY AND BODY MASS INDEX (BMI) OF 33.0 TO 33.9 IN ADULT: ICD-10-CM

## 2019-02-18 DIAGNOSIS — N17.9 ACUTE RENAL FAILURE, UNSPECIFIED ACUTE RENAL FAILURE TYPE (HCC): ICD-10-CM

## 2019-02-18 DIAGNOSIS — N04.9 NEPHROTIC SYNDROME: ICD-10-CM

## 2019-02-18 DIAGNOSIS — I73.00 RAYNAUD'S DISEASE WITHOUT GANGRENE: ICD-10-CM

## 2019-02-18 DIAGNOSIS — H34.231 RETINAL ARTERY BRANCH OCCLUSION OF RIGHT EYE: ICD-10-CM

## 2019-02-18 DIAGNOSIS — M32.14 LUPUS NEPHRITIS (HCC): ICD-10-CM

## 2019-02-18 RX ORDER — ERGOCALCIFEROL 1.25 MG/1
CAPSULE ORAL
Refills: 0 | COMMUNITY
Start: 2019-02-04 | End: 2020-03-03 | Stop reason: ALTCHOICE

## 2019-02-18 RX ORDER — CICLOPIROX OLAMINE 7.7 MG/G
CREAM TOPICAL
COMMUNITY
Start: 2019-02-13 | End: 2019-02-28 | Stop reason: ALTCHOICE

## 2019-02-18 RX ORDER — PHENDIMETRAZINE TARTRATE 35 MG/1
35 TABLET ORAL 3 TIMES DAILY
Qty: 90 TAB | Refills: 0 | Status: SHIPPED | OUTPATIENT
Start: 2019-02-18 | End: 2019-04-10 | Stop reason: ALTCHOICE

## 2019-02-18 RX ORDER — NIFEDIPINE 30 MG/1
TABLET, EXTENDED RELEASE ORAL
COMMUNITY
Start: 2019-02-11

## 2019-02-18 NOTE — PATIENT INSTRUCTIONS
Nephrotic Syndrome: Care Instructions Your Care Instructions Healthy kidneys remove wastes from the blood. They also help balance water, salt, and mineral levels in the blood. Nephrotic syndrome is a sign that your kidneys aren't working right. When you have this kidney problem, you have high levels of protein in your urine. You may also have low levels of protein and high levels of cholesterol in your blood. The most common symptom is swelling around the eyes or in the feet or ankles. You may also notice foamy urine or weight gain from fluid buildup. The syndrome also increases the risk of skin infections. Diabetes is the most common cause of the syndrome. It can also be caused by kidney disease, lupus, some infections, and certain cancers. In some cases, the cause is not known. Which treatment you get depends on your age and what health problem is causing nephrotic syndrome. Your doctor might prescribe medicines. You might also need other treatments if the syndrome is causing other problems, such as high blood pressure or high cholesterol. Follow-up care is a key part of your treatment and safety. Be sure to make and go to all appointments, and call your doctor if you are having problems. It's also a good idea to know your test results and keep a list of the medicines you take. How can you care for yourself? Work with your doctor · If your doctor prescribed medicines, take them as prescribed, even after you start to feel better. Call your doctor if you think you are having a problem with your medicine. · See your doctor regularly to have your kidney function checked. · Make sure your doctor knows about all the medicines, vitamins, or herbal supplements you take. This means anything you take with or without a prescription. · Get a flu shot each year. And get any other shots your doctor suggests. Care for yourself at home · Cut down on salt. This can reduce the amount of water your body retains. · Follow your doctor's advice for the amounts of protein and potassium you need in your diet. · Be gentle with your skin to prevent infection. Here are some tips: 
? Take short showers or baths using warm or cool water. Don't use hot water to bathe. ? Use mild soaps, such as Dove or Cetaphil. ? Pat your skin gently with towels to dry off after washing. ? Use moisturizing lotion after you bathe. You can use it more often if your skin is dry. Choose a lotion with no alcohol. ? Don't scratch or rub your skin. When should you call for help? Call 911 anytime you think you may need emergency care. For example, call if: 
  · You have sudden, severe pain in your belly.  
 Call your doctor now or seek immediate medical care if: 
  · You are gaining weight.  
  · You have trouble breathing.  
  · You have a fever.  
  · You have symptoms of a skin infection, such as: 
? Increased pain, swelling, warmth, or redness. ? Red streaks leading from the area. ? Pus draining from the area. ? A fever.  
 Watch closely for changes in your health, and be sure to contact your doctor if: 
  · You do not get better as expected. Where can you learn more? Go to http://kodak-dodie.info/. Enter T453 in the search box to learn more about \"Nephrotic Syndrome: Care Instructions. \" Current as of: March 14, 2018 Content Version: 11.9 © 8468-3011 [employer]. Care instructions adapted under license by [employer] (which disclaims liability or warranty for this information). If you have questions about a medical condition or this instruction, always ask your healthcare professional. [place] [address] any warranty or liability for your use of this information. Raynaud's Phenomenon: Care Instructions Overview Raynaud's is a condition that causes your hands and feet to overreact to cold.  They may become painful and numb, and they can change colors, becoming very pale and then blue. This condition also is called Raynaud's phenomenon. There are two kinds of Raynaud's. Primary Raynaud's, also known as Raynaud's disease, happens by itself and is the most common form. Secondary Raynaud's, also called Raynaud's syndrome, happens as part of another disease. In Raynaud's, the small vessels that bring blood to the skin either become narrow, or constrict for a short period of time. This limits blood flow to the hands and feet and sometimes to the nose or ears. Your hands and feet feel cold and numb and then turn very pale. As blood flow returns, your fingers and toes may turn red, and begin to throb and hurt. Raynaud's can be painful and annoying, but it usually does not cause serious problems. You can take simple steps to protect your hands and feet from the cold. If you have a bad case of Raynaud's and you cannot keep your hands and feet warm enough, your doctor may prescribe medicine. Follow-up care is a key part of your treatment and safety. Be sure to make and go to all appointments, and call your doctor if you are having problems. It's also a good idea to know your test results and keep a list of the medicines you take. How can you care for yourself at home? To prevent Raynaud's episodes or ease symptoms · Run warm water over your hands or feet to increase blood flow. Use another part of your body, such as your forearm, to make sure the water is not too hot; you could burn your hands or feet and not feel it because they are numb. · Swing your arms in a [place] at the sides of your body (\"windmilling\") to increase blood flow. · If your doctor prescribes medicine to help Raynaud's, take it exactly as prescribed. Call your doctor if you think you are having a problem with your medicine. · If another condition causes your Raynaud's, make sure to follow your treatment for that condition. · Wear mittens or gloves when it is cold outside.  Mittens are warmer than gloves because they keep your fingers together. Gloves underneath mittens will keep your hands warmer than gloves alone. You also can use pot holders or oven mitts when getting something from the freezer or refrigerator. · You can slip chemical hand warmers into your mittens or gloves when you do outside activities. · Do not smoke. Nicotine makes blood vessels constrict, which can bring on an attack. If you need help quitting, talk to your doctor about stop-smoking programs and medicines. These can increase your chances of quitting for good. · Avoid caffeine and cold medicines that have pseudoephedrine. They make blood vessels constrict. Beta-blocker medicines, often used to treat high blood pressure, also can make Raynaud's worse. · Drink plenty of fluids to prevent dehydration, which can lower the amount of blood moving through the blood vessels. If you have kidney, heart, or liver disease and have to limit fluids, talk with your doctor before you increase the amount of fluids you drink. · Try to stay calm when you are under stress. Anxiety can make your blood vessels constrict and lead to a Raynaud's attack. To keep your whole body warm · Eat a hot meal and drink a warm liquid before going outside. They may help raise your body temperature. · Wear layers of warm clothing. The inner layer should be made of a material such as polypropylene that pulls moisture away from your body. · Wear a hat. · Do not wear clothing that is too tight. It can decrease or cut off blood flow. · Try to stay dry. Choose waterproof, breathable jackets and boots. Being wet makes you more likely to become chilled. When should you call for help? Call your doctor now or seek immediate medical care if: 
  · You have severe pain in your hands or feet.  
  · Normal color does not return to your hands or feet.  
  · Your hands or feet do not warm up even after home care.  Watch closely for changes in your health, and be sure to contact your doctor if you have any problems. Where can you learn more? Go to http://kodak-dodie.info/. Enter P043 in the search box to learn more about \"Raynaud's Phenomenon: Care Instructions. \" Current as of: [place]axmi 10, 2018 Content Version: 11.9 © 2183-5863 [employer]. Care instructions adapted under license by [employer] (which disclaims liability or warranty for this information). If you have questions about a medical condition or this instruction, always ask your healthcare professional. [address] any warranty or liability for your use of this information.

## 2019-02-18 NOTE — PROGRESS NOTES
Chief Complaint Patient presents with  Weight Management Rm 5  
 
 
HPI: 
The patient is a 41 y.o. female who presents today for a follow up appointment. No hospital, ER or specialist visits since last primary care visit except as noted below. The patient is a 41y.o. year old female who presents today for initial medical weight loss visit. Pt's initial weight is 172 pounds with a BMI of 33.59. Patient's goal weight is 130 pounds. The patient's bariatric comorbidities include HTN, lupus, nephrotic syndrome, anxiety, depression, arthritis. The patient's reason for medical weight loss is feel better, help with lupus related comorbidities, help with all of her medical problems. The patient's quality of life goals are to be able to do \"a lot of things that she used to do before her diagnosis of lupus at the age of 20\". Pt with hypertension, nephrotic syndrome, SLE, vit d deficiency, hx of thromboembolism, and obesity presents to the office with a BP of 114/80. For BP, she uses Atenolol 50 mg and Losartan 100 mg daily, tolerating well. For cholesterol, she uses Zocor 20 mg daily, tolerating well. For RAD, she uses Warfin 6 mg daily, rx'd by Dr. Johnny Grajeda who she sees regularly. The patient's diet typically includes: B - eggs, coffee (every once in a while), beans, rice, fruit, sometimes skip breakfast; L - chicken, salad, rice, tortilla, steak, soups, S - not really a snacker, D - does not eat much for dinner, small sandwich, fruit, salad The patient is drinking the following: soda (just cut these out, occasionally but not even when out to eat, coffee, mostly water, OJ infrequently, cranberry juice for urinary symptoms - has one glass/day The patient's exercise at this time includes: walking (tracking her steps on her phone) The patient is currently weighing: \"I try not to\". She feels she is usually in the same range. The patient has not tried appetite suppressants in the past, but she has tried some food interventions. She has previously tried the following medications: \"fit teas, honey/cinnnamon teas\", apple cider vinegar. She has tried restrictive diets. She patient has not tried appetite suppressants in the past, but she has tried some food interventions. She has previously tried the following medications: \"fit teas, honey/cinnnamon teas\", apple cider vinegar. She has tried restrictive diets. Started going to the gym 3x/week. She is not drinking soda or juices, mostly water, she is feeling stressed and feels like \"giving up\" because she has not been able to successfully lose weight despite all of her efforts. She gained 5 pounds since the last visit. She drinks 64 oz of water daily. She averages 8 hours of sleep nightly. Sometimes, she has difficulty sleeping through the night and wakes up multiple times a night. Denies caffeine consumption. She was started on Bontril last month and she felt that it worked initially but then Get[employer]itted got used to it\". She has continued with constipation despite taking Miralax PRN. Last Weight Metrics: 
12/12/2012 10/30/2012 9/6/2012 8/3/2012 7/23/2012 7/19/2012 6/26/2012 5/29/2012  
155 lb 156 lb 6.4 oz 157 lb 12.8 oz 157 lb 161 lb 6.4 oz 157 lb 10.1 oz 150 lb 155 lb  
29.3 kg/m2 29.57 kg/m2 29.83 kg/m2 29.68 kg/m2 30.51 kg/m2 29.8 kg/m2 29.3 kg/m2 30.27 kg/m2 Review of Systems A comprehensive review of systems was negative except for that written in the HPI. Patient Active Problem List  
Diagnosis Code  Pregnancy Z34.90  Systemic lupus erythematosus ([facility] [address] M32.9  Lupus nephritis. Chronic non-nephrotic proteinuria for years through 3/11. Now proteinuria up to 12 grams. M32.14  
 Hypertension I10  
 Acute renal failure secondary to lupus nephritis and possibly ATN as well. In 3/11 the Bun/creat was 10/0.7 N17.9  Cholestasis of pregnancy O26.619, K83.1  Hypertension, malignant I10  
 Nephrotic syndrome N04.9  Anemia D64.9  Long-term use of immunosuppressant medication Z79.899  Current use of steroid medication Z79.52  
 Lupus nephritis (HCC) M32.14  
 Hypermobility syndrome M35.7 [facility] Branch retinal artery occlusion H34.239  
 Depression, major, recurrent (HCC) F33.9  Anxiety disorder F41.9  Diplopia H53.2  Carpal instability of left wrist with volar intercalated segment instability M25.332  Carpal instability of right wrist M25.331  Raynaud's syndrome I73.00  Lupus L93.0  Acute blood loss anemia D62  GERD (gastroesophageal reflux disease) K21.9  History of thromboembolism Z86.718  Class 1 obesity due to excess calories with body mass index (BMI) of 33.0 to 33.9 in adult E66.09, Z68.33  
 Hypocortisolemia (HCC) E27.49  Fatigue R53.83  
 Hypercholesterolemia E78.00  Vitamin D deficiency E55.9  Thrombocytopenia ([address]  D69.6  Elevated cortisol level (HCC) E27.0  SAMARA (generalized anxiety disorder) F41.1  Headache R51  
 PUD (peptic ulcer disease) K27.9  Anemia associated with acute blood loss D62 Past Medical History:  
Diagnosis Date  Anemia associated with acute blood loss 06/2018  
 blood transfusion x 2. Txd w iron. Dr. Gerhard Esparza.  Arthritis  Branch retinal artery occlusion approx 2013 Right, as of 5/26/16 resolved and on Coumadin.  Celiac disease 2012 Dr. Corky Tobias.  Chronic renal insufficiency, stage II (mild) secondary to lupus nephritis:  Dr. Jia Mccray  Depression, major, recurrent ([address] 03/10/2014 Shilpa Shoulders, LCSW.  Diffuse proliferative lupus glomerulonephritis ([address] Dr Yulisa Hannah  Diplopia 2014 Dr. Gio Zamora.  Dizziness 07/2014 Dr. Aura Barba, neuro.  Elevated cortisol level (HCC)  Fatigue   
 intermittent due to lupus  SAMARA (generalized anxiety disorder) 03/10/2014 Dorathy Kanner, [facility]  Gallstones 2017 Dr. Raad Chang  Gastritis   
 as of 16 asymptomatic  Headache  Dr. Natalya Brannon  Hypertension 2000s  
 due to CKD. Dr. Elliott Lawson  Left tibialis posterior tendonitis   
 due to injury on sand. Dr. Keon Seth.  Long term current use of anticoagulant therapy  Ovarian cyst 2016 Dr. Lopez Ask  Pregnancy induced hypertension  Proteinuria Dr. Elliott Lawson  PUD (peptic ulcer disease) Dr. Mariela Malone  Raynauds syndrome Dr. Alina Munson. Dr. Grant Apo.  Sicca syndrome ([facility] [address] Dr. Alina Munson.  Systemic lupus erythematosus with glomerular disease ([facility] [address]   
 as of 16 \"aching and swelling of joints\" per patient. Dr Elaine Stevenson. Dr. Elmer Carrillo. Past Surgical History:  
Procedure Laterality Date  HX ACL RECONSTRUCTION Left approx  Dr. Laury Dexter.  HX  SECTION    HX ENDOSCOPY  2012, 2017 Dr. Ciara Salazar.  HX LAP CHOLECYSTECTOMY  2017  
 due to gallstones. Dr. Raad Chang  HX ORTHOPAEDIC Right 10/22/2014  
 hand to thumb due to thumb dislocation. Dr. Tad Woodruff.  HX OTHER SURGICAL    
 kidney biopsy; benign  HX OTHER SURGICAL Right 10/22/2014  
 right Posterior Interosseous Nerve Neurectomy, right Anterior Interosseous Nerve Neurectomy. Dr. Tad Woodruff  HX RENAL BIOPSY  2018 CT Guided Renal Biopsy with Conscious Sedation. due to CKD/SLE.  HX SEPTOPLASTY  2016  
 w inf turbinate resection. due to turbinate hypertrophy. Dr. Prosper Kay Social History Tobacco Use  Smoking status: Never Smoker  Smokeless tobacco: Never Used Substance Use Topics  Alcohol use: No  
 Drug use: No  
 
 
Family History Problem Relation Age of Onset  Glaucoma Father  Hypertension Father Outpatient Medications Marked as Taking for the 19 encounter (Office Visit) with Kenrick Garcia NP  
 Medication Sig Dispense Refill  ergocalciferol (ERGOCALCIFEROL) 50,000 unit capsule TAKE ONE CAPSULE BY MOUTH ONCE A WEEK  0  
 NIFEdipine ER (PROCARDIA XL) 30 mg ER tablet  ciclopirox (LOPROX) 0.77 % topical cream     
 phendimetrazine tartrate 35 mg tab Take 35 mg by mouth three (3) times daily. Max Daily Amount: 105 mg. 90 Tab 0  
 fluticasone (FLONASE) 50 mcg/actuation nasal spray 2 Sprays by Both Nostrils route daily. 1 Bottle 5  warfarin (COUMADIN) 6 mg tablet Take 4 mg by mouth daily.  ferrous sulfate 325 mg (65 mg iron) tablet Take 1 Tab by mouth two (2) times daily (with meals). (Patient taking differently: Take 325 mg by mouth daily.) 60 Tab 1  
 acetaminophen (ACETAMINOPHEN EXTRA STRENGTH) 500 mg tablet Take 1 Tab by mouth every six (6) hours as needed for Pain. 30 Tab 0  
 hydroxychloroquine (PLAQUENIL) 200 mg tablet Take 1 tab po qam and 1/2 tab po qpm 45 Tab 9  
 simvastatin (ZOCOR) 20 mg tablet TAKE 1 TABLET BY MOUTH ONCE DAILY  3  
 losartan (COZAAR) 100 mg tablet Take 100 mg by mouth nightly.  atenolol (TENORMIN) 50 mg tablet Take  by mouth nightly.  mycophenolate (CELLCEPT) 500 mg tablet Take 1,500 mg by mouth two (2) times a day. Current Outpatient Medications on File Prior to Visit Medication Sig Dispense Refill  ergocalciferol (ERGOCALCIFEROL) 50,000 unit capsule TAKE ONE CAPSULE BY MOUTH ONCE A WEEK  0  
 NIFEdipine ER (PROCARDIA XL) 30 mg ER tablet  ciclopirox (LOPROX) 0.77 % topical cream     
 fluticasone (FLONASE) 50 mcg/actuation nasal spray 2 Sprays by Both Nostrils route daily. 1 Bottle 5  warfarin (COUMADIN) 6 mg tablet Take 4 mg by mouth daily.  ferrous sulfate 325 mg (65 mg iron) tablet Take 1 Tab by mouth two (2) times daily (with meals).  (Patient taking differently: Take 325 mg by mouth daily.) 60 Tab 1  
 acetaminophen (ACETAMINOPHEN EXTRA STRENGTH) 500 mg tablet Take 1 Tab by mouth every six (6) hours as needed for Pain. 30 Tab 0  
 hydroxychloroquine (PLAQUENIL) 200 mg tablet Take 1 tab po qam and 1/2 tab po qpm 45 Tab 9  
 simvastatin (ZOCOR) 20 mg tablet TAKE 1 TABLET BY MOUTH ONCE DAILY  3  
 losartan (COZAAR) 100 mg tablet Take 100 mg by mouth nightly.  atenolol (TENORMIN) 50 mg tablet Take  by mouth nightly.  mycophenolate (CELLCEPT) 500 mg tablet Take 1,500 mg by mouth two (2) times a day. No current facility-administered medications on file prior to visit. Allergies Allergen Reactions  Gluten Diarrhea  Fiorinal-Codeine #3 [Codeine-Butalbital-Asa-Caff] Nausea Only PE: 
Visit Vitals /76 (BP 1 Location: Right arm, BP Patient Position: Sitting) Pulse 65 Temp 98.6 °F (37 °C) (Oral) Resp 20 Ht 5' 1\" (1.549 m) Wt 168 lb 8 oz (76.4 kg) SpO2 98% BMI 31.84 kg/m² Results for orders placed or performed during the hospital encounter of 11/11/18 URINALYSIS W/ REFLEX CULTURE Result Value Ref Range Color YELLOW/STRAW Appearance CLEAR CLEAR Specific gravity <1.005 1.003 - 1.030  
 pH (UA) 6.0 5.0 - 8.0 Protein 100 (A) NEG mg/dL Glucose NEGATIVE  NEG mg/dL Ketone NEGATIVE  NEG mg/dL Bilirubin NEGATIVE  NEG Blood SMALL (A) NEG Urobilinogen 0.2 0.2 - 1.0 EU/dL Nitrites NEGATIVE  NEG Leukocyte Esterase NEGATIVE  NEG    
 WBC 0-4 0 - 4 /hpf  
 RBC 0-5 0 - 5 /hpf Epithelial cells FEW FEW /lpf Bacteria NEGATIVE  NEG /hpf  
 UA:UC IF INDICATED CULTURE NOT INDICATED BY UA RESULT CNI    
CBC WITH AUTOMATED DIFF Result Value Ref Range WBC 8.3 3.6 - 11.0 K/uL  
 RBC 4.76 3.80 - 5.20 M/uL  
 HGB 14.4 11.5 - 16.0 g/dL HCT 42.7 35.0 - 47.0 % MCV 89.7 80.0 - 99.0 FL  
 MCH 30.3 26.0 - 34.0 PG  
 MCHC 33.7 30.0 - 36.5 g/dL  
 RDW 12.9 11.5 - 14.5 % PLATELET 716 074 - 772 K/uL MPV 8.7 (L) 8.9 - 12.9 FL  
 NRBC 0.0 0  WBC ABSOLUTE NRBC 0.00 0.00 - 0.01 K/uL NEUTROPHILS 73 32 - 75 % LYMPHOCYTES 19 12 - 49 % MONOCYTES 6 5 - 13 % EOSINOPHILS 1 0 - 7 % BASOPHILS 0 0 - 1 % IMMATURE GRANULOCYTES 0 0.0 - 0.5 % ABS. NEUTROPHILS 6.0 1.8 - 8.0 K/UL  
 ABS. LYMPHOCYTES 1.6 0.8 - 3.5 K/UL  
 ABS. MONOCYTES 0.5 0.0 - 1.0 K/UL  
 ABS. EOSINOPHILS 0.1 0.0 - 0.4 K/UL  
 ABS. BASOPHILS 0.0 0.0 - 0.1 K/UL  
 ABS. IMM. GRANS. 0.0 0.00 - 0.04 K/UL  
 DF AUTOMATED METABOLIC PANEL, COMPREHENSIVE Result Value Ref Range Sodium 136 136 - 145 mmol/L Potassium 3.9 3.5 - 5.1 mmol/L Chloride 104 97 - 108 mmol/L  
 CO2 23 21 - 32 mmol/L Anion gap 9 5 - 15 mmol/L Glucose 103 (H) 65 - 100 mg/dL BUN 28 (H) 6 - 20 MG/DL Creatinine 1.76 (H) 0.55 - 1.02 MG/DL  
 BUN/Creatinine ratio 16 12 - 20 GFR est AA 39 (L) >60 ml/min/1.73m2 GFR est non-AA 32 (L) >60 ml/min/1.73m2 Calcium 8.3 (L) 8.5 - 10.1 MG/DL Bilirubin, total 0.6 0.2 - 1.0 MG/DL  
 ALT (SGPT) 28 12 - 78 U/L  
 AST (SGOT) 14 (L) 15 - 37 U/L Alk. phosphatase 65 45 - 117 U/L Protein, total 8.4 (H) 6.4 - 8.2 g/dL Albumin 3.7 3.5 - 5.0 g/dL Globulin 4.7 (H) 2.0 - 4.0 g/dL A-G Ratio 0.8 (L) 1.1 - 2.2 PROTHROMBIN TIME + INR Result Value Ref Range INR 2.6 (H) 0.9 - 1.1 Prothrombin time 25.1 (H) 9.0 - 11.1 sec SAMPLES BEING HELD Result Value Ref Range SAMPLES BEING HELD 1RED COMMENT Add-on orders for these samples will be processed based on acceptable specimen integrity and analyte stability, which may vary by analyte. Gen: alert, oriented, no acute distress Head: normocephalic, atraumatic Ears: external auditory canals clear, TMs without erythema or effusion Eyes: pupils equal round reactive to light, sclera clear, conjunctiva clear Oral: moist mucus membranes, no oral lesions, no pharyngeal inflammation or exudate Neck: symmetric normal sized thyroid, no carotid bruits, no jugular vein distention Resp: no increase work of breathing, lungs clear to ausculation bilaterally, no wheezing, rales or rhonchi CV: S1, S2 normal.  No murmurs, rubs, or gallops. Abd: soft, not tender, not distended. No hepatosplenomegaly. Normal bowel sounds. No hernias. No abdominal or renal bruits. Neuro: cranial nerves intact, normal strength and movement in all extremities, reflexes and sensation intact and symmetric. Skin: no lesion or rash Extremities: no cyanosis or edema Assessment/Plan: ICD-10-CM ICD-9-CM 1. Hypertension secondary to other renal disorders I15.1 405.91 phendimetrazine tartrate 35 mg tab  
 N28.89 593.89 2. Lupus nephritis. Chronic non-nephrotic proteinuria for years through 3/11. Now proteinuria up to 12 grams. M32.14 710.0 phendimetrazine tartrate 35 mg tab  
  583.81 REFERRAL TO RHEUMATOLOGY 3. Recurrent major depressive [facility], [facility] F33.42 296.36 phendimetrazine tartrate 35 mg tab 4. Long-term use of immunosuppressant medication Z79.899 V58.69 phendimetrazine tartrate 35 mg tab REFERRAL TO RHEUMATOLOGY 5. Class 1 obesity due to excess calories with serious comorbidity and body mass index (BMI) of 33.0 to 33.9 in adult E66.09 278.00 phendimetrazine tartrate 35 mg tab  
 Z68.33 V85.33   
6. Raynaud's disease without gangrene I73.00 443.0 REFERRAL TO RHEUMATOLOGY 7. Retinal artery branch occlusion of right eye H34.231 362.32 REFERRAL TO HEMATOLOGY 8. Acute renal failure, unspecified acute renal failure type ([facility] [address] N17.9 584.9 REFERRAL TO NEPHROLOGY 9. Lupus nephritis ([facility] M32.14 710.0 REFERRAL TO NEPHROLOGY  
  583.81   
10. Nephrotic syndrome N04.9 581.9 REFERRAL TO NEPHROLOGY Follow-up Disposition: 
Return in about 4 weeks (around 3/18/2019) for Medication Check, Weight Mgmt, Referrals F/U. 
lab results and schedule of future lab studies reviewed with patient - request records from Dr. Flip Torres office, consider Phentermine based on renal function and Nephrology consult. reviewed diet, exercise and weight control 
reviewed medications and side effects in detail Constipation:  Increase Miralax to daily. Health Maintenance reviewed - reviewed, UTD. Recommended healthy diet low in carbohydrates, fats, sodium and cholesterol. Recommended regular cardiovascular exercise 3-6 times per week for 30-60 minutes daily. Chart is reviewed and updated today in the office. Records requested for other providers patient has seen and is currently seeing. Patient was offered a choice/choices in the treatment plan today. Patient expresses understanding of the plan and agrees with recommendations. Verbal and written instructions (see AVS) provided. See patient instructions for more. Patient expresses understanding of diagnosis and treatment plan.

## 2019-02-18 NOTE — PROGRESS NOTES
Jose M Ochoa  Identified pt with two pt identifiers(name and ). Chief Complaint Patient presents with  Weight Management Rm 5  
 
 
1. Have you been to the ER, urgent care clinic since your last visit?n   Hospitalized since your last visit? n  
 
2. Have you seen or consulted any other health care providers outside of the [address] [place] [address] since your last visit? Include any pap smears or colon screening. n 
 
 
Advance Care Planning In the event something were to happen to you and you were unable to speak on your behalf, do you have an Advance Directive/ Living Will in place stating your wishes? NO If yes, do we have a copy on file NO If no, would you like information NO Medication reconciliation up to date and corrected with patient at this time. Today's provider has been notified of reason for visit, vitals and flowsheets obtained on patients. Reviewed record in preparation for visit, huddled with provider and have obtained necessary documentation. There are no preventive care reminders to display for this patient. Wt Readings from Last 3 Encounters:  
19 168 lb 8 oz (76.4 kg) 19 163 lb 1.6 oz (74 kg) 18 167 lb 8 oz (76 kg) Temp Readings from Last 3 Encounters:  
19 98.6 °F (37 °C) (Oral) 19 98.5 °F (36.9 °C) (Oral) 18 97.7 °F (36.5 °C) (Temporal) BP Readings from Last 3 Encounters:  
19 114/76  
19 114/80  
18 125/90 Pulse Readings from Last 3 Encounters:  
19 65  
19 61  
18 (!) 52 Vitals:  
 19 0472 BP: 114/76 Pulse: 65 Resp: 20 Temp: 98.6 °F (37 °C) TempSrc: Oral  
SpO2: 98% Weight: 168 lb 8 oz (76.4 kg) Height: 5' 1\" (1.549 m) PainSc:   0 - No pain Learning Assessment: 
:  
 
Learning Assessment 2014 PRIMARY LEARNER Patient Patient HIGHEST LEVEL OF EDUCATION - PRIMARY LEARNER  GRADUATED HIGH SCHOOL OR GED GRADUATED HIGH SCHOOL OR GED  
BARRIERS PRIMARY LEARNER NONE NONE  
CO-LEARNER CAREGIVER No No  
PRIMARY LANGUAGE ENGLISH ENGLISH  NEED No -  
LEARNER PREFERENCE PRIMARY LISTENING PICTURES  
  READING -  
LEARNING SPECIAL TOPICS none -  
ANSWERED BY patient patient RELATIONSHIP SELF SELF Depression Screening: 
:  
 
3 most recent PHQ Screens 2/18/2019 Little interest or pleasure in doing things Several days Feeling down, depressed, irritable, or hopeless Several days Total Score PHQ 2 2 Trouble falling or staying asleep, or sleeping too much Several days Feeling tired or having little energy Several days Poor appetite, weight loss, or overeating Not at all Feeling bad about yourself - or that you are a failure or have let yourself or your family down Several days Trouble concentrating on things such as school, work, reading, or watching TV Not at all Moving or speaking so slowly that other people could have noticed; or the opposite being so fidgety that others notice Not at all Thoughts of being better off dead, or hurting yourself in some way Not at all PHQ 9 Score 5 No flowsheet data found. Fall Risk Assessment: 
:  
 
No flowsheet data found. Abuse Screening: 
:  
 
No flowsheet data found. ADL Screening: 
:  
 
ADL Assessment 2/18/2019 Feeding yourself No Help Needed Getting from bed to chair No Help Needed Getting dressed No Help Needed Bathing or showering No Help Needed Walk across the room (includes cane/walker) No Help Needed Using the telphone No Help Needed Taking your medications No Help Needed Preparing meals No Help Needed Managing money (expenses/bills) No Help Needed Moderately strenuous housework (laundry) No Help Needed Shopping for personal items (toiletries/medicines) No Help Needed Shopping for groceries No Help Needed Driving No Help Needed Climbing a flight of stairs No Help Needed Getting to places beyond walking distances No Help Needed BMI: 
Weight Metrics 2/18/2019 2/18/2019 1/21/2019 12/5/2018 12/5/2018 11/11/2018 10/19/2018 Weight - 168 lb 8 oz 163 lb 1.6 oz - 167 lb 8 oz 171 lb 8.3 oz 172 lb Neck Circ (inches) 34 - - - - - - Waist Measure Inches - - 36.5 34 - - - Body Fat % 34.3 - 40.6 34.1 - - -  
BMI - 31.84 kg/m2 30.82 kg/m2 - 31.65 kg/m2 32.41 kg/m2 33.59 kg/m2 Medication reconciliation up to date and corrected with patient at this time.

## 2019-02-28 ENCOUNTER — OFFICE VISIT (OUTPATIENT)
Dept: FAMILY MEDICINE CLINIC | Age: 39
End: 2019-02-28

## 2019-02-28 VITALS
TEMPERATURE: 98.8 F | HEART RATE: 78 BPM | OXYGEN SATURATION: 100 % | BODY MASS INDEX: 31.3 KG/M2 | HEIGHT: 61 IN | DIASTOLIC BLOOD PRESSURE: 80 MMHG | SYSTOLIC BLOOD PRESSURE: 111 MMHG | WEIGHT: 165.8 LBS | RESPIRATION RATE: 20 BRPM

## 2019-02-28 DIAGNOSIS — R52 BODY ACHES: ICD-10-CM

## 2019-02-28 DIAGNOSIS — R68.89 FLU-LIKE SYMPTOMS: ICD-10-CM

## 2019-02-28 DIAGNOSIS — J02.9 SORE THROAT: Primary | ICD-10-CM

## 2019-02-28 DIAGNOSIS — Z20.828 EXPOSURE TO THE FLU: ICD-10-CM

## 2019-02-28 LAB
S PYO AG THROAT QL: NEGATIVE
VALID INTERNAL CONTROL?: YES

## 2019-02-28 RX ORDER — OSELTAMIVIR PHOSPHATE 75 MG/1
75 CAPSULE ORAL 2 TIMES DAILY
Qty: 10 CAP | Refills: 0 | Status: SHIPPED | OUTPATIENT
Start: 2019-02-28 | End: 2019-03-05

## 2019-02-28 NOTE — PROGRESS NOTES
Both kids tested pos for flu. Got flu shot. Sxs began Sat with abdm pain and diarrhea, vomiting X 1. Yest with diarrhea, ST. Today with HA, body aches, ST, some cough. Visit Vitals /80 (BP 1 Location: Left arm, BP Patient Position: Sitting) Pulse 78 Temp 98.8 °F (37.1 °C) (Oral) Resp 20 Ht 5' 0.83\" (1.545 m) Wt 165 lb 12.8 oz (75.2 kg) LMP  (LMP Unknown) SpO2 100% BMI 31.51 kg/m² Patient alert and cooperative. Reviewed above. Assessment: 1. Flu-like symptoms, probable flu as exposure at home from both of kids. Plan: 1. Tamiflu b.i.d. for five days. 2. Stay out of work till next week. 3. Follow up if secondary infection, otherwise prn.

## 2019-02-28 NOTE — PROGRESS NOTES
Adore Ochoa  Identified pt with two pt identifiers(name and ). Chief Complaint Patient presents with  Sore Throat  Generalized Body Aches  Cough 1. Have you been to the ER, urgent care clinic since your last visit? Hospitalized since your last visit? No 
 
2. Have you seen or consulted any other health care providers outside of the [address] [place] [address] since your last visit? Include any pap smears or colon screening. No 
 
 
Would you like to sign up for MyChart today, if you have not already done so? No 
If not, would you like information on MyChart, and how to sign up at a later time? No 
 
 
Medication reconciliation up to date and corrected with patient at this time. Today's provider has been notified of reason for visit, vitals and flowsheets obtained on patients. Reviewed record in preparation for visit, huddled with provider and have obtained necessary documentation. There are no preventive care reminders to display for this patient. Wt Readings from Last 3 Encounters:  
19 165 lb 12.8 oz (75.2 kg) 19 168 lb 8 oz (76.4 kg) 19 163 lb 1.6 oz (74 kg) Temp Readings from Last 3 Encounters:  
19 98.8 °F (37.1 °C) (Oral) 19 98.6 °F (37 °C) (Oral) 19 98.5 °F (36.9 °C) (Oral) BP Readings from Last 3 Encounters:  
19 111/80  
19 114/76  
19 114/80 Pulse Readings from Last 3 Encounters:  
19 78  
19 65  
19 61 Vitals:  
 19 1039 BP: 111/80 Pulse: 78 Resp: 20 Temp: 98.8 °F (37.1 °C) TempSrc: Oral  
SpO2: 100% Weight: 165 lb 12.8 oz (75.2 kg) Height: 5' 0.83\" (1.545 m) PainSc:   0 - No pain Learning Assessment: 
:  
 
Learning Assessment 2014 PRIMARY LEARNER Patient Patient HIGHEST LEVEL OF EDUCATION - PRIMARY LEARNER  GRADUATED HIGH SCHOOL OR GED GRADUATED HIGH SCHOOL OR GED  
BARRIERS PRIMARY LEARNER NONE NONE CO-LEARNER CAREGIVER No No  
PRIMARY LANGUAGE ENGLISH ENGLISH  NEED No -  
LEARNER PREFERENCE PRIMARY LISTENING PICTURES  
  READING -  
LEARNING SPECIAL TOPICS none -  
ANSWERED BY patient patient RELATIONSHIP SELF SELF Depression Screening: 
:  
 
3 most recent PHQ Screens 2/18/2019 Little interest or pleasure in doing things Several days Feeling down, depressed, irritable, or hopeless Several days Total Score PHQ 2 2 Trouble falling or staying asleep, or sleeping too much Several days Feeling tired or having little energy Several days Poor appetite, weight loss, or overeating Not at all Feeling bad about yourself - or that you are a failure or have let yourself or your family down Several days Trouble concentrating on things such as school, work, reading, or watching TV Not at all Moving or speaking so slowly that other people could have noticed; or the opposite being so fidgety that others notice Not at all Thoughts of being better off dead, or hurting yourself in some way Not at all PHQ 9 Score 5 Fall Risk Assessment: 
:  
 
No flowsheet data found. Abuse Screening: 
:  
 
No flowsheet data found. ADL Screening: 
:  
 

## 2019-02-28 NOTE — ACP (ADVANCE CARE PLANNING)
In the event something were to happen to you and you were unable to speak on your behalf, do you have an Advance Directive/ Living Will in place stating your wishes? NO    If yes, do we have a copy on file NO    Advance Care Planning (ACP)       Attempted to discuss ACP with Ms. Ochoa today, she declines to discuss at this time. Will readdress at future visit.

## 2019-04-10 ENCOUNTER — OFFICE VISIT (OUTPATIENT)
Dept: FAMILY MEDICINE CLINIC | Age: 39
End: 2019-04-10

## 2019-04-10 VITALS
DIASTOLIC BLOOD PRESSURE: 76 MMHG | RESPIRATION RATE: 20 BRPM | HEIGHT: 61 IN | HEART RATE: 56 BPM | TEMPERATURE: 97.7 F | WEIGHT: 168.5 LBS | SYSTOLIC BLOOD PRESSURE: 106 MMHG | OXYGEN SATURATION: 99 % | BODY MASS INDEX: 31.81 KG/M2

## 2019-04-10 DIAGNOSIS — M35.7 HYPERMOBILITY SYNDROME: ICD-10-CM

## 2019-04-10 DIAGNOSIS — M32.14 LUPUS NEPHRITIS (HCC): Chronic | ICD-10-CM

## 2019-04-10 DIAGNOSIS — Z13.39 SCREENING FOR ALCOHOLISM: ICD-10-CM

## 2019-04-10 DIAGNOSIS — E78.00 HYPERCHOLESTEROLEMIA: ICD-10-CM

## 2019-04-10 DIAGNOSIS — Z79.60 LONG-TERM USE OF IMMUNOSUPPRESSANT MEDICATION: ICD-10-CM

## 2019-04-10 DIAGNOSIS — I15.1 HYPERTENSION SECONDARY TO OTHER RENAL DISORDERS: ICD-10-CM

## 2019-04-10 DIAGNOSIS — R19.7 DIARRHEA, UNSPECIFIED TYPE: ICD-10-CM

## 2019-04-10 DIAGNOSIS — D69.6 THROMBOCYTOPENIA (HCC): ICD-10-CM

## 2019-04-10 DIAGNOSIS — M32.14 SYSTEMIC LUPUS ERYTHEMATOSUS WITH GLOMERULAR DISEASE, UNSPECIFIED SLE TYPE (HCC): Chronic | ICD-10-CM

## 2019-04-10 DIAGNOSIS — E55.9 VITAMIN D DEFICIENCY: ICD-10-CM

## 2019-04-10 DIAGNOSIS — F41.1 GAD (GENERALIZED ANXIETY DISORDER): ICD-10-CM

## 2019-04-10 DIAGNOSIS — Z13.31 SCREENING FOR DEPRESSION: ICD-10-CM

## 2019-04-10 DIAGNOSIS — K90.0 CELIAC DISEASE: Primary | ICD-10-CM

## 2019-04-10 DIAGNOSIS — M32.14 LUPUS NEPHRITIS (HCC): ICD-10-CM

## 2019-04-10 DIAGNOSIS — E66.09 CLASS 1 OBESITY DUE TO EXCESS CALORIES WITH SERIOUS COMORBIDITY AND BODY MASS INDEX (BMI) OF 33.0 TO 33.9 IN ADULT: ICD-10-CM

## 2019-04-10 DIAGNOSIS — N28.89 HYPERTENSION SECONDARY TO OTHER RENAL DISORDERS: ICD-10-CM

## 2019-04-10 DIAGNOSIS — K59.00 CONSTIPATION, UNSPECIFIED CONSTIPATION TYPE: ICD-10-CM

## 2019-04-10 PROBLEM — K21.9 GERD (GASTROESOPHAGEAL REFLUX DISEASE): Status: RESOLVED | Noted: 2018-06-12 | Resolved: 2019-04-10

## 2019-04-10 PROBLEM — E27.49 HYPOCORTISOLEMIA (HCC): Status: RESOLVED | Noted: 2018-10-19 | Resolved: 2019-04-10

## 2019-04-10 PROBLEM — D62 ANEMIA ASSOCIATED WITH ACUTE BLOOD LOSS: Status: RESOLVED | Noted: 2018-06-01 | Resolved: 2019-04-10

## 2019-04-10 RX ORDER — PHENDIMETRAZINE TARTRATE 105 MG/1
105 CAPSULE, EXTENDED RELEASE ORAL DAILY
Qty: 30 CAP | Refills: 0 | Status: SHIPPED | OUTPATIENT
Start: 2019-04-10 | End: 2019-05-17 | Stop reason: SDUPTHER

## 2019-04-10 NOTE — PATIENT INSTRUCTIONS
Medicare Wellness Visit, Female The best way to live healthy is to have a lifestyle where you eat a well-balanced diet, exercise regularly, limit alcohol use, and quit all forms of tobacco/nicotine, if applicable. Regular preventive services are another way to keep healthy. Preventive services (vaccines, screening tests, monitoring & exams) can help personalize your care plan, which helps you manage your own care. Screening tests can find health problems at the earliest stages, when they are easiest to treat. [facility] follows the current, evidence-based guidelines published by the [facility] ([facility]) when recommending preventive services for our patients. Because we follow these guidelines, sometimes recommendations change over time as research supports it. (For example, mammograms used to be recommended annually. Even though Medicare will still pay for an annual mammogram, the newer guidelines recommend a mammogram every two years for women of average risk.) Of course, you and your doctor may decide to screen more often for some diseases, based on your risk and your health status. Preventive services for you include: - Medicare offers their members a free annual wellness visit, which is time for you and your primary care provider to discuss and plan for your preventive service needs. Take advantage of this benefit every year! 
-All adults over the age of 72 should receive the recommended pneumonia vaccines. Current USPSTF guidelines recommend a series of two vaccines for the best pneumonia protection.  
-All adults should have a flu vaccine yearly and a tetanus vaccine every 10 years. All adults age 61 and older should receive a shingles vaccine once in their lifetime.   
-A bone mass density test is recommended when a woman turns 65 to screen for osteoporosis. This test is only recommended one time, as a screening. Some providers will use this same test as a disease monitoring tool if you already have osteoporosis. -All adults age 38-68 who are overweight should have a diabetes screening test once every three years.  
-Other screening tests and preventive services for persons with diabetes include: an eye exam to screen for diabetic retinopathy, a kidney function test, a foot exam, and stricter control over your cholesterol.  
-Cardiovascular screening for adults with routine risk involves an electrocardiogram (ECG) at intervals determined by your doctor.  
-Colorectal cancer screenings should be done for adults age 54-65 with no increased risk factors for colorectal cancer. There are a number of acceptable methods of screening for this type of cancer. Each test has its own benefits and drawbacks. Discuss with your doctor what is most appropriate for you during your annual wellness visit. The different tests include: colonoscopy (considered the best screening method), a fecal occult blood test, a fecal DNA test, and sigmoidoscopy. -Breast cancer screenings are recommended every other year for women of normal risk, age 54-69. 
-Cervical cancer screenings for women over age 72 are only recommended with certain risk factors.  
-All adults born between [facility] should be screened once for Hepatitis C. Here is a list of your current Health Maintenance items (your personalized list of preventive services) with a due date: 
Health Maintenance Due Topic Date Due  
 Annual Well Visit  10/15/2018 Celiac Disease: Care Instructions Your Care Instructions Celiac disease (or celiac sprue) is a problem with digesting gluten. Gluten is a type of protein found in wheat, rye, and other grains. This problem starts when the body's immune system attacks the small intestine when gluten is eaten. The immune system is supposed to fight off viruses and other invaders, but sometimes it turns on the person's own body.  (This is called an autoimmune disease.) Celiac disease seems to run in families. Celiac disease causes damage to the small intestine. This makes it hard for the body to absorb vitamins and other nutrients. You cannot prevent celiac disease. But you can stop and reverse the damage to the small intestine by eating a strict gluten-free diet. Follow-up care is a key part of your treatment and safety. Be sure to make and go to all appointments, and call your doctor if you are having problems. It's also a good idea to know your test results and keep a list of the medicines you take. How can you care for yourself at home? · Eat a gluten-free diet to prevent symptoms and damage to the small intestine. Even a small amount of gluten may cause damage. ? Avoid all foods that contain wheat, rye, and barley gluten. Bread, bagels, pasta, pizza, malted breakfast cereals, and crackers are all examples of foods that contain gluten. ? Avoid oats, at least at first. Oats may cause symptoms in some people. The oats may be contaminated with wheat, barley, or rye from processing. But many people who have celiac disease can eat moderate amounts of oats without having symptoms. Health professionals vary in their long-term recommendations regarding eating foods with oats. But most agree it is safe to eat oats labeled as gluten-free. · You may need to avoid milk and milk products for a while. Once you stop eating any gluten, the intestine will begin to heal. Then it should be okay to drink milk and eat milk products. · Read food labels carefully and look for hidden gluten, such as gluten in medicine and some food additives. If a label says \"modified food starch,\" the product may contain gluten. · Plan your diet around: 
? Eggs. ? Dairy products, if you can eat them. Cheese, yogurt, and other dairy products can be an important part of the diet. ?  Flours and foods made with amaranth, arrowroot, beans, buckwheat, corn, cornmeal, flax, millet, potatoes, gluten-free nut and oat bran, quinoa, rice, sorghum, soybeans, tapioca, or teff. ? Fresh, frozen, and canned meats, fruits, and vegetables. Watch for added gluten. · Talk to your doctor or contact your local hospital or dietitian for information about support groups in your area. You may find a support group helpful for discovering ways to help you deal with celiac disease. Celiac disease support groups often share recipes and good food sources. · Look for gluten-free foods. Many food stores, especially health food stores, offer specially marked gluten-free food. When should you call for help? Watch closely for changes in your health, and be sure to contact your doctor if: 
  · Your bloating, gas, and diarrhea get worse.  
  · You have bloating, gas, and diarrhea after not having them for a while. Where can you learn more? Go to http://[employer].info/. Enter [id number] in the search box to learn more about \"Celiac Disease: Care Instructions. \" Current as of: March 27, 2018 Content Version: 11.9 © 2393-0834 [employer]. Care instructions adapted under license by [employer] (which disclaims liability or warranty for this information). If you have questions about a medical condition or this instruction, always ask your healthcare professional. [place] [address] any warranty or liability for your use of this information. Constipation: 
 
Constipation is a common problem for many patients. Many medications, lower daily  fluid intake, etc may cause acute constipation. Constipation should be relieved in most cases by one or more of the following suggestions: 
 
1. Increase your daily fluid intake. Aim for a minimum of 48-64oz daily. 2.  Increased mobility. Move as much as possible. Walking not only improves circulation but it is also helpful for gastrointestinal motility. 3.  Stool softeners (non habit forming). A.  Colace (Docusate) - Over the Counter, may take 100mg capsule up to 3 times daily for a maximum of 300mg daily. 4.  Laxatives - If stool softeners are not providing relief OR if you have not had a BM in 3 or more days. It is essential that you Laxative use for extended periods of time may be habit forming. Please ask your provider if you are concerned about how long you have been taking them. A.  Miralax - mix 1 capful into any drink of your choice daily for up to 7 days. Can increase to two capfuls a day 
 OR 
 B. Milk of Magnesia - Take 30mL (2 Tbsp) to 60mL (4Tbsp) daily. OR 
 C. Senna (PUSH) (senokot) 8.5mg tablets. Take 1-2 every night. 5.  Rectal Suppositories/Fleets Enema - When the use of stool softeners or laxatives have been ineffective in relieving your constipation symptoms you may find a rectal suppository or fleets enema effective. Occasionally stool has hardened at the base of the rectum making the above interventions ineffective. A suppository or enema can remove this hardened stool so the above interventions will then be effective. GOAL: one soft bowel movement daily If the above methods are ineffective, please call our office to schedule an appointment to discuss next steps to help you relieve your constipation. You may require a prescription medication to eliminate your constipation. Appetite Suppressants: 
Phentermine Qysmia Rafat Rojass Ellyviq Jasave

## 2019-04-10 NOTE — PROGRESS NOTES
Chief Complaint Patient presents with  Weight Management Rm 7  
 
 
HPI: 
The patient is a 41 y.o. female who presents today for a follow up appointment. No hospital, ER or specialist visits since last primary care visit except as noted below. Weight Mgmt: The patient is a 41y.o. year old female who presents today for initial medical weight loss visit. Pt's initial weight is 172 pounds with a BMI of 33.59. Patient's goal weight is 130 pounds. The patient's weight today is 168.8 pounds with a BMI of 31.84. She has gained 2 pounds since her last visit. The patient's bariatric comorbidities include HTN, lupus, nephrotic syndrome, anxiety, depression, arthritis. The patient's reason for medical weight loss is feel better, help with lupus related comorbidities, help with all of her medical problems. The patient's quality of life goals are to be able to do \"a lot of things that she used to do before her diagnosis of lupus at the age of 20\". Pt with hypertension, nephrotic syndrome, SLE, vit d deficiency, hx of thromboembolism, and obesity presents to the office with a BP of 114/80. For BP, she uses Atenolol 50 mg and Losartan 100 mg daily, tolerating well. For cholesterol, she uses Zocor 20 mg daily, tolerating well. For RAD, she uses Warfin 6 mg daily, rx'd by Dr. Jeison Holman who she sees regularly. The patient's diet typically includes: B - eggs, coffee (every once in a while), beans, rice, fruit, sometimes skip breakfast; L - chicken, salad, rice, tortilla, steak, soups, S - not really a snacker, D - does not eat much for dinner, small sandwich, fruit, salad The patient is drinking the following: soda (just cut these out, occasionally but not even when out to eat, coffee, mostly water, OJ infrequently, cranberry juice for urinary symptoms - has one glass/day The patient's exercise at this time includes: walking (tracking her steps on her phone) The patient is currently weighing: \"I try not to\". She feels she is usually in the same range. The patient has not tried appetite suppressants in the past, but she has tried some food interventions. She has previously tried the following medications: \"fit teas, honey/cinnnamon teas\", apple cider vinegar. She has tried restrictive diets. She patient has not tried appetite suppressants in the past, but she has tried some food interventions. She has previously tried the following medications: \"fit teas, honey/cinnnamon teas\", apple cider vinegar. She has tried restrictive diets. Started going to the gym 3x/week. She is not drinking soda or juices, mostly water, she is feeling stressed and feels like \"giving up\" because she has not been able to successfully lose weight despite all of her efforts. She drinks 64 oz of water daily. She averages 8 hours of sleep nightly. Sometimes, she has difficulty sleeping through the night and wakes up multiple times a night. Denies caffeine consumption. She was started on Bontril in the fall and she felt that it worked initially but then [email or web address] got used to it\". She was changed to Bontril TID. She does not feel this is helping. She is forgetting to take the lunch hour dose. Weight Metrics 4/10/2019 2/28/2019 2/18/2019 2/18/2019 1/21/2019 12/5/2018 12/5/2018 Weight 168 lb 8 oz 165 lb 12.8 oz - 168 lb 8 oz 163 lb 1.6 oz - 167 lb 8 oz Neck Circ (inches) - - 34 - - - - Waist Measure Inches - - - - 36.5 34 - Body Fat % - - 34.3 - 40.6 34.1 - BMI 31.84 kg/m2 31.51 kg/m2 - 31.84 kg/m2 30.82 kg/m2 - 31.65 kg/m2 Constipation: 
Increased Miralax to daily. She has had diarrhea and constipation alternating. She has a hx of celiac disease and gastritis, saw Dr. Satnam Hawk for this. Has not seen him \"in years\", would like to re-establish for further workup and evaluation of her symptoms.   She has had these symptoms for approximately 5 months. Denies probiotic use. Denies N/V. Last Weight Metrics: 
Weight Loss Metrics 4/10/2019 2/28/2019 2/18/2019 1/21/2019 12/5/2018 11/11/2018 10/19/2018 Today's Wt 168 lb 8 oz 165 lb 12.8 oz 168 lb 8 oz 163 lb 1.6 oz 167 lb 8 oz 171 lb 8.3 oz 172 lb BMI 31.84 kg/m2 31.51 kg/m2 31.84 kg/m2 30.82 kg/m2 31.65 kg/m2 32.41 kg/m2 33.59 kg/m2 Review of Systems A comprehensive review of systems was negative except for that written in the HPI. Patient Active Problem List  
Diagnosis Code  Pregnancy Z34.90  Systemic lupus erythematosus ([facility] [address] M32.9  Lupus nephritis. Chronic non-nephrotic proteinuria for years through 3/11. Now proteinuria up to 12 grams. M32.14  
 Hypertension I10  
 Acute renal failure secondary to lupus nephritis and possibly ATN as well. In 3/11 the Bun/creat was 10/0.7 N17.9  Cholestasis of pregnancy O26.619, K83.1  Hypertension, malignant I10  
 Nephrotic syndrome N04.9  Anemia D64.9  Long-term use of immunosuppressant medication Z79.899  Current use of steroid medication Z79.52  
 Lupus nephritis (HCC) M32.14  
 Hypermobility syndrome M35.7 [facility] retinal artery occlusion H34.239  
 Depression, major, recurrent (HCC) F33.9  Anxiety disorder F41.9  Diplopia H53.2  Carpal instability of left wrist with volar intercalated segment instability M25.332  Carpal instability of right wrist M25.331  Raynaud's syndrome I73.00  Lupus ([facility] [address] M32.9  Acute blood loss anemia D62  GERD (gastroesophageal reflux disease) K21.9  History of thromboembolism Z86.718  Class 1 obesity due to excess calories with body mass index (BMI) of 33.0 to 33.9 in adult E66.09, Z68.33  
 Hypocortisolemia (HCC) E27.49  Fatigue R53.83  
 Hypercholesterolemia E78.00  Vitamin D deficiency E55.9  Thrombocytopenia ([address]  D69.6  Elevated cortisol level (HCC) E27.0  SAMARA (generalized anxiety disorder) F41.1  Headache R51  
 PUD (peptic ulcer disease) K27.9  Anemia associated with acute blood loss D62 Past Medical History:  
Diagnosis Date  Anemia associated with acute blood loss 2018  
 blood transfusion x 2. Txd w iron. Dr. Miranda Paniagua.  Arthritis  Branch retinal artery occlusion approx  Right, as of 16 resolved and on Coumadin.  Celiac disease  Dr. Satnam Hawk.  Chronic renal insufficiency, stage II (mild) secondary to lupus nephritis:  Dr. Gokul Jean-Baptiste  Depression, major, recurrent ([facility] [address] 03/10/2014 Hornerenio Stone, [facility].  Diffuse proliferative lupus glomerulonephritis ([facility] [address] Dr Irvin Ge  Diplopia  Dr. Curtis Power.  Dizziness 2014 Dr. Tyrell Lilly, neuro.  Elevated cortisol level (HCC)  Fatigue   
 intermittent due to lupus  SAMARA (generalized anxiety disorder) [facility]10/2014 [facility]  Gallstones  Dr. Carlos Funez  Gastritis   
 as of 16 asymptomatic  Headache  Dr. Tyrell Lilyl  Hypertension   
 due to CKD. Dr. Irvin eG  Left tibialis posterior tendonitis   
 due to injury on sand. Dr. Edson Franks.  Long term current use of anticoagulant therapy  Ovarian cyst 2016 Dr. Mahsa Rosario  Pregnancy induced hypertension  Proteinuria Dr. Irvin Ge  PUD (peptic ulcer disease) Dr. Satnam Hawk  Raynauds syndrome Dr. Henry Clifton.  Sicca syndrome ([facility] [address] Dr. Henry Camejo.  Systemic lupus erythematosus with glomerular disease ([facility] [address]   
 as of 16 \"aching and swelling of joints\" per patient. Dr Amari Burdick. Dr. Lino Rodriguez. Past Surgical History:  
Procedure Laterality Date  HX ACL RECONSTRUCTION Left approx  Dr. Catalina Gamboa.  HX  SECTION  2011  HX ENDOSCOPY  2012, 2017 Dr. Travis Wilkerson.  HX LAP CHOLECYSTECTOMY  2017  
 due to gallstones. Dr. Carlos Funez  HX ORTHOPAEDIC Right 10/22/2014  
 hand to thumb due to thumb dislocation. Dr. Sherwin Abdalla.  HX OTHER SURGICAL  2012  
 kidney biopsy; benign  HX OTHER SURGICAL Right 10/22/2014  
 right Posterior Interosseous Nerve Neurectomy, right Anterior Interosseous Nerve Neurectomy. Dr. Sherwin Abdalla  HX RENAL BIOPSY  1/7/14, 2018 CT Guided Renal Biopsy with Conscious Sedation. due to CKD/SLE.  HX SEPTOPLASTY  06/03/2016  
 w inf turbinate resection. due to turbinate hypertrophy. Dr. Guillermnia Melchor Social History Tobacco Use  Smoking status: Never Smoker  Smokeless tobacco: Never Used Substance Use Topics  Alcohol use: No  
 Drug use: No  
 
 
Family History Problem Relation Age of Onset  Glaucoma Father  Hypertension Father Outpatient Medications Marked as Taking for the 4/10/19 encounter (Office Visit) with Darius Lopez NP Medication Sig Dispense Refill  ergocalciferol (ERGOCALCIFEROL) 50,000 unit capsule TAKE ONE CAPSULE BY MOUTH ONCE A WEEK  0  
 NIFEdipine ER (PROCARDIA XL) 30 mg ER tablet  phendimetrazine tartrate 35 mg tab Take 35 mg by mouth three (3) times daily. Max Daily Amount: 105 mg. 90 Tab 0  
 fluticasone (FLONASE) 50 mcg/actuation nasal spray 2 Sprays by Both Nostrils route daily. 1 Bottle 5  warfarin (COUMADIN) 6 mg tablet Take 4 mg by mouth daily. M, W, F, Sa, Damian is 6mg once daily; Tu, Th is 4mg once daily  ferrous sulfate 325 mg (65 mg iron) tablet Take 1 Tab by mouth two (2) times daily (with meals). (Patient taking differently: Take 325 mg by mouth daily.) 60 Tab 1  
 acetaminophen (ACETAMINOPHEN EXTRA STRENGTH) 500 mg tablet Take 1 Tab by mouth every six (6) hours as needed for Pain.  30 Tab 0  
 hydroxychloroquine (PLAQUENIL) 200 mg tablet Take 1 tab po qam and 1/2 tab po qpm 45 Tab 9  
 simvastatin (ZOCOR) 20 mg tablet TAKE 1 TABLET BY MOUTH ONCE DAILY  3  
  losartan (COZAAR) 100 mg tablet Take 100 mg by mouth nightly.  atenolol (TENORMIN) 50 mg tablet Take  by mouth nightly.  mycophenolate (CELLCEPT) 500 mg tablet Take 1,500 mg by mouth two (2) times a day. Current Outpatient Medications on File Prior to Visit Medication Sig Dispense Refill  ergocalciferol (ERGOCALCIFEROL) 50,000 unit capsule TAKE ONE CAPSULE BY MOUTH ONCE A WEEK  0  
 NIFEdipine ER (PROCARDIA XL) 30 mg ER tablet  phendimetrazine tartrate 35 mg tab Take 35 mg by mouth three (3) times daily. Max Daily Amount: 105 mg. 90 Tab 0  
 fluticasone (FLONASE) 50 mcg/actuation nasal spray 2 Sprays by Both Nostrils route daily. 1 Bottle 5  warfarin (COUMADIN) 6 mg tablet Take 4 mg by mouth daily. M, W, F, Sa, Damian is 6mg once daily; Tu, Th is 4mg once daily  ferrous sulfate 325 mg (65 mg iron) tablet Take 1 Tab by mouth two (2) times daily (with meals). (Patient taking differently: Take 325 mg by mouth daily.) 60 Tab 1  
 acetaminophen (ACETAMINOPHEN EXTRA STRENGTH) 500 mg tablet Take 1 Tab by mouth every six (6) hours as needed for Pain. 30 Tab 0  
 hydroxychloroquine (PLAQUENIL) 200 mg tablet Take 1 tab po qam and 1/2 tab po qpm 45 Tab 9  
 simvastatin (ZOCOR) 20 mg tablet TAKE 1 TABLET BY MOUTH ONCE DAILY  3  
 losartan (COZAAR) 100 mg tablet Take 100 mg by mouth nightly.  atenolol (TENORMIN) 50 mg tablet Take  by mouth nightly.  mycophenolate (CELLCEPT) 500 mg tablet Take 1,500 mg by mouth two (2) times a day. No current facility-administered medications on file prior to visit. Allergies Allergen Reactions  Gluten Diarrhea  Fiorinal-Codeine #3 [Codeine-Butalbital-Asa-Caff] Nausea Only PE: 
Visit Vitals /76 (BP 1 Location: Right arm, BP Patient Position: Sitting) Pulse (!) 56 Temp 97.7 °F (36.5 °C) (Oral) Resp 20 Ht 5' 1\" (1.549 m) Wt 168 lb 8 oz (76.4 kg) SpO2 99% BMI 31.84 kg/m² Results for orders placed or performed in visit on 02/28/19 AMB POC RAPID STREP A Result Value Ref Range VALID INTERNAL CONTROL POC Yes Group A Strep Ag Negative Negative Gen: alert, oriented, no acute distress Head: normocephalic, atraumatic Ears: external auditory canals clear, TMs without erythema or effusion Eyes: pupils equal round reactive to light, sclera clear, conjunctiva clear Oral: moist mucus membranes, no oral lesions, no pharyngeal inflammation or exudate Neck: symmetric normal sized thyroid, no carotid bruits, no jugular vein distention Resp: no increase work of breathing, lungs clear to ausculation bilaterally, no wheezing, rales or rhonchi CV: S1, S2 normal.  No murmurs, rubs, or gallops. Abd: soft, not tender, not distended. No hepatosplenomegaly. Normal bowel sounds. No hernias. No abdominal or renal bruits. Neuro: cranial nerves intact, normal strength and movement in all extremities, reflexes and sensation intact and symmetric. Skin: no lesion or rash Extremities: no cyanosis or edema Assessment/Plan: As above 
 
lab results and schedule of future lab studies reviewed with patient - request records from Dr. Jaleel Zhao office, consider Phentermine based on renal function and Nephrology consult. reviewed diet, exercise and weight control 
reviewed medications and side effects in detail Constipation:  Increase Miralax to daily. Assessment/Plan: ICD-10-CM ICD-9-CM 1. Celiac disease K90.0 579.0 REFERRAL TO GASTROENTEROLOGY 2. Class 1 obesity due to excess calories with serious comorbidity and body mass index (BMI) of 33.0 to 33.9 in adult E66.09 278.00 phendimetrazine tartrate 105 mg cpER  
 Z68.33 V85.33   
3. Constipation, unspecified constipation type K59.00 564.00 REFERRAL TO GASTROENTEROLOGY 4. Diarrhea, unspecified type R19.7 787.91 REFERRAL TO GASTROENTEROLOGY 5.  Hypertension secondary to other renal disorders I15.1 405.91   
 N28.89 593.89   
6. Hypermobility syndrome M35.7 728.5 7. Lupus nephritis. Chronic non-nephrotic proteinuria for years through 3/11. Now proteinuria up to 12 grams. M32.14 710.0   
  583.81   
8. Lupus nephritis (HCC) M32.14 710.0   
  583.81   
9. Systemic lupus erythematosus with glomerular disease, unspecified SLE type ([facility] [address] M32.14 710.0   
  583.81   
10. Long-term use of immunosuppressant medication Z79.899 V58.69   
11. Hypercholesterolemia E78.00 272.0 12. Vitamin D deficiency E55.9 268.9 13. Thrombocytopenia (HCC)  D69.6 287.5 14. SAMARA (generalized anxiety disorder) F41.1 300.02   
15. Screening for alcoholism Z13.39 V79.1 [place] ANNUAL ALCOHOL SCREEN 15 MIN 16. Screening for depression Z13.31 V79.0 Celia 68 Follow-up and Dispositions · Return in about 1 month (around 5/8/2019) for Medication Check, Weight Mgmt, Constipation, Nephrology. lab results and schedule of future lab studies reviewed with patient 
reviewed diet, exercise and weight control 
reviewed medications and side effects in detail 
lose weight, increase physical activity, call if any problems 
consider Phentermine based on renal function and Nephrology consult. Patient given appetite suppressant names to show Dr. Jojo [facility] reviewed - reviewed, MWV done today, otherwise UTD. Recommended healthy diet low in carbohydrates, fats, sodium and cholesterol. Recommended regular cardiovascular exercise 3-6 times per week for 30-60 minutes daily. Chart is reviewed and updated today in the office. Records requested for other providers patient has seen and is currently seeing. Patient was offered a choice/choices in the treatment plan today. Patient expresses understanding of the plan and agrees with recommendations. Verbal and written instructions (see AVS) provided. See patient instructions for more. Patient expresses understanding of diagnosis and treatment plan. Medicare Wellness Visit as below: [facility] 04[facility]10/2019 Patient ID: Torin Pan is a 41 y.o. female. Assessment/Plan:   
Diagnoses and all orders for this visit: 1. Celiac disease 
-     REFERRAL TO GASTROENTEROLOGY 2. Class 1 obesity due to excess calories with serious comorbidity and body mass index (BMI) of 33.0 to 33.9 in adult -     phendimetrazine tartrate 105 mg cpER; Take 105 mg by mouth daily. Max Daily Amount: 105 mg. 
 
3. Constipation, unspecified constipation type 
-     REFERRAL TO GASTROENTEROLOGY 4. Diarrhea, unspecified type 
-     REFERRAL TO GASTROENTEROLOGY 5. Hypertension secondary to other renal disorders 6. Hypermobility syndrome 7. Lupus nephritis. Chronic non-nephrotic proteinuria for years through 3/11. Now proteinuria up to 12 grams. 8. Lupus nephritis ([facility] [address] 9. Systemic lupus erythematosus with glomerular disease, unspecified SLE type ([facility] [address] 10. Long-term use of immunosuppressant medication 11. Hypercholesterolemia 12. Vitamin D deficiency 13. Thrombocytopenia ([address] 14. SAMARA (generalized anxiety disorder) 15. Screening for alcoholism -     OR ANNUAL ALCOHOL SCREEN 15 MIN 16. Screening for depression 
-     Celia 68 See patient instructions for more. Counselled pt on Patient health concerns and plans. Patient was offered a choice/choices in the treatment plan today. Reviewed return precautions as appropriate. Patient expresses understanding of the plan and agrees with recommendations. Patient Instructions Medicare Wellness Visit, Female The best way to live healthy is to have a lifestyle where you eat a well-balanced diet, exercise regularly, limit alcohol use, and quit all forms of tobacco/nicotine, if applicable. Regular preventive services are another way to keep healthy.  Preventive services (vaccines, screening tests, monitoring & exams) can help personalize your care plan, which helps you manage your own care. Screening tests can find health problems at the earliest stages, when they are easiest to treat. [facility] follows the current, evidence-based guidelines published by the [facility] Ernie Tovar (RUSTSTF) when recommending preventive services for our patients. Because we follow these guidelines, sometimes recommendations change over time as research supports it. (For example, mammograms used to be recommended annually. Even though Medicare will still pay for an annual mammogram, the newer guidelines recommend a mammogram every two years for women of average risk.) Of course, you and your doctor may decide to screen more often for some diseases, based on your risk and your health status. Preventive services for you include: - Medicare offers their members a free annual wellness visit, which is time for you and your primary care provider to discuss and plan for your preventive service needs. Take advantage of this benefit every year! 
-All adults over the age of 72 should receive the recommended pneumonia vaccines. Current USPSTF guidelines recommend a series of two vaccines for the best pneumonia protection.  
-All adults should have a flu vaccine yearly and a tetanus vaccine every 10 years. All adults age 61 and older should receive a shingles vaccine once in their lifetime.   
-A bone mass density test is recommended when a woman turns 65 to screen for osteoporosis. This test is only recommended one time, as a screening. Some providers will use this same test as a disease monitoring tool if you already have osteoporosis.  
-All adults age 38-68 who are overweight should have a diabetes screening test once every three years.  
-Other screening tests and preventive services for persons with diabetes include: an eye exam to screen for diabetic retinopathy, a kidney function test, a foot exam, and stricter control over your cholesterol.  
-Cardiovascular screening for adults with routine risk involves an electrocardiogram (ECG) at intervals determined by your doctor.  
-Colorectal cancer screenings should be done for adults age 54-65 with no increased risk factors for colorectal cancer. There are a number of acceptable methods of screening for this type of cancer. Each test has its own benefits and drawbacks. Discuss with your doctor what is most appropriate for you during your annual wellness visit. The different tests include: colonoscopy (considered the best screening method), a fecal occult blood test, a fecal DNA test, and sigmoidoscopy. -Breast cancer screenings are recommended every other year for women of normal risk, age 54-69. 
-Cervical cancer screenings for women over age 72 are only recommended with certain risk factors.  
-All adults born between [facility] should be screened once for Hepatitis C. Here is a list of your current Health Maintenance items (your personalized list of preventive services) with a due date: 
Health Maintenance Due Topic Date Due  
 Annual Well Visit  10/15/2018 Celiac Disease: Care Instructions Your Care Instructions Celiac disease (or celiac sprue) is a problem with digesting gluten. Gluten is a type of protein found in wheat, rye, and other grains. This problem starts when the body's immune system attacks the small intestine when gluten is eaten. The immune system is supposed to fight off viruses and other invaders, but sometimes it turns on the person's own body. (This is called an autoimmune disease.) Celiac disease seems to run in families. Celiac disease causes damage to the small intestine. This makes it hard for the body to absorb vitamins and other nutrients. You cannot prevent celiac disease. But you can stop and reverse the damage to the small intestine by eating a strict gluten-free diet. Follow-up care is a key part of your treatment and safety. Be sure to make and go to all appointments, and call your doctor if you are having problems. It's also a good idea to know your test results and keep a list of the medicines you take. How can you care for yourself at home? · Eat a gluten-free diet to prevent symptoms and damage to the small intestine. Even a small amount of gluten may cause damage. ? Avoid all foods that contain wheat, rye, and barley gluten. Bread, bagels, pasta, pizza, malted breakfast cereals, and crackers are all examples of foods that contain gluten. ? Avoid oats, at least at first. Oats may cause symptoms in some people. The oats may be contaminated with wheat, barley, or rye from processing. But many people who have celiac disease can eat moderate amounts of oats without having symptoms. Health professionals vary in their long-term recommendations regarding eating foods with oats. But most agree it is safe to eat oats labeled as gluten-free. · You may need to avoid milk and milk products for a while. Once you stop eating any gluten, the intestine will begin to heal. Then it should be okay to drink milk and eat milk products. · Read food labels carefully and look for hidden gluten, such as gluten in medicine and some food additives. If a label says \"modified food starch,\" the product may contain gluten. · Plan your diet around: 
? Eggs. ? Dairy products, if you can eat them. Cheese, yogurt, and other dairy products can be an important part of the diet. ? Flours and foods made with amaranth, arrowroot, beans, buckwheat, corn, cornmeal, flax, millet, potatoes, gluten-free nut and oat bran, quinoa, rice, sorghum, soybeans, tapioca, or teff. ? Fresh, frozen, and canned meats, fruits, and vegetables. Watch for added gluten. · Talk to your doctor or contact your local hospital or dietitian for information about support groups in your area.  You may find a support group helpful for discovering ways to help you deal with celiac disease. Celiac disease support groups often share recipes and good food sources. · Look for gluten-free foods. Many food stores, especially health food stores, offer specially marked gluten-free food. When should you call for help? Watch closely for changes in your health, and be sure to contact your doctor if: 
  · Your bloating, gas, and diarrhea get worse.  
  · You have bloating, gas, and diarrhea after not having them for a while. Where can you learn more? Go to http://olga.info/. Enter [id number] in the search box to learn more about \"Celiac Disease: Care Instructions. \" Current as of: March 27, 2018 Content Version: 11.9 © 6159-3302 [employer]. Care instructions adapted under license by [employer] (which disclaims liability or warranty for this information). If you have questions about a medical condition or this instruction, always ask your healthcare professional. [place] [address] any warranty or liability for your use of this information. Constipation: 
 
Constipation is a common problem for many patients. Many medications, lower daily  fluid intake, etc may cause acute constipation. Constipation should be relieved in most cases by one or more of the following suggestions: 
 
1. Increase your daily fluid intake. Aim for a minimum of 48-64oz daily. 2.  Increased mobility. Move as much as possible. Walking not only improves circulation but it is also helpful for gastrointestinal motility. 3.  Stool softeners (non habit forming). A.  Colace (Docusate) - Over the Counter, may take 100mg capsule up to 3 times daily for a maximum of 300mg daily. 4.  Laxatives - If stool softeners are not providing relief OR if you have not had a BM in 3 or more days.   It is essential that you Laxative use for extended periods of time may be habit forming. Please ask your provider if you are concerned about how long you have been taking them. A.  Miralax - mix 1 capful into any drink of your choice daily for up to 7 days. Can increase to two capfuls a day 
 OR 
 B. Milk of Magnesia - Take 30mL (2 Tbsp) to 60mL (4Tbsp) daily. OR 
 C. Senna (PUSH) (senokot) 8.5mg tablets. Take 1-2 every night. 5.  Rectal Suppositories/Fleets Enema - When the use of stool softeners or laxatives have been ineffective in relieving your constipation symptoms you may find a rectal suppository or fleets enema effective. Occasionally stool has hardened at the base of the rectum making the above interventions ineffective. A suppository or enema can remove this hardened stool so the above interventions will then be effective. GOAL: one soft bowel movement daily If the above methods are ineffective, please call our office to schedule an appointment to discuss next steps to help you relieve your constipation. You may require a prescription medication to eliminate your constipation. Appetite Suppressants: 
Phentermine Qysmia Kimberley [place] Contrave Subjective: HPI: 
Pati Goldberg is a 41 y.o. female being seen for: Chief Complaint Patient presents with  Weight Management Rm 7 Medicare Wellness Visit today - EKG done within the last year on 10/19/2019 Lab review · Labs reviewed in detail: Dr. Candido Whalen monitoring these, reviewed her last note. See scanned note, all UTD. Review of Systems Otherwise as noted in HPI ? Objective:  
 
Visit Vitals /76 (BP 1 Location: Right arm, BP Patient Position: Sitting) Pulse (!) 56 Temp 97.7 °F (36.5 °C) (Oral) Resp 20 Ht 5' 1\" (1.549 m) Wt 168 lb 8 oz (76.4 kg) SpO2 99% BMI 31.84 kg/m² BP Readings from Last 3 Encounters:  
04/10/19 106/76  
02/28/19 111/80  
02/18/19 114/76 Wt Readings from Last 3 Encounters: 04/10/19 168 lb 8 oz (76.4 kg) 02/28/19 165 lb 12.8 oz (75.2 kg) 02/18/19 168 lb 8 oz (76.4 kg) Physical Exam 
_____________________ This is an Initial Medicare Annual Wellness Exam (AWV) (Performed 12 months after IPPE or effective date of Medicare Part B enrollment, Once in a lifetime) I have reviewed the patient's medical history in detail and updated the computerized patient record. History Past Medical History:  
Diagnosis Date  Anemia associated with acute blood loss 06/2018  
 blood transfusion x 2. Txd w iron. Dr. Lourdes Mohs.  Arthritis  Branch retinal artery occlusion approx 2013 Right, as of 5/26/16 resolved and on Coumadin.  Celiac disease 2012 Dr. Bam Nguyen.  Chronic renal insufficiency, stage II (mild) secondary to lupus nephritis:  Dr. Jesús Luna  Depression, major, recurrent ([facility] 03/10/2014 Myesha Bravo LCSW.  Diffuse proliferative lupus glomerulonephritis ([facility] Dr Alie Shah  Diplopia 2014 Dr. Anisha Harrington.  Dizziness 07/2014 Dr. Megan Francois, neuro.  Elevated cortisol level (HCC)  Fatigue   
 intermittent due to lupus  SAMARA (generalized anxiety disorder) 03/10/2014 Myesha Bravo LCSW  Gallstones 2017 Dr. Neely Roles  Gastritis   
 as of 5/26/16 asymptomatic  Headache 2014 Dr. Megan Francois  Hypertension 2000s  
 due to CKD. Dr. Alie Shah  Left tibialis posterior tendonitis 2017  
 due to injury on sand. Dr. Shila Olivares.  Long term current use of anticoagulant therapy  Ovarian cyst 05/26/2016 Dr. Colby Escobar  Pregnancy induced hypertension  Proteinuria Dr. Alie Shah  PUD (peptic ulcer disease) Dr. Bam Nguyen  Raynauds syndrome Dr. Mini Thomas. Dr. Lynette Glover.  Sicca syndrome ([facility] Dr. Mini Thomas.  Systemic lupus erythematosus with glomerular disease ([facility] 2001  
 as of 5/26/16 \"aching and swelling of joints\" per patient. Dr Lange Lacarla. Dr. Lenore Karimi Past Surgical History:  
Procedure Laterality Date  HX ACL RECONSTRUCTION Left approx  Dr. Toribio Wall.  HX  SECTION    HX ENDOSCOPY  2012, 2017 Dr. Juana Gonzalez.  HX LAP CHOLECYSTECTOMY  2017  
 due to gallstones. Dr. Cristin Colvin  HX ORTHOPAEDIC Right 10/22/2014  
 hand to thumb due to thumb dislocation. Dr. Kelly Serrano.  HX OTHER SURGICAL  2012  
 kidney biopsy; benign  HX OTHER SURGICAL Right 10/22/2014  
 right Posterior Interosseous Nerve Neurectomy, right Anterior Interosseous Nerve Neurectomy. Dr. Kelly Serrano  HX RENAL BIOPSY  2018 CT Guided Renal Biopsy with Conscious Sedation. due to CKD/SLE.  HX SEPTOPLASTY  2016  
 w inf turbinate resection. due to turbinate hypertrophy. Dr. Lexus Kan Current Outpatient Medications Medication Sig Dispense Refill  phendimetrazine tartrate 105 mg cpER Take 105 mg by mouth daily. Max Daily Amount: 105 mg. 30 Cap 0  
 ergocalciferol (ERGOCALCIFEROL) 50,000 unit capsule TAKE ONE CAPSULE BY MOUTH ONCE A WEEK  0  
 NIFEdipine ER (PROCARDIA XL) 30 mg ER tablet  fluticasone (FLONASE) 50 mcg/actuation nasal spray 2 Sprays by Both Nostrils route daily. 1 Bottle 5  warfarin (COUMADIN) 6 mg tablet Take 4 mg by mouth daily. M, W, F, Sa, Damian is 6mg once daily; , Th is 4mg once daily  ferrous sulfate 325 mg (65 mg iron) tablet Take 1 Tab by mouth two (2) times daily (with meals). (Patient taking differently: Take 325 mg by mouth daily.) 60 Tab 1  
 acetaminophen (ACETAMINOPHEN EXTRA STRENGTH) 500 mg tablet Take 1 Tab by mouth every six (6) hours as needed for Pain. 30 Tab 0  
 hydroxychloroquine (PLAQUENIL) 200 mg tablet Take 1 tab po qam and 1/2 tab po qpm 45 Tab 9  
 simvastatin (ZOCOR) 20 mg tablet TAKE 1 TABLET BY MOUTH ONCE DAILY  3  
 losartan (COZAAR) 100 mg tablet Take 100 mg by mouth nightly.  atenolol (TENORMIN) 50 mg tablet Take  by mouth nightly.  mycophenolate (CELLCEPT) 500 mg tablet Take 1,500 mg by mouth two (2) times a day. Allergies Allergen Reactions  Gluten Diarrhea  Fiorinal-Codeine #3 [Codeine-Butalbital-Asa-Caff] Nausea Only Family History Problem Relation Age of Onset  Glaucoma Father  Hypertension Father Social History Tobacco Use  Smoking status: Never Smoker  Smokeless tobacco: Never Used Substance Use Topics  Alcohol use: No  
 
Patient Active Problem List  
Diagnosis Code  Systemic lupus erythematosus ([facility] M32.9  Hypertension I10  
 Nephrotic syndrome N04.9  Long-term use of immunosuppressant medication Z79.899  Current use of steroid medication Z79.52  
 Lupus nephritis ([facility]) M32.14  
 Hypermobility syndrome M35.7 [facility] retinal artery occlusion H34.239  
 Depression, major, recurrent ([facility]) F33.9  Anxiety disorder F41.9  Diplopia H53.2  Carpal instability of left wrist with volar intercalated segment instability M25.332  Carpal instability of right wrist M25.331  Raynaud's syndrome I73.00  Acute blood loss anemia D62  
 History of thromboembolism Z86.718  Class 1 obesity due to excess calories with body mass index (BMI) of 33.0 to 33.9 in adult E66.09, Z68.33  
 Fatigue R53.83  
 Hypercholesterolemia E78.00  Vitamin D deficiency E55.9  Thrombocytopenia ([facility]  D69.6  Elevated cortisol level ([facility]) E27.0  SAMARA (generalized anxiety disorder) F41.1  Celiac disease K90.0 Depression Risk Factor Screening:  
 
3 most recent PHQ Screens 4/10/2019 Little interest or pleasure in doing things Several days Feeling down, depressed, irritable, or hopeless Several days Total Score PHQ 2 2 Trouble falling or staying asleep, or sleeping too much Not at all Feeling tired or having little energy Several days Poor appetite, weight loss, or overeating Not at all Feeling bad about yourself - or that you are a failure or have let yourself or your family down More than half the days Trouble concentrating on things such as school, work, reading, or watching TV Several days Moving or speaking so slowly that other people could have noticed; or the opposite being so fidgety that others notice Not at all Thoughts of being better off dead, or hurting yourself in some way Several days PHQ 9 Score 7 Alcohol Risk Factor Screening:  
 
Social History Substance and Sexual Activity Alcohol Use No  
 
 
 
Functional Ability and Level of Safety:  
 
Hearing Loss Denies hearing loss Family reports hearing loss Patient notes some hearing loss Activities of Daily Living ADL Assessment 4/10/2019 Feeding yourself No Help Needed Getting from bed to chair No Help Needed Getting dressed No Help Needed Bathing or showering No Help Needed Walk across the room (includes cane/walker) No Help Needed Using the telphone No Help Needed Taking your medications No Help Needed Preparing meals No Help Needed Managing money (expenses/bills) No Help Needed Moderately strenuous housework (laundry) No Help Needed Shopping for personal items (toiletries/medicines) No Help Needed Shopping for groceries No Help Needed Driving No Help Needed Climbing a flight of stairs No Help Needed Getting to places beyond walking distances No Help Needed Fall Risk Fall Risk Assessment, last 12 mths 4/10/2019 Able to walk? Yes Fall in past 12 months? No  
 
Abuse Screen Abuse Screening Questionnaire 4/10/2019 Do you ever feel afraid of your partner? Vane Eagle Are you in a relationship with someone who physically or mentally threatens you? Vane Eagle Is it safe for you to go home? Srikanth Gilbert Review of Systems As noted Physical Examination No exam data present, see above Evaluation of Cognitive Function: 
Denies concerns about cognitive function Patient Care Team: 
Jorge Matthew NP as PCP - General (Nurse Practitioner) Itzel Westbrook MD as Physician (Nephrology) Dustin Jovel MD as Physician (Hematology and Oncology) Carmen Abbott MD as Surgeon (General Surgery) Héctor Mata MD as Physician (Rheumatology) Lindsey Odell MD (Gastroenterology) Magnolia Meza MD (Otolaryngology) Dalia Becerril MD (Hand Surgery) Bubba Taylor MD (Neurology) Lisa Polanco MD (Orthopedic Surgery) Maynor Caceres MD (Obstetrics & Gynecology) Advice/Referrals/Counseling Education and counseling provided: 
· HM reviewed with due dates: See orders and patient instructions for more · Personalized Health Advice provided · Risk factors and management reviewed · Depression Screening · Smoking Cessation if applicable · Vision screening · Alcohol Screening · ACP Counseling given with patient consent done Health Maintenance Due Topic Date Due  
 Annual Well Visit  10/15/2018 Assessment/Plan As above

## 2019-04-10 NOTE — PROGRESS NOTES
Lou Ochoa  Identified pt with two pt identifiers(name and ). Chief Complaint Patient presents with  Weight Management Rm 7  
 
 
1. Have you been to the ER, urgent care clinic since your last visit?n   Hospitalized since your last visit? n  
 
2. Have you seen or consulted any other health care providers outside of the [address] [place] [address] since your last visit? Include any pap smears or colon screening. n  
 
 
Advance Care Planning In the event something were to happen to you and you were unable to speak on your behalf, do you have an Advance Directive/ Living Will in place stating your wishes? NO If yes, do we have a copy on file NO If no, would you like information NO Medication reconciliation up to date and corrected with patient at this time. Today's provider has been notified of reason for visit, vitals and flowsheets obtained on patients. Reviewed record in preparation for visit, huddled with provider and have obtained necessary documentation. Health Maintenance Due Topic  MEDICARE YEARLY EXAM   
 
 
Wt Readings from Last 3 Encounters:  
04/10/19 168 lb 8 oz (76.4 kg) 19 165 lb 12.8 oz (75.2 kg) 19 168 lb 8 oz (76.4 kg) Temp Readings from Last 3 Encounters:  
04/10/19 97.7 °F (36.5 °C) (Oral) 19 98.8 °F (37.1 °C) (Oral) 19 98.6 °F (37 °C) (Oral) BP Readings from Last 3 Encounters:  
04/10/19 106/76  
19 111/80  
19 114/76 Pulse Readings from Last 3 Encounters:  
04/10/19 (!) 56  
19 78  
19 65 Vitals:  
 04/10/19 1016 BP: 106/76 Pulse: (!) 56 Resp: 20 Temp: 97.7 °F (36.5 °C) TempSrc: Oral  
SpO2: 99% Weight: 168 lb 8 oz (76.4 kg) Height: 5' 1\" (1.549 m) PainSc:   0 - No pain Learning Assessment: 
:  
 
Learning Assessment 2014 PRIMARY LEARNER Patient Patient HIGHEST LEVEL OF EDUCATION - PRIMARY LEARNER  GRADUATED HIGH SCHOOL OR GED GRADUATED HIGH SCHOOL OR GED  
BARRIERS PRIMARY LEARNER NONE NONE  
CO-LEARNER CAREGIVER No No  
PRIMARY LANGUAGE ENGLISH ENGLISH  NEED No -  
LEARNER PREFERENCE PRIMARY LISTENING PICTURES  
  READING -  
LEARNING SPECIAL TOPICS none -  
ANSWERED BY patient patient RELATIONSHIP SELF SELF Depression Screening: 
:  
 
3 most recent PHQ Screens 4/10/2019 Little interest or pleasure in doing things Several days Feeling down, depressed, irritable, or hopeless Several days Total Score PHQ 2 2 Trouble falling or staying asleep, or sleeping too much Not at all Feeling tired or having little energy Several days Poor appetite, weight loss, or overeating Not at all Feeling bad about yourself - or that you are a failure or have let yourself or your family down More than half the days Trouble concentrating on things such as school, work, reading, or watching TV Several days Moving or speaking so slowly that other people could have noticed; or the opposite being so fidgety that others notice Not at all Thoughts of being better off dead, or hurting yourself in some way Several days PHQ 9 Score 7 No flowsheet data found. Fall Risk Assessment: 
:  
 
No flowsheet data found. Abuse Screening: 
:  
 
No flowsheet data found. ADL Screening: 
:  
 
ADL Assessment 4/10/2019 Feeding yourself No Help Needed Getting from bed to chair No Help Needed Getting dressed No Help Needed Bathing or showering No Help Needed Walk across the room (includes cane/walker) No Help Needed Using the telphone No Help Needed Taking your medications No Help Needed Preparing meals No Help Needed Managing money (expenses/bills) No Help Needed Moderately strenuous housework (laundry) No Help Needed Shopping for personal items (toiletries/medicines) No Help Needed Shopping for groceries No Help Needed Driving No Help Needed Climbing a flight of stairs No Help Needed Getting to places beyond walking distances No Help Needed BMI: 
Weight Metrics 4/10/2019 2/28/2019 2/18/2019 2/18/2019 1/21/2019 12/5/2018 12/5/2018 Weight 168 lb 8 oz 165 lb 12.8 oz - 168 lb 8 oz 163 lb 1.6 oz - 167 lb 8 oz Neck Circ (inches) - - 34 - - - - Waist Measure Inches - - - - 36.5 34 - Body Fat % - - 34.3 - 40.6 34.1 - BMI 31.84 kg/m2 31.51 kg/m2 - 31.84 kg/m2 30.82 kg/m2 - 31.65 kg/m2 Medication reconciliation up to date and corrected with patient at this time.

## 2019-05-17 ENCOUNTER — OFFICE VISIT (OUTPATIENT)
Dept: FAMILY MEDICINE CLINIC | Age: 39
End: 2019-05-17

## 2019-05-17 VITALS
HEART RATE: 61 BPM | DIASTOLIC BLOOD PRESSURE: 60 MMHG | BODY MASS INDEX: 31.06 KG/M2 | OXYGEN SATURATION: 99 % | HEIGHT: 61 IN | RESPIRATION RATE: 20 BRPM | TEMPERATURE: 98.5 F | WEIGHT: 164.5 LBS | SYSTOLIC BLOOD PRESSURE: 100 MMHG

## 2019-05-17 DIAGNOSIS — I15.1 HYPERTENSION SECONDARY TO OTHER RENAL DISORDERS: Primary | ICD-10-CM

## 2019-05-17 DIAGNOSIS — M32.14 SYSTEMIC LUPUS ERYTHEMATOSUS WITH GLOMERULAR DISEASE, UNSPECIFIED SLE TYPE (HCC): Chronic | ICD-10-CM

## 2019-05-17 DIAGNOSIS — M32.14 LUPUS NEPHRITIS (HCC): ICD-10-CM

## 2019-05-17 DIAGNOSIS — R63.4 WEIGHT LOSS: ICD-10-CM

## 2019-05-17 DIAGNOSIS — Z76.89 ENCOUNTER FOR WEIGHT MANAGEMENT: ICD-10-CM

## 2019-05-17 DIAGNOSIS — E66.09 CLASS 1 OBESITY DUE TO EXCESS CALORIES WITH SERIOUS COMORBIDITY AND BODY MASS INDEX (BMI) OF 33.0 TO 33.9 IN ADULT: ICD-10-CM

## 2019-05-17 DIAGNOSIS — N28.89 HYPERTENSION SECONDARY TO OTHER RENAL DISORDERS: Primary | ICD-10-CM

## 2019-05-17 RX ORDER — PHENDIMETRAZINE TARTRATE 105 MG/1
105 CAPSULE, EXTENDED RELEASE ORAL DAILY
Qty: 30 CAP | Refills: 0 | Status: SHIPPED | OUTPATIENT
Start: 2019-05-17 | End: 2019-06-19 | Stop reason: ALTCHOICE

## 2019-05-17 NOTE — PROGRESS NOTES
Cass Ochoa  Identified pt with two pt identifiers(name and ). Chief Complaint   Patient presents with    Weight Management     Room 4       1. Have you been to the ER, urgent care clinic since your last visit?n   Hospitalized since your last visit? n     2. Have you seen or consulted any other health care providers outside of the [address] [place] [address] since your last visit? Include any pap smears or colon screening. n       Advance Care Planning    In the event something were to happen to you and you were unable to speak on your behalf, do you have an Advance Directive/ Living Will in place stating your wishes? NO    If yes, do we have a copy on file NO    If no, would you like information NO    Medication reconciliation up to date and corrected with patient at this time. Today's provider has been notified of reason for visit, vitals and flowsheets obtained on patients. Reviewed record in preparation for visit, huddled with provider and have obtained necessary documentation. There are no preventive care reminders to display for this patient.     Wt Readings from Last 3 Encounters:   19 164 lb 8 oz (74.6 kg)   04/10/19 168 lb 8 oz (76.4 kg)   19 165 lb 12.8 oz (75.2 kg)     Temp Readings from Last 3 Encounters:   19 98.5 °F (36.9 °C) (Oral)   04/10/19 97.7 °F (36.5 °C) (Oral)   19 98.8 °F (37.1 °C) (Oral)     BP Readings from Last 3 Encounters:   19 100/60   04/10/19 106/76   19 111/80     Pulse Readings from Last 3 Encounters:   19 61   04/10/19 (!) 56   19 78     Vitals:    19 0846   BP: 100/60   Pulse: 61   Resp: 20   Temp: 98.5 °F (36.9 °C)   TempSrc: Oral   SpO2: 99%   Weight: 164 lb 8 oz (74.6 kg)   Height: 5' 1\" (1.549 m)   PainSc:   0 - No pain         Learning Assessment:  :     Learning Assessment 2014   PRIMARY LEARNER Patient Patient   HIGHEST LEVEL OF EDUCATION - PRIMARY LEARNER  GRADUATED HIGH SCHOOL OR GED GRADUATED HIGH SCHOOL OR GED   BARRIERS PRIMARY LEARNER NONE NONE   CO-LEARNER CAREGIVER No No   PRIMARY LANGUAGE ENGLISH ENGLISH    NEED No -   LEARNER PREFERENCE PRIMARY LISTENING PICTURES     READING -   LEARNING SPECIAL TOPICS none -   ANSWERED BY patient patient   RELATIONSHIP SELF SELF       Depression Screening:  :     3 most recent PHQ Screens 4/10/2019   Little interest or pleasure in doing things Several days   Feeling down, depressed, irritable, or hopeless Several days   Total Score PHQ 2 2   Trouble falling or staying asleep, or sleeping too much Not at all   Feeling tired or having little energy Several days   Poor appetite, weight loss, or overeating Not at all   Feeling bad about yourself - or that you are a failure or have let yourself or your family down More than half the days   Trouble concentrating on things such as school, work, reading, or watching TV Several days   Moving or speaking so slowly that other people could have noticed; or the opposite being so fidgety that others notice Not at all   Thoughts of being better off dead, or hurting yourself in some way Several days   PHQ 9 Score 7       No flowsheet data found. Fall Risk Assessment:  :     Fall Risk Assessment, last 12 mths 4/10/2019   Able to walk? Yes   Fall in past 12 months? No       Abuse Screening:  :     Abuse Screening Questionnaire 4/10/2019   Do you ever feel afraid of your partner? N   Are you in a relationship with someone who physically or mentally threatens you? N   Is it safe for you to go home?  Y       ADL Screening:  :     ADL Assessment 5/17/2019   Feeding yourself No Help Needed   Getting from bed to chair No Help Needed   Getting dressed No Help Needed   Bathing or showering No Help Needed   Walk across the room (includes cane/walker) No Help Needed   Using the telphone No Help Needed   Taking your medications No Help Needed   Preparing meals No Help Needed   Managing money (expenses/bills) No Help Needed   Moderately strenuous housework (laundry) No Help Needed   Shopping for personal items (toiletries/medicines) No Help Needed   Shopping for groceries No Help Needed   Driving No Help Needed   Climbing a flight of stairs No Help Needed   Getting to places beyond walking distances No Help Needed           BMI:  Weight Metrics 5/17/2019 5/17/2019 4/10/2019 2/28/2019 2/18/2019 2/18/2019 1/21/2019   Weight - 164 lb 8 oz 168 lb 8 oz 165 lb 12.8 oz - 168 lb 8 oz 163 lb 1.6 oz   Neck Circ (inches) - - - - 34 - -   Waist Measure Inches 34.25 - - - - - 36.5   Body Fat % 33.6 - - - 34.3 - 40.6   BMI - 31.08 kg/m2 31.84 kg/m2 31.51 kg/m2 - 31.84 kg/m2 30.82 kg/m2           Medication reconciliation up to date and corrected with patient at this time.

## 2019-05-17 NOTE — PATIENT INSTRUCTIONS
Learning About Obesity  What is obesity? Obesity means having so much body fat that your health is in danger. Having too much body fat can lead to type 2 diabetes, heart disease, high blood pressure, arthritis, sleep apnea, and stroke. Even if you don't feel bad now, think about these health risks. Do they seem like a good reason to start on a new path toward a healthier weight? Or do you have another personal, powerful reason for wanting to lose weight? Whatever it is, keep it in mind. It can be hard to change eating habits and exercise habits. But with your own reason and plan, you can do it. How do you know if your weight is in the obesity range? To know if your weight is in the obesity range, your doctor looks at your body mass index (BMI) and waist size. Your BMI is a number that is calculated from your weight and your height. To figure your BMI for yourself, get a BMI table from your doctor or use an online tool, such as http://www.Towergate.Hojo.pl/ on the [employer] of [employer]. A healthy BMI is from 18.5 to 24.9. If your BMI is from 30.0 to 39.9, you are considered to have obesity. If your BMI is over 40.0, you are considered to have extreme obesity. What causes obesity? When you take in more calories than you burn off, you gain weight. How you eat, how active you are, and other things affect how your body uses calories and whether you gain weight. If you have family members who have too much body fat, you may have inherited a tendency to gain weight. And your family also helps form your eating and lifestyle habits, which can lead to obesity. Also, our busy lives make it harder to plan and cook healthy meals. For many of us, it's easier to reach for prepared foods, go out to eat, or go to the drive-through. But these foods are often high in saturated fat and calories. Portions are often too large.   What can you do to reach a healthy weight? Focus on health, not diets. Diets are hard to stay on and don't work in the long run. It is very hard to stay with a diet that includes lots of big changes in your eating habits. Instead of a diet, focus on lifestyle changes that will improve your health and achieve the right balance of energy and calories. To lose weight, you need to burn more calories than you take in. You can do it by eating healthy foods in reasonable amounts and becoming more active, even a little bit every day. Making small changes over time can add up to a lot. Make a plan for change. Many people have found that naming their reasons for change and staying focused on their plan can make a big difference. Work with your doctor to create a plan that is right for you. · Ask yourself: Katheryn Holliday are my personal, most powerful reasons for wanting this change? What will my life look like when I've made the change? \"  · Set your long-term goal. Make it specific, such as \"I will lose x pounds. \"  · Break your long-term goal into smaller, short-term goals. Make these small steps specific and within your reach, things you know you can do. These steps are what keep you going from day to day. Talk with your doctor about other weight-loss options. If you have a BMI in a certain range and have not been able to lose weight with diet and exercise, medicine or surgery may be an option for you. Before your doctor will prescribe medicines or surgery, he or she will probably want you to be more active and follow your healthy eating plan for a period of time. These habits are key lifelong changes for managing your weight, with or without other medical treatment. And these changes can help you avoid weight-related health problems. How can you stay on your plan for change? Be ready. Choose to start during a time when there are few events that might trigger slip-ups, like holidays, social events, and high-stress periods. Decide on your first few steps.  Most people have more success when they make small changes, one step at a time. For example, you might switch a daily candy bar to a piece of fruit, walk 10 minutes more, or add more vegetables to a meal.  Line up your support people. Make sure you're not going to be alone as you make this change. Connect with people who understand how important it is to you. Ask family members and friends for help in keeping with your plan. And think about who could make it harder for you, and how to handle them. Try tracking. People who keep track of what they eat, feel, and do are better at losing weight. Try writing down things like:  · What and how much you eat. · How you feel before and after each meal.  · Details about each meal (like eating out or at home, eating alone, or with friends or family). · What you do to be active. Look and plan. As you track, look for patterns that you may want to change. Take note of:  · When you eat and whether you skip meals. · How often you eat out. · How many fruits and vegetables you eat. · When you eat beyond feeling full. · When and why you eat for reasons other than being hungry. When you stray from your plan, don't get upset. Figure out what made you slip up and how you can fix it. Can you take medicines or have surgery to lose weight? If you have a BMI in a certain range and have not been able to lose weight with diet and exercise, medicine or surgery may be an option for you. If you have a BMI of at least 30.0 (or a BMI of at least 27.0 and another health problem related to your weight), ask your doctor about weight-loss medicines. They work by making you feel less hungry, making you feel full more quickly, or changing how you digest fat. Medicines are used along with diet changes and more physical activity to help you make lasting changes.   If you have a BMI of 40.0 or more (or a BMI of 35.0 or more and another health problem related to your weight), your doctor may talk with you about surgery. Weight-loss surgery has risks, and you will need to work with your doctor to compare the risk of having obesity with the risks of surgery. With any option you choose, you will still need to eat a healthy diet and get regular exercise. Follow-up care is a key part of your treatment and safety. Be sure to make and go to all appointments, and call your doctor if you are having problems. It's also a good idea to know your test results and keep a list of the medicines you take. Where can you learn more? Go to http://kodak-dodie.info/. Enter N111 in the search box to learn more about \"Learning About Obesity. \"  Current as of: 2018  Content Version: 11.9  © 1647-8967 [employer]. Care instructions adapted under license by [employer] (which disclaims liability or warranty for this information). If you have questions about a medical condition or this instruction, always ask your healthcare professional. [place] [address] any warranty or liability for your use of this information. Lupus: Care Instructions  Your Care Instructions  Lupus is a long-term disease that can cause inflammation, pain, and tissue damage in your body. It is an autoimmune disease. This means the immune system attacks its own tissues. Lupus may cause problems with your skin, kidneys, heart, lungs, nerves, or blood cells. This information is about systemic lupus erythematosus (SLE). SLE is the most common and most serious type of lupus. But there are other types of lupus, such as discoid or cutaneous lupus, drug-induced systemic lupus, and  lupus. When you have lupus symptoms, you are having flares or relapses. When your symptoms get better, you are in remission. Lupus may get worse very quickly. There is no way to tell when a flare will happen or how bad it will be.  When you have a lupus flare, you may have new symptoms as well as symptoms you have had in the past.  Learn your body's signs of a flare, such as joint pain, a rash, a fever, or being more tired. When you see any of these signs, take steps to control your symptoms. Follow-up care is a key part of your treatment and safety. Be sure to make and go to all appointments, and call your doctor if you are having problems. It's also a good idea to know your test results and keep a list of the medicines you take. How can you care for yourself at home? Reduce stress and tiredness  · Keep your daily schedule as simple as possible. · Keep your list of things to do as short as you can. · Exercise regularly. A daily walk or swim, for example, can lower stress, clear your head, improve your mood, and help fight tiredness. · Use meditation, yoga, or guided imagery to relax. · Get plenty of rest. Some people with lupus need up to 12 hours of sleep every night. · Pace yourself. Do not do too many activities. · Ask others for help. Do not try to do everything yourself. · Take short breaks from your usual activities. Think about cutting down on work hours when your symptoms are severe. · If you think that depression or anxiety is making you feel more tired, talk to your doctor, a mental health professional, or both. Take care of your skin  · Ask your doctor about the use of corticosteroid creams for skin symptoms. · If you are bothered by the way a lupus rash looks on your face or if you have scars from lupus, you can try makeup, such as Covermark, to cover the rash or scars. · Stay out of the sun, especially when the sun's rays are the strongest, usually between 10 a.m. and 4 p.m. If you must be in the sun, cover your arms and legs, and wear a hat. Make sure to use a broad-spectrum sunscreen that has a sun protection factor (SPF) of 50 or higher. Put more sunscreen on after swimming, sweating, or toweling off.   Practice good self-care  · Learn more about lupus and how to take care of yourself. · Take your medicines exactly as prescribed. Call your doctor if you have any problems with your medicine. · Do not smoke. If you need help quitting, talk to your doctor about stop-smoking programs and medicines. These can increase your chances of quitting for good. · Eat a healthy, balanced diet. A balanced diet includes whole grains, dairy, fruits and vegetables, and protein. Eat a variety of foods from each of those groups so you get all the nutrients you need. · Avoid other people who are sick with colds or the flu. These illnesses can cause lupus flares. Talk to your doctor about flu shots and pneumococcal vaccinations. If you do get sick or think you are getting an infection, talk with your doctor so you can treat your symptoms right away. · Brush and floss your teeth each day. See your dentist two times a year. · Get regular eye exams. · Build a support system of family, friends, and health professionals. When should you call for help? Call 911 anytime you think you may need emergency care. For example, call if:    · You have symptoms of a heart attack. These may include:  ? Chest pain or pressure, or a strange feeling in the chest.  ? Sweating. ? Shortness of breath. ? Nausea or vomiting. ? Pain, pressure, or a strange feeling in the back, neck, jaw, or upper belly or in one or both shoulders or arms. ? Lightheadedness or sudden weakness. ? A fast or irregular heartbeat.    After you call 911, the  may tell you to chew 1 adult-strength or 2 to 4 low-dose aspirin. Wait for an ambulance.  Do not try to drive yourself.   Call your doctor now or seek immediate medical care if:    · You are short of breath.     · You have blood in your urine or are urinating less often and in smaller amounts than usual.     · You have a fever.     · You feel depressed or notice any changes in your behavior or thinking.     · You are dizzy or have muscle weakness.     · You have swelling of the lower legs or feet.    Watch closely for changes in your health, and be sure to contact your doctor if:    · Your symptoms get worse or you develop any new symptoms. These may include aching or swollen joints, increased fatigue, loss of appetite, hair loss, skin rashes, or new sores in your mouth or nose. Where can you learn more? Go to http://kodak-dodie.info/. Enter Z586 in the search box to learn more about \"Lupus: Care Instructions. \"  Current as of: Laxmi 10, 2018  Content Version: 11.9  © 5632-9509 [employer]. Care instructions adapted under license by [employer] (which disclaims liability or warranty for this information). If you have questions about a medical condition or this instruction, always ask your healthcare professional. [address] any warranty or liability for your use of this information.

## 2019-05-17 NOTE — PROGRESS NOTES
Chief Complaint   Patient presents with    Weight Management     Room 4         HPI:  The patient is a 41 y.o. female who presents today for a follow up appointment. No hospital, ER or specialist visits since last primary care visit except as noted below. Weight Mgmt:  The patient is a 41y.o. year old female who presents today for initial medical weight loss visit. Pt's initial weight is 172 pounds with a BMI of 33.59. Patient's goal weight is 130 pounds. The patient's weight today is 164.8 pounds with a BMI of 31.08. She has lost 4 pounds since her last visit. The patient's bariatric comorbidities include HTN, lupus, nephrotic syndrome, anxiety, depression, arthritis. The patient's reason for medical weight loss is feel better, help with lupus related comorbidities, help with all of her medical problems. The patient's quality of life goals are to be able to do \"a lot of things that she used to do before her diagnosis of lupus at the age of 20\". Pt with hypertension, nephrotic syndrome, SLE, vit d deficiency, hx of thromboembolism, and obesity presents to the office with a BP of 114/80. For BP, she uses Atenolol 50 mg and Losartan 100 mg daily, tolerating well. For cholesterol, she uses Zocor 20 mg daily, tolerating well. For RAD, she uses Warfin 6 mg daily, rx'd by Dr. Wilma Barboza who she sees regularly.           The patient's diet typically includes: B - eggs, coffee (every once in a while), beans, rice, fruit, sometimes skip breakfast; L - chicken, salad, rice, tortilla, steak, soups, S - not really a snacker, D - does not eat much for dinner, small sandwich, fruit, salad         The patient is drinking the following: soda (just cut these out, occasionally but not even when out to eat, coffee, mostly water, OJ infrequently, cranberry juice for urinary symptoms - has one glass/day         The patient's exercise at this time includes: walking (tracking her steps on her phone)         The patient is currently weighing: \"I try not to\". She feels she is usually in the same range. The patient has not tried appetite suppressants in the past, but she has tried some food interventions. She has previously tried the following medications: \"fit teas, honey/cinnnamon teas\", apple cider vinegar. She has tried restrictive diets. She patient has not tried appetite suppressants in the past, but she has tried some food interventions. She has previously tried the following medications: \"fit teas, honey/cinnnamon teas\", apple cider vinegar. She has tried restrictive diets. Started going to the gym 3x/week. She is not drinking soda or juices, mostly water, she is feeling stressed and feels like \"giving up\" because she has not been able to successfully lose weight despite all of her efforts. She drinks 64 oz of water daily. She averages 8 hours of sleep nightly. Sometimes, she has difficulty sleeping through the night and wakes up multiple times a night. Denies caffeine consumption. She was started on Bontril in the fall and she felt that it worked initially but then [employer] got used to it\". She was changed to Bontril TID. She does not feel this was helping. She was forgetting to take the lunch hour dose. She was changed back to the XR Bontril at her last visit on 4/10/2019. Her hunger is well controlled in terms of volume but she still reports she is hungry overall. Consider Phentermine once renal function established and Nephrology consult done, no data that shows safety with this medication however. Patient gave appetite suppressant medications to Dr. Demetrius Restrepo for review as follows:    Dr. Padmini Kern notes on 5/6/2019:   \"-Belviq: may be ok but not well studied in patients with renal disease. \"Use with caution\" should definitely watch for accumulation of metabolites. I would not take it.  -Qysmia: dose adjustment needed.  -Saxenda: looks okay. -Contrave: dose adjustment needed.   -Phentermine: I strongly recommend AGAINST it. Finally, I recommend she see her MD before starting any of these. \"    Weight Metrics 5/17/2019 5/17/2019 4/10/2019 2/28/2019 2/18/2019 2/18/2019 1/21/2019   Weight - 164 lb 8 oz 168 lb 8 oz 165 lb 12.8 oz - 168 lb 8 oz 163 lb 1.6 oz   Neck Circ (inches) - - - - 34 - -   Waist Measure Inches 34.25 - - - - - 36.5   Body Fat % 33.6 - - - 34.3 - 40.6   BMI - 31.08 kg/m2 31.84 kg/m2 31.51 kg/m2 - 31.84 kg/m2 30.82 kg/m2     Review of Systems  A comprehensive review of systems was negative except for that written in the HPI. Patient Active Problem List   Diagnosis Code    Systemic lupus erythematosus ([facility]) M32.9    Hypertension I10    Nephrotic syndrome N04.9    Long-term use of immunosuppressant medication Z79.899    Current use of steroid medication Z79.52    Lupus nephritis ([facility]) M32.14    Hypermobility syndrome M35.7    Branch retinal artery occlusion H34.239    Depression, major, recurrent ([facility]) F33.9    Anxiety disorder F41.9    Diplopia H53.2    Carpal instability of left wrist with volar intercalated segment instability M25.332    Carpal instability of right wrist M25.331    Raynaud's syndrome I73.00    Acute blood loss anemia D62    History of thromboembolism Z86.718    Class 1 obesity due to excess calories with body mass index (BMI) of 33.0 to 33.9 in adult E66.09, Z68.33    Fatigue R53.83    Hypercholesterolemia E78.00    Vitamin D deficiency E55.9    Thrombocytopenia ([facility])  D69.6    SAMARA (generalized anxiety disorder) F41.1    Celiac disease K90.0    Encounter for weight management Z76.89       Past Medical History:   Diagnosis Date    Anemia associated with acute blood loss 06/2018    blood transfusion x 2. Txd w iron. Dr. Chantal Lowery.  Arthritis     Branch retinal artery occlusion approx 2013    Right, as of 5/26/16 resolved and on Coumadin.  Celiac disease 2012    Dr. SEE TYT ([employer]).     Chronic renal insufficiency, stage II (mild)     secondary to lupus nephritis:  Dr. Kenrick Mcogvern    Depression, major, recurrent ([facility] 03/10/2014    Simran Signs, LCSW.  Diffuse proliferative lupus glomerulonephritis (HCC)     Dr Doc Herbert Diplopia     Dr. Marin Bañuelos.  Dizziness 2014    Dr. Griselda Lawn, neuro.  Elevated cortisol level (HCC)     Fatigue     intermittent due to lupus    SAMARA (generalized anxiety disorder) 03/10/2014    Simran Signs, LCSW    Gallstones     Dr. Robert Spring Gastritis     as of 16 asymptomatic    Headache     Dr. Johnny Fitzpatrick Hypertension 2000s    due to CKD. Dr. Laila Vila    Left tibialis posterior tendonitis     due to injury on sand. Dr. Wendi Osorio.  Long term current use of anticoagulant therapy     Ovarian cyst 2016    Dr. Horacio Junior Pregnancy induced hypertension     Proteinuria     Dr. Doc Herbert PUD (peptic ulcer disease)     Dr. Gresham Staff syndrome     Dr. Wodo Lawler. Dr. Beverley Valles.  Sicca syndrome ([facility])     Dr. Wood Lawler.  Systemic lupus erythematosus with glomerular disease ([facility]     as of 16 \"aching and swelling of joints\" per patient. Dr Viri Dominguez. Dr. Rosy Melchor. Past Surgical History:   Procedure Laterality Date    HX ACL RECONSTRUCTION Left approx     Dr. Ivone De La Torre.  HX  SECTION      HX ENDOSCOPY  2012, 2017    Dr. Ilir De Souza.  HX LAP CHOLECYSTECTOMY  2017    due to gallstones. Dr. Dannielle Wilde Right 10/22/2014    hand to thumb due to thumb dislocation. Dr. Kacey Hamm.  HX OTHER SURGICAL  2012    kidney biopsy; benign    HX OTHER SURGICAL Right 10/22/2014    right Posterior Interosseous Nerve Neurectomy, right Anterior Interosseous Nerve Neurectomy. Dr. Jenaro Cabrera HX RENAL BIOPSY  2018    CT Guided Renal Biopsy with Conscious Sedation. due to CKD/SLE.  HX SEPTOPLASTY  2016    w inf turbinate resection. due to turbinate hypertrophy.    Merle Tavarez       Social History     Tobacco Use    Smoking status: Never Smoker    Smokeless tobacco: Never Used   Substance Use Topics    Alcohol use: No    Drug use: No       Family History   Problem Relation Age of Onset    Glaucoma Father     Hypertension Father        Outpatient Medications Marked as Taking for the 5/17/19 encounter (Office Visit) with Batsheva Patten NP   Medication Sig Dispense Refill    liraglutide (SAXENDA) 3 mg/0.5 mL (18 mg/3 mL) pen Inject 0.6 x 1 week, then 1.2 x 1 week, then 1.8 x 1 week, then 2.4 x 1 week, then 3 x 1week 5 Adjustable Dose Pre-filled Pen Syringe 1    pen needle, diabetic (NOVOTWIST) 32 gauge x 1/5\" ndle 30 UNSPECIFIED by Does Not Apply route daily. 30 Pen Needle 2    phendimetrazine tartrate 105 mg cpER Take 105 mg by mouth daily. Max Daily Amount: 105 mg. 30 Cap 0    ergocalciferol (ERGOCALCIFEROL) 50,000 unit capsule TAKE ONE CAPSULE BY MOUTH ONCE A WEEK  0    NIFEdipine ER (PROCARDIA XL) 30 mg ER tablet       fluticasone (FLONASE) 50 mcg/actuation nasal spray 2 Sprays by Both Nostrils route daily. 1 Bottle 5    warfarin (COUMADIN) 6 mg tablet Take 4 mg by mouth daily. M, W, F, Sa, Damian is 6mg once daily; Tu, Th is 4mg once daily      ferrous sulfate 325 mg (65 mg iron) tablet Take 1 Tab by mouth two (2) times daily (with meals). (Patient taking differently: Take 325 mg by mouth daily.) 60 Tab 1    acetaminophen (ACETAMINOPHEN EXTRA STRENGTH) 500 mg tablet Take 1 Tab by mouth every six (6) hours as needed for Pain. 30 Tab 0    hydroxychloroquine (PLAQUENIL) 200 mg tablet Take 1 tab po qam and 1/2 tab po qpm 45 Tab 9    simvastatin (ZOCOR) 20 mg tablet TAKE 1 TABLET BY MOUTH ONCE DAILY  3    losartan (COZAAR) 100 mg tablet Take 100 mg by mouth nightly.  atenolol (TENORMIN) 50 mg tablet Take  by mouth nightly.  mycophenolate (CELLCEPT) 500 mg tablet Take 1,500 mg by mouth two (2) times a day.          Current Outpatient Medications on File Prior to Visit   Medication Sig Dispense Refill    ergocalciferol (ERGOCALCIFEROL) 50,000 unit capsule TAKE ONE CAPSULE BY MOUTH ONCE A WEEK  0    NIFEdipine ER (PROCARDIA XL) 30 mg ER tablet       fluticasone (FLONASE) 50 mcg/actuation nasal spray 2 Sprays by Both Nostrils route daily. 1 Bottle 5    warfarin (COUMADIN) 6 mg tablet Take 4 mg by mouth daily. M, W, F, Sa, Damian is 6mg once daily; Tu, Th is 4mg once daily      ferrous sulfate 325 mg (65 mg iron) tablet Take 1 Tab by mouth two (2) times daily (with meals). (Patient taking differently: Take 325 mg by mouth daily.) 60 Tab 1    acetaminophen (ACETAMINOPHEN EXTRA STRENGTH) 500 mg tablet Take 1 Tab by mouth every six (6) hours as needed for Pain. 30 Tab 0    hydroxychloroquine (PLAQUENIL) 200 mg tablet Take 1 tab po qam and 1/2 tab po qpm 45 Tab 9    simvastatin (ZOCOR) 20 mg tablet TAKE 1 TABLET BY MOUTH ONCE DAILY  3    losartan (COZAAR) 100 mg tablet Take 100 mg by mouth nightly.  atenolol (TENORMIN) 50 mg tablet Take  by mouth nightly.  mycophenolate (CELLCEPT) 500 mg tablet Take 1,500 mg by mouth two (2) times a day. No current facility-administered medications on file prior to visit.         Allergies   Allergen Reactions    Gluten Diarrhea    Fiorinal-Codeine #3 [Codeine-Butalbital-Asa-Caff] Nausea Only       PE:  Visit Vitals  /60 (BP 1 Location: Right arm, BP Patient Position: Sitting)   Pulse 61   Temp 98.5 °F (36.9 °C) (Oral)   Resp 20   Ht 5' 1\" (1.549 m)   Wt 164 lb 8 oz (74.6 kg)   SpO2 99%   BMI 31.08 kg/m²       Gen: alert, oriented, no acute distress  Head: normocephalic, atraumatic  Ears: external auditory canals clear, TMs without erythema or effusion  Eyes: pupils equal round reactive to light, sclera clear, conjunctiva clear  Oral: moist mucus membranes, no oral lesions, no pharyngeal inflammation or exudate  Neck: symmetric normal sized thyroid, no carotid bruits, no jugular vein distention  Resp: no increase work of breathing, lungs clear to ausculation bilaterally, no wheezing, rales or rhonchi  CV: S1, S2 normal.  No murmurs, rubs, or gallops. Abd: soft, not tender, not distended. No hepatosplenomegaly. Normal bowel sounds. No hernias. No abdominal or renal bruits. Neuro: cranial nerves intact, normal strength and movement in all extremities, reflexes and sensation intact and symmetric. Skin: no lesion or rash  Extremities: no cyanosis or edema    No results found for this visit on 05/17/19. Assessment/Plan:    ICD-10-CM ICD-9-CM    1. Hypertension secondary to other renal disorders I15.1 405.91     N28.89 593.89    2. Lupus nephritis ([facility] M32.14 710.0      583.81    3. Systemic lupus erythematosus with glomerular disease, unspecified SLE type ([facility] M32.14 710.0      583.81    4. Class 1 obesity due to excess calories with serious comorbidity and body mass index (BMI) of 33.0 to 33.9 in adult E66.09 278.00 liraglutide (SAXENDA) 3 mg/0.5 mL (18 mg/3 mL) pen    Z68.33 V85.33 pen needle, diabetic (NOVOTWIST) 32 gauge x 1/5\" ndle      phendimetrazine tartrate 105 mg cpER   5. Encounter for weight management Z76.89 V65.49 liraglutide (SAXENDA) 3 mg/0.5 mL (18 mg/3 mL) pen      pen needle, diabetic (NOVOTWIST) 32 gauge x 1/5\" ndle   6. Weight loss R63.4 783.21 liraglutide (SAXENDA) 3 mg/0.5 mL (18 mg/3 mL) pen      pen needle, diabetic (NOVOTWIST) 32 gauge x 1/5\" ndle     Follow-up and Dispositions    · Return in about 1 month (around 6/14/2019) for Medication Check, Weight Mgmt, Saxenda vs. Bontril.       reviewed diet, exercise and weight control - patient was prescribed Saxenda today in the office. This is the safest anti-obesity medication for this patient based on her medical history and current BMI. If her insurance will not cover this medication we can consider trialing Contrave however cost will be an issue for any of these medications if out of pocket.   See Dr. Erica Rose recommendation in HPI above for specific reasons each AOM is either recommended or not additionally. reviewed medications and side effects in detail    lose weight, increase physical activity, call if any problems    Health Maintenance reviewed - reviewed, UTD. Recommended healthy diet low in carbohydrates, fats, sodium and cholesterol. Recommended regular cardiovascular exercise 3-6 times per week for 30-60 minutes daily. Chart is reviewed and updated today in the office. Records requested for other providers patient has seen and is currently seeing. Patient was offered a choice/choices in the treatment plan today. Patient expresses understanding of the plan and agrees with recommendations. Verbal and written instructions (see AVS) provided. See patient instructions for more. Patient expresses understanding of diagnosis and treatment plan.

## 2019-05-20 ENCOUNTER — TELEPHONE (OUTPATIENT)
Dept: FAMILY MEDICINE CLINIC | Age: 39
End: 2019-05-20

## 2019-05-20 DIAGNOSIS — E66.09 CLASS 1 OBESITY DUE TO EXCESS CALORIES WITH SERIOUS COMORBIDITY AND BODY MASS INDEX (BMI) OF 33.0 TO 33.9 IN ADULT: Primary | ICD-10-CM

## 2019-05-20 NOTE — TELEPHONE ENCOUNTER
[facility] sent an alternative request for Saxenda. Drug is not on formulary, please send alternative.

## 2019-05-24 NOTE — TELEPHONE ENCOUNTER
Spoke with patient after obtaining 2 patient identifiers  Patient made aware of medication change. Patient would like to know if she should continue to take her phentermine.  Please advise

## 2019-05-30 NOTE — TELEPHONE ENCOUNTER
Spoke with patient after obtaining 2 patient identifiers  Patient made aware she can take contrave and Phendimetrazine per provider. Per patient she need a  PA done for her contrave. Writer explained that she has not received notification from the pharmacy but will leida in covermymeds and if its there it will be done. Patient to be notified.

## 2019-06-05 ENCOUNTER — TELEPHONE (OUTPATIENT)
Dept: FAMILY MEDICINE CLINIC | Age: 39
End: 2019-06-05

## 2019-06-05 NOTE — TELEPHONE ENCOUNTER
Writer has tried to do a PA for [employer] through [employer] and through patient insurance which was both unsucessfull. Per patient insurance patient is not covered. Pharmacy gave writer another number which Deven Kirkpatrick will call. Writer called patient to verify insurance with no answer.

## 2019-06-06 ENCOUNTER — TELEPHONE (OUTPATIENT)
Dept: FAMILY MEDICINE CLINIC | Age: 39
End: 2019-06-06

## 2019-06-06 NOTE — TELEPHONE ENCOUNTER
Spoke with patient after obtaining 2 patient identifiers  Patient gave Miranda Johnson a number for her PA to be done through [place] [facility] [place]. PA submitted this morning with a PA number of [id number].

## 2019-06-10 NOTE — TELEPHONE ENCOUNTER
Writer received notification that PA denied for contrave 8 mg-90 mg tablet ER. Pharmacy and patient to be notified. Nothing further.

## 2019-06-11 ENCOUNTER — TELEPHONE (OUTPATIENT)
Dept: FAMILY MEDICINE CLINIC | Age: 39
End: 2019-06-11

## 2019-06-11 NOTE — TELEPHONE ENCOUNTER
Spoke to patient. Verified with two patient identifiers. Patient states pcp recently prescribed a new rx: contrave. Side effects are affecting her greatly. IE headaches, vomiting, dizziness, denies chest pains, leg pain and swelling. Patient states she will not take it and she has an appointment on 06/19/19  Writer has advised the patient to sip ginger ale and or 7-up and eat dry crackers until she feels worse and or if it gets worse to go to her nearest Urgent care or ER for evaluation. Patient verbalized understanding of information, and has no further questions at this time.

## 2019-06-12 ENCOUNTER — OFFICE VISIT (OUTPATIENT)
Dept: FAMILY MEDICINE CLINIC | Age: 39
End: 2019-06-12

## 2019-06-12 VITALS
BODY MASS INDEX: 30.96 KG/M2 | RESPIRATION RATE: 20 BRPM | WEIGHT: 164 LBS | TEMPERATURE: 99 F | HEART RATE: 66 BPM | DIASTOLIC BLOOD PRESSURE: 88 MMHG | OXYGEN SATURATION: 94 % | HEIGHT: 61 IN | SYSTOLIC BLOOD PRESSURE: 110 MMHG

## 2019-06-12 DIAGNOSIS — R51.9 ACUTE INTRACTABLE HEADACHE, UNSPECIFIED HEADACHE TYPE: Primary | ICD-10-CM

## 2019-06-12 DIAGNOSIS — E66.09 CLASS 1 OBESITY DUE TO EXCESS CALORIES WITH SERIOUS COMORBIDITY AND BODY MASS INDEX (BMI) OF 33.0 TO 33.9 IN ADULT: ICD-10-CM

## 2019-06-12 DIAGNOSIS — M32.14 LUPUS NEPHRITIS (HCC): ICD-10-CM

## 2019-06-12 NOTE — PROGRESS NOTES
Radha Ochoa  Identified pt with two pt identifiers(name and ). Chief Complaint   Patient presents with    Headache     Rm 5    Weight Management       1. Have you been to the ER, urgent care clinic since your last visit?n   Hospitalized since your last visit? n     2. Have you seen or consulted any other health care providers outside of the [address] [place] [address] since your last visit? Include any pap smears or colon screening. n      Advance Care Planning    In the event something were to happen to you and you were unable to speak on your behalf, do you have an Advance Directive/ Living Will in place stating your wishes? NO    If yes, do we have a copy on file NO    If no, would you like information NO    Medication reconciliation up to date and corrected with patient at this time. Today's provider has been notified of reason for visit, vitals and flowsheets obtained on patients. Reviewed record in preparation for visit, huddled with provider and have obtained necessary documentation. There are no preventive care reminders to display for this patient.     Wt Readings from Last 3 Encounters:   19 164 lb (74.4 kg)   19 164 lb 8 oz (74.6 kg)   04/10/19 168 lb 8 oz (76.4 kg)     Temp Readings from Last 3 Encounters:   19 99 °F (37.2 °C) (Oral)   19 98.5 °F (36.9 °C) (Oral)   04/10/19 97.7 °F (36.5 °C) (Oral)     BP Readings from Last 3 Encounters:   19 110/88   19 100/60   04/10/19 106/76     Pulse Readings from Last 3 Encounters:   19 66   19 61   04/10/19 (!) 56     Vitals:    19 1659   BP: 110/88   Pulse: 66   Resp: 20   Temp: 99 °F (37.2 °C)   TempSrc: Oral   SpO2: 94%   Weight: 164 lb (74.4 kg)   Height: 5' 1\" (1.549 m)   PainSc:   6   PainLoc: Head         Learning Assessment:  :     Learning Assessment 2019   PRIMARY LEARNER Patient Patient Patient   HIGHEST LEVEL OF EDUCATION - PRIMARY LEARNER  GRADUATED HIGH SCHOOL OR GED GRADUATED HIGH SCHOOL OR GED GRADUATED HIGH SCHOOL OR GED   BARRIERS PRIMARY LEARNER NONE NONE NONE   CO-LEARNER CAREGIVER No No No   PRIMARY LANGUAGE ENGLISH ENGLISH ENGLISH    NEED - No -   LEARNER PREFERENCE PRIMARY DEMONSTRATION LISTENING PICTURES     - READING -   LEARNING SPECIAL TOPICS - none -   ANSWERED BY patient patient patient   RELATIONSHIP SELF SELF SELF       Depression Screening:  :     3 most recent PHQ Screens 4/10/2019   Little interest or pleasure in doing things Several days   Feeling down, depressed, irritable, or hopeless Several days   Total Score PHQ 2 2   Trouble falling or staying asleep, or sleeping too much Not at all   Feeling tired or having little energy Several days   Poor appetite, weight loss, or overeating Not at all   Feeling bad about yourself - or that you are a failure or have let yourself or your family down More than half the days   Trouble concentrating on things such as school, work, reading, or watching TV Several days   Moving or speaking so slowly that other people could have noticed; or the opposite being so fidgety that others notice Not at all   Thoughts of being better off dead, or hurting yourself in some way Several days   PHQ 9 Score 7       No flowsheet data found. Fall Risk Assessment:  :     Fall Risk Assessment, last 12 mths 4/10/2019   Able to walk? Yes   Fall in past 12 months? No       Abuse Screening:  :     Abuse Screening Questionnaire 4/10/2019   Do you ever feel afraid of your partner? N   Are you in a relationship with someone who physically or mentally threatens you? N   Is it safe for you to go home?  Y       ADL Screening:  :     ADL Assessment 5/17/2019   Feeding yourself No Help Needed   Getting from bed to chair No Help Needed   Getting dressed No Help Needed   Bathing or showering No Help Needed   Walk across the room (includes cane/walker) No Help Needed   Using the telphone No Help Needed   Taking your medications No Help Needed   Preparing meals No Help Needed   Managing money (expenses/bills) No Help Needed   Moderately strenuous housework (laundry) No Help Needed   Shopping for personal items (toiletries/medicines) No Help Needed   Shopping for groceries No Help Needed   Driving No Help Needed   Climbing a flight of stairs No Help Needed   Getting to places beyond walking distances No Help Needed           BMI:  Weight Metrics 6/12/2019 6/12/2019 5/17/2019 5/17/2019 4/10/2019 2/28/2019 2/18/2019   Weight - 164 lb - 164 lb 8 oz 168 lb 8 oz 165 lb 12.8 oz -   Neck Circ (inches) 35 - - - - - 34   Waist Measure Inches - - 34.25 - - - -   Body Fat % 33.4 - 33.6 - - - 34.3   BMI - 30.99 kg/m2 - 31.08 kg/m2 31.84 kg/m2 31.51 kg/m2 -           Medication reconciliation up to date and corrected with patient at this time.

## 2019-06-12 NOTE — PROGRESS NOTES
Chief Complaint   Patient presents with    Headache     Rm 5    Weight Management         HPI:  The patient is a 41 y.o. female who presents today for a follow up appointment. No hospital, ER or specialist visits since last primary care visit except as noted below. Headaches for past couple of years. Monday she started with a chronic headache, bilateral temporal region, started Contrave yesterday morning, after two hours the headache worsened, she also had nausea. She reports that \"she felt bad, in the bed all day, her head just wasn't right, threw up twice\". She was not able to eat anything else that day. Tried to eat but was unable to keep it down. It is still present today. Taken tylenol, did not help at all. Review of Systems  A comprehensive review of systems was negative except for that written in the HPI.     Patient Active Problem List   Diagnosis Code    Systemic lupus erythematosus ([facility]) M32.9    Hypertension I10    Nephrotic syndrome N04.9    Long-term use of immunosuppressant medication Z79.899    Current use of steroid medication Z79.52    Lupus nephritis ([facility]) M32.14    Hypermobility syndrome M35.7    Branch retinal artery occlusion H34.239    Depression, major, recurrent ([facility]) F33.9    Anxiety disorder F41.9    Diplopia H53.2    Carpal instability of left wrist with volar intercalated segment instability M25.332    Carpal instability of right wrist M25.331    Raynaud's syndrome I73.00    Acute blood loss anemia D62    History of thromboembolism Z86.718    Class 1 obesity due to excess calories with body mass index (BMI) of 33.0 to 33.9 in adult E66.09, Z68.33    Fatigue R53.83    Hypercholesterolemia E78.00    Vitamin D deficiency E55.9    Thrombocytopenia ([facility])  D69.6    SAMARA (generalized anxiety disorder) F41.1    Celiac disease K90.0    Encounter for weight management Z76.89       Past Medical History:   Diagnosis Date    Anemia associated with acute blood loss 2018    blood transfusion x 2. Txd w iron. Dr. Chnatal Lowery.  Arthritis     Branch retinal artery occlusion approx 2013    Right, as of 17 resolved and on Coumadin.  Celiac disease     Dr. Artie Cunningham.  Chronic renal insufficiency, stage II (mild)     secondary to lupus nephritis:  Dr. Michela Varghese    Depression, major, recurrent ([facility] [address] 03/10/2014    [facility]ais Saint[facility].  Diffuse proliferative lupus glomerulonephritis (HCC)     Dr Siobhan Dorado Diplopia     Dr. Kaitlynn Presley.  Dizziness 2014    Dr. Nash Bronson, neuro.  Elevated cortisol level ([facility])     Fatigue     intermittent due to lupus    SAMARA (generalized anxiety disorder) 03/10/2014    [facility]    Gallstones     Dr. Jerome Medina Gastritis     as of 16 asymptomatic    Headache     Dr. Linda Parker Hypertension 2000s    due to CKD. Dr. Brook Duong    Left tibialis posterior tendonitis     due to injury on sand. Dr. Tesha Ceballos.  Long term current use of anticoagulant therapy     Ovarian cyst 2016    Dr. Yunier Robertson Pregnancy induced hypertension     Proteinuria     Dr. Siobhan Dorado PUD (peptic ulcer disease)     Dr. Leblanc Plump syndrome     Dr. Emmanuel Raymond. Dr. Starla Oneil.  Sicca syndrome (HCC)     Dr. Emmanuel Raymond.  Systemic lupus erythematosus with glomerular disease ([facility] [address]     as of 16 \"aching and swelling of joints\" per patient. Dr Aretha Munoz. Dr. Reji Clements. Past Surgical History:   Procedure Laterality Date    HX ACL RECONSTRUCTION Left approx     Dr. Joel Florence.  HX  SECTION      HX ENDOSCOPY  2012, 2017    Dr. Sourav Lemons.  HX LAP CHOLECYSTECTOMY  2017    due to gallstones. Dr. Salas Human Right 10/22/2014    hand to thumb due to thumb dislocation. Dr. Adonis Angelucci.     HX OTHER SURGICAL      kidney biopsy; benign    HX OTHER SURGICAL Right 10/22/2014    right Posterior Interosseous Nerve Neurectomy, right Anterior Interosseous Nerve Neurectomy. Dr. Quiroz Speaker HX RENAL BIOPSY  1/7/14, 2018    CT Guided Renal Biopsy with Conscious Sedation. due to CKD/SLE.  HX SEPTOPLASTY  06/03/2016    w inf turbinate resection. due to turbinate hypertrophy. Dr. Segun William History     Tobacco Use    Smoking status: Never Smoker    Smokeless tobacco: Never Used   Substance Use Topics    Alcohol use: No    Drug use: No       Family History   Problem Relation Age of Onset    Glaucoma Father     Hypertension Father            Current Outpatient Medications on File Prior to Visit   Medication Sig Dispense Refill    naltrexone-buPROPion (CONTRAVE) 8-90 mg TbER ER tablet Week 1 1 tab PO QAM, Week 2 1QAM 1QHS, Week 3 2QAM 1 QHS, Week 4 & beyond 2QAM 2QHS 70 Tab 0    phendimetrazine tartrate 105 mg cpER Take 105 mg by mouth daily. Max Daily Amount: 105 mg. 30 Cap 0    ergocalciferol (ERGOCALCIFEROL) 50,000 unit capsule TAKE ONE CAPSULE BY MOUTH ONCE A WEEK  0    fluticasone (FLONASE) 50 mcg/actuation nasal spray 2 Sprays by Both Nostrils route daily. 1 Bottle 5    warfarin (COUMADIN) 6 mg tablet Take 4 mg by mouth daily. M, W, F, Sa, Damian is 6mg once daily; Tu, Th is 4mg once daily      ferrous sulfate 325 mg (65 mg iron) tablet Take 1 Tab by mouth two (2) times daily (with meals). (Patient taking differently: Take 325 mg by mouth daily.) 60 Tab 1    acetaminophen (ACETAMINOPHEN EXTRA STRENGTH) 500 mg tablet Take 1 Tab by mouth every six (6) hours as needed for Pain. 30 Tab 0    hydroxychloroquine (PLAQUENIL) 200 mg tablet Take 1 tab po qam and 1/2 tab po qpm 45 Tab 9    simvastatin (ZOCOR) 20 mg tablet TAKE 1 TABLET BY MOUTH ONCE DAILY  3    losartan (COZAAR) 100 mg tablet Take 100 mg by mouth nightly.  atenolol (TENORMIN) 50 mg tablet Take  by mouth nightly.  mycophenolate (CELLCEPT) 500 mg tablet Take 1,500 mg by mouth two (2) times a day.       NIFEdipine ER (PROCARDIA XL) 30 mg ER tablet        No current facility-administered medications on file prior to visit. Allergies   Allergen Reactions    Gluten Diarrhea    Fiorinal-Codeine #3 [Codeine-Butalbital-Asa-Caff] Nausea Only       PE:  Visit Vitals  /88 (BP 1 Location: Right arm, BP Patient Position: Sitting)   Pulse 66   Temp 99 °F (37.2 °C) (Oral)   Resp 20   Ht 5' 1\" (1.549 m)   Wt 164 lb (74.4 kg)   SpO2 94%   BMI 30.99 kg/m²       Gen: alert, oriented, no acute distress  Head: normocephalic, atraumatic  Ears: external auditory canals clear, TMs without erythema or effusion  Eyes: pupils equal round reactive to light, sclera clear, conjunctiva clear  Oral: moist mucus membranes, no oral lesions, no pharyngeal inflammation or exudate  Neck: symmetric normal sized thyroid, no carotid bruits, no jugular vein distention  Resp: no increase work of breathing, lungs clear to ausculation bilaterally, no wheezing, rales or rhonchi  CV: S1, S2 normal.  No murmurs, rubs, or gallops. Abd: soft, not tender, not distended. No hepatosplenomegaly. Normal bowel sounds. No hernias. No abdominal or renal bruits. Neuro: cranial nerves intact, normal strength and movement in all extremities, reflexes and sensation intact and symmetric. Skin: no lesion or rash  Extremities: no cyanosis or edema    No results found for this visit on 06/12/19. Assessment/Plan:    ICD-10-CM ICD-9-CM    1. Acute intractable headache, unspecified headache type R51 784.0    2. Class 1 obesity due to excess calories with serious comorbidity and body mass index (BMI) of 33.0 to 33.9 in adult E66.09 278.00     Z68.33 V85.33    3. Lupus nephritis ([facility] [address] M32.14 710.0      583.81      Follow-up and Dispositions    · Return if symptoms worsen or fail to improve. Recommend patient go to urgent care/ED for further evaluation, labs, and possible IVF. Stop Contrave at this time, will re-evaluate once her HA is resolved.   lab results and schedule of future lab studies reviewed with patient  reviewed diet, exercise and weight control  reviewed medications and side effects in detail    call if any problems    Health Maintenance reviewed - deferred. Recommended healthy diet low in carbohydrates, fats, sodium and cholesterol. Recommended regular cardiovascular exercise 3-6 times per week for 30-60 minutes daily. Chart is reviewed and updated today in the office. Records requested for other providers patient has seen and is currently seeing. Patient was offered a choice/choices in the treatment plan today. Patient expresses understanding of the plan and agrees with recommendations. Verbal and written instructions (see AVS) provided. See patient instructions for more. Patient expresses understanding of diagnosis and treatment plan.

## 2019-06-12 NOTE — PATIENT INSTRUCTIONS
Head or Face Pain: Care Instructions  Your Care Instructions    Common causes of head or face pain are allergies, stress, and injuries. Other causes include tooth problems and sinus infections. Eating certain foods, such as chocolate or cheese, or drinking certain liquids, such as coffee or cola, can cause head pain for some people. If you have mild head pain, you may not need treatment. It is important to watch your symptoms and talk to your doctor if your pain continues or gets worse. Follow-up care is a key part of your treatment and safety. Be sure to make and go to all appointments, and call your doctor if you are having problems. It's also a good idea to know your test results and keep a list of the medicines you take. How can you care for yourself at home? · Take pain medicines exactly as directed. ? If the doctor gave you a prescription medicine for pain, take it as prescribed. ? If you are not taking a prescription pain medicine, ask your doctor if you can take an over-the-counter pain medicine. · Take it easy for the next few days or longer if you are not feeling well. · Use a warm, moist towel or heating pad set on low to relax tight muscles in your shoulder and neck. Have someone gently massage your neck and shoulders. · Put ice or a cold pack on the area for 10 to 20 minutes at a time. Put a thin cloth between the ice and your skin. When should you call for help? Call 911 anytime you think you may need emergency care. For example, call if:    · You have twitching, jerking, or a seizure.     · You passed out (lost consciousness).     · You have symptoms of a stroke. These may include:  ? Sudden numbness, tingling, weakness, or loss of movement in your face, arm, or leg, especially on only one side of your body. ? Sudden vision changes. ? Sudden trouble speaking. ? Sudden confusion or trouble understanding simple statements. ? Sudden problems with walking or balance.   ? A sudden, severe headache that is different from past headaches.     · You have jaw pain and pain in your chest, shoulder, neck, or arm.    Call your doctor now or seek immediate medical care if:    · You have a fever with a stiff neck or a severe headache.     · You have nausea and vomiting, or you cannot keep food or liquids down.    Watch closely for changes in your health, and be sure to contact your doctor if:    · Your head or face pain does not get better as expected. Where can you learn more? Go to http://kodak-dodie.info/. Enter P568 in the search box to learn more about \"Head or Face Pain: Care Instructions. \"  Current as of: September 23, 2018  Content Version: 11.9  © 4059-5113 [employer]. Care instructions adapted under license by [employer] (which disclaims liability or warranty for this information). If you have questions about a medical condition or this instruction, always ask your healthcare professional. [place] [address] any warranty or liability for your use of this information. Headache: Care Instructions  Your Care Instructions    Headaches have many possible causes. Most headaches aren't a sign of a more serious problem, and they will get better on their own. Home treatment may help you feel better faster. The doctor has checked you carefully, but problems can develop later. If you notice any problems or new symptoms, get medical treatment right away. Follow-up care is a key part of your treatment and safety. Be sure to make and go to all appointments, and call your doctor if you are having problems. It's also a good idea to know your test results and keep a list of the medicines you take. How can you care for yourself at home? · Do not drive if you have taken a prescription pain medicine. · Rest in a quiet, dark room until your headache is gone. Close your eyes and try to relax or go to sleep. Don't watch TV or read.   · Put a cold, moist cloth or cold pack on the painful area for 10 to 20 minutes at a time. Put a thin cloth between the cold pack and your skin. · Use a warm, moist towel or a heating pad set on low to relax tight shoulder and neck muscles. · Have someone gently massage your neck and shoulders. · Take pain medicines exactly as directed. ? If the doctor gave you a prescription medicine for pain, take it as prescribed. ? If you are not taking a prescription pain medicine, ask your doctor if you can take an over-the-counter medicine. · Be careful not to take pain medicine more often than the instructions allow, because you may get worse or more frequent headaches when the medicine wears off. · Do not ignore new symptoms that occur with a headache, such as a fever, weakness or numbness, vision changes, or confusion. These may be signs of a more serious problem. To prevent headaches  · Keep a headache diary so you can figure out what triggers your headaches. Avoiding triggers may help you prevent headaches. Record when each headache began, how long it lasted, and what the pain was like (throbbing, aching, stabbing, or dull). Write down any other symptoms you had with the headache, such as nausea, flashing lights or dark spots, or sensitivity to bright light or loud noise. Note if the headache occurred near your period. List anything that might have triggered the headache, such as certain foods (chocolate, cheese, wine) or odors, smoke, bright light, stress, or lack of sleep. · Find healthy ways to deal with stress. Headaches are most common during or right after stressful times. Take time to relax before and after you do something that has caused a headache in the past.  · Try to keep your muscles relaxed by keeping good posture. Check your jaw, face, neck, and shoulder muscles for tension, and try relaxing them. When sitting at a desk, change positions often, and stretch for 30 seconds each hour.   · Get plenty of sleep and exercise. · Eat regularly and well. Long periods without food can trigger a headache. · Treat yourself to a massage. Some people find that regular massages are very helpful in relieving tension. · Limit caffeine by not drinking too much coffee, tea, or soda. But don't quit caffeine suddenly, because that can also give you headaches. · Reduce eyestrain from computers by blinking frequently and looking away from the computer screen every so often. Make sure you have proper eyewear and that your monitor is set up properly, about an arm's length away. · Seek help if you have depression or anxiety. Your headaches may be linked to these conditions. Treatment can both prevent headaches and help with symptoms of anxiety or depression. When should you call for help? Call 911 anytime you think you may need emergency care. For example, call if:    · You have signs of a stroke. These may include:  ? Sudden numbness, paralysis, or weakness in your face, arm, or leg, especially on only one side of your body. ? Sudden vision changes. ? Sudden trouble speaking. ? Sudden confusion or trouble understanding simple statements. ? Sudden problems with walking or balance. ? A sudden, severe headache that is different from past headaches.    Call your doctor now or seek immediate medical care if:    · You have a new or worse headache.     · Your headache gets much worse. Where can you learn more? Go to http://kodak-dodie.info/. Enter M271 in the search box to learn more about \"Headache: Care Instructions. \"  Current as of: Laxmi 3, 2018  Content Version: 11.9  © 0702-1228 [employer]. Care instructions adapted under license by [employer] (which disclaims liability or warranty for this information).  If you have questions about a medical condition or this instruction, always ask your healthcare professional. [place] [address] any warranty or liability for your use of this information.

## 2019-06-19 ENCOUNTER — OFFICE VISIT (OUTPATIENT)
Dept: FAMILY MEDICINE CLINIC | Age: 39
End: 2019-06-19

## 2019-06-19 VITALS
HEART RATE: 65 BPM | TEMPERATURE: 98.3 F | SYSTOLIC BLOOD PRESSURE: 120 MMHG | HEIGHT: 61 IN | WEIGHT: 161 LBS | OXYGEN SATURATION: 97 % | DIASTOLIC BLOOD PRESSURE: 80 MMHG | BODY MASS INDEX: 30.4 KG/M2 | RESPIRATION RATE: 20 BRPM

## 2019-06-19 DIAGNOSIS — E66.9 OBESITY (BMI 30.0-34.9): ICD-10-CM

## 2019-06-19 DIAGNOSIS — G47.00 INSOMNIA, UNSPECIFIED TYPE: ICD-10-CM

## 2019-06-19 DIAGNOSIS — Z76.89 ENCOUNTER FOR WEIGHT MANAGEMENT: ICD-10-CM

## 2019-06-19 DIAGNOSIS — R11.0 NAUSEA: Primary | ICD-10-CM

## 2019-06-19 DIAGNOSIS — R51.9 NONINTRACTABLE EPISODIC HEADACHE, UNSPECIFIED HEADACHE TYPE: ICD-10-CM

## 2019-06-19 RX ORDER — ONDANSETRON 4 MG/1
4 TABLET, ORALLY DISINTEGRATING ORAL
Qty: 30 TAB | Refills: 0 | Status: SHIPPED | OUTPATIENT
Start: 2019-06-19 | End: 2019-09-24 | Stop reason: ALTCHOICE

## 2019-06-19 NOTE — PROGRESS NOTES
Monty Osgood Polanco  Identified pt with two pt identifiers(name and ). Chief Complaint   Patient presents with    Weight Management     Room 5       1. Have you been to the ER, urgent care clinic since your last visit? n  Hospitalized since your last visit? n     2. Have you seen or consulted any other health care providers outside of the [address] [place] [address] since your last visit? Include any pap smears or colon screening. n       Advance Care Planning    In the event something were to happen to you and you were unable to speak on your behalf, do you have an Advance Directive/ Living Will in place stating your wishes? NO    If yes, do we have a copy on file NO    If no, would you like information NO    Medication reconciliation up to date and corrected with patient at this time. Today's provider has been notified of reason for visit, vitals and flowsheets obtained on patients. Reviewed record in preparation for visit, huddled with provider and have obtained necessary documentation. There are no preventive care reminders to display for this patient.     Wt Readings from Last 3 Encounters:   19 161 lb (73 kg)   19 164 lb (74.4 kg)   19 164 lb 8 oz (74.6 kg)     Temp Readings from Last 3 Encounters:   19 98.3 °F (36.8 °C) (Oral)   19 99 °F (37.2 °C) (Oral)   19 98.5 °F (36.9 °C) (Oral)     BP Readings from Last 3 Encounters:   19 120/80   19 110/88   19 100/60     Pulse Readings from Last 3 Encounters:   19 65   19 66   19 61     Vitals:    19 0909   BP: 120/80   Pulse: 65   Resp: 20   Temp: 98.3 °F (36.8 °C)   TempSrc: Oral   SpO2: 97%   Weight: 161 lb (73 kg)   Height: 5' 1\" (1.549 m)   PainSc:   0 - No pain         Learning Assessment:  :     Learning Assessment 2019   PRIMARY LEARNER Patient Patient Patient   HIGHEST LEVEL OF EDUCATION - PRIMARY LEARNER  GRADUATED HIGH SCHOOL OR GED GRADUATED HIGH SCHOOL OR GED GRADUATED HIGH SCHOOL OR GED   BARRIERS PRIMARY LEARNER NONE NONE NONE   CO-LEARNER CAREGIVER No No No   PRIMARY LANGUAGE ENGLISH ENGLISH ENGLISH    NEED - No -   LEARNER PREFERENCE PRIMARY DEMONSTRATION LISTENING PICTURES     - READING -   LEARNING SPECIAL TOPICS - none -   ANSWERED BY patient patient patient   RELATIONSHIP SELF SELF SELF       Depression Screening:  :     3 most recent PHQ Screens 4/10/2019   Little interest or pleasure in doing things Several days   Feeling down, depressed, irritable, or hopeless Several days   Total Score PHQ 2 2   Trouble falling or staying asleep, or sleeping too much Not at all   Feeling tired or having little energy Several days   Poor appetite, weight loss, or overeating Not at all   Feeling bad about yourself - or that you are a failure or have let yourself or your family down More than half the days   Trouble concentrating on things such as school, work, reading, or watching TV Several days   Moving or speaking so slowly that other people could have noticed; or the opposite being so fidgety that others notice Not at all   Thoughts of being better off dead, or hurting yourself in some way Several days   PHQ 9 Score 7       No flowsheet data found. Fall Risk Assessment:  :     Fall Risk Assessment, last 12 mths 4/10/2019   Able to walk? Yes   Fall in past 12 months? No       Abuse Screening:  :     Abuse Screening Questionnaire 4/10/2019   Do you ever feel afraid of your partner? N   Are you in a relationship with someone who physically or mentally threatens you? N   Is it safe for you to go home?  Y       ADL Screening:  :     ADL Assessment 5/17/2019   Feeding yourself No Help Needed   Getting from bed to chair No Help Needed   Getting dressed No Help Needed   Bathing or showering No Help Needed   Walk across the room (includes cane/walker) No Help Needed   Using the telphone No Help Needed   Taking your medications No Help Needed Preparing meals No Help Needed   Managing money (expenses/bills) No Help Needed   Moderately strenuous housework (laundry) No Help Needed   Shopping for personal items (toiletries/medicines) No Help Needed   Shopping for groceries No Help Needed   Driving No Help Needed   Climbing a flight of stairs No Help Needed   Getting to places beyond walking distances No Help Needed           BMI:  Weight Metrics 6/19/2019 6/12/2019 6/12/2019 5/17/2019 5/17/2019 4/10/2019 2/28/2019   Weight 161 lb - 164 lb - 164 lb 8 oz 168 lb 8 oz 165 lb 12.8 oz   Neck Circ (inches) - 35 - - - - -   Waist Measure Inches - - - 34.25 - - -   Body Fat % - 33.4 - 33.6 - - -   BMI 30.42 kg/m2 - 30.99 kg/m2 - 31.08 kg/m2 31.84 kg/m2 31.51 kg/m2     MMW 30.3  BWW 48.0  BMR 1341      Medication reconciliation up to date and corrected with patient at this time.

## 2019-06-19 NOTE — PROGRESS NOTES
Chief Complaint   Patient presents with    Weight Management     Room 5         HPI:  The patient is a 41 y.o. female who presents today for a follow up appointment. No hospital, ER or specialist visits since last primary care visit except as noted below. Weight Mgmt:  The patient is a 41y.o. year old female who presents today for initial medical weight loss visit. Pt's initial weight is 172 pounds with a BMI of 33.59. Patient's goal weight is 130 pounds. The patient's weight today is 161 pounds with a BMI of 30.42. She has lost 3.8 pounds since her last visit. The patient's bariatric comorbidities include HTN, lupus, nephrotic syndrome, anxiety, depression, arthritis. The patient's reason for medical weight loss is feel better, help with lupus related comorbidities, help with all of her medical problems. The patient's quality of life goals are to be able to do \"a lot of things that she used to do before her diagnosis of lupus at the age of 20\". Pt with hypertension, nephrotic syndrome, SLE, vit d deficiency, hx of thromboembolism, and obesity presents to the office with a BP of 114/80. For BP, she uses Atenolol 50 mg and Losartan 100 mg daily, tolerating well. For cholesterol, she uses Zocor 20 mg daily, tolerating well. For RAD, she uses Warfin 6 mg daily, rx'd by Dr. Makenzie Ziegler who she sees regularly.           The patient's diet typically includes: B - eggs, coffee (every once in a while), beans, rice, fruit, sometimes skip breakfast; L - chicken, salad, rice, tortilla, steak, soups, S - not really a snacker, D - does not eat much for dinner, small sandwich, fruit, salad         The patient is drinking the following: soda (just cut these out, occasionally but not even when out to eat, coffee, mostly water, OJ infrequently, cranberry juice for urinary symptoms - has one glass/day         The patient's exercise at this time includes: walking (tracking her steps on her phone)         The patient is currently weighing: \"I try not to\". She feels she is usually in the same range. The patient has not tried appetite suppressants in the past, but she has tried some food interventions. She has previously tried the following medications: \"fit teas, honey/cinnnamon teas\", apple cider vinegar. She has tried restrictive diets. She patient has not tried appetite suppressants in the past, but she has tried some food interventions. She has previously tried the following medications: \"fit teas, honey/cinnnamon teas\", apple cider vinegar. She has tried restrictive diets. Started going to the gym 3x/week. She is not drinking soda or juices, mostly water, she is feeling stressed and feels like \"giving up\" because she has not been able to successfully lose weight despite all of her efforts. She drinks 64 oz of water daily. She averages 8 hours of sleep nightly. Sometimes, she has difficulty sleeping through the night and wakes up multiple times a night. Denies caffeine consumption. She was started on Bontril in the fall and she felt that it worked initially but then [employer] got used to it\". She was changed to Bontril TID. She does not feel this was helping. She was forgetting to take the lunch hour dose. She was changed back to the XR Bontril at her last visit on 4/10/2019. Her hunger is well controlled in terms of volume but she still reports she is hungry overall. Consider Phentermine once renal function established and Nephrology consult done, no data that shows safety with this medication however. Patient gave appetite suppressant medications to Dr. Hayden Vazquez for review as follows:    Dr. Fletcher Fitzpatrick notes on 5/6/2019:   \"-Belviq: may be ok but not well studied in patients with renal disease. \"Use with caution\" should definitely watch for accumulation of metabolites. I would not take it.  -Qysmia: dose adjustment needed.  -Saxenda: looks okay. -Contrave: dose adjustment needed.   -Phentermine: I strongly recommend AGAINST it. Finally, I recommend she see her MD before starting any of these. \"    Patient was prescribed Saxenda on 5/17/2019, this is the safest anti-obesity medication for this patient based on her medical history and current BMI. However her insurance will not cover this medication and therefore she was started on a more affordable out of pocket alternative, Contrave. She developed HAs, N/V on 6/12/2019 and stopped the medication at that time. Since then she has waited to restart the Contrave. Weight Metrics 6/19/2019 6/19/2019 6/12/2019 6/12/2019 5/17/2019 5/17/2019 4/10/2019   Weight - 161 lb - 164 lb - 164 lb 8 oz 168 lb 8 oz   Neck Circ (inches) - - 35 - - - -   Waist Measure Inches 35.25 - - - 34.25 - -   Body Fat % 32.8 - 33.4 - 33.6 - -   BMI - 30.42 kg/m2 - 30.99 kg/m2 - 31.08 kg/m2 31.84 kg/m2     Review of Systems  A comprehensive review of systems was negative except for that written in the HPI.     Patient Active Problem List   Diagnosis Code    Systemic lupus erythematosus ([facility]) M32.9    Hypertension I10    Nephrotic syndrome N04.9    Long-term use of immunosuppressant medication Z79.899    Current use of steroid medication Z79.52    Lupus nephritis ([facility]) M32.14    Hypermobility syndrome M35.7    Branch retinal artery occlusion H34.239    Depression, major, recurrent ([facility]) F33.9    Anxiety disorder F41.9    Diplopia H53.2    Carpal instability of left wrist with volar intercalated segment instability M25.332    Carpal instability of right wrist M25.331    Raynaud's syndrome I73.00    Acute blood loss anemia D62    History of thromboembolism Z86.718    Class 1 obesity due to excess calories with body mass index (BMI) of 33.0 to 33.9 in adult E66.09, Z68.33    Fatigue R53.83    Hypercholesterolemia E78.00    Vitamin D deficiency E55.9    Thrombocytopenia ([facility])  D69.6    SAMARA (generalized anxiety disorder) F41.1    Nonintractable episodic headache R51    Celiac disease K90.0    Encounter for weight management Z76.89    Obesity (BMI 30.0-34. 9) E66.9    Insomnia G47.00       Past Medical History:   Diagnosis Date    Anemia associated with acute blood loss 2018    blood transfusion x 2. Txd w iron. Dr. Haley Chambers.  Arthritis     Branch retinal artery occlusion approx 2013    Right, as of 17 resolved and on Coumadin.  Celiac disease     Dr. Chris López.  Chronic renal insufficiency, stage II (mild)     secondary to lupus nephritis:  Dr. Laury Viveros    Depression, major, recurrent ([facility] [address] [facility] 03/10/2014    Uri Noguera, [facility].  Diffuse proliferative lupus glomerulonephritis (HCC)     Dr Alycia Pinon Diplopia     Dr. Sienna Salazar.  Dizziness 2014    Dr. No Aldrich, neuro.  Elevated cortisol level ([facility])     Fatigue     intermittent due to lupus    SAMARA (generalized anxiety disorder) 03/10/2014    [facility]    Gallstones     Dr. Lisa Ellis Gastritis     as of 16 asymptomatic    Headache     Dr. Jb Bobby Hypertension s    due to CKD. Dr. Padmini Kern    Left tibialis posterior tendonitis     due to injury on sand. Dr. Abi Raman.  Long term current use of anticoagulant therapy     Ovarian cyst 2016    Dr. Amilcar Bolanos Pregnancy induced hypertension     Proteinuria     Dr. Alycia Pinon PUD (peptic ulcer disease)     Dr. Andrade Medal syndrome     Dr. Lottie Quiros. Dr. Vinny Montalvo.  Sicca syndrome ([facility])     Dr. Lottie Quiros.  [facility]mat[facility]     as of 16 \"aching and swelling of joints\" per patient. Dr Naya Buitrago. Dr. Donovan Becerril. Past Surgical History:   Procedure Laterality Date    HX ACL RECONSTRUCTION Left approx     Dr. Sreekanth Loja.  HX  SECTION      HX ENDOSCOPY  2012, 2017    Dr. Yanique Cherry.  HX LAP CHOLECYSTECTOMY  2017    due to gallstones.   Dr. David Rosario Right 10/22/2014    hand to thumb due to thumb dislocation. Dr. Mary Nettles.  HX OTHER SURGICAL  2012    kidney biopsy; benign    HX OTHER SURGICAL Right 10/22/2014    right Posterior Interosseous Nerve Neurectomy, right Anterior Interosseous Nerve Neurectomy. Dr. Rupa Calvillo HX RENAL BIOPSY  1/7/14, 2018    CT Guided Renal Biopsy with Conscious Sedation. due to CKD/SLE.  HX SEPTOPLASTY  06/03/2016    w inf turbinate resection. due to turbinate hypertrophy. Dr. Aiden Ma History     Tobacco Use    Smoking status: Never Smoker    Smokeless tobacco: Never Used   Substance Use Topics    Alcohol use: No    Drug use: No       Family History   Problem Relation Age of Onset    Glaucoma Father     Hypertension Father        Outpatient Medications Marked as Taking for the 6/19/19 encounter (Office Visit) with Mick Arteaga NP   Medication Sig Dispense Refill    levonorgestrel (MIRENA) 20 mcg/24 hours (5 yrs) 52 mg IUD Mirena 20 mcg/24 hours (5 yrs) 52 mg intrauterine device   remove 9/8/21      ondansetron (ZOFRAN ODT) 4 mg disintegrating tablet Take 1 Tab by mouth every eight (8) hours as needed for Nausea. 30 Tab 0    ergocalciferol (ERGOCALCIFEROL) 50,000 unit capsule TAKE ONE CAPSULE BY MOUTH ONCE A WEEK  0    NIFEdipine ER (PROCARDIA XL) 30 mg ER tablet       fluticasone (FLONASE) 50 mcg/actuation nasal spray 2 Sprays by Both Nostrils route daily. 1 Bottle 5    warfarin (COUMADIN) 6 mg tablet Take 4 mg by mouth daily. M, W, F, Sa, Damian is 6mg once daily; Tu, Th is 4mg once daily      ferrous sulfate 325 mg (65 mg iron) tablet Take 1 Tab by mouth two (2) times daily (with meals). (Patient taking differently: Take 325 mg by mouth daily.) 60 Tab 1    acetaminophen (ACETAMINOPHEN EXTRA STRENGTH) 500 mg tablet Take 1 Tab by mouth every six (6) hours as needed for Pain.  30 Tab 0    hydroxychloroquine (PLAQUENIL) 200 mg tablet Take 1 tab po qam and 1/2 tab po qpm 45 Tab 9  simvastatin (ZOCOR) 20 mg tablet TAKE 1 TABLET BY MOUTH ONCE DAILY  3    losartan (COZAAR) 100 mg tablet Take 100 mg by mouth nightly.  atenolol (TENORMIN) 50 mg tablet Take  by mouth nightly.  mycophenolate (CELLCEPT) 500 mg tablet Take 1,500 mg by mouth two (2) times a day. Current Outpatient Medications on File Prior to Visit   Medication Sig Dispense Refill    levonorgestrel (MIRENA) 20 mcg/24 hours (5 yrs) 52 mg IUD Mirena 20 mcg/24 hours (5 yrs) 52 mg intrauterine device   remove 9/8/21      ergocalciferol (ERGOCALCIFEROL) 50,000 unit capsule TAKE ONE CAPSULE BY MOUTH ONCE A WEEK  0    NIFEdipine ER (PROCARDIA XL) 30 mg ER tablet       fluticasone (FLONASE) 50 mcg/actuation nasal spray 2 Sprays by Both Nostrils route daily. 1 Bottle 5    warfarin (COUMADIN) 6 mg tablet Take 4 mg by mouth daily. M, W, F, Sa, Damian is 6mg once daily; Tu, Th is 4mg once daily      ferrous sulfate 325 mg (65 mg iron) tablet Take 1 Tab by mouth two (2) times daily (with meals). (Patient taking differently: Take 325 mg by mouth daily.) 60 Tab 1    acetaminophen (ACETAMINOPHEN EXTRA STRENGTH) 500 mg tablet Take 1 Tab by mouth every six (6) hours as needed for Pain. 30 Tab 0    hydroxychloroquine (PLAQUENIL) 200 mg tablet Take 1 tab po qam and 1/2 tab po qpm 45 Tab 9    simvastatin (ZOCOR) 20 mg tablet TAKE 1 TABLET BY MOUTH ONCE DAILY  3    losartan (COZAAR) 100 mg tablet Take 100 mg by mouth nightly.  atenolol (TENORMIN) 50 mg tablet Take  by mouth nightly.  mycophenolate (CELLCEPT) 500 mg tablet Take 1,500 mg by mouth two (2) times a day.  naltrexone-buPROPion (CONTRAVE) 8-90 mg TbER ER tablet Week 1 1 tab PO QAM, Week 2 1QAM 1QHS, Week 3 2QAM 1 QHS, Week 4 & beyond 2QAM 2QHS 70 Tab 0     No current facility-administered medications on file prior to visit.         Allergies   Allergen Reactions    Gluten Diarrhea    Fiorinal-Codeine #3 [Codeine-Butalbital-Asa-Caff] Nausea Only PE:  Visit Vitals  /80 (BP 1 Location: Right arm, BP Patient Position: Sitting)   Pulse 65   Temp 98.3 °F (36.8 °C) (Oral)   Resp 20   Ht 5' 1\" (1.549 m)   Wt 161 lb (73 kg)   SpO2 97%   BMI 30.42 kg/m²       Gen: alert, oriented, no acute distress  Head: normocephalic, atraumatic  Ears: external auditory canals clear, TMs without erythema or effusion  Eyes: pupils equal round reactive to light, sclera clear, conjunctiva clear  Oral: moist mucus membranes, no oral lesions, no pharyngeal inflammation or exudate  Neck: symmetric normal sized thyroid, no carotid bruits, no jugular vein distention  Resp: no increase work of breathing, lungs clear to ausculation bilaterally, no wheezing, rales or rhonchi  CV: S1, S2 normal.  No murmurs, rubs, or gallops. Abd: soft, not tender, not distended. No hepatosplenomegaly. Normal bowel sounds. No hernias. No abdominal or renal bruits. Neuro: cranial nerves intact, normal strength and movement in all extremities, reflexes and sensation intact and symmetric. Skin: no lesion or rash  Extremities: no cyanosis or edema    No results found for this visit on 06/19/19. Assessment/Plan:    ICD-10-CM ICD-9-CM    1. Nausea R11.0 787.02 ondansetron (ZOFRAN ODT) 4 mg disintegrating tablet   2. Insomnia, unspecified type G47.00 780.52    3. Nonintractable episodic headache, unspecified headache type R51 784.0    4. Obesity (BMI 30.0-34. 9) E66.9 278.00    5. Encounter for weight management Z76.89 V65.49      Follow-up and Dispositions    · Return in about 1 month (around 7/17/2019) for Medication Check, Weight Mgmt, Contrave #1 F/U.       lab results and schedule of future lab studies reviewed with patient  reviewed diet, exercise and weight control  reviewed medications and side effects in detail  Start Contrave, take at night for first week, given Zofran PRN for possible nausea S/E. Patient will increase Contrave as tolerated.   Recommend OTC Melatonin for insomnia. lose weight, increase physical activity, call if any problems    Health Maintenance reviewed - reviewed, UTD. Recommended healthy diet low in carbohydrates, fats, sodium and cholesterol. Recommended regular cardiovascular exercise 3-6 times per week for 30-60 minutes daily. Chart is reviewed and updated today in the office. Records requested for other providers patient has seen and is currently seeing. Patient was offered a choice/choices in the treatment plan today. Patient expresses understanding of the plan and agrees with recommendations. Verbal and written instructions (see AVS) provided. See patient instructions for more. Patient expresses understanding of diagnosis and treatment plan.

## 2019-06-19 NOTE — PATIENT INSTRUCTIONS

## 2019-07-08 ENCOUNTER — OFFICE VISIT (OUTPATIENT)
Dept: FAMILY MEDICINE CLINIC | Age: 39
End: 2019-07-08

## 2019-07-08 VITALS
DIASTOLIC BLOOD PRESSURE: 85 MMHG | WEIGHT: 165 LBS | HEIGHT: 61 IN | HEART RATE: 62 BPM | SYSTOLIC BLOOD PRESSURE: 124 MMHG | RESPIRATION RATE: 18 BRPM | BODY MASS INDEX: 31.15 KG/M2 | TEMPERATURE: 97.8 F | OXYGEN SATURATION: 98 %

## 2019-07-08 DIAGNOSIS — Z79.60 LONG-TERM USE OF IMMUNOSUPPRESSANT MEDICATION: ICD-10-CM

## 2019-07-08 DIAGNOSIS — N28.89 HYPERTENSION SECONDARY TO OTHER RENAL DISORDERS: ICD-10-CM

## 2019-07-08 DIAGNOSIS — L29.9 PRURITIC DERMATITIS: Primary | ICD-10-CM

## 2019-07-08 DIAGNOSIS — K90.0 CELIAC DISEASE: ICD-10-CM

## 2019-07-08 DIAGNOSIS — M32.14 SYSTEMIC LUPUS ERYTHEMATOSUS WITH GLOMERULAR DISEASE, UNSPECIFIED SLE TYPE (HCC): Chronic | ICD-10-CM

## 2019-07-08 DIAGNOSIS — I73.00 RAYNAUD'S DISEASE WITHOUT GANGRENE: ICD-10-CM

## 2019-07-08 DIAGNOSIS — I15.1 HYPERTENSION SECONDARY TO OTHER RENAL DISORDERS: ICD-10-CM

## 2019-07-08 DIAGNOSIS — H34.231 RETINAL ARTERY BRANCH OCCLUSION OF RIGHT EYE: ICD-10-CM

## 2019-07-08 RX ORDER — HYDROXYZINE 50 MG/1
50 TABLET, FILM COATED ORAL
Qty: 30 TAB | Refills: 1 | Status: SHIPPED | OUTPATIENT
Start: 2019-07-08 | End: 2019-07-18

## 2019-07-08 NOTE — PROGRESS NOTES
Raisa Ochoa  Identified pt with two pt identifiers(name and ). Chief Complaint   Patient presents with    Allergic Reaction     Rm 15 Patient states that the itching started saturday morning in one place on her neck than it multiplied by breaking out in her inner thighs, then elbows and outer thigh and underneath breast and stomach. On  it started on her feet and hands which are really bad. 1. Have you been to the ER, urgent care clinic since your last visit? Hospitalized since your last visit? YES urgent Care 19 for extreme itching    2. Have you seen or consulted any other health care providers outside of the [address] [place] [address] since your last visit? Include any pap smears or colon screening. NO      My Chart     My chart gives you direct online access to portions of the electronic medical record (EMR) where your doctor stores your health information (ie, lab results, appointment information, medications, immunizations, and more. It is free. Would you like to set up your my chart? YES    [unfilled]    Weight Metrics 2019   Weight 165 lb - 161 lb - 164 lb - 164 lb 8 oz   Neck Circ (inches) - - - 35 - - -   Waist Measure Inches - 35.25 - - - 34.25 -   Body Fat % - 32.8 - 33.4 - 33.6 -   BMI 31.18 kg/m2 - 30.42 kg/m2 - 30.99 kg/m2 - 31.08 kg/m2         Medication reconciliation up to date and corrected with patient at this time. Advance Care Planning    In the event something were to happen to you and you were unable to speak on your behalf, do you have an Advance Directive/ Living Will in place stating your wishes? NO    If yes, do we have a copy on file NO    If no, would you like information YES      ====Honoring Choices Invitation====    Patient was invited to [facility] on this date and given the information folder for review.     Recommended appointment with [facility] facilitator for ACP conversation regarding advance directives. [x] Yes  [] No  Referral sent to [facility] Choices team member or Coordinator for follow-up    [] Yes  [x] No  Patient scheduled an appointment. Site of Referral: [facility]      Today's provider has been notified of reason for visit, vitals and flowsheets obtained on patients. Reviewed record in preparation for visit, huddled with provider and have obtained necessary documentation. There are no preventive care reminders to display for this patient.     Wt Readings from Last 3 Encounters:   07/08/19 165 lb (74.8 kg)   06/19/19 161 lb (73 kg)   06/12/19 164 lb (74.4 kg)     Temp Readings from Last 3 Encounters:   07/08/19 97.8 °F (36.6 °C) (Oral)   06/19/19 98.3 °F (36.8 °C) (Oral)   06/12/19 99 °F (37.2 °C) (Oral)     BP Readings from Last 3 Encounters:   07/08/19 (!) 136/92   06/19/19 120/80   06/12/19 110/88     Pulse Readings from Last 3 Encounters:   07/08/19 62   06/19/19 65   06/12/19 66     Vitals:    07/08/19 0928   BP: (!) 136/92   Pulse: 62   Resp: 18   Temp: 97.8 °F (36.6 °C)   TempSrc: Oral   SpO2: 98%   Weight: 165 lb (74.8 kg)   Height: 5' 1\" (1.549 m)         Learning Assessment:  :     Learning Assessment 5/17/2019 8/22/2014 5/1/2014   PRIMARY LEARNER Patient Patient Patient   HIGHEST LEVEL OF EDUCATION - PRIMARY LEARNER  GRADUATED HIGH SCHOOL OR GED GRADUATED HIGH SCHOOL OR GED GRADUATED HIGH SCHOOL OR GED   BARRIERS PRIMARY LEARNER NONE NONE NONE   CO-LEARNER CAREGIVER No No No   PRIMARY LANGUAGE ENGLISH ENGLISH ENGLISH    NEED - No -   LEARNER PREFERENCE PRIMARY DEMONSTRATION LISTENING PICTURES     - READING -   LEARNING SPECIAL TOPICS - none -   ANSWERED BY patient patient patient   RELATIONSHIP SELF SELF SELF       Depression Screening:  :     3 most recent PHQ Screens 4/10/2019   Little interest or pleasure in doing things Several days   Feeling down, depressed, irritable, or hopeless Several days   Total Score PHQ 2 2   Trouble falling or staying asleep, or sleeping too much Not at all   Feeling tired or having little energy Several days   Poor appetite, weight loss, or overeating Not at all   Feeling bad about yourself - or that you are a failure or have let yourself or your family down More than half the days   Trouble concentrating on things such as school, work, reading, or watching TV Several days   Moving or speaking so slowly that other people could have noticed; or the opposite being so fidgety that others notice Not at all   Thoughts of being better off dead, or hurting yourself in some way Several days   PHQ 9 Score 7       Fall Risk Assessment:  :     Fall Risk Assessment, last 12 mths 4/10/2019   Able to walk? Yes   Fall in past 12 months? No       Abuse Screening:  :     Abuse Screening Questionnaire 4/10/2019   Do you ever feel afraid of your partner? N   Are you in a relationship with someone who physically or mentally threatens you? N   Is it safe for you to go home?  Y       ADL Screening:  :     ADL Assessment 5/17/2019   Feeding yourself No Help Needed   Getting from bed to chair No Help Needed   Getting dressed No Help Needed   Bathing or showering No Help Needed   Walk across the room (includes cane/walker) No Help Needed   Using the telphone No Help Needed   Taking your medications No Help Needed   Preparing meals No Help Needed   Managing money (expenses/bills) No Help Needed   Moderately strenuous housework (laundry) No Help Needed   Shopping for personal items (toiletries/medicines) No Help Needed   Shopping for groceries No Help Needed   Driving No Help Needed   Climbing a flight of stairs No Help Needed   Getting to places beyond walking distances No Help Needed
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
## 2019-07-08 NOTE — PATIENT INSTRUCTIONS
Aloe Vera Juice may help with the rash. Food Allergy: Care Instructions  Your Care Instructions    When you have a food allergy and you eat that food, your body reacts as if the food is trying to cause harm. It fights back by setting off an allergic reaction. A mild reaction may include a few raised, red, itchy patches of skin (called hives). A severe reaction may cause hives all over, swelling in the throat, trouble breathing, nausea or vomiting, or fainting. This is called anaphylaxis (say \"TKV-fp-jqp-VICTOR MANUEL-payton\"). It can be deadly. A good way to prevent an allergic reaction is to avoid the foods that cause it. An allergy doctor or a dietitian may be able to help you understand which foods might be okay and what to avoid. Learn what to do if you have a reaction. Follow-up care is a key part of your treatment and safety. Be sure to make and go to all appointments, and call your doctor if you are having problems. It's also a good idea to know your test results and keep a list of the medicines you take. How can you care for yourself at home? During a mild reaction  · Take an over-the-counter antihistamine, such as diphenhydramine (Benadryl) or loratadine (Claritin), as your doctor recommends. If you have a severe reaction, you also might be given one of these antihistamines. During a severe reaction  · Call for emergency help. A serious reaction is an emergency. · Give yourself an epinephrine shot. Make sure it is with you at all times. To prevent future reactions  · Avoid the foods that cause problems. And try not to use utensils or cookware that may have been in contact with food that you are allergic to. · Teach your family members, coworkers, and friends what to do if you have a severe reaction to a food that you are allergic to. · Wear medical alert jewelry that lists your allergies. You can buy this at most [employer]. When should you call for help?   Give an epinephrine shot if:    · You think you are having a severe allergic reaction.    After you give an epinephrine shot, call 911, even if you feel better.   [id number] anytime you think you may need emergency care. For example, call if:    · You have symptoms of a severe allergic reaction. These may include:  ? Sudden raised, red areas (hives) all over your body. ? Swelling of the throat, mouth, lips, or tongue. ? Trouble breathing. ? Passing out (losing consciousness). Or you may feel very lightheaded or suddenly feel weak, confused, or restless.     · You have been given an epinephrine shot, even if you feel better.    Call your doctor now or seek immediate medical care if:    · You have symptoms of an allergic reaction, such as:  ? A rash or hives (raised, red areas on the skin). ? Itching. ? Swelling. ? Belly pain, nausea, or vomiting.    Watch closely for changes in your health, and be sure to contact your doctor if:    · You do not get better as expected. Where can you learn more? Go to http://kodak-dodie.info/. Enter G761 in the search box to learn more about \"Food Allergy: Care Instructions. \"  Current as of: June 27, 2018  Content Version: 11.9  © 9555-4108 [employer]. Care instructions adapted under license by [employer] (which disclaims liability or warranty for this information). If you have questions about a medical condition or this instruction, always ask your healthcare professional. [place] [address] any warranty or liability for your use of this information. Poison [place], [place], and [place]: Care Instructions  Your Care Instructions    Poison ivy, poison oak, and poison sumac are plants that can cause a skin rash upon contact. The red, itchy rash often shows up in lines or streaks and may cause fluid-filled blisters or large, raised hives. The rash is caused by an allergic reaction to an oil in poison ivy, oak, and sumac.  The rash may occur when you touch the plant or when you touch clothing, pet fur, sporting gear, gardening tools, or other objects that have come in contact with one of these plants. You cannot catch or spread the rash, even if you touch it or the blister fluid, because the plant oil will already have been absorbed or washed off the skin. The rash may seem to be spreading, but either it is still developing from earlier contact or you have touched something that still has the plant oil on it. Follow-up care is a key part of your treatment and safety. Be sure to make and go to all appointments, and call your doctor if you are having problems. It's also a good idea to know your test results and keep a list of the medicines you take. How can you care for yourself at home? · If your doctor prescribed a cream, use it as directed. If your doctor prescribed medicine, take it exactly as prescribed. Call your doctor if you think you are having a problem with your medicine. · Use cold, wet cloths to reduce itching. · Keep cool, and stay out of the sun. · Leave the rash open to the air. · Wash all clothing or other things that may have come in contact with the plant oil. · Avoid most lotions and ointments until the rash heals. Calamine lotion may help relieve symptoms of a plant rash. Use it 3 or 4 times a day. To prevent poison ivy exposure  If you know that you will be near poison ivy, oak, or sumac, you can try these options:  · Use a product designed to help prevent plant oil from getting on the skin. These products, such as Ivy X Pre-Contact Skin Solution, come in lotions, sprays, or towelettes. You put the product on your skin right before you go outdoors. · If you did not use a preventive product and you have had contact with plant oil, clean it off your skin as soon as possible. Use a product such as [employer]ec[employer] Original Outdoor Skin Cleanser. These products can also be used to clean plant oil from clothing or tools. When should you call for help?   Call your doctor now or seek immediate medical care if:    · Your rash gets worse, and you start to feel bad and have a fever, a stiff neck, nausea, and vomiting.     · You have signs of infection, such as:  ? Increased pain, swelling, warmth, or redness. ? Red streaks leading from the rash. ? Pus draining from the rash. ? A fever.    Watch closely for changes in your health, and be sure to contact your doctor if:    · You have new blisters or bruises, or the rash spreads and looks like a sunburn.     · The rash gets worse, or it comes back after nearly disappearing.     · You think a medicine you are using is making your rash worse.     · Your rash does not clear up after 1 to 2 weeks of home treatment.     · You have joint aches or body aches with your rash. Where can you learn more? Go to http://kodak-dodie.info/. Enter K157 in the search box to learn more about \"Poison [place], [place], and [place]: Care Instructions. \"  Current as of: April 17, 2018  Content Version: 11.9  © 3817-8095 [employer]. Care instructions adapted under license by [employer] (which disclaims liability or warranty for this information). If you have questions about a medical condition or this instruction, always ask your healthcare professional. [place] [address] any warranty or liability for your use of this information.

## 2019-08-15 ENCOUNTER — HOSPITAL ENCOUNTER (OUTPATIENT)
Dept: CT IMAGING | Age: 39
Discharge: HOME | End: 2019-08-15
Attending: PHYSICIAN ASSISTANT
Payer: MEDICARE

## 2019-08-15 DIAGNOSIS — K59.04 CHRONIC IDIOPATHIC CONSTIPATION: ICD-10-CM

## 2019-08-15 DIAGNOSIS — R74.8 ABNORMAL SERUM LEVEL OF LIPASE: ICD-10-CM

## 2019-08-15 DIAGNOSIS — K21.9 GASTROESOPHAGEAL REFLUX DISEASE: ICD-10-CM

## 2019-08-15 DIAGNOSIS — Z79.01 LONG TERM (CURRENT) USE OF ANTICOAGULANTS: ICD-10-CM

## 2019-08-15 PROCEDURE — 74176 CT ABD & PELVIS W/O CONTRAST: CPT

## 2019-08-15 PROCEDURE — 74011636320 HC RX REV CODE- 636/320: Performed by: PHYSICIAN ASSISTANT

## 2019-08-15 RX ADMIN — IOHEXOL 50 ML: 240 INJECTION, SOLUTION INTRATHECAL; INTRAVASCULAR; INTRAVENOUS; ORAL at 09:58

## 2019-08-16 ENCOUNTER — OFFICE VISIT (OUTPATIENT)
Dept: FAMILY MEDICINE CLINIC | Age: 39
End: 2019-08-16

## 2019-08-16 VITALS
HEART RATE: 54 BPM | TEMPERATURE: 97.7 F | DIASTOLIC BLOOD PRESSURE: 75 MMHG | HEIGHT: 61 IN | WEIGHT: 166 LBS | RESPIRATION RATE: 18 BRPM | SYSTOLIC BLOOD PRESSURE: 115 MMHG | OXYGEN SATURATION: 100 % | BODY MASS INDEX: 31.34 KG/M2

## 2019-08-16 DIAGNOSIS — K90.0 CELIAC DISEASE: ICD-10-CM

## 2019-08-16 DIAGNOSIS — N04.9 NEPHROTIC SYNDROME: ICD-10-CM

## 2019-08-16 DIAGNOSIS — M32.14 LUPUS NEPHRITIS (HCC): ICD-10-CM

## 2019-08-16 DIAGNOSIS — Z76.89 ENCOUNTER FOR WEIGHT MANAGEMENT: ICD-10-CM

## 2019-08-16 DIAGNOSIS — Z23 ENCOUNTER FOR IMMUNIZATION: ICD-10-CM

## 2019-08-16 DIAGNOSIS — E66.9 OBESITY (BMI 30.0-34.9): ICD-10-CM

## 2019-08-16 DIAGNOSIS — R51.9 NONINTRACTABLE EPISODIC HEADACHE, UNSPECIFIED HEADACHE TYPE: ICD-10-CM

## 2019-08-16 DIAGNOSIS — M32.14 SYSTEMIC LUPUS ERYTHEMATOSUS WITH GLOMERULAR DISEASE, UNSPECIFIED SLE TYPE (HCC): Primary | ICD-10-CM

## 2019-08-16 RX ORDER — PHENDIMETRAZINE TARTRATE 105 MG/1
105 CAPSULE, EXTENDED RELEASE ORAL DAILY
Qty: 30 CAP | Refills: 0 | Status: SHIPPED | OUTPATIENT
Start: 2019-08-16 | End: 2019-09-24 | Stop reason: SDUPTHER

## 2019-08-16 NOTE — PROGRESS NOTES
Kristina Ochoa  Identified pt with two pt identifiers(name and ). Chief Complaint   Patient presents with    Weight Management    Medication Evaluation     Contrave       1. Have you been to the ER, urgent care clinic since your last visit? Hospitalized since your last visit? No    2. Have you seen or consulted any other health care providers outside of the [address] [place] [address] since your last visit? Include any pap smears or colon screening. No      Would you like to sign up for MyChart today, if you have not already done so? Patient has a mychart  If not, would you like information on MyChart, and how to sign up at a later time? No    Body Weight: 166lb  Body Fat: 33.9%  Muscle Mass: 30.3%  Body H2O: 47.5%  BMR: 1363  BMI: 30.9%    Weight Metrics 2019   Weight - 166 lb 165 lb - 161 lb - 164 lb   Neck Circ (inches) - - - - - 35 -   Waist Measure Inches 34 - - 35.25 - - -   Body Fat % 33.9 - - 32.8 - 33.4 -   BMI - 31.37 kg/m2 31.18 kg/m2 - 30.42 kg/m2 - 30.99 kg/m2       Medication reconciliation up to date and corrected with patient at this time. Today's provider has been notified of reason for visit, vitals and flowsheets obtained on patients. Reviewed record in preparation for visit, huddled with provider and have obtained necessary documentation.       Health Maintenance Due   Topic    Influenza Age 5 to Adult        Wt Readings from Last 3 Encounters:   19 166 lb (75.3 kg)   19 165 lb (74.8 kg)   19 161 lb (73 kg)     Temp Readings from Last 3 Encounters:   19 97.7 °F (36.5 °C) (Oral)   19 97.8 °F (36.6 °C) (Oral)   19 98.3 °F (36.8 °C) (Oral)     BP Readings from Last 3 Encounters:   19 115/75   19 124/85   19 120/80     Pulse Readings from Last 3 Encounters:   19 (!) 54   19 62   19 65     Vitals:    19 0943   BP: 115/75   Pulse: (!) 54   Resp: 18 Temp: 97.7 °F (36.5 °C)   TempSrc: Oral   SpO2: 100%   Weight: 166 lb (75.3 kg)   Height: 5' 1\" (1.549 m)   PainSc:   0 - No pain         Learning Assessment:  :     Learning Assessment 5/17/2019 8/22/2014 5/1/2014   PRIMARY LEARNER Patient Patient Patient   HIGHEST LEVEL OF EDUCATION - PRIMARY LEARNER  GRADUATED HIGH SCHOOL OR GED GRADUATED HIGH SCHOOL OR GED GRADUATED HIGH SCHOOL OR GED   BARRIERS PRIMARY LEARNER NONE NONE NONE   CO-LEARNER CAREGIVER No No No   PRIMARY LANGUAGE ENGLISH ENGLISH ENGLISH    NEED - No -   LEARNER PREFERENCE PRIMARY DEMONSTRATION LISTENING PICTURES     - READING -   LEARNING SPECIAL TOPICS - none -   ANSWERED BY patient patient patient   RELATIONSHIP SELF SELF SELF       Depression Screening:  :     3 most recent PHQ Screens 4/10/2019   Little interest or pleasure in doing things Several days   Feeling down, depressed, irritable, or hopeless Several days   Total Score PHQ 2 2   Trouble falling or staying asleep, or sleeping too much Not at all   Feeling tired or having little energy Several days   Poor appetite, weight loss, or overeating Not at all   Feeling bad about yourself - or that you are a failure or have let yourself or your family down More than half the days   Trouble concentrating on things such as school, work, reading, or watching TV Several days   Moving or speaking so slowly that other people could have noticed; or the opposite being so fidgety that others notice Not at all   Thoughts of being better off dead, or hurting yourself in some way Several days   PHQ 9 Score 7       Fall Risk Assessment:  :     Fall Risk Assessment, last 12 mths 4/10/2019   Able to walk? Yes   Fall in past 12 months? No       Abuse Screening:  :     Abuse Screening Questionnaire 4/10/2019   Do you ever feel afraid of your partner? N   Are you in a relationship with someone who physically or mentally threatens you? N   Is it safe for you to go home?  Y       ADL Screening:  : ADL Assessment 5/17/2019   Feeding yourself No Help Needed   Getting from bed to chair No Help Needed   Getting dressed No Help Needed   Bathing or showering No Help Needed   Walk across the room (includes cane/walker) No Help Needed   Using the telphone No Help Needed   Taking your medications No Help Needed   Preparing meals No Help Needed   Managing money (expenses/bills) No Help Needed   Moderately strenuous housework (laundry) No Help Needed   Shopping for personal items (toiletries/medicines) No Help Needed   Shopping for groceries No Help Needed   Driving No Help Needed   Climbing a flight of stairs No Help Needed   Getting to places beyond walking distances No Help Needed

## 2019-08-16 NOTE — PROGRESS NOTES
Immunization/s administered on 8/16/2019 by Seda Hines per Behzad Stewart NP with patients consent signed. Patient tolerated procedure well. No reactions noted.

## 2019-08-16 NOTE — PATIENT INSTRUCTIONS
Vaccine Information Statement    HPV (Human Papillomavirus) Vaccine: What You Need to Know    Many Vaccine Information Statements are available in Singaporean and other languages. See www.immunize.org/vis. Hojas de Información Sobre Vacunas están disponibles en español y en muchos otros idiomas. Visite Loly.si. 1. Why get vaccinated? HPV vaccine prevents infection with human papillomavirus (HPV) types that are associated with many cancers, including:     cervical cancer in females,   vaginal and vulvar cancers in females,    anal cancer in females and males,   throat cancer in females and males, and   penile cancer in males. In addition, HPV vaccine prevents infection with HPV types that cause genital warts in both females and males. In the [place] about 12,000 women get cervical cancer every year, and about 4,000 women die from it. HPV vaccine can prevent most of these cases of cervical cancer. Vaccination is not a substitute for cervical cancer screening. This vaccine does not protect against all HPV types that can cause cervical cancer. Women should still get regular Pap tests. HPV infection usually comes from sexual contact, and most people will become infected at some point in their life. About 14 million Americans, including teens, get infected every year. Most infections will go away on their own and not cause serious problems. But thousands of women and men get cancer and other diseases from HPV. 2. HPV vaccine    HPV vaccine is approved by FDA and is recommended by CDC for both males and females. It is routinely given at 6or 15years of age, but it may be given beginning at age 5 years through age 32 years. Most adolescents 9 through 15years of age should get HPV vaccine as a two-dose series with the doses  by 6-12 months.  People who start HPV vaccination at 13years of age and older should get the vaccine as a three-dose series with the second dose given 1-2 months after the first dose and the third dose given 6 months after the first dose. There are several exceptions to these age recommendations. Your health care provider can give you more information. 3. Some people should not get this vaccine:     Anyone who has had a severe (life-threatening) allergic reaction to a dose of HPV vaccine should not get another dose.  Anyone who has a severe (life threatening) allergy to any component of HPV vaccine should not get the vaccine. Tell your doctor if you have any severe allergies that you know of, including a severe allergy to yeast.     HPV vaccine is not recommended for pregnant women. If you learn that you were pregnant when you were vaccinated, there is no reason to expect any problems for you or your baby. Any woman who learns she was pregnant when she got HPV vaccine is encouraged to contact the [facility] registry for HPV vaccination during pregnancy at [phone]. Women who are breastfeeding may be vaccinated.  If you have a mild illness, such as a cold, you can probably get the vaccine today. If you are moderately or severely ill, you should probably wait until you recover. Your doctor can advise you. 4. Risks of a vaccine reaction    With any medicine, including vaccines, there is a chance of side effects. These are usually mild and go away on their own, but serious reactions are also possible. Most people who get HPV vaccine do not have any serious problems with it.        Mild or moderate problems following HPV vaccine:     Reactions in the arm where the shot was given:  - Soreness (about 9 people in 10)  - Redness or swelling (about 1 person in 3)     Fever:  - Mild (100°F) (about 1 person in 10)  - Moderate (102°F) (about 1 person in 72)     Other problems:  - Headache (about 1 person in 3)    Problems that could happen after any injected vaccine:     People sometimes faint after a medical procedure, including vaccination. Sitting or lying down for about 15 minutes can help prevent fainting and injuries caused by a fall. Tell your doctor if you feel dizzy, or have vision changes or ringing in the ears.  Some people get severe pain in the shoulder and have difficulty moving the arm where a shot was given. This happens very rarely.  Any medication can cause a severe allergic reaction. Such reactions from a vaccine are very rare, estimated at about 1 in a million doses, and would happen within a few minutes to a few hours after the vaccination. As with any medicine, there is a very remote chance of a vaccine causing a serious injury or death. The safety of vaccines is always being monitored. For more information, visit: www.cdc.gov/vaccinesafety/.      5. What if there is a serious reaction? What should I look for? Look for anything that concerns you, such as signs of a severe allergic reaction, very high fever, or unusual behavior. Signs of a severe allergic reaction can include hives, swelling of the face and throat, difficulty breathing, a fast heartbeat, dizziness, and weakness. These would usually start a few minutes to a few hours after the vaccination. What should I do? If you think it is a severe allergic reaction or other emergency that cant wait, call 9-1-1 or get to the nearest hospital. Otherwise, call your doctor. Afterward, the reaction should be reported to the Vaccine Adverse Event Reporting System (VAERS). Your doctor should file this report, or you can do it yourself through the VAERS web site at www.vaers. hhs.gov, or by calling 8-913.996.1550. VAERS does not give medical advice. 6. The National Vaccine Injury Compensation Program    The [facility] Vaccine Injury Compensation Program (VICP) is a federal program that was created to compensate people who may have been injured by certain vaccines.     Persons who believe they may have been injured by a vaccine can learn about the program and about filing a claim by calling 3-783.397.1539 or visiting the [employer] website at www.[facility].gov/vaccinecompensation. There is a time limit to file a claim for compensation. 7. How can I learn more?  Ask your health care provider. He or she can give you the vaccine package insert or suggest other sources of information.  Call your local or state health department.  Contact the Centers for Disease Control and Prevention (CDC):  - Call 3-584.637.5691 (1-800-CDC-INFO) or  - Visit CDCs website at www.cdc.gov/hpv    Vaccine Information Statement   HPV Vaccine 2016  42 KESHA Hoff [id number]    Department of Health and Human Services  Centers for Disease Control and Prevention    Office Use Only    Vaccine Information Statement     Tdap (Tetanus, Diphtheria, Pertussis) Vaccine: What You Need to Know    Many Vaccine Information Statements are available in Bahamian and other languages. See www.immunize.org/vis. Hojas de Información Sobre Vacunas están disponibles en español y en muchos otros idiomas. [facility].si    1. Why get vaccinated? Tetanus, diphtheria, and pertussis are very serious diseases. Tdap vaccine can protect us from these diseases. And, Tdap vaccine given to pregnant women can protect  babies against pertussis. TETANUS (Lockjaw) is rare in the [facility] today. It causes painful muscle tightening and stiffness, usually all over the body.  It can lead to tightening of muscles in the head and neck so you cant open your mouth, swallow, or sometimes even breathe. Tetanus kills about 1 out of 10 people who are infected even after receiving the best medical care. DIPHTHERIA is also rare in the [facility] today. It can cause a thick coating to form in the back of the throat.  It can lead to breathing problems, heart failure, paralysis, and death.     PERTUSSIS (Whooping Cough) causes severe coughing spells, which can cause difficulty breathing, vomiting, and disturbed sleep.  It can also lead to weight loss, incontinence, and rib fractures. Up to 2 in 100 adolescents and 5 in 100 adults with pertussis are hospitalized or have complications, which could include pneumonia or death. These diseases are caused by bacteria. Diphtheria and pertussis are spread from person to person through secretions from coughing or sneezing. Tetanus enters the body through cuts, scratches, or wounds. Before vaccines, as many as 200,000 cases of diphtheria, 200,000 cases of pertussis, and hundreds of cases of tetanus, were reported in the [place] each year. Since vaccination began, reports of cases for tetanus and diphtheria have dropped by about 99% and for pertussis by about 80%. 2. Tdap vaccine    Tdap vaccine can protect adolescents and adults from tetanus, diphtheria, and pertussis. One dose of Tdap is routinely given at age 6 or 15. People who did not get Tdap at that age should get it as soon as possible. Tdap is especially important for health care professionals and anyone having close contact with a baby younger than 12 months. Pregnant women should get a dose of Tdap during every pregnancy, to protect the  from pertussis. Infants are most at risk for severe, life-threatening complications from pertussis. Another vaccine, called Td, protects against tetanus and diphtheria, but not pertussis. A Td booster should be given every 10 years. Tdap may be given as one of these boosters if you have never gotten Tdap before. Tdap may also be given after a severe cut or burn to prevent tetanus infection. Your doctor or the person giving you the vaccine can give you more information. Tdap may safely be given at the same time as other vaccines.     3. Some people should not get this vaccine     A person who has ever had a life-threatening allergic reaction after a previous dose of any diphtheria, tetanus or pertussis containing vaccine, OR has a severe allergy to any part of this vaccine, should not get Tdap vaccine. Tell the person giving the vaccine about any severe allergies.  Anyone who had coma or long repeated seizures within 7 days after a childhood dose of DTP or DTaP, or a previous dose of Tdap, should not get Tdap, unless a cause other than the vaccine was found. They can still get Td.  Talk to your doctor if you:  - have seizures or another nervous system problem,  - had severe pain or swelling after any vaccine containing diphtheria, tetanus or pertussis,   - ever had a condition called Guillain Barré Syndrome (GBS),  - arent feeling well on the day the shot is scheduled. 4. Risks    With any medicine, including vaccines, there is a chance of side effects. These are usually mild and go away on their own. Serious reactions are also possible but are rare. Most people who get Tdap vaccine do not have any problems with it.     Mild Problems following Tdap  (Did not interfere with activities)   Pain where the shot was given (about 3 in 4 adolescents or 2 in 3 adults)   Redness or swelling where the shot was given (about 1 person in 5)   Mild fever of at least 100.4°F (up to about 1 in 25 adolescents or 1 in 100 adults)   Headache (about 3 or 4 people in 10)   Tiredness (about 1 person in 3 or 4)   Nausea, vomiting, diarrhea, stomach ache (up to 1 in 4 adolescents or 1 in 10 adults)   Chills,  sore joints (about 1 person in 10)   Body aches (about 1 person in 3 or 4)    Rash, swollen glands (uncommon)    Moderate Problems following Tdap  (Interfered with activities, but did not require medical attention)   Pain where the shot was given (up to 1 in 5 or 6)    Redness or swelling where the shot was given (up to about 1 in 16 adolescents or 1 in 12 adults)   Fever over 102°F (about 1 in 100 adolescents or 1 in 250 adults)   Headache (about 1 in 7 adolescents or 1 in 10 adults)   Nausea, vomiting, diarrhea, stomach ache (up to 1 or 3 people in 100)   Swelling of the entire arm where the shot was given (up to about 1 in 500). Severe Problems following Tdap  (Unable to perform usual activities; required medical attention)   Swelling, severe pain, bleeding, and redness in the arm where the shot was given (rare). Problems that could happen after any vaccine:     People sometimes faint after a medical procedure, including vaccination. Sitting or lying down for about 15 minutes can help prevent fainting, and injuries caused by a fall. Tell your doctor if you feel dizzy, or have vision changes or ringing in the ears.  Some people get severe pain in the shoulder and have difficulty moving the arm where a shot was given. This happens very rarely.  Any medication can cause a severe allergic reaction. Such reactions from a vaccine are very rare, estimated at fewer than 1 in a million doses, and would happen within a few minutes to a few hours after the vaccination. As with any medicine, there is a very remote chance of a vaccine causing a serious injury or death. The safety of vaccines is always being monitored. For more information, visit: www.cdc.gov/vaccinesafety/    5. What if there is a serious problem? What should I look for?  Look for anything that concerns you, such as signs of a severe allergic reaction, very high fever, or unusual behavior.  Signs of a severe allergic reaction can include hives, swelling of the face and throat, difficulty breathing, a fast heartbeat, dizziness, and weakness. These would usually start a few minutes to a few hours after the vaccination. What should I do?  If you think it is a severe allergic reaction or other emergency that cant wait, call 9-1-1 or get the person to the nearest hospital. Otherwise, call your doctor.      Afterward, the reaction should be reported to the Vaccine Adverse Event Reporting System ([facility]). Your doctor might file this report, or you can do it yourself through the VAERS web site at www.vaers. [facility].gov, or by calling 9-753.511.9944. [facility] does not give medical advice. 6. The National Vaccine Injury Compensation Program    The [facility] Vaccine Injury Compensation Program (VICP) is a federal program that was created to compensate people who may have been injured by certain vaccines. Persons who believe they may have been injured by a vaccine can learn about the program and about filing a claim by calling 0-965.873.8990 or visiting the [employer] website at www.[facility].gov/vaccinecompensation. There is a time limit to file a claim for compensation. 7. How can I learn more?  Ask your doctor. He or she can give you the vaccine package insert or suggest other sources of information.  Call your local or state health department.  Contact the Centers for Disease Control and Prevention (CDC):  - Call 3-291.950.2508 (1-800-CDC-INFO) or  - Visit CDCs website at www.cdc.gov/vaccines      Vaccine Information Statement   Tdap Vaccine  (2/24/2015)  43 KESHA Benoit [id number]    Department of Health and Human Services  Centers for Disease Control and Prevention    Office Use Only       Celiac Disease: Care Instructions  Your Care Instructions  Celiac disease (or celiac sprue) is a problem with digesting gluten. Gluten is a type of protein found in wheat, rye, and other grains. This problem starts when the body's immune system attacks the small intestine when gluten is eaten. The immune system is supposed to fight off viruses and other invaders, but sometimes it turns on the person's own body. (This is called an autoimmune disease.) Celiac disease seems to run in families. Celiac disease causes damage to the small intestine. This makes it hard for the body to absorb vitamins and other nutrients. You cannot prevent celiac disease.  But you can stop and reverse the damage to the small intestine by eating a strict gluten-free diet. Follow-up care is a key part of your treatment and safety. Be sure to make and go to all appointments, and call your doctor if you are having problems. It's also a good idea to know your test results and keep a list of the medicines you take. How can you care for yourself at home? · Eat a gluten-free diet to prevent symptoms and damage to the small intestine. Even a small amount of gluten may cause damage. ? Avoid all foods that contain wheat, rye, and barley gluten. Bread, bagels, pasta, pizza, malted breakfast cereals, and crackers are all examples of foods that contain gluten. ? Avoid oats, at least at first. Oats may cause symptoms in some people. The oats may be contaminated with wheat, barley, or rye from processing. But many people who have celiac disease can eat moderate amounts of oats without having symptoms. Health professionals vary in their long-term recommendations regarding eating foods with oats. But most agree it is safe to eat oats labeled as gluten-free. · You may need to avoid milk and milk products for a while. Once you stop eating any gluten, the intestine will begin to heal. Then it should be okay to drink milk and eat milk products. · Read food labels carefully and look for hidden gluten, such as gluten in medicine and some food additives. If a label says \"modified food starch,\" the product may contain gluten. · Plan your diet around:  ? Eggs. ? Dairy products, if you can eat them. Cheese, yogurt, and other dairy products can be an important part of the diet. ? Flours and foods made with amaranth, arrowroot, beans, buckwheat, corn, cornmeal, flax, millet, potatoes, gluten-free nut and oat bran, quinoa, rice, sorghum, soybeans, tapioca, or teff. ? Fresh, frozen, and canned meats, fruits, and vegetables. Watch for added gluten. · Talk to your doctor or contact your local hospital or dietitian for information about support groups in your area.  You may find a support group helpful for discovering ways to help you deal with celiac disease. Celiac disease support groups often share recipes and good food sources. · Look for gluten-free foods. Many food stores, especially health food stores, offer specially marked gluten-free food. When should you call for help? Watch closely for changes in your health, and be sure to contact your doctor if:    · Your bloating, gas, and diarrhea get worse.     · You have bloating, gas, and diarrhea after not having them for a while. Where can you learn more? Go to http://kodak-dodie.info/. Enter [id number] in the search box to learn more about \"Celiac Disease: Care Instructions. \"  Current as of: November 7, 2018  Content Version: 12.1  © 0571-8406 [employer]. Care instructions adapted under license by [employer] (which disclaims liability or warranty for this information). If you have questions about a medical condition or this instruction, always ask your healthcare professional. [place] [address] any warranty or liability for your use of this information. Learning About Low-Carbohydrate Diets for Weight Loss  What is a low-carbohydrate diet? Low-carb diets avoid foods that are high in carbohydrate. These high-carb foods include pasta, bread, rice, cereal, fruits, and starchy vegetables. Instead, these diets usually have you eat foods that are high in fat and protein. Many people lose weight quickly on a low-carb diet. But the early weight loss is water. People on this diet often gain the weight back after they start eating carbs again. Not all diet plans are safe or work well. A lot of the evidence shows that low-carb diets aren't healthy. That's because these diets often don't include healthy foods like fruits and vegetables. Losing weight safely means balancing protein, fat, and carbs with every meal and snack.  And low-carb diets don't always provide the vitamins, minerals, and fiber you need. If you have a serious medical condition, talk to your doctor before you try any diet. These conditions include kidney disease, heart disease, type 2 diabetes, high cholesterol, and high blood pressure. If you are pregnant, it may not be safe for your baby if you are on a low-carb diet. How can you lose weight safely? You might have heard that a diet plan helped another person lose weight. But that doesn't mean that it will work for you. It is very hard to stay on a diet that includes lots of big changes in your eating habits. If you want to get to a healthy weight and stay there, making healthy lifestyle changes will often work better than dieting. These steps can help. · Make a plan for change. Work with your doctor to create a plan that is right for you. · See a dietitian. He or she can show you how to make healthy changes in your eating habits. · Manage stress. If you have a lot of stress in your life, it can be hard to focus on making healthy changes to your daily habits. · Track your food and activity. You are likely to do better at losing weight if you keep track of what you eat and what you do. Follow-up care is a key part of your treatment and safety. Be sure to make and go to all appointments, and call your doctor if you are having problems. It's also a good idea to know your test results and keep a list of the medicines you take. Where can you learn more? Go to http://kodak-dodie.info/. Enter A121 in the search box to learn more about \"Learning About Low-Carbohydrate Diets for Weight Loss. \"  Current as of: November 7, 2018  Content Version: 12.1  © 9627-6931 [employer]. Care instructions adapted under license by [employer] (which disclaims liability or warranty for this information).  If you have questions about a medical condition or this instruction, always ask your healthcare professional. Pita [employer]ep[employer] disclaims any warranty or liability for your use of this information.

## 2019-08-16 NOTE — PROGRESS NOTES
Chief Complaint   Patient presents with    Weight Management    Medication Evaluation     Contrave         HPI:  The patient is a 46 y.o. female who presents today for a follow up appointment. No hospital, ER or specialist visits since last primary care visit except as noted below. Weight Mgmt:  The patient is a 41y.o. year old female who presents today for initial medical weight loss visit. Pt's initial weight is 172 pounds with a BMI of 33.59. Patient's goal weight is 130 pounds. The patient's weight today is 166 pounds with a BMI of 31.37. She has gained 5 pounds since her last visit. The patient's bariatric comorbidities include HTN, lupus, nephrotic syndrome, anxiety, depression, arthritis. The patient's reason for medical weight loss is feel better, help with lupus related comorbidities, help with all of her medical problems. The patient's quality of life goals are to be able to do \"a lot of things that she used to do before her diagnosis of lupus at the age of 20\". Pt with hypertension, nephrotic syndrome, SLE, vit d deficiency, hx of thromboembolism, and obesity presents to the office with a BP of 114/80. For BP, she uses Atenolol 50 mg and Losartan 100 mg daily, tolerating well. For cholesterol, she uses Zocor 20 mg daily, tolerating well. For RAD, she uses Warfin 6 mg daily, rx'd by Dr. Marjorie Mustafa who she sees regularly.           The patient's diet typically includes: B - eggs, coffee (every once in a while), beans, rice, fruit, sometimes skip breakfast; L - chicken, salad, rice, tortilla, steak, soups, S - not really a snacker, D - does not eat much for dinner, small sandwich, fruit, salad         The patient is drinking the following: soda (just cut these out, occasionally but not even when out to eat, coffee, mostly water, OJ infrequently, cranberry juice for urinary symptoms - has one glass/day         The patient's exercise at this time includes: walking (tracking her steps on her phone) The patient is currently weighing: \"I try not to\". She feels she is usually in the same range. The patient has not tried appetite suppressants in the past, but she has tried some food interventions. She has previously tried the following medications: \"fit teas, honey/cinnnamon teas\", apple cider vinegar. She has tried restrictive diets. She patient has not tried appetite suppressants in the past, but she has tried some food interventions. She has previously tried the following medications: \"fit teas, honey/cinnnamon teas\", apple cider vinegar. She has tried restrictive diets. Started going to the gym 3x/week. She is not drinking soda or juices, mostly water, she is feeling stressed and feels like \"giving up\" because she has not been able to successfully lose weight despite all of her efforts. She drinks 64 oz of water daily. She averages 8 hours of sleep nightly. Sometimes, she has difficulty sleeping through the night and wakes up multiple times a night. Denies caffeine consumption. She was started on Bontril in the fall and she felt that it worked initially but then Ancera got used to it\". She was changed to Bontril TID. She does not feel this was helping. She was forgetting to take the lunch hour dose. She was changed back to the XR Bontril at her last visit on 4/10/2019. Her hunger is well controlled in terms of volume but she still reports she is hungry overall. Consider Phentermine once renal function established and Nephrology consult done, no data that shows safety with this medication however. Patient gave appetite suppressant medications to Dr. Dot Rodriguez for review as follows:    Dr. Emperatriz Valera notes on 5/6/2019:   \"-Belviq: may be ok but not well studied in patients with renal disease. \"Use with caution\" should definitely watch for accumulation of metabolites. I would not take it.  -Qysmia: dose adjustment needed.  -Saxenda: looks okay.   -Contrave: dose adjustment needed. -Phentermine: I strongly recommend AGAINST it. Finally, I recommend she see her MD before starting any of these. \"    Patient was prescribed Saxenda on 5/17/2019, this is the safest anti-obesity medication for this patient based on her medical history and current BMI. However her insurance will not cover this medication and therefore she was started on a more affordable out of pocket alternative, Contrave. She developed HAs, N/V on 6/12/2019 and stopped the medication at that time. Since then she has waited to restart the Contrave. She reports she started the Contrave again, she took it for about 3 weeks. She felt like she was eating more, was not effective. She reports constant headaches and made her \"feel awful\". She states the Bontril helped much better with her appetite. Her frustration has also been that she has felt somewhat down and eating for stress somewhat. Weight Metrics 8/16/2019 8/16/2019 7/8/2019 6/19/2019 6/19/2019 6/12/2019 6/12/2019   Weight - 166 lb 165 lb - 161 lb - 164 lb   Neck Circ (inches) - - - - - 35 -   Waist Measure Inches 34 - - 35.25 - - -   Body Fat % 33.9 - - 32.8 - 33.4 -   BMI - 31.37 kg/m2 31.18 kg/m2 - 30.42 kg/m2 - 30.99 kg/m2     Review of Systems  A comprehensive review of systems was negative except for that written in the HPI.     Patient Active Problem List   Diagnosis Code    Systemic lupus erythematosus ([facility]) M32.9    Hypertension I10    Nephrotic syndrome N04.9    Long-term use of immunosuppressant medication Z79.899    Current use of steroid medication Z79.52    Lupus nephritis ([facility]) M32.14    Hypermobility syndrome M35.7    Branch retinal artery occlusion H34.239    Depression, major, recurrent ([facility]) F33.9    Anxiety disorder F41.9    Diplopia H53.2    Carpal instability of left wrist with volar intercalated segment instability M25.332    Carpal instability of right wrist M25.331    Raynaud's syndrome I73.00    Acute blood loss anemia D62    History of thromboembolism Z86.718    Class 1 obesity due to excess calories with body mass index (BMI) of 33.0 to 33.9 in adult E66.09, Z68.33    Fatigue R53.83    Hypercholesterolemia E78.00    Vitamin D deficiency E55.9    Thrombocytopenia (HCC)  D69.6    SAMARA (generalized anxiety disorder) F41.1    Nonintractable episodic headache R51    Celiac disease K90.0    Encounter for weight management Z76.89    Obesity (BMI 30.0-34. 9) E66.9    Insomnia G47.00       Past Medical History:   Diagnosis Date    Anemia associated with acute blood loss 06/2018    blood transfusion x 2. Txd w iron. Dr. Kasi Esqueda.  Arthritis     Branch retinal artery occlusion approx 2013    Right, as of 5/26/16 resolved and on Coumadin.  Celiac disease 2012    Dr. Malissa Loredo.  Chronic renal insufficiency, stage II (mild)     secondary to lupus nephritis:  Dr. Kaia Fiore    Depression, major, recurrent ([facility] 03/10/2014    Kati Allen LCSW.  Diffuse proliferative lupus glomerulonephritis (HCC)     Dr Christina Wilkinson Diplopia 2014    Dr. Poonam Long.  Dizziness 07/2014    Dr. Alfonza Crigler, neuro.  Elevated cortisol level (HCC)     Fatigue     intermittent due to lupus    SAMARA (generalized anxiety disorder) 03/10/2014    Kati Allen LCSW    Gallstones 2017    Dr. Jessica Hernandez Gastritis     as of 5/26/16 asymptomatic    Headache 2014    Dr. Alissa Harris Hypertension 2000s    due to CKD. Dr. Christi Mosley    Left tibialis posterior tendonitis 2017    due to injury on sand. Dr. Erna Holt.  Long term current use of anticoagulant therapy     Ovarian cyst 05/26/2016    Dr. Vicky Nelson Pregnancy induced hypertension     Proteinuria     Dr. Christina Wilkinson PUNIR (peptic ulcer disease)     Dr. Rebekah Tovar syndrome     Dr. Kaitlin Zhu. Dr. Harshad Roland.  Sicca syndrome (HCC)     Dr. Kaitlin Zhu.     Systemic lupus erythematosus with glomerular disease ([facility] 2001    as of 5/26/16 \"aching and swelling of joints\" per patient. Dr Madison Horn. Dr. Courtney Lyle. Past Surgical History:   Procedure Laterality Date    HX ACL RECONSTRUCTION Left approx     Dr. Laney Parker.  HX  SECTION      HX ENDOSCOPY  2012, 2017    Dr. Chin Gibbons.  HX LAP CHOLECYSTECTOMY  2017    due to gallstones. Dr. Shirley Rawls Right 10/22/2014    hand to thumb due to thumb dislocation. Dr. Don Serrano.  HX OTHER SURGICAL  2012    kidney biopsy; benign    HX OTHER SURGICAL Right 10/22/2014    right Posterior Interosseous Nerve Neurectomy, right Anterior Interosseous Nerve Neurectomy. Dr. Elidia Montague HX RENAL BIOPSY  2018    CT Guided Renal Biopsy with Conscious Sedation. due to CKD/SLE.  HX SEPTOPLASTY  2016    w inf turbinate resection. due to turbinate hypertrophy. Dr. Nick Pagan History     Tobacco Use    Smoking status: Never Smoker    Smokeless tobacco: Never Used   Substance Use Topics    Alcohol use: No    Drug use: No       Family History   Problem Relation Age of Onset    Glaucoma Father     Hypertension Father        Outpatient Medications Marked as Taking for the 19 encounter (Office Visit) with Albert Daugherty NP   Medication Sig Dispense Refill    phendimetrazine tartrate 105 mg cpER Take 105 mg by mouth daily. Max Daily Amount: 105 mg. 30 Cap 0    levonorgestrel (MIRENA) 20 mcg/24 hours (5 yrs) 52 mg IUD Mirena 20 mcg/24 hours (5 yrs) 52 mg intrauterine device   remove 21      ondansetron (ZOFRAN ODT) 4 mg disintegrating tablet Take 1 Tab by mouth every eight (8) hours as needed for Nausea. 30 Tab 0    ergocalciferol (ERGOCALCIFEROL) 50,000 unit capsule TAKE ONE CAPSULE BY MOUTH ONCE A WEEK  0    NIFEdipine ER (PROCARDIA XL) 30 mg ER tablet       fluticasone (FLONASE) 50 mcg/actuation nasal spray 2 Sprays by Both Nostrils route daily.  1 Bottle 5    warfarin (COUMADIN) 6 mg tablet Take 4 mg by mouth daily. M, W, F, Sa, Damian is 6mg once daily; Tu, Th is 4mg once daily      ferrous sulfate 325 mg (65 mg iron) tablet Take 1 Tab by mouth two (2) times daily (with meals). (Patient taking differently: Take 325 mg by mouth daily.) 60 Tab 1    acetaminophen (ACETAMINOPHEN EXTRA STRENGTH) 500 mg tablet Take 1 Tab by mouth every six (6) hours as needed for Pain. 30 Tab 0    hydroxychloroquine (PLAQUENIL) 200 mg tablet Take 1 tab po qam and 1/2 tab po qpm 45 Tab 9    simvastatin (ZOCOR) 20 mg tablet TAKE 1 TABLET BY MOUTH ONCE DAILY  3    losartan (COZAAR) 100 mg tablet Take 50 mg by mouth nightly.  atenolol (TENORMIN) 50 mg tablet Take  by mouth nightly.  mycophenolate (CELLCEPT) 500 mg tablet Take 1,500 mg by mouth two (2) times a day. Current Outpatient Medications on File Prior to Visit   Medication Sig Dispense Refill    levonorgestrel (MIRENA) 20 mcg/24 hours (5 yrs) 52 mg IUD Mirena 20 mcg/24 hours (5 yrs) 52 mg intrauterine device   remove 9/8/21      ondansetron (ZOFRAN ODT) 4 mg disintegrating tablet Take 1 Tab by mouth every eight (8) hours as needed for Nausea. 30 Tab 0    ergocalciferol (ERGOCALCIFEROL) 50,000 unit capsule TAKE ONE CAPSULE BY MOUTH ONCE A WEEK  0    NIFEdipine ER (PROCARDIA XL) 30 mg ER tablet       fluticasone (FLONASE) 50 mcg/actuation nasal spray 2 Sprays by Both Nostrils route daily. 1 Bottle 5    warfarin (COUMADIN) 6 mg tablet Take 4 mg by mouth daily. M, W, F, Sa, Damian is 6mg once daily; Tu, Th is 4mg once daily      ferrous sulfate 325 mg (65 mg iron) tablet Take 1 Tab by mouth two (2) times daily (with meals). (Patient taking differently: Take 325 mg by mouth daily.) 60 Tab 1    acetaminophen (ACETAMINOPHEN EXTRA STRENGTH) 500 mg tablet Take 1 Tab by mouth every six (6) hours as needed for Pain.  30 Tab 0    hydroxychloroquine (PLAQUENIL) 200 mg tablet Take 1 tab po qam and 1/2 tab po qpm 45 Tab 9    simvastatin (ZOCOR) 20 mg tablet TAKE 1 TABLET BY MOUTH ONCE DAILY  3    losartan (COZAAR) 100 mg tablet Take 50 mg by mouth nightly.  atenolol (TENORMIN) 50 mg tablet Take  by mouth nightly.  mycophenolate (CELLCEPT) 500 mg tablet Take 1,500 mg by mouth two (2) times a day. No current facility-administered medications on file prior to visit. Allergies   Allergen Reactions    Gluten Diarrhea    Fiorinal-Codeine #3 [Codeine-Butalbital-Asa-Caff] Nausea Only       PE:  Visit Vitals  /75 (BP 1 Location: Left arm, BP Patient Position: Sitting)   Pulse (!) 54   Temp 97.7 °F (36.5 °C) (Oral)   Resp 18   Ht 5' 1\" (1.549 m)   Wt 166 lb (75.3 kg)   LMP  (LMP Unknown)   SpO2 100%   BMI 31.37 kg/m²       Gen: alert, oriented, no acute distress  Head: normocephalic, atraumatic  Ears: external auditory canals clear, TMs without erythema or effusion  Eyes: pupils equal round reactive to light, sclera clear, conjunctiva clear  Oral: moist mucus membranes, no oral lesions, no pharyngeal inflammation or exudate  Neck: symmetric normal sized thyroid, no carotid bruits, no jugular vein distention  Resp: no increase work of breathing, lungs clear to ausculation bilaterally, no wheezing, rales or rhonchi  CV: S1, S2 normal.  No murmurs, rubs, or gallops. Abd: soft, not tender, not distended. No hepatosplenomegaly. Normal bowel sounds. No hernias. No abdominal or renal bruits. Neuro: cranial nerves intact, normal strength and movement in all extremities, reflexes and sensation intact and symmetric. Skin: no lesion or rash  Extremities: no cyanosis or edema    No results found for this visit on 08/16/19. Assessment/Plan:    ICD-10-CM ICD-9-CM    1. Systemic lupus erythematosus with glomerular disease, unspecified SLE type ([facility] M32.14 710.0 REFERRAL TO NUTRITION     583.81    2. Obesity (BMI 30.0-34. 9) E66.9 278.00 phendimetrazine tartrate 105 mg cpER   3. Encounter for weight management Z76.89 V65.49 phendimetrazine tartrate 105 mg cpER   4. Nephrotic syndrome N04.9 581.9 REFERRAL TO NUTRITION   5. Lupus nephritis ([facility] M32.14 710.0 REFERRAL TO NUTRITION     583.81    6. Nonintractable episodic headache, unspecified headache type R51 784.0    7. Encounter for immunization Z23 V03.89 OR IMMUNIZ ADMIN,1 SINGLE/COMB VAC/TOXOID      HUMAN PAPILLOMA VIRUS NONAVALENT HPV 3 DOSE IM (GARDASIL 9)      TETANUS, DIPHTHERIA TOXOIDS AND ACELLULAR PERTUSSIS VACCINE (TDAP), IN INDIVIDS. >=7, IM      OR IMMUNIZ,ADMIN,EACH ADDL   8. Celiac disease K90.0 579.0 REFERRAL TO NUTRITION     Diagnoses and all orders for this visit:    1. Systemic lupus erythematosus with glomerular disease, unspecified SLE type ([facility]  -     REFERRAL TO NUTRITION    2. Obesity (BMI 30.0-34.9)  -     phendimetrazine tartrate 105 mg cpER; Take 105 mg by mouth daily. Max Daily Amount: 105 mg.    3. Encounter for weight management  -     phendimetrazine tartrate 105 mg cpER; Take 105 mg by mouth daily. Max Daily Amount: 105 mg.    4. Nephrotic syndrome  -     REFERRAL TO NUTRITION    5. Lupus nephritis ([facility]  -     REFERRAL TO NUTRITION    6. Nonintractable episodic headache, unspecified headache type    7. Encounter for immunization  -     OR IMMUNIZ ADMIN,1 SINGLE/COMB VAC/TOXOID  -     HUMAN PAPILLOMA VIRUS NONAVALENT HPV 3 DOSE IM (GARDASIL 9)  -     TETANUS, DIPHTHERIA TOXOIDS AND ACELLULAR PERTUSSIS VACCINE (TDAP), IN INDIVIDS. >=7, IM  -     OR IMMUNIZ,ADMIN,EACH ADDL    8.  Celiac disease  -     REFERRAL TO NUTRITION      Follow-up and Dispositions    · Return in about 1 month (around 9/13/2019) for Medication Check, Weight Mgmt, Bontril #1 restart F/U, Nutr F/U.       lab results and schedule of future lab studies reviewed with patient  reviewed diet, exercise and weight control  reviewed medications and side effects in detail    lose weight, increase physical activity, call if any problems    Health Maintenance reviewed - reviewed, flu vaccine will be due at her next visit unless she goes with her kids to get it before then, will revisit this next month, otherwise UTD. Recommended healthy diet low in carbohydrates, fats, sodium and cholesterol. Recommended regular cardiovascular exercise 3-6 times per week for 30-60 minutes daily. Chart is reviewed and updated today in the office. Records requested for other providers patient has seen and is currently seeing. Patient was offered a choice/choices in the treatment plan today. Patient expresses understanding of the plan and agrees with recommendations. Verbal and written instructions (see AVS) provided. See patient instructions for more. Patient expresses understanding of diagnosis and treatment plan.

## 2019-08-23 ENCOUNTER — HOSPITAL ENCOUNTER (OUTPATIENT)
Dept: NUTRITION | Age: 39
End: 2019-08-23

## 2019-08-29 ENCOUNTER — HOSPITAL ENCOUNTER (OUTPATIENT)
Dept: NUTRITION | Age: 39
Discharge: HOME | End: 2019-08-29
Payer: MEDICARE

## 2019-08-29 PROCEDURE — 97802 MEDICAL NUTRITION INDIV IN: CPT | Performed by: DIETITIAN, REGISTERED

## 2019-08-30 NOTE — PROGRESS NOTES
[address] [place], [address] [facility],  [address]  Phone: [phone] Fax: [phone]   Nutrition Assessment  Medical Nutrition Therapy   Outpatient Initial Evaluation         Patient Name: Albina Abdi : 1980   Treatment Diagnosis: Obesity, celiac disease, nephrotic syndrome   Referral Source: Sparkle Smith NP Start of Care [facility]): 2019     Gender: female Age: 46 y.o. Ht: 61 in Wt:  163.2 lb 74.2 kg   BMI: 30.1 RMR   Male  RMR Female 1372   Anthropometrics Assessment: Per BMI, pt is considered obese- Stage I     Past Medical History includes: Celiac disease, chronic renal insufficiency, depression, lupus, HTN, Raynauds syndrome     Pertinent Medications:   Zofran prn, Ergocalciferol, Ferrous sulfate   Biochemical Data:   Lab Results   Component Value Date/Time    Hemoglobin A1c 5.3 10/22/2018 08:51 AM     Lab Results   Component Value Date/Time    Cholesterol, total 176 10/22/2018 08:51 AM    HDL Cholesterol 35 (L) 10/22/2018 08:51 AM    LDL, calculated 95 10/22/2018 08:51 AM    VLDL, calculated 46 (H) 10/22/2018 08:51 AM    Triglyceride 231 (H) 10/22/2018 08:51 AM     Lab Results   Component Value Date/Time    ALT (SGPT) 28 2018 07:35 PM    AST (SGOT) 14 (L) 2018 07:35 PM    Alk.  phosphatase 65 2018 07:35 PM    Bilirubin, total 0.6 2018 07:35 PM     Lab Results   Component Value Date/Time    Creatinine 1.76 (H) 2018 07:35 PM     Lab Results   Component Value Date/Time    BUN 28 (H) 2018 07:35 PM     No results found for: MCACR, MCA1, MCA2, MCA3, MCAU, MCAU2, MCALPOCT     Nutrition Diagnosis Obesity R/T excessive caloric intake AEB pt with BMI of 30.1, emotional eating  Altered GI tract R/T celiac disease AEB pt wit celiac disease (not following gluten free diet)     Subjective/Assessment: Pt seen for obesity, celiac disease, nephrotic syndrome. Pt states desiring to lose weight in order to ease pain in knees, be able to play with children and improve overall health     Current Eating Patterns: Bfast: (9-10am) skips or corn flakes or protein shake or egg/saleem  Lunch: (2pm)- chicken or steak, rice or salad or pasta  Dinner: (7pm)- same as lunch  Snacks: chips, fruit  Beverages: water (dislikes), 16 oz juice, soda occasionally     Estimate Needs   Calories:  8265-0515 Protein: 53 Carbs: 175 Fat: 54   Kcal/day  g/day  g/day  g/day        percent: 15  50  35               Education & Recommendations provided: Recommended pt eat 3 meals a day (presently skipping bfast most days of the week) using healthy plate method (1/4 lean protein, 1/4 carb, 1/2 non-starchy veggies); worked with pt in creating a menu. Discussed how to read food labels for gluten and recommended pt totally avoid gluten in food and on surfaces (cross-contamination); pt currently eating foods with gluten. Pt states emotional eating occurring often- some from depression, some from boredom. Discussed substituting non-food related behaviors (read, gym, crafts) for emotional eating.     Handouts Provided: []  Carbohydrates  [x]  Protein  []  Fiber  [x]  Serving Sizes  [x]  Meal and Snack Ideas  []  Food Journals []  Diabetes  []  Cholesterol  []  Sodium  [x]  Gen Nutr Guidelines  []  SBGM Guidelines  [x]  Others:   Information Reviewed with: Patient   Readiness to Change Stage: []  Pre-contemplative    []  Contemplative  [x]  Preparation               []  Action                  []  Maintenance   Potential Barriers to Learning: []  Decline in memory    []  Language barrier   []  Other:  []  Emotional                  []  Limited mobility  []  Lack of motivation     [] Vision, hearing or cognitive impairment   Expected Compliance: Good due to pt motivation     Nutritional Goal - To promote lifestyle changes to result in:    [x]  Weight loss  []  Improved diabetic control  [] Decreased cholesterol levels  []  Decreased blood pressure  []  Weight maintenance []  Preventing any interactions associated with food allergies  []  Adequate weight gain toward goal weight  [x]  Other: following Gluten free diet       Patient Goals:  SMART goals 1. Go to gym 3x week (morning)  2. Use healthy plate method for meals 5 days per week; plan meals and purchase groceries from list  3. Read food labels for gluten and avoid  4.  If hungry have snack; if not, substitute non-food related activity for emotional eating   Total Treatment Time: 60min   Dietitian Signature: Omar Mckeon RD Date: 8/30/2019   Follow-up: 2 weeks Time: 1:31 PM

## 2019-09-12 ENCOUNTER — HOSPITAL ENCOUNTER (OUTPATIENT)
Dept: NUTRITION | Age: 39
Discharge: HOME | End: 2019-09-12
Payer: MEDICARE

## 2019-09-12 PROCEDURE — 97803 MED NUTRITION INDIV SUBSEQ: CPT | Performed by: DIETITIAN, REGISTERED

## 2019-09-12 NOTE — PROGRESS NOTES
NUTRITION  FOLLOW-UP TREATMENT NOTE  Patient Name: Jesus Alberto Marques         Date: 2019  : 1980    YES Patient  Verified  Diagnosis: Obesity, celiac disease, nephrotic syndrome   In time:   9:10            Out time:   9:40   Total Treatment Time (min):   30 min     SUBJECTIVE/ASSESSMENT    Changes in medication or medical history? Any new allergies, surgeries or procedures? NO         Pt has lost 2.6 lbs over past 2 weeks. States reading food labels and avoiding gluten. Pt has been eating 3 meals a day most days of the week; sometimes skipping bfast or using protein shake (30g protein); recommended consuming shake with lower amount of protein as to not exceed protein needs. Pt states reducing portion sizes at meals and increasing non-starchy vegetables. No boredom eating- has been busy with housework/projects. Pt has gone to gym twice over past 2 weeks; would like to do some form of exercise 3x week (gym or DVD at home). Pt continues on appetite suppressant but understands the short-term nature and is making lifestyle changes to support wt loss. Current Wt: 160.6 Previous Wt: 163.2 Wt Change: - 2.6 lbs     Achievement of Goals: 1. Go to gym 3x week (morning)  - Progressing towards goal; continue goal    2. Use healthy plate method for meals 5 days per week; plan meals and purchase groceries from list  - Progressing towards goal; continue with goal    3. Read food labels for gluten and avoid  - Goal met; continue with goal    4. If hungry have snack; if not, substitute non-food related activity for emotional eating  - Goal met; continue with goal         Patient Education:  [x]  Review current plan with patient   []  Other:    Handouts/  Information Provided: []  Carbohydrates  []  Protein  []  Fiber  []  Serving Sizes  []  Fluids  []  General guidelines []  Diabetes  []  Cholesterol  []  Sodium  []  SBGM  []  Food Journals  []  Others:      New Patient Goals: 1.  Have bfast daily- no more than 15g protein for shake  2. Continue balanced plate method at meals (gluten free)  3.  Exercise at home or at gym 3x week     PLAN    [x]  Continue on current plan []  Follow-up PRN   []  Discharge due to :    [x]  Next appt: 3 weeks     Dietitian: Mina Orozco RD    Date: 9/12/2019 Time: 10:06 AM

## 2019-09-24 ENCOUNTER — OFFICE VISIT (OUTPATIENT)
Dept: FAMILY MEDICINE CLINIC | Age: 39
End: 2019-09-24

## 2019-09-24 VITALS
DIASTOLIC BLOOD PRESSURE: 76 MMHG | RESPIRATION RATE: 20 BRPM | HEART RATE: 70 BPM | SYSTOLIC BLOOD PRESSURE: 103 MMHG | BODY MASS INDEX: 30.49 KG/M2 | OXYGEN SATURATION: 99 % | HEIGHT: 61 IN | WEIGHT: 161.5 LBS | TEMPERATURE: 98.8 F

## 2019-09-24 DIAGNOSIS — M32.14 SYSTEMIC LUPUS ERYTHEMATOSUS WITH GLOMERULAR DISEASE, UNSPECIFIED SLE TYPE (HCC): Primary | ICD-10-CM

## 2019-09-24 DIAGNOSIS — E55.9 VITAMIN D DEFICIENCY: ICD-10-CM

## 2019-09-24 DIAGNOSIS — M32.14 LUPUS NEPHRITIS (HCC): ICD-10-CM

## 2019-09-24 DIAGNOSIS — E78.00 HYPERCHOLESTEROLEMIA: ICD-10-CM

## 2019-09-24 DIAGNOSIS — E66.09 CLASS 1 OBESITY DUE TO EXCESS CALORIES WITH SERIOUS COMORBIDITY AND BODY MASS INDEX (BMI) OF 33.0 TO 33.9 IN ADULT: ICD-10-CM

## 2019-09-24 DIAGNOSIS — Z76.89 ENCOUNTER FOR WEIGHT MANAGEMENT: ICD-10-CM

## 2019-09-24 DIAGNOSIS — Z13.220 LIPID SCREENING: ICD-10-CM

## 2019-09-24 DIAGNOSIS — J30.2 SEASONAL ALLERGIC RHINITIS, UNSPECIFIED TRIGGER: ICD-10-CM

## 2019-09-24 DIAGNOSIS — J30.89 SEASONAL ALLERGIC RHINITIS DUE TO OTHER ALLERGIC TRIGGER: ICD-10-CM

## 2019-09-24 DIAGNOSIS — R79.9 ABNORMAL FINDING OF BLOOD CHEMISTRY: ICD-10-CM

## 2019-09-24 DIAGNOSIS — N04.9 NEPHROTIC SYNDROME: ICD-10-CM

## 2019-09-24 DIAGNOSIS — Z00.00 LABORATORY EXAM ORDERED AS PART OF ROUTINE GENERAL MEDICAL EXAMINATION: ICD-10-CM

## 2019-09-24 DIAGNOSIS — D50.9 IRON DEFICIENCY ANEMIA, UNSPECIFIED IRON DEFICIENCY ANEMIA TYPE: ICD-10-CM

## 2019-09-24 DIAGNOSIS — Z23 ENCOUNTER FOR IMMUNIZATION: ICD-10-CM

## 2019-09-24 DIAGNOSIS — E53.8 VITAMIN B12 DEFICIENCY: ICD-10-CM

## 2019-09-24 DIAGNOSIS — L29.9 PRURITIC DERMATITIS: ICD-10-CM

## 2019-09-24 DIAGNOSIS — R53.83 FATIGUE, UNSPECIFIED TYPE: ICD-10-CM

## 2019-09-24 DIAGNOSIS — Z13.1 DIABETES MELLITUS SCREENING: ICD-10-CM

## 2019-09-24 DIAGNOSIS — D69.6 THROMBOCYTOPENIA (HCC): ICD-10-CM

## 2019-09-24 DIAGNOSIS — R63.4 WEIGHT LOSS: ICD-10-CM

## 2019-09-24 DIAGNOSIS — E66.9 OBESITY (BMI 30.0-34.9): ICD-10-CM

## 2019-09-24 RX ORDER — FLUTICASONE PROPIONATE 50 MCG
2 SPRAY, SUSPENSION (ML) NASAL DAILY
Qty: 1 BOTTLE | Refills: 5 | Status: SHIPPED | OUTPATIENT
Start: 2019-09-24 | End: 2020-01-07 | Stop reason: ALTCHOICE

## 2019-09-24 RX ORDER — PHENDIMETRAZINE TARTRATE 105 MG/1
105 CAPSULE, EXTENDED RELEASE ORAL DAILY
Qty: 30 CAP | Refills: 0 | Status: SHIPPED | OUTPATIENT
Start: 2019-09-24 | End: 2019-10-22 | Stop reason: SDUPTHER

## 2019-09-24 NOTE — PROGRESS NOTES
Immunization/s administered on 9/24/2019 by Gui Mcleod LPN per Renaldo Mahmood NP with patients consent signed. Patient tolerated procedure well. No reactions noted.

## 2019-09-24 NOTE — PATIENT INSTRUCTIONS
Vaccine Information Statement    Influenza (Flu) Vaccine (Inactivated or Recombinant): What You Need to Know    Many Vaccine Information Statements are available in Ukrainian and other languages. See www.immunize.org/vis  Hojas de información sobre vacunas están disponibles en español y en muchos otros idiomas. Visite www.immunize.org/vis    1. Why get vaccinated? Influenza vaccine can prevent influenza (flu). Flu is a contagious disease that spreads around the [facility] every year, usually between October and May. Anyone can get the flu, but it is more dangerous for some people. Infants and young children, people 72years of age and older, pregnant women, and people with certain health conditions or a weakened immune system are at greatest risk of flu complications. Pneumonia, bronchitis, sinus infections and ear infections are examples of flu-related complications. If you have a medical condition, such as heart disease, cancer or diabetes, flu can make it worse. Flu can cause fever and chills, sore throat, muscle aches, fatigue, cough, headache, and runny or stuffy nose. Some people may have vomiting and diarrhea, though this is more common in children than adults. Each year thousands of people in the [facility] die from flu, and many more are hospitalized. Flu vaccine prevents millions of illnesses and flu-related visits to the doctor each year. 2. Influenza vaccines     CDC recommends everyone 10months of age and older get vaccinated every flu season. Children 6 months through 6years of age may need 2 doses during a single flu season. Everyone else needs only 1 dose each flu season. It takes about 2 weeks for protection to develop after vaccination. There are many flu viruses, and they are always changing. Each year a new flu vaccine is made to protect against three or four viruses that are likely to cause disease in the upcoming flu season.  Even when the vaccine doesnt exactly match these viruses, it may still provide some protection. Influenza vaccine does not cause flu. Influenza vaccine may be given at the same time as other vaccines. 3. Talk with your health care provider    Tell your vaccine provider if the person getting the vaccine:   Has had an allergic reaction after a previous dose of influenza vaccine, or has any severe, life-threatening allergies.  Has ever had Guillain-Barré Syndrome (also called GBS). In some cases, your health care provider may decide to postpone influenza vaccination to a future visit. People with minor illnesses, such as a cold, may be vaccinated. People who are moderately or severely ill should usually wait until they recover before getting influenza vaccine. Your health care provider can give you more information. 4. Risks of a reaction     Soreness, redness, and swelling where shot is given, fever, muscle aches, and headache can happen after influenza vaccine.  There may be a very small increased risk of Guillain-Barré Syndrome (GBS) after inactivated influenza vaccine (the flu shot). [facility] children who get the flu shot along with pneumococcal vaccine (PCV13), and/or DTaP vaccine at the same time might be slightly more likely to have a seizure caused by fever. Tell your health care provider if a child who is getting flu vaccine has ever had a seizure. People sometimes faint after medical procedures, including vaccination. Tell your provider if you feel dizzy or have vision changes or ringing in the ears. As with any medicine, there is a very remote chance of a vaccine causing a severe allergic reaction, other serious injury, or death. 5. What if there is a serious problem? An allergic reaction could occur after the vaccinated person leaves the clinic.  If you see signs of a severe allergic reaction (hives, swelling of the face and throat, difficulty breathing, a fast heartbeat, dizziness, or weakness), call 9-1-1 and get the person to the nearest hospital.    For other signs that concern you, call your health care provider. Adverse reactions should be reported to the Vaccine Adverse Event Reporting System (VAERS). Your health care provider will usually file this report, or you can do it yourself. Visit the VAERS website at www.vaers. [facility].gov or call 9-946.585.7220. VAERS is only for reporting reactions, and VAERS staff do not give medical advice. 6. The National Vaccine Injury Compensation Program    The [facility] Vaccine Injury Compensation Program (VICP) is a federal program that was created to compensate people who may have been injured by certain vaccines. Visit the VICP website at www.[facility].gov/vaccinecompensation or call 4-668.243.4556 to learn about the program and about filing a claim. There is a time limit to file a claim for compensation. 7. How can I learn more?  Ask your health care provider.  Call your local or state health department.  Contact the Centers for Disease Control and Prevention (CDC):  - Call 8-131.540.2932 (1-800-CDC-INFO) or  - Visit CDCs influenza website at www.cdc.gov/flu    Vaccine Information Statement (Interim)  Inactivated Influenza Vaccine   8/15/2019  43 KESHA Patel Cecilio [id number]   Department of Health and Human Services  Centers for Disease Control and Prevention    Office Use Only      Vaccine Information Statement    HPV (Human Papillomavirus) Vaccine: What You Need to Know    Many Vaccine Information Statements are available in Trinidadian and other languages. See www.immunize.org/vis. Hojas de Información Sobre Vacunas están disponibles en español y en muchos otros idiomas. Visite Loly.si. 1. Why get vaccinated?     HPV vaccine prevents infection with human papillomavirus (HPV) types that are associated with many cancers, including:     cervical cancer in females,   vaginal and vulvar cancers in females,    anal cancer in females and males,   throat cancer in females and males, and   penile cancer in males. In addition, HPV vaccine prevents infection with HPV types that cause genital warts in both females and males. In the [place] about 12,000 women get cervical cancer every year, and about 4,000 women die from it. HPV vaccine can prevent most of these cases of cervical cancer. Vaccination is not a substitute for cervical cancer screening. This vaccine does not protect against all HPV types that can cause cervical cancer. Women should still get regular Pap tests. HPV infection usually comes from sexual contact, and most people will become infected at some point in their life. About 14 million Americans, including teens, get infected every year. Most infections will go away on their own and not cause serious problems. But thousands of women and men get cancer and other diseases from HPV. 2. HPV vaccine    HPV vaccine is approved by FDA and is recommended by CDC for both males and females. It is routinely given at 6or 15years of age, but it may be given beginning at age 5 years through age 32 years. Most adolescents 9 through 15years of age should get HPV vaccine as a two-dose series with the doses  by 6-12 months. People who start HPV vaccination at 13years of age and older should get the vaccine as a three-dose series with the second dose given 1-2 months after the first dose and the third dose given 6 months after the first dose. There are several exceptions to these age recommendations. Your health care provider can give you more information. 3. Some people should not get this vaccine:     Anyone who has had a severe (life-threatening) allergic reaction to a dose of HPV vaccine should not get another dose.  Anyone who has a severe (life threatening) allergy to any component of HPV vaccine should not get the vaccine.       Tell your doctor if you have any severe allergies that you know of, including a severe allergy to yeast.     HPV vaccine is not recommended for pregnant women. If you learn that you were pregnant when you were vaccinated, there is no reason to expect any problems for you or your baby. Any woman who learns she was pregnant when she got HPV vaccine is encouraged to contact the [facility] registry for HPV vaccination during pregnancy at 1-531.618.6666. Women who are breastfeeding may be vaccinated.  If you have a mild illness, such as a cold, you can probably get the vaccine today. If you are moderately or severely ill, you should probably wait until you recover. Your doctor can advise you. 4. Risks of a vaccine reaction    With any medicine, including vaccines, there is a chance of side effects. These are usually mild and go away on their own, but serious reactions are also possible. Most people who get HPV vaccine do not have any serious problems with it. Mild or moderate problems following HPV vaccine:     Reactions in the arm where the shot was given:  - Soreness (about 9 people in 10)  - Redness or swelling (about 1 person in 3)     Fever:  - Mild (100°F) (about 1 person in 10)  - Moderate (102°F) (about 1 person in 72)     Other problems:  - Headache (about 1 person in 3)    Problems that could happen after any injected vaccine:     People sometimes faint after a medical procedure, including vaccination. Sitting or lying down for about 15 minutes can help prevent fainting and injuries caused by a fall. Tell your doctor if you feel dizzy, or have vision changes or ringing in the ears.  Some people get severe pain in the shoulder and have difficulty moving the arm where a shot was given. This happens very rarely.  Any medication can cause a severe allergic reaction. Such reactions from a vaccine are very rare, estimated at about 1 in a million doses, and would happen within a few minutes to a few hours after the vaccination.      As with any medicine, there is a very remote chance of a vaccine causing a serious injury or death. The safety of vaccines is always being monitored. For more information, visit: www.cdc.gov/vaccinesafety/.      5. What if there is a serious reaction? What should I look for? Look for anything that concerns you, such as signs of a severe allergic reaction, very high fever, or unusual behavior. Signs of a severe allergic reaction can include hives, swelling of the face and throat, difficulty breathing, a fast heartbeat, dizziness, and weakness. These would usually start a few minutes to a few hours after the vaccination. What should I do? If you think it is a severe allergic reaction or other emergency that cant wait, call 9-1-1 or get to the nearest hospital. Otherwise, call your doctor. Afterward, the reaction should be reported to the Vaccine Adverse Event Reporting System (VAERS). Your doctor should file this report, or you can do it yourself through the VAERS web site at www.vaers. [facility].gov, or by calling 4-784.104.9461. VAERS does not give medical advice. 6. The National Vaccine Injury Compensation Program    The [facility] Vaccine Injury Compensation Program (VICP) is a federal program that was created to compensate people who may have been injured by certain vaccines. Persons who believe they may have been injured by a vaccine can learn about the program and about filing a claim by calling 5-802.318.1421 or visiting the [employer] website at www.[facility].gov/vaccinecompensation. There is a time limit to file a claim for compensation. 7. How can I learn more?  Ask your health care provider. He or she can give you the vaccine package insert or suggest other sources of information.  Call your local or state health department.    Contact the Centers for Disease Control and Prevention (CDC):  - Call 0-413.328.7587 (1-800-CDC-INFO) or  - Visit CDCs website at www.cdc.gov/hpv    Vaccine Information Statement   HPV Vaccine 12/02/2016  43 KESHA Hale [id number]    Department of Health and Human Services  Centers for Disease Control and Prevention    Office Use Only       Nephrotic Syndrome: Care Instructions  Your Care Instructions    Healthy kidneys remove wastes from the blood. They also help balance water, salt, and mineral levels in the blood. Nephrotic syndrome is a sign that your kidneys aren't working right. When you have this kidney problem, you have high levels of protein in your urine. You may also have low levels of protein and high levels of cholesterol in your blood. The most common symptom is swelling around the eyes or in the feet or ankles. You may also notice foamy urine or weight gain from fluid buildup. The syndrome also increases the risk of skin infections. Diabetes is the most common cause of the syndrome. It can also be caused by kidney disease, lupus, some infections, and certain cancers. In some cases, the cause is not known. Which treatment you get depends on your age and what health problem is causing nephrotic syndrome. Your doctor might prescribe medicines. You might also need other treatments if the syndrome is causing other problems, such as high blood pressure or high cholesterol. Follow-up care is a key part of your treatment and safety. Be sure to make and go to all appointments, and call your doctor if you are having problems. It's also a good idea to know your test results and keep a list of the medicines you take. How can you care for yourself? Work with your doctor  · If your doctor prescribed medicines, take them as prescribed, even after you start to feel better. Call your doctor if you think you are having a problem with your medicine. · See your doctor regularly to have your kidney function checked. · Make sure your doctor knows about all the medicines, vitamins, or herbal supplements you take.  This means anything you take with or without a prescription. · Get a flu shot each year. And get any other shots your doctor suggests. Care for yourself at home  · Cut down on salt. This can reduce the amount of water your body retains. · Follow your doctor's advice for the amounts of protein and potassium you need in your diet. · Be gentle with your skin to prevent infection. Here are some tips:  ? Take short showers or baths using warm or cool water. Don't use hot water to bathe. ? Use mild soaps, such as Dove or Cetaphil. ? Pat your skin gently with towels to dry off after washing. ? Use moisturizing lotion after you bathe. You can use it more often if your skin is dry. Choose a lotion with no alcohol. ? Don't scratch or rub your skin. When should you call for help? Call 911 anytime you think you may need emergency care. For example, call if:    · You have sudden, severe pain in your belly.    Call your doctor now or seek immediate medical care if:    · You are gaining weight.     · You have trouble breathing.     · You have a fever.     · You have symptoms of a skin infection, such as:  ? Increased pain, swelling, warmth, or redness. ? Red streaks leading from the area. ? Pus draining from the area. ? A fever.    Watch closely for changes in your health, and be sure to contact your doctor if:    · You do not get better as expected. Where can you learn more? Go to http://kodak-dodie.info/. Enter T190 in the search box to learn more about \"Nephrotic Syndrome: Care Instructions. \"  Current as of: October 31, 2018  Content Version: 12.2  © 1123-9477 [employer] ([employer]). Care instructions adapted under license by [employer] (which disclaims liability or warranty for this information). If you have questions about a medical condition or this instruction, always ask your healthcare professional. [address] any warranty or liability for your use of this information.          Lupus: Care Instructions  Your Care Instructions  Lupus is a long-term disease that can cause inflammation, pain, and tissue damage in your body. It is an autoimmune disease. This means the immune system attacks its own tissues. Lupus may cause problems with your skin, kidneys, heart, lungs, nerves, or blood cells. This information is about systemic lupus erythematosus (SLE). SLE is the most common and most serious type of lupus. But there are other types of lupus, such as discoid or cutaneous lupus, drug-induced systemic lupus, and  lupus. When you have lupus symptoms, you are having flares or relapses. When your symptoms get better, you are in remission. Lupus may get worse very quickly. There is no way to tell when a flare will happen or how bad it will be. When you have a lupus flare, you may have new symptoms as well as symptoms you have had in the past.  Learn your body's signs of a flare, such as joint pain, a rash, a fever, or being more tired. When you see any of these signs, take steps to control your symptoms. Follow-up care is a key part of your treatment and safety. Be sure to make and go to all appointments, and call your doctor if you are having problems. It's also a good idea to know your test results and keep a list of the medicines you take. How can you care for yourself at home? Reduce stress and tiredness  · Keep your daily schedule as simple as possible. · Keep your list of things to do as short as you can. · Exercise regularly. A daily walk or swim, for example, can lower stress, clear your head, improve your mood, and help fight tiredness. · Use meditation, yoga, or guided imagery to relax. · Get plenty of rest. Some people with lupus need up to 12 hours of sleep every night. · Pace yourself. Do not do too many activities. · Ask others for help. Do not try to do everything yourself. · Take short breaks from your usual activities.  Think about cutting down on work hours when your symptoms are severe. · If you think that depression or anxiety is making you feel more tired, talk to your doctor, a mental health professional, or both. Take care of your skin  · Ask your doctor about the use of corticosteroid creams for skin symptoms. · If you are bothered by the way a lupus rash looks on your face or if you have scars from lupus, you can try makeup, such as Covermark, to cover the rash or scars. · Stay out of the sun, especially when the sun's rays are the strongest, usually between 10 a.m. and 4 p.m. If you must be in the sun, cover your arms and legs, and wear a hat. Make sure to use a broad-spectrum sunscreen that has a sun protection factor (SPF) of 50 or higher. Put more sunscreen on after swimming, sweating, or toweling off. Practice good self-care  · Learn more about lupus and how to take care of yourself. · Take your medicines exactly as prescribed. Call your doctor if you have any problems with your medicine. · Do not smoke. If you need help quitting, talk to your doctor about stop-smoking programs and medicines. These can increase your chances of quitting for good. · Eat a healthy, balanced diet. A balanced diet includes whole grains, dairy, fruits and vegetables, and protein. Eat a variety of foods from each of those groups so you get all the nutrients you need. · Avoid other people who are sick with colds or the flu. These illnesses can cause lupus flares. Talk to your doctor about flu shots and pneumococcal vaccinations. If you do get sick or think you are getting an infection, talk with your doctor so you can treat your symptoms right away. · Brush and floss your teeth each day. See your dentist two times a year. · Get regular eye exams. · Build a support system of family, friends, and health professionals. When should you call for help? Call 911 anytime you think you may need emergency care. For example, call if:    · You have symptoms of a heart attack.  These may include:  ? Chest pain or pressure, or a strange feeling in the chest.  ? Sweating. ? Shortness of breath. ? Nausea or vomiting. ? Pain, pressure, or a strange feeling in the back, neck, jaw, or upper belly or in one or both shoulders or arms. ? Lightheadedness or sudden weakness. ? A fast or irregular heartbeat.    After you call 911, the  may tell you to chew 1 adult-strength or 2 to 4 low-dose aspirin. Wait for an ambulance. Do not try to drive yourself.   Call your doctor now or seek immediate medical care if:    · You are short of breath.     · You have blood in your urine or are urinating less often and in smaller amounts than usual.     · You have a fever.     · You feel depressed or notice any changes in your behavior or thinking.     · You are dizzy or have muscle weakness.     · You have swelling of the lower legs or feet.    Watch closely for changes in your health, and be sure to contact your doctor if:    · Your symptoms get worse or you develop any new symptoms. These may include aching or swollen joints, increased fatigue, loss of appetite, hair loss, skin rashes, or new sores in your mouth or nose. Where can you learn more? Go to http://kodak-dodie.info/. Enter W766 in the search box to learn more about \"Lupus: Care Instructions. \"  Current as of: April 1, 2019  Content Version: 12.2  © 3223-6725 [employer]. Care instructions adapted under license by [employer] (which disclaims liability or warranty for this information). If you have questions about a medical condition or this instruction, always ask your healthcare professional. [place] [address] any warranty or liability for your use of this information.

## 2019-09-24 NOTE — PROGRESS NOTES
Chief Complaint   Patient presents with    Weight Management     Room 4         HPI:  The patient is a 46 y.o. female who presents today for a follow up appointment. No hospital, ER or specialist visits since last primary care visit except as noted below. Weight Mgmt:  The patient is a 41y.o. year old female who presents today for initial medical weight loss visit. Pt's initial weight is 172 pounds with a BMI of 33.59. Patient's goal weight is 130 pounds. The patient's weight today is 161.8 pounds with a BMI of 31.37. She has lost 4.2 pounds since her last visit. The patient's bariatric comorbidities include HTN, lupus, nephrotic syndrome, anxiety, depression, arthritis. The patient's reason for medical weight loss is feel better, help with lupus related comorbidities, help with all of her medical problems. The patient's quality of life goals are to be able to do \"a lot of things that she used to do before her diagnosis of lupus at the age of 20\". Pt with hypertension, nephrotic syndrome, SLE, vit d deficiency, hx of thromboembolism, and obesity presents to the office with a BP of 114/80. For BP, she uses Atenolol 50 mg and Losartan 100 mg daily, tolerating well. For cholesterol, she uses Zocor 20 mg daily, tolerating well. For RAD, she uses Warfin 6 mg daily, rx'd by Dr. Yaiml Lopez who she sees regularly. The patient's diet typically includes: B - eggs, coffee (every once in a while), beans, rice, fruit, sometimes skip breakfast; L - chicken, salad, rice, tortilla, steak, soups, S - not really a snacker, D - does not eat much for dinner, small sandwich, fruit, salad. Started seeing a nutritionist with [facility] [place], seen on 8/29/2019, 9/12/2019. Doing well with gluten free diet. Will f/u with BERT Brock RD in 3 weeks.          The patient is drinking the following: soda (just cut these out, occasionally but not even when out to eat, coffee, mostly water, OJ infrequently, cranberry juice for urinary symptoms - has one glass/day         The patient's exercise at this time includes: walking (tracking her steps on her phone), she has started to increase this with a goal of exercising 3x/week. The patient is currently weighing: \"I try not to\". She feels she is usually in the same range. The patient has not tried appetite suppressants in the past, but she has tried some food interventions. She has previously tried the following medications: \"fit teas, honey/cinnnamon teas\", apple cider vinegar. She has tried restrictive diets. She patient has not tried appetite suppressants in the past, but she has tried some food interventions. She has previously tried the following medications: \"fit teas, honey/cinnnamon teas\", apple cider vinegar. She has tried restrictive diets. Started going to the gym 3x/week. She is not drinking soda or juices, mostly water, she is feeling stressed and feels like \"giving up\" because she has not been able to successfully lose weight despite all of her efforts. She drinks 64 oz of water daily. She averages 8 hours of sleep nightly. Sometimes, she has difficulty sleeping through the night and wakes up multiple times a night. Denies caffeine consumption. She was started on Bontril in the fall and she felt that it worked initially but then [employer] got used to it\". She was changed to Bontril TID. She does not feel this was helping. She was forgetting to take the lunch hour dose. She was changed back to the XR Bontril at her last visit on 4/10/2019. Her hunger is well controlled in terms of volume but she still reports she is hungry overall. Consider Phentermine once renal function established and Nephrology consult done, no data that shows safety with this medication however.  Patient gave appetite suppressant medications to Dr. Bessie Erazo for review as follows:    Dr. Des Belle notes on 5/6/2019:   \"-Belviq: may be ok but not well studied in patients with renal disease. \"Use with caution\" should definitely watch for accumulation of metabolites. I would not take it.  -Qysmia: dose adjustment needed.  -Saxenda: looks okay. -Contrave: dose adjustment needed. -Phentermine: I strongly recommend AGAINST it. Finally, I recommend she see her MD before starting any of these. \"    Patient was prescribed Saxenda on 5/17/2019, this is the safest anti-obesity medication for this patient based on her medical history and current BMI. However her insurance will not cover this medication and therefore she was started on a more affordable out of pocket alternative, Contrave. She developed HAs, N/V on 6/12/2019 and stopped the medication at that time. Since then she has waited to restart the Contrave. She reports she started the Contrave again, she took it for about 3 weeks. She felt like she was eating more, was not effective. She reports constant headaches and made her \"feel awful\". She states the Bontril helped much better with her appetite. Her frustration has also been that she has felt somewhat down and eating for stress somewhat. Restarted Bontril at her last visit on 8/16/2019. She has done well with this, she reports her hunger is well controlled. She denies any adverse effects from this and would like to continue with this at this time. Weight Metrics 9/24/2019 9/24/2019 8/16/2019 8/16/2019 7/8/2019 6/19/2019 6/19/2019   Weight - 161 lb 8 oz - 166 lb 165 lb - 161 lb   Neck Circ (inches) - - - - - - -   Waist Measure Inches 34.5 - 34 - - 35.25 -   Body Fat % 33 - 33.9 - - 32.8 -   BMI - 30.52 kg/m2 - 31.37 kg/m2 31.18 kg/m2 - 30.42 kg/m2     Review of Systems  A comprehensive review of systems was negative except for that written in the HPI.     Patient Active Problem List   Diagnosis Code    Systemic lupus erythematosus (HCC) M32.9    Hypertension I10    Nephrotic syndrome N04.9    Long-term use of immunosuppressant medication Z79.899    Current use of steroid medication Z79.52    Lupus nephritis ([facility] M32.14    Hypermobility syndrome M35.7    Branch retinal artery occlusion H34.239    Depression, major, recurrent (HCC) F33.9    Anxiety disorder F41.9    Diplopia H53.2    Carpal instability of left wrist with volar intercalated segment instability M25.332    Carpal instability of right wrist M25.331    Raynaud's syndrome I73.00    Acute blood loss anemia D62    History of thromboembolism Z86.718    Class 1 obesity due to excess calories with body mass index (BMI) of 33.0 to 33.9 in adult E66.09, Z68.33    Fatigue R53.83    Hypercholesterolemia E78.00    Vitamin D deficiency E55.9    Thrombocytopenia (HCC)  D69.6    SAMARA (generalized anxiety disorder) F41.1    Nonintractable episodic headache R51    Celiac disease K90.0    Encounter for weight management Z76.89    Obesity (BMI 30.0-34. 9) E66.9    Insomnia G47.00    Seasonal allergic rhinitis J30.2       Past Medical History:   Diagnosis Date    Anemia associated with acute blood loss 06/2018    blood transfusion x 2. Txd w iron. Dr. Duane Parisian.  Arthritis     Branch retinal artery occlusion approx 2013    Right, as of 5/26/16 resolved and on Coumadin.  Celiac disease 2012    Dr. Joseph Wadsworth.  Chronic renal insufficiency, stage II (mild)     secondary to lupus nephritis:  Dr. Desiree Hill    Depression, major, recurrent ([facility] 03/10/2014    Tristin Henderson LCSW.  Diffuse proliferative lupus glomerulonephritis (HCC)     Dr Yordan Juan Diplopia 2014    Dr. Lizz Mess.  Dizziness 07/2014    Dr. Tatiana De Jesus, neuro.  Elevated cortisol level (HCC)     Fatigue     intermittent due to lupus    SAMARA (generalized anxiety disorder) 03/10/2014    Tristin Henderson LCSW    Gallstones 2017    Dr. Britney Gipson Gastritis     as of 5/26/16 asymptomatic    Headache 2014    Dr. Miguel Diana Hypertension 2000s    due to CKD.   Dr. Vance Funez    Left tibialis posterior tendonitis 2017    due to injury on swapnil.  Dr. Tabatha Loera.  Long term current use of anticoagulant therapy     Ovarian cyst 2016    Dr. Ijeoma Mahan Pregnancy induced hypertension     Proteinuria     Dr. Mark Casas PUD (peptic ulcer disease)     Dr. James Chandler syndrome     Dr. Efe Gurrola. Dr. Ozzy Phillips.  Sicca syndrome (HCC)     Dr. Efe Gurrola.  Systemic lupus erythematosus with glomerular disease ([facility]     as of 16 \"aching and swelling of joints\" per patient. Dr Edison Ronquillo. Dr. Soco Montejo. Past Surgical History:   Procedure Laterality Date    HX ACL RECONSTRUCTION Left approx     Dr. Priscella Merlin.  HX  SECTION      HX ENDOSCOPY  2012, 2017    Dr. Mahad Ott.  HX LAP CHOLECYSTECTOMY  2017    due to gallstones. Dr. Lisa Mccray Right 10/22/2014    hand to thumb due to thumb dislocation. Dr. Dougie George.  HX OTHER SURGICAL  2012    kidney biopsy; benign    HX OTHER SURGICAL Right 10/22/2014    right Posterior Interosseous Nerve Neurectomy, right Anterior Interosseous Nerve Neurectomy. Dr. Abida Lamar HX RENAL BIOPSY  2018    CT Guided Renal Biopsy with Conscious Sedation. due to CKD/SLE.  HX SEPTOPLASTY  2016    w inf turbinate resection. due to turbinate hypertrophy. Dr. Palma Standing History     Tobacco Use    Smoking status: Never Smoker    Smokeless tobacco: Never Used   Substance Use Topics    Alcohol use: No    Drug use: No       Family History   Problem Relation Age of Onset    Glaucoma Father     Hypertension Father        Outpatient Medications Marked as Taking for the 19 encounter (Office Visit) with Marilee To NP   Medication Sig Dispense Refill    phendimetrazine tartrate 105 mg cpER Take 105 mg by mouth daily. Max Daily Amount: 105 mg. 30 Cap 0    fluticasone propionate (FLONASE) 50 mcg/actuation nasal spray 2 Sprays by Both Nostrils route daily.  1 Bottle 5    levonorgestrel (MIRENA) 20 mcg/24 hours (5 yrs) 52 mg IUD Mirena 20 mcg/24 hours (5 yrs) 52 mg intrauterine device   remove 9/8/21      ergocalciferol (ERGOCALCIFEROL) 50,000 unit capsule TAKE ONE CAPSULE BY MOUTH ONCE A WEEK  0    NIFEdipine ER (PROCARDIA XL) 30 mg ER tablet       warfarin (COUMADIN) 6 mg tablet Take 4 mg by mouth daily. M, W, F, Sa, Damian is 6mg once daily; Tu, Th is 4mg once daily      acetaminophen (ACETAMINOPHEN EXTRA STRENGTH) 500 mg tablet Take 1 Tab by mouth every six (6) hours as needed for Pain. 30 Tab 0    hydroxychloroquine (PLAQUENIL) 200 mg tablet Take 1 tab po qam and 1/2 tab po qpm 45 Tab 9    simvastatin (ZOCOR) 20 mg tablet TAKE 1 TABLET BY MOUTH ONCE DAILY  3    losartan (COZAAR) 100 mg tablet Take 50 mg by mouth nightly.  atenolol (TENORMIN) 50 mg tablet Take  by mouth nightly.  mycophenolate (CELLCEPT) 500 mg tablet Take 1,500 mg by mouth two (2) times a day. Current Outpatient Medications on File Prior to Visit   Medication Sig Dispense Refill    levonorgestrel (MIRENA) 20 mcg/24 hours (5 yrs) 52 mg IUD Mirena 20 mcg/24 hours (5 yrs) 52 mg intrauterine device   remove 9/8/21      ergocalciferol (ERGOCALCIFEROL) 50,000 unit capsule TAKE ONE CAPSULE BY MOUTH ONCE A WEEK  0    NIFEdipine ER (PROCARDIA XL) 30 mg ER tablet       warfarin (COUMADIN) 6 mg tablet Take 4 mg by mouth daily. M, W, F, Sa, Damian is 6mg once daily; Tu, Th is 4mg once daily      acetaminophen (ACETAMINOPHEN EXTRA STRENGTH) 500 mg tablet Take 1 Tab by mouth every six (6) hours as needed for Pain. 30 Tab 0    hydroxychloroquine (PLAQUENIL) 200 mg tablet Take 1 tab po qam and 1/2 tab po qpm 45 Tab 9    simvastatin (ZOCOR) 20 mg tablet TAKE 1 TABLET BY MOUTH ONCE DAILY  3    losartan (COZAAR) 100 mg tablet Take 50 mg by mouth nightly.  atenolol (TENORMIN) 50 mg tablet Take  by mouth nightly.  mycophenolate (CELLCEPT) 500 mg tablet Take 1,500 mg by mouth two (2) times a day. No current facility-administered medications on file prior to visit. Allergies   Allergen Reactions    Gluten Diarrhea    Fiorinal-Codeine #3 [Codeine-Butalbital-Asa-Caff] Nausea Only       PE:  Visit Vitals  /76 (BP 1 Location: Right arm, BP Patient Position: Sitting)   Pulse 70   Temp 98.8 °F (37.1 °C) (Oral)   Resp 20   Ht 5' 1\" (1.549 m)   Wt 161 lb 8 oz (73.3 kg)   SpO2 99%   BMI 30.52 kg/m²       Gen: alert, oriented, no acute distress  Head: normocephalic, atraumatic  Ears: external auditory canals clear, TMs without erythema or effusion  Eyes: pupils equal round reactive to light, sclera clear, conjunctiva clear  Oral: moist mucus membranes, no oral lesions, no pharyngeal inflammation or exudate  Neck: symmetric normal sized thyroid, no carotid bruits, no jugular vein distention  Resp: no increase work of breathing, lungs clear to ausculation bilaterally, no wheezing, rales or rhonchi  CV: S1, S2 normal.  No murmurs, rubs, or gallops. Abd: soft, not tender, not distended. No hepatosplenomegaly. Normal bowel sounds. No hernias. No abdominal or renal bruits. Neuro: cranial nerves intact, normal strength and movement in all extremities, reflexes and sensation intact and symmetric. Skin: no lesion or rash  Extremities: no cyanosis or edema    No results found for this visit on 09/24/19. Assessment/Plan:    ICD-10-CM ICD-9-CM    1. Systemic lupus erythematosus with glomerular disease, unspecified SLE type ([facility] M32.14 710.0 CBC WITH AUTOMATED DIFF     583.81 IRON PROFILE      FERRITIN   2. Seasonal allergic rhinitis, unspecified trigger J30.2 477.9    3. Encounter for immunization Z23 V03.89 INFLUENZA VIRUS VAC QUAD,SPLIT,PRESV FREE SYRINGE IM      WI IMMUNIZ ADMIN,1 SINGLE/COMB VAC/TOXOID      HUMAN PAPILLOMA VIRUS NONAVALENT HPV 3 DOSE IM (GARDASIL 9)      WI IMMUNIZ,ADMIN,EACH ADDL   4. Obesity (BMI 30.0-34. 9) E66.9 278.00 phendimetrazine tartrate 105 mg cpER   5.  Encounter for weight management Z76.89 V65.49 phendimetrazine tartrate 105 mg cpER   6. Seasonal allergic rhinitis due to other allergic trigger J30.89 477.8 fluticasone propionate (FLONASE) 50 mcg/actuation nasal spray   7. Lupus nephritis (HCC) M32.14 710.0 URINALYSIS W/ RFLX MICROSCOPIC     924.03 METABOLIC PANEL, COMPREHENSIVE   8. Class 1 obesity due to excess calories with serious comorbidity and body mass index (BMI) of 33.0 to 33.9 in adult E66.09 278.00 URINALYSIS W/ RFLX MICROSCOPIC    Z68.33 V85.33 LIPID PANEL      METABOLIC PANEL, COMPREHENSIVE      TSH 3RD GENERATION      VITAMIN D, 25 HYDROXY      HEMOGLOBIN A1C WITH EAG      CBC WITH AUTOMATED DIFF      VITAMIN B12   9. Hypercholesterolemia E78.00 272.0 LIPID PANEL   10. Vitamin D deficiency E55.9 268.9 VITAMIN D, 25 HYDROXY   11. Thrombocytopenia (HCC)  D69.6 287.5 CBC WITH AUTOMATED DIFF   12. Nephrotic syndrome N04.9 581.9 URINALYSIS W/ RFLX MICROSCOPIC      METABOLIC PANEL, COMPREHENSIVE   13. Weight loss R63.4 783.21 URINALYSIS W/ RFLX MICROSCOPIC      LIPID PANEL      METABOLIC PANEL, COMPREHENSIVE      TSH 3RD GENERATION      VITAMIN D, 25 HYDROXY      HEMOGLOBIN A1C WITH EAG      CBC WITH AUTOMATED DIFF      VITAMIN B12   14. Fatigue, unspecified type R53.83 780.79 URINALYSIS W/ RFLX MICROSCOPIC      LIPID PANEL      METABOLIC PANEL, COMPREHENSIVE      TSH 3RD GENERATION      VITAMIN D, 25 HYDROXY      HEMOGLOBIN A1C WITH EAG      CBC WITH AUTOMATED DIFF      VITAMIN B12   15. Diabetes mellitus screening Z13.1 V77.1 URINALYSIS W/ RFLX MICROSCOPIC      METABOLIC PANEL, COMPREHENSIVE      HEMOGLOBIN A1C WITH EAG   16. Lipid screening Z13.220 V77.91 LIPID PANEL   17. Vitamin B12 deficiency E53.8 266.2 VITAMIN B12   18.  Laboratory exam ordered as part of routine general medical examination Z00.00 V72.62 URINALYSIS W/ RFLX MICROSCOPIC      LIPID PANEL      METABOLIC PANEL, COMPREHENSIVE      TSH 3RD GENERATION      VITAMIN D, 25 HYDROXY      HEMOGLOBIN A1C WITH EAG      CBC WITH AUTOMATED DIFF      VITAMIN B12   19. Iron deficiency anemia, unspecified iron deficiency anemia type D50.9 280.9 CBC WITH AUTOMATED DIFF      IRON PROFILE      FERRITIN   20. Pruritic dermatitis L29.9 698.9    21. Abnormal finding of blood chemistry  R79.9 790.6 HEMOGLOBIN A1C WITH EAG     Diagnoses and all orders for this visit:    1. Systemic lupus erythematosus with glomerular disease, unspecified SLE type ([facility]  -     CBC WITH AUTOMATED DIFF; Future  -     IRON PROFILE; Future  -     FERRITIN; Future    2. Seasonal allergic rhinitis, unspecified trigger    3. Encounter for immunization  -     INFLUENZA VIRUS VAC QUAD,SPLIT,PRESV FREE SYRINGE IM  -     MD IMMUNIZ ADMIN,1 SINGLE/COMB VAC/TOXOID  -     HUMAN PAPILLOMA VIRUS NONAVALENT HPV 3 DOSE IM (GARDASIL 9)  -     MD IMMUNIZ,ADMIN,EACH ADDL    4. Obesity (BMI 30.0-34.9)  -     phendimetrazine tartrate 105 mg cpER; Take 105 mg by mouth daily. Max Daily Amount: 105 mg.    5. Encounter for weight management  -     phendimetrazine tartrate 105 mg cpER; Take 105 mg by mouth daily. Max Daily Amount: 105 mg.    6. Seasonal allergic rhinitis due to other allergic trigger  -     fluticasone propionate (FLONASE) 50 mcg/actuation nasal spray; 2 Sprays by Both Nostrils route daily. 7. Lupus nephritis (HCC)  -     URINALYSIS W/ RFLX MICROSCOPIC; Future  -     METABOLIC PANEL, COMPREHENSIVE; Future    8. Class 1 obesity due to excess calories with serious comorbidity and body mass index (BMI) of 33.0 to 33.9 in adult  -     URINALYSIS W/ RFLX MICROSCOPIC; Future  -     LIPID PANEL; Future  -     METABOLIC PANEL, COMPREHENSIVE; Future  -     TSH 3RD GENERATION; Future  -     VITAMIN D, 25 HYDROXY; Future  -     HEMOGLOBIN A1C WITH EAG; Future  -     CBC WITH AUTOMATED DIFF; Future  -     VITAMIN B12; Future    9. Hypercholesterolemia  -     LIPID PANEL; Future    10. Vitamin D deficiency  -     VITAMIN D, 25 HYDROXY; Future    11.  Thrombocytopenia ([facility] -     CBC WITH AUTOMATED DIFF; Future    12. Nephrotic syndrome  -     URINALYSIS W/ RFLX MICROSCOPIC; Future  -     METABOLIC PANEL, COMPREHENSIVE; Future    13. Weight loss  -     URINALYSIS W/ RFLX MICROSCOPIC; Future  -     LIPID PANEL; Future  -     METABOLIC PANEL, COMPREHENSIVE; Future  -     TSH 3RD GENERATION; Future  -     VITAMIN D, 25 HYDROXY; Future  -     HEMOGLOBIN A1C WITH EAG; Future  -     CBC WITH AUTOMATED DIFF; Future  -     VITAMIN B12; Future    14. Fatigue, unspecified type  -     URINALYSIS W/ RFLX MICROSCOPIC; Future  -     LIPID PANEL; Future  -     METABOLIC PANEL, COMPREHENSIVE; Future  -     TSH 3RD GENERATION; Future  -     VITAMIN D, 25 HYDROXY; Future  -     HEMOGLOBIN A1C WITH EAG; Future  -     CBC WITH AUTOMATED DIFF; Future  -     VITAMIN B12; Future    15. Diabetes mellitus screening  -     URINALYSIS W/ RFLX MICROSCOPIC; Future  -     METABOLIC PANEL, COMPREHENSIVE; Future  -     HEMOGLOBIN A1C WITH EAG; Future    16. Lipid screening  -     LIPID PANEL; Future    17. Vitamin B12 deficiency  -     VITAMIN B12; Future    18. Laboratory exam ordered as part of routine general medical examination  -     URINALYSIS W/ RFLX MICROSCOPIC; Future  -     LIPID PANEL; Future  -     METABOLIC PANEL, COMPREHENSIVE; Future  -     TSH 3RD GENERATION; Future  -     VITAMIN D, 25 HYDROXY; Future  -     HEMOGLOBIN A1C WITH EAG; Future  -     CBC WITH AUTOMATED DIFF; Future  -     VITAMIN B12; Future    19. Iron deficiency anemia, unspecified iron deficiency anemia type  -     CBC WITH AUTOMATED DIFF; Future  -     IRON PROFILE; Future  -     FERRITIN; Future    20. Pruritic dermatitis    21. Abnormal finding of blood chemistry   -     HEMOGLOBIN A1C WITH EAG;  Future      Follow-up and Dispositions    · Return in about 4 weeks (around 10/22/2019) for Medication Check, Weight Mgmt, Labs F/U, Bontril #2 F/U.       lab results and schedule of future lab studies reviewed with patient  reviewed diet, exercise and weight control  reviewed medications and side effects in detail - Overall dryness noted by the patient, cause likely lupus, will evaluate after lab results, consider systemic medication to help with overall dryness (scalp dry/itchy, vaginal itching, dryness of face and arms). lose weight, increase physical activity, continue present plan, call if any problems    Health Maintenance reviewed - reviewed, HPV #2 given today in the office, HPV #3 due in 2/2020, flu vaccine given today in the office, otherwise UTD. Recommended healthy diet low in carbohydrates, fats, sodium and cholesterol. Recommended regular cardiovascular exercise 3-6 times per week for 30-60 minutes daily. Chart is reviewed and updated today in the office. Records requested for other providers patient has seen and is currently seeing. Patient was offered a choice/choices in the treatment plan today. Patient expresses understanding of the plan and agrees with recommendations. Verbal and written instructions (see AVS) provided. See patient instructions for more. Patient expresses understanding of diagnosis and treatment plan.

## 2019-09-24 NOTE — PROGRESS NOTES
Archana Ochoa  Identified pt with two pt identifiers(name and ). Chief Complaint   Patient presents with    Weight Management     Room 4       1. Have you been to the ER, urgent care clinic since your last visit?n   Hospitalized since your last visit? n     2. Have you seen or consulted any other health care providers outside of the [address] [place] [address] since your last visit? Include any pap smears or colon screening. n       Advance Care Planning    In the event something were to happen to you and you were unable to speak on your behalf, do you have an Advance Directive/ Living Will in place stating your wishes? NO    If yes, do we have a copy on file NO    If no, would you like information NO    Medication reconciliation up to date and corrected with patient at this time. Today's provider has been notified of reason for visit, vitals and flowsheets obtained on patients. Reviewed record in preparation for visit, huddled with provider and have obtained necessary documentation. There are no preventive care reminders to display for this patient.     Wt Readings from Last 3 Encounters:   19 161 lb 8 oz (73.3 kg)   19 166 lb (75.3 kg)   19 165 lb (74.8 kg)     Temp Readings from Last 3 Encounters:   19 98.8 °F (37.1 °C) (Oral)   19 97.7 °F (36.5 °C) (Oral)   19 97.8 °F (36.6 °C) (Oral)     BP Readings from Last 3 Encounters:   19 103/76   19 115/75   19 124/85     Pulse Readings from Last 3 Encounters:   19 70   19 (!) 54   19 62     Vitals:    19 0846   BP: 103/76   Pulse: 70   Resp: 20   Temp: 98.8 °F (37.1 °C)   TempSrc: Oral   SpO2: 99%   Weight: 161 lb 8 oz (73.3 kg)   Height: 5' 1\" (1.549 m)   PainSc:   0 - No pain         Learning Assessment:  :     Learning Assessment 2019   PRIMARY LEARNER Patient Patient Patient   HIGHEST LEVEL OF EDUCATION - PRIMARY LEARNER  GRADUATED HIGH SCHOOL OR GED GRADUATED HIGH SCHOOL OR GED GRADUATED HIGH SCHOOL OR GED   BARRIERS PRIMARY LEARNER NONE NONE NONE   CO-LEARNER CAREGIVER No No No   PRIMARY LANGUAGE ENGLISH ENGLISH ENGLISH    NEED - No -   LEARNER PREFERENCE PRIMARY DEMONSTRATION LISTENING PICTURES     - READING -   LEARNING SPECIAL TOPICS - none -   ANSWERED BY patient patient patient   RELATIONSHIP SELF SELF SELF       Depression Screening:  :     3 most recent PHQ Screens 4/10/2019   Little interest or pleasure in doing things Several days   Feeling down, depressed, irritable, or hopeless Several days   Total Score PHQ 2 2   Trouble falling or staying asleep, or sleeping too much Not at all   Feeling tired or having little energy Several days   Poor appetite, weight loss, or overeating Not at all   Feeling bad about yourself - or that you are a failure or have let yourself or your family down More than half the days   Trouble concentrating on things such as school, work, reading, or watching TV Several days   Moving or speaking so slowly that other people could have noticed; or the opposite being so fidgety that others notice Not at all   Thoughts of being better off dead, or hurting yourself in some way Several days   PHQ 9 Score 7       No flowsheet data found. Fall Risk Assessment:  :     Fall Risk Assessment, last 12 mths 4/10/2019   Able to walk? Yes   Fall in past 12 months? No       Abuse Screening:  :     Abuse Screening Questionnaire 4/10/2019   Do you ever feel afraid of your partner? N   Are you in a relationship with someone who physically or mentally threatens you? N   Is it safe for you to go home?  Y       ADL Screening:  :     ADL Assessment 5/17/2019   Feeding yourself No Help Needed   Getting from bed to chair No Help Needed   Getting dressed No Help Needed   Bathing or showering No Help Needed   Walk across the room (includes cane/walker) No Help Needed   Using the telphone No Help Needed   Taking your medications No Help Needed   Preparing meals No Help Needed   Managing money (expenses/bills) No Help Needed   Moderately strenuous housework (laundry) No Help Needed   Shopping for personal items (toiletries/medicines) No Help Needed   Shopping for groceries No Help Needed   Driving No Help Needed   Climbing a flight of stairs No Help Needed   Getting to places beyond walking distances No Help Needed           BMI:  Weight Metrics 9/24/2019 9/24/2019 8/16/2019 8/16/2019 7/8/2019 6/19/2019 6/19/2019   Weight - 161 lb 8 oz - 166 lb 165 lb - 161 lb   Neck Circ (inches) - - - - - - -   Waist Measure Inches 34.5 - 34 - - 35.25 -   Body Fat % 33 - 33.9 - - 32.8 -   BMI - 30.52 kg/m2 - 31.37 kg/m2 31.18 kg/m2 - 30.42 kg/m2           Medication reconciliation up to date and corrected with patient at this time. Zenaida Villanueva

## 2019-09-25 DIAGNOSIS — R53.83 FATIGUE, UNSPECIFIED TYPE: ICD-10-CM

## 2019-09-25 DIAGNOSIS — Z00.00 LABORATORY EXAM ORDERED AS PART OF ROUTINE GENERAL MEDICAL EXAMINATION: ICD-10-CM

## 2019-09-25 DIAGNOSIS — Z13.1 DIABETES MELLITUS SCREENING: ICD-10-CM

## 2019-09-25 DIAGNOSIS — M32.14 SYSTEMIC LUPUS ERYTHEMATOSUS WITH GLOMERULAR DISEASE, UNSPECIFIED SLE TYPE (HCC): ICD-10-CM

## 2019-09-25 DIAGNOSIS — E66.09 CLASS 1 OBESITY DUE TO EXCESS CALORIES WITH SERIOUS COMORBIDITY AND BODY MASS INDEX (BMI) OF 33.0 TO 33.9 IN ADULT: ICD-10-CM

## 2019-09-25 DIAGNOSIS — R79.9 ABNORMAL FINDING OF BLOOD CHEMISTRY: ICD-10-CM

## 2019-09-25 DIAGNOSIS — E55.9 VITAMIN D DEFICIENCY: ICD-10-CM

## 2019-09-25 DIAGNOSIS — M32.14 LUPUS NEPHRITIS (HCC): ICD-10-CM

## 2019-09-25 DIAGNOSIS — D69.6 THROMBOCYTOPENIA (HCC): ICD-10-CM

## 2019-09-25 DIAGNOSIS — Z13.220 LIPID SCREENING: ICD-10-CM

## 2019-09-25 DIAGNOSIS — D50.9 IRON DEFICIENCY ANEMIA, UNSPECIFIED IRON DEFICIENCY ANEMIA TYPE: ICD-10-CM

## 2019-09-25 DIAGNOSIS — R63.4 WEIGHT LOSS: ICD-10-CM

## 2019-09-25 DIAGNOSIS — E53.8 VITAMIN B12 DEFICIENCY: ICD-10-CM

## 2019-09-25 DIAGNOSIS — E78.00 HYPERCHOLESTEROLEMIA: ICD-10-CM

## 2019-09-25 DIAGNOSIS — N04.9 NEPHROTIC SYNDROME: ICD-10-CM

## 2019-09-28 ENCOUNTER — OFFICE VISIT (OUTPATIENT)
Dept: URGENT CARE | Age: 39
End: 2019-09-28

## 2019-09-28 VITALS
BODY MASS INDEX: 30.02 KG/M2 | RESPIRATION RATE: 18 BRPM | HEART RATE: 68 BPM | HEIGHT: 61 IN | TEMPERATURE: 97.9 F | OXYGEN SATURATION: 100 % | SYSTOLIC BLOOD PRESSURE: 122 MMHG | DIASTOLIC BLOOD PRESSURE: 80 MMHG | WEIGHT: 159 LBS

## 2019-09-28 DIAGNOSIS — M25.571 ACUTE RIGHT ANKLE PAIN: Primary | ICD-10-CM

## 2019-09-28 DIAGNOSIS — M25.562 ACUTE PAIN OF LEFT KNEE: ICD-10-CM

## 2019-09-28 RX ORDER — TRAMADOL HYDROCHLORIDE 50 MG/1
50 TABLET ORAL
Qty: 12 TAB | Refills: 0 | Status: SHIPPED | OUTPATIENT
Start: 2019-09-28 | End: 2019-10-01

## 2019-09-28 RX ORDER — METHOCARBAMOL 500 MG/1
500 TABLET, FILM COATED ORAL
Qty: 7 TAB | Refills: 0 | Status: SHIPPED | OUTPATIENT
Start: 2019-09-28 | End: 2021-03-04
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
## 2019-10-10 ENCOUNTER — HOSPITAL ENCOUNTER (OUTPATIENT)
Dept: NUTRITION | Age: 39
Discharge: HOME | End: 2019-10-10
Payer: MEDICARE

## 2019-10-10 PROCEDURE — 97803 MED NUTRITION INDIV SUBSEQ: CPT | Performed by: DIETITIAN, REGISTERED

## 2019-10-10 NOTE — PROGRESS NOTES
NUTRITION  FOLLOW-UP TREATMENT NOTE  Patient Name: Dilcia Montes         Date: 10/10/2019  : 1980    YES Patient  Verified  Diagnosis: obesity, celiac, nephrotic syndrome   In time:   10:00            Out time:   10:30   Total Treatment Time (min):   30     SUBJECTIVE/ASSESSMENT    Changes in medication or medical history? Any new allergies, surgeries or procedures? NO    If yes, update Summary List   Pt seen for follow up; ~1 lb lost over past month. Pt unable to exercise for past 2 weeks due to fall-injured ankle and knee. Continues to use plate method for meals- 3 meals a day (avoiding gluten); snacks (fruit or granola bar) between meals when hungry. States going out to eat with friend 2x week ([place] or [place]). Pt expressed need for new meal ideas- provided pt with website info (pinterest, cookinglite.com). Reviewed low sodium diet and how to read food labels for sodium and added sugar. Pt interested in tracking food intake for calories and sodium. Current Wt: 159.8 Previous Wt: 160.6 Wt Change: -0.8 lb     Achievement of Goals: 1. Have bfast daily- no more than 15g protein for shake  - Goal met    2. Continue balanced plate method at meals (gluten free)  - Goal met    3. Exercise at home or at gym 3x week  - Goal not met (injury); continue goal         Patient Education:  [x]  Review current plan with patient   []  Other:    Handouts/  Information Provided: []  Carbohydrates  []  Protein  []  Fiber  []  Serving Sizes  []  Fluids  []  General guidelines []  Diabetes  []  Cholesterol  [x]  Sodium  []  SBGM  [x]  Food Journals- reviewed myfitnesspal  []  Others:      New Patient Goals: 1. Read food labels for sodium and added sugars; limit sodium to 6235-7139 mg/day; added sugars limited to 25g/day    2.  Track food intake on myfitnesspal     PLAN    [x]  Continue on current plan []  Follow-up PRN   []  Discharge due to :    []  Next appt: 1 month     Dietitian: Almer Hamman, RD Date: 10/10/2019 Time: 12:28 PM

## 2019-10-18 LAB
APPEARANCE UR: CLEAR
BACTERIA #/AREA URNS HPF: ABNORMAL /[HPF]
BILIRUB UR QL STRIP: NEGATIVE
CASTS URNS MICRO: ABNORMAL
CASTS URNS QL MICRO: PRESENT /LPF
COLOR UR: YELLOW
EPI CELLS #/AREA URNS HPF: >10 /HPF (ref 0–10)
GLUCOSE UR QL: NEGATIVE
HGB UR QL STRIP: ABNORMAL
KETONES UR QL STRIP: NEGATIVE
LEUKOCYTE ESTERASE UR QL STRIP: NEGATIVE
MICRO URNS: ABNORMAL
MUCOUS THREADS URNS QL MICRO: PRESENT
NITRITE UR QL STRIP: NEGATIVE
PH UR STRIP: 6 [PH] (ref 5–7.5)
PROT UR QL STRIP: ABNORMAL
RBC #/AREA URNS HPF: ABNORMAL /HPF (ref 0–2)
SP GR UR: 1.02 (ref 1–1.03)
UROBILINOGEN UR STRIP-MCNC: 0.2 MG/DL (ref 0.2–1)
WBC #/AREA URNS HPF: ABNORMAL /HPF (ref 0–5)

## 2019-10-20 LAB
25(OH)D3+25(OH)D2 SERPL-MCNC: 70.4 NG/ML (ref 30–100)
ALBUMIN SERPL-MCNC: 4.3 G/DL (ref 3.5–5.5)
ALBUMIN/GLOB SERPL: 1.5 {RATIO} (ref 1.2–2.2)
ALP SERPL-CCNC: 57 IU/L (ref 39–117)
ALT SERPL-CCNC: 13 IU/L (ref 0–32)
AST SERPL-CCNC: 14 IU/L (ref 0–40)
BASOPHILS # BLD AUTO: 0.1 X10E3/UL (ref 0–0.2)
BASOPHILS NFR BLD AUTO: 1 %
BILIRUB SERPL-MCNC: 0.8 MG/DL (ref 0–1.2)
BUN SERPL-MCNC: 18 MG/DL (ref 6–20)
BUN/CREAT SERPL: 12 (ref 9–23)
CALCIUM SERPL-MCNC: 9.7 MG/DL (ref 8.7–10.2)
CHLORIDE SERPL-SCNC: 103 MMOL/L (ref 96–106)
CHOLEST SERPL-MCNC: 121 MG/DL (ref 100–199)
CO2 SERPL-SCNC: 24 MMOL/L (ref 20–29)
CREAT SERPL-MCNC: 1.5 MG/DL (ref 0.57–1)
EOSINOPHIL # BLD AUTO: 0.2 X10E3/UL (ref 0–0.4)
EOSINOPHIL NFR BLD AUTO: 4 %
ERYTHROCYTE [DISTWIDTH] IN BLOOD BY AUTOMATED COUNT: 12.7 % (ref 12.3–15.4)
EST. AVERAGE GLUCOSE BLD GHB EST-MCNC: 100 MG/DL
FERRITIN SERPL-MCNC: 154 NG/ML (ref 15–150)
GLOBULIN SER CALC-MCNC: 2.9 G/DL (ref 1.5–4.5)
GLUCOSE SERPL-MCNC: 83 MG/DL (ref 65–99)
HBA1C MFR BLD: 5.1 % (ref 4.8–5.6)
HCT VFR BLD AUTO: 37.9 % (ref 34–46.6)
HDLC SERPL-MCNC: 39 MG/DL
HGB BLD-MCNC: 13 G/DL (ref 11.1–15.9)
IMM GRANULOCYTES # BLD AUTO: 0 X10E3/UL (ref 0–0.1)
IMM GRANULOCYTES NFR BLD AUTO: 0 %
INTERPRETATION, 910389: NORMAL
INTERPRETATION: NORMAL
IRON SATN MFR SERPL: 33 % (ref 15–55)
IRON SERPL-MCNC: 74 UG/DL (ref 27–159)
LDLC SERPL CALC-MCNC: 52 MG/DL (ref 0–99)
LYMPHOCYTES # BLD AUTO: 1.2 X10E3/UL (ref 0.7–3.1)
LYMPHOCYTES NFR BLD AUTO: 20 %
MCH RBC QN AUTO: 30.6 PG (ref 26.6–33)
MCHC RBC AUTO-ENTMCNC: 34.3 G/DL (ref 31.5–35.7)
MCV RBC AUTO: 89 FL (ref 79–97)
MONOCYTES # BLD AUTO: 0.4 X10E3/UL (ref 0.1–0.9)
MONOCYTES NFR BLD AUTO: 6 %
NEUTROPHILS # BLD AUTO: 4.3 X10E3/UL (ref 1.4–7)
NEUTROPHILS NFR BLD AUTO: 69 %
PDF IMAGE, 910387: NORMAL
PLATELET # BLD AUTO: 221 X10E3/UL (ref 150–450)
POTASSIUM SERPL-SCNC: 4.2 MMOL/L (ref 3.5–5.2)
PROT SERPL-MCNC: 7.2 G/DL (ref 6–8.5)
RBC # BLD AUTO: 4.25 X10E6/UL (ref 3.77–5.28)
SODIUM SERPL-SCNC: 140 MMOL/L (ref 134–144)
TIBC SERPL-MCNC: 227 UG/DL (ref 250–450)
TRIGL SERPL-MCNC: 151 MG/DL (ref 0–149)
TSH SERPL DL<=0.005 MIU/L-ACNC: 1.54 UIU/ML (ref 0.45–4.5)
UIBC SERPL-MCNC: 153 UG/DL (ref 131–425)
VIT B12 SERPL-MCNC: 676 PG/ML (ref 232–1245)
VLDLC SERPL CALC-MCNC: 30 MG/DL (ref 5–40)
WBC # BLD AUTO: 6.2 X10E3/UL (ref 3.4–10.8)

## 2019-10-22 ENCOUNTER — OFFICE VISIT (OUTPATIENT)
Dept: FAMILY MEDICINE CLINIC | Age: 39
End: 2019-10-22

## 2019-10-22 VITALS
DIASTOLIC BLOOD PRESSURE: 77 MMHG | WEIGHT: 158.1 LBS | HEART RATE: 68 BPM | TEMPERATURE: 99.8 F | BODY MASS INDEX: 29.85 KG/M2 | SYSTOLIC BLOOD PRESSURE: 112 MMHG | HEIGHT: 61 IN | OXYGEN SATURATION: 98 % | RESPIRATION RATE: 19 BRPM

## 2019-10-22 DIAGNOSIS — M32.14 SYSTEMIC LUPUS ERYTHEMATOSUS WITH GLOMERULAR DISEASE, UNSPECIFIED SLE TYPE (HCC): ICD-10-CM

## 2019-10-22 DIAGNOSIS — L85.3 XEROSIS OF SKIN: ICD-10-CM

## 2019-10-22 DIAGNOSIS — R63.4 WEIGHT LOSS: ICD-10-CM

## 2019-10-22 DIAGNOSIS — K90.0 CELIAC DISEASE: ICD-10-CM

## 2019-10-22 DIAGNOSIS — R79.9 ABNORMAL FINDING OF BLOOD CHEMISTRY: ICD-10-CM

## 2019-10-22 DIAGNOSIS — Z00.00 ANNUAL PHYSICAL EXAM: Primary | ICD-10-CM

## 2019-10-22 DIAGNOSIS — E66.3 OVERWEIGHT (BMI 25.0-29.9): ICD-10-CM

## 2019-10-22 DIAGNOSIS — E55.9 VITAMIN D DEFICIENCY: ICD-10-CM

## 2019-10-22 DIAGNOSIS — I15.1 HYPERTENSION SECONDARY TO OTHER RENAL DISORDERS: ICD-10-CM

## 2019-10-22 DIAGNOSIS — N28.89 HYPERTENSION SECONDARY TO OTHER RENAL DISORDERS: ICD-10-CM

## 2019-10-22 DIAGNOSIS — E78.00 HYPERCHOLESTEROLEMIA: ICD-10-CM

## 2019-10-22 DIAGNOSIS — N04.9 NEPHROTIC SYNDROME: ICD-10-CM

## 2019-10-22 DIAGNOSIS — M32.14 LUPUS NEPHRITIS (HCC): ICD-10-CM

## 2019-10-22 DIAGNOSIS — Z76.89 ENCOUNTER FOR WEIGHT MANAGEMENT: ICD-10-CM

## 2019-10-22 PROBLEM — E66.09 CLASS 1 OBESITY DUE TO EXCESS CALORIES WITH BODY MASS INDEX (BMI) OF 33.0 TO 33.9 IN ADULT: Status: RESOLVED | Noted: 2018-10-19 | Resolved: 2019-10-22

## 2019-10-22 PROBLEM — E66.9 OBESITY (BMI 30.0-34.9): Status: RESOLVED | Noted: 2019-06-19 | Resolved: 2019-10-22

## 2019-10-22 RX ORDER — PHENDIMETRAZINE TARTRATE 105 MG/1
105 CAPSULE, EXTENDED RELEASE ORAL DAILY
Qty: 30 CAP | Refills: 0 | Status: SHIPPED | OUTPATIENT
Start: 2019-10-22 | End: 2019-12-04 | Stop reason: SDUPTHER

## 2019-10-22 RX ORDER — PILOCARPINE HYDROCHLORIDE 5 MG/1
5 TABLET, FILM COATED ORAL DAILY
Qty: 30 TAB | Refills: 1 | Status: SHIPPED | OUTPATIENT
Start: 2019-10-22 | End: 2019-11-13 | Stop reason: SDUPTHER

## 2019-10-22 NOTE — PROGRESS NOTES
Reviewed with the patient today in the office on 10/22/2019: Your lab results have been reviewed and are as follows:    Urine Test:  Your urine analysis is normal for you. Cholesterol Panel: Much better! Total Cholesterol is 121 (goal <200). Triglycerides are 151. (goal <150)  Your HDL or \"good\" cholesterol is 39. (goal >40). Your LDL or \"bad\" cholesterol is 52.  (goal <100). CMP:  Fasting blood sugar is normal at 83, your hemoglobin A1C is 5.1. You do not have diabetes. Kidney function is better than 1 year ago. Your electrolytes are normal.   Your liver function is normal.     Thyroid:  Your thyroid function is normal.    Vitamin D is normal. Try to get 10-15 minutes of sunlight daily without burning and try to eat foods such as low fat dairy which is Vitamin D fortified. CBC:  Your red blood cell count is normal.   Your white blood cell count is normal.   Your platelet count is normal.   You are not anemic and your hemoglobin is 13.0. Your vitamin B12 is normal.  Your Iron levels are normal, iron stores have improved. Your Ferritin is normal.    Lyssa Chung, MSN, MHA, FNP-BC

## 2019-10-22 NOTE — PROGRESS NOTES
Henry Ochoa  Identified pt with two pt identifiers(name and ). Chief Complaint   Patient presents with    Weight Management     Room 6       1. Have you been to the ER, urgent care clinic since your last visit? Y [address] Hospitalized since your last visit? N    2. Have you seen or consulted any other health care providers outside of the [address] [place] [address] since your last visit? Include any pap smears or colon screening. N      Advance Care Planning    In the event something were to happen to you and you were unable to speak on your behalf, do you have an Advance Directive/ Living Will in place stating your wishes? NO    If yes, do we have a copy on file NO    If no, would you like information NO    Medication reconciliation up to date and corrected with patient at this time. Today's provider has been notified of reason for visit, vitals and flowsheets obtained on patients. Reviewed record in preparation for visit, huddled with provider and have obtained necessary documentation. There are no preventive care reminders to display for this patient.     Wt Readings from Last 3 Encounters:   10/22/19 158 lb 1.6 oz (71.7 kg)   19 159 lb (72.1 kg)   19 161 lb 8 oz (73.3 kg)     Temp Readings from Last 3 Encounters:   10/22/19 99.8 °F (37.7 °C) (Oral)   19 97.9 °F (36.6 °C)   19 98.8 °F (37.1 °C) (Oral)     BP Readings from Last 3 Encounters:   10/22/19 112/77   19 122/80   19 103/76     Pulse Readings from Last 3 Encounters:   10/22/19 68   19 68   19 70     Vitals:    10/22/19 1026   BP: 112/77   Pulse: 68   Resp: 19   Temp: 99.8 °F (37.7 °C)   TempSrc: Oral   SpO2: 98%   Weight: 158 lb 1.6 oz (71.7 kg)   Height: 5' 1\" (1.549 m)   PainSc:   0 - No pain         Learning Assessment:  :     Learning Assessment 2019   PRIMARY LEARNER Patient Patient Patient   HIGHEST LEVEL OF EDUCATION - PRIMARY LEARNER  GRADUATED HIGH SCHOOL OR GED GRADUATED HIGH SCHOOL OR GED GRADUATED HIGH SCHOOL OR GED   BARRIERS PRIMARY LEARNER NONE NONE NONE   CO-LEARNER CAREGIVER No No No   PRIMARY LANGUAGE ENGLISH ENGLISH ENGLISH    NEED - No -   LEARNER PREFERENCE PRIMARY DEMONSTRATION LISTENING PICTURES     - READING -   LEARNING SPECIAL TOPICS - none -   ANSWERED BY patient patient patient   RELATIONSHIP SELF SELF SELF       Depression Screening:  :     3 most recent PHQ Screens 4/10/2019   Little interest or pleasure in doing things Several days   Feeling down, depressed, irritable, or hopeless Several days   Total Score PHQ 2 2   Trouble falling or staying asleep, or sleeping too much Not at all   Feeling tired or having little energy Several days   Poor appetite, weight loss, or overeating Not at all   Feeling bad about yourself - or that you are a failure or have let yourself or your family down More than half the days   Trouble concentrating on things such as school, work, reading, or watching TV Several days   Moving or speaking so slowly that other people could have noticed; or the opposite being so fidgety that others notice Not at all   Thoughts of being better off dead, or hurting yourself in some way Several days   PHQ 9 Score 7       No flowsheet data found. Fall Risk Assessment:  :     Fall Risk Assessment, last 12 mths 4/10/2019   Able to walk? Yes   Fall in past 12 months? No       Abuse Screening:  :     Abuse Screening Questionnaire 4/10/2019   Do you ever feel afraid of your partner? N   Are you in a relationship with someone who physically or mentally threatens you? N   Is it safe for you to go home?  Y       ADL Screening:  :     ADL Assessment 5/17/2019   Feeding yourself No Help Needed   Getting from bed to chair No Help Needed   Getting dressed No Help Needed   Bathing or showering No Help Needed   Walk across the room (includes cane/walker) No Help Needed   Using the telphone No Help Needed   Taking your medications No Help Needed   Preparing meals No Help Needed   Managing money (expenses/bills) No Help Needed   Moderately strenuous housework (laundry) No Help Needed   Shopping for personal items (toiletries/medicines) No Help Needed   Shopping for groceries No Help Needed   Driving No Help Needed   Climbing a flight of stairs No Help Needed   Getting to places beyond walking distances No Help Needed           BMI:  Weight Metrics 10/22/2019 10/22/2019 9/28/2019 9/24/2019 9/24/2019 8/16/2019 8/16/2019   Weight - 158 lb 1.6 oz 159 lb - 161 lb 8 oz - 166 lb   Neck Circ (inches) - - - - - - -   Waist Measure Inches 34.5 - - 34.5 - 34 -   Body Fat % 34.6 - - 33 - 33.9 -   BMI - 29.87 kg/m2 30.04 kg/m2 - 30.52 kg/m2 - 31.37 kg/m2     BMI 29.8  BMW47. 1  BMR 1322      Medication reconciliation up to date and corrected with patient at this time.

## 2019-10-22 NOTE — PATIENT INSTRUCTIONS
Reviewed with the patient today in the office on 10/22/2019: Your lab results have been reviewed and are as follows:    Urine Test:  Your urine analysis is normal for you. Cholesterol Panel: Much better! Total Cholesterol is 121 (goal <200). Triglycerides are 151. (goal <150)  Your HDL or \"good\" cholesterol is 39. (goal >40). Your LDL or \"bad\" cholesterol is 52.  (goal <100). CMP:  Fasting blood sugar is normal at 83, your hemoglobin A1C is 5.1. You do not have diabetes. Kidney function is better than 1 year ago. Your electrolytes are normal.   Your liver function is normal.     Thyroid:  Your thyroid function is normal.    Vitamin D is normal. Try to get 10-15 minutes of sunlight daily without burning and try to eat foods such as low fat dairy which is Vitamin D fortified. CBC:  Your red blood cell count is normal.   Your white blood cell count is normal.   Your platelet count is normal.   You are not anemic and your hemoglobin is 13.0. Your vitamin B12 is normal.  Your Iron levels are normal, iron stores have improved. Your Ferritin is normal.    Lyssa Chung, MSN, MHA, FNP-BC           Dry Skin: Care Instructions  Your Care Instructions  Dry skin is a common problem, especially in areas where the air is very dry. Dry skin can also become a problem as you get older and lose natural oils that keep your skin moist.  A tendency toward dry, itchy skin may run in families. Some problems with the body's defenses (immune system), allergies, or an infection with a fungus may also cause patches of dry skin. An over-the-counter cream may help your dry skin. If your skin problem does not get better with home treatment, your doctor may prescribe ointment. You may need antibiotics if you have a skin infection. Follow-up care is a key part of your treatment and safety. Be sure to make and go to all appointments, and call your doctor if you are having problems.  It's also a good idea to know your test results and keep a list of the medicines you take. How can you care for yourself at home? Showers and baths  · Keep showers and baths short, and use warm or lukewarm water. Don't use hot water. It takes off more of your skin's natural oils. · Use as little soap as you can. Choose a mild soap, such as Dove, Cetaphil, or Neutrogena. Or use a skin cleanser like Aquanil or Cetaphil. · If you are taking a bath, use soap only at the very end. Then rinse off all traces of soap with fresh water. Gently pat your skin dry with a towel. Skin creams and moisturizers  · Apply moisturizer or skin cream right away (within 3 minutes) after a bath or shower. Use a moisturizer at other times too, as often as you need it. · Moisturizing creams are better than lotions. Try brands like CeraVe cream, Cetaphil cream, or Eucerin cream.  Other tips  · When washing clothes, use a small amount of detergent. Don't use fabric softeners or dryer sheets. · For small areas of itchy skin, try an over-the-counter 1% hydrocortisone cream.  · If you have very dry hands, spread petroleum jelly (such as Vaseline) on your hands before bed. Wear thin cotton gloves while you sleep. If your feet are dry, spread Vaseline on them and wear socks while you sleep. When should you call for help? Call your doctor now or seek immediate medical care if:    · You have signs of infection, such as:  ? Pain, warmth, or swelling in the skin. ? Red streaks near a wound in the skin. ? Pus coming from a wound in your skin. ? A fever.    Watch closely for changes in your health, and be sure to contact your doctor if:    · You do not get better as expected. Where can you learn more? Go to http://kodak-dodie.info/. Enter O561 in the search box to learn more about \"Dry Skin: Care Instructions. \"  Current as of: April 1, 2019  Content Version: 12.2  © 4555-2448 [employer].  Care instructions adapted under license by [employer] (which disclaims liability or warranty for this information). If you have questions about a medical condition or this instruction, always ask your healthcare professional. [address] any warranty or liability for your use of this information. Well Visit, Ages 25 to 48: Care Instructions  Your Care Instructions    Physical exams can help you stay healthy. Your doctor has checked your overall health and may have suggested ways to take good care of yourself. He or she also may have recommended tests. At home, you can help prevent illness with healthy eating, regular exercise, and other steps. Follow-up care is a key part of your treatment and safety. Be sure to make and go to all appointments, and call your doctor if you are having problems. It's also a good idea to know your test results and keep a list of the medicines you take. How can you care for yourself at home? · Reach and stay at a healthy weight. This will lower your risk for many problems, such as obesity, diabetes, heart disease, and high blood pressure. · Get at least 30 minutes of physical activity on most days of the week. Walking is a good choice. You also may want to do other activities, such as running, swimming, cycling, or playing tennis or team sports. Discuss any changes in your exercise program with your doctor. · Do not smoke or allow others to smoke around you. If you need help quitting, talk to your doctor about stop-smoking programs and medicines. These can increase your chances of quitting for good. · Talk to your doctor about whether you have any risk factors for sexually transmitted infections (STIs). Having one sex partner (who does not have STIs and does not have sex with anyone else) is a good way to avoid these infections. · Use birth control if you do not want to have children at this time.  Talk with your doctor about the choices available and what might be best for you.  · Protect your skin from too much sun. When you're outdoors from 10 a.m. to 4 p.m., stay in the shade or cover up with clothing and a hat with a wide brim. Wear sunglasses that block UV rays. Even when it's cloudy, put broad-spectrum sunscreen (SPF 30 or higher) on any exposed skin. · See a dentist one or two times a year for checkups and to have your teeth cleaned. · Wear a seat belt in the car. Follow your doctor's advice about when to have certain tests. These tests can spot problems early. For everyone  · Cholesterol. Have the fat (cholesterol) in your blood tested after age 21. Your doctor will tell you how often to have this done based on your age, family history, or other things that can increase your risk for heart disease. · Blood pressure. Have your blood pressure checked during a routine doctor visit. Your doctor will tell you how often to check your blood pressure based on your age, your blood pressure results, and other factors. · Vision. Talk with your doctor about how often to have a glaucoma test.  · Diabetes. Ask your doctor whether you should have tests for diabetes. · Colon cancer. Your risk for colorectal cancer gets higher as you get older. Some experts say that adults should start regular screening at age 48 and stop at age 76. Others say to start before age 48 or continue after age 76. Talk with your doctor about your risk and when to start and stop screening. For women  · Breast exam and mammogram. Talk to your doctor about when you should have a clinical breast exam and a mammogram. Medical experts differ on whether and how often women under 50 should have these tests. Your doctor can help you decide what is right for you. · Cervical cancer screening test and pelvic exam. Begin with a Pap test at age 24. The test often is part of a pelvic exam. Starting at age 27, you may choose to have a Pap test, an HPV test, or both tests at the same time (called co-testing).  Talk with your doctor about how often to have testing. · Tests for sexually transmitted infections (STIs). Ask whether you should have tests for STIs. You may be at risk if you have sex with more than one person, especially if your partners do not wear condoms. For men  · Tests for sexually transmitted infections (STIs). Ask whether you should have tests for STIs. You may be at risk if you have sex with more than one person, especially if you do not wear a condom. · Testicular cancer exam. Ask your doctor whether you should check your testicles regularly. · Prostate exam. Talk to your doctor about whether you should have a blood test (called a PSA test) for prostate cancer. Experts differ on whether and when men should have this test. Some experts suggest it if you are older than 39 and are -American or have a father or brother who got prostate cancer when he was younger than 72. When should you call for help? Watch closely for changes in your health, and be sure to contact your doctor if you have any problems or symptoms that concern you. Where can you learn more? Go to http://kodak-dodie.info/. Enter P072 in the search box to learn more about \"Well Visit, Ages 25 to 48: Care Instructions. \"  Current as of: December 13, 2018  Content Version: 12.2  © 9162-9574 [employer]. Care instructions adapted under license by [employer] (which disclaims liability or warranty for this information). If you have questions about a medical condition or this instruction, always ask your healthcare professional. [place] [address] any warranty or liability for your use of this information.

## 2019-10-22 NOTE — PROGRESS NOTES
Chief Complaint   Patient presents with    Weight Management     Room 6         HPI:  The patient is a 46 y.o. female who presents today for a follow up appointment. No hospital, ER or specialist visits since last primary care visit except as noted below. Weight Mgmt:  The patient is a 41y.o. year old female who presents today for initial medical weight loss visit. Pt's initial weight is 172 pounds with a BMI of 33.59. Patient's goal weight is 130 pounds. The patient's weight today is 158 pounds with a BMI of 29.87. She has lost 3 pounds since her last visit. The patient's bariatric comorbidities include HTN, lupus, nephrotic syndrome, anxiety, depression, arthritis. The patient's reason for medical weight loss is feel better, help with lupus related comorbidities, help with all of her medical problems. The patient's quality of life goals are to be able to do \"a lot of things that she used to do before her diagnosis of lupus at the age of 20\". Pt with hypertension, nephrotic syndrome, SLE, vit d deficiency, hx of thromboembolism, and obesity presents to the office with a BP of 114/80. For BP, she uses Atenolol 50 mg and Losartan 100 mg daily, tolerating well. For cholesterol, she uses Zocor 20 mg daily, tolerating well. For RAD, she uses Warfin 6 mg daily, rx'd by Dr. Tori Davila who she sees regularly. The patient's diet typically includes: B - eggs, coffee (every once in a while), beans, rice, fruit, sometimes skip breakfast; L - chicken, salad, rice, tortilla, steak, soups, S - not really a snacker, D - does not eat much for dinner, small sandwich, fruit, salad. Started seeing a nutritionist with [facility], seen on 8/29/2019, 9/12/2019. Doing well with gluten free diet. She is being followed monthly by Yasmine Lira RD.          The patient is drinking the following: soda (just cut these out, occasionally but not even when out to eat, coffee, mostly water, OJ infrequently, cranberry juice for urinary symptoms - has one glass/day         The patient's exercise at this time includes: walking (tracking her steps on her phone), she has started to increase this with a goal of exercising 3x/week. The patient is currently weighing: \"I try not to\". She feels she is usually in the same range. \"Sometimes but not every day\"         The patient has not tried appetite suppressants in the past, but she has tried some food interventions. She has previously tried the following medications: \"fit teas, honey/cinnnamon teas\", apple cider vinegar. She has tried restrictive diets. She patient has not tried appetite suppressants in the past, but she has tried some food interventions. She has previously tried the following medications: \"fit teas, honey/cinnnamon teas\", apple cider vinegar. She has tried restrictive diets. Started going to the gym 3x/week. She is not drinking soda or juices, mostly water, she is feeling stressed and feels like \"giving up\" because she has not been able to successfully lose weight despite all of her efforts. She drinks 64 oz of water daily. She averages 8 hours of sleep nightly. Sometimes, she has difficulty sleeping through the night and wakes up multiple times a night. Denies caffeine consumption. She was started on Bontril in the fall and she felt that it worked initially but then [place] got used to it\". She was changed to Bontril TID. She does not feel this was helping. She was forgetting to take the lunch hour dose. She was changed back to the XR Bontril at her last visit on 4/10/2019. Her hunger is well controlled in terms of volume but she still reports she is hungry overall. Consider Phentermine once renal function established and Nephrology consult done, no data that shows safety with this medication however.  Patient gave appetite suppressant medications to Dr. Jojo Olivarez for review as follows:    Dr. Christine Woo notes on 5/6/2019:   \"-Belviq: may be ok but not well studied in patients with renal disease. \"Use with caution\" should definitely watch for accumulation of metabolites. I would not take it.  -Qysmia: dose adjustment needed.  -Saxenda: looks okay. -Contrave: dose adjustment needed. -Phentermine: I strongly recommend AGAINST it. Finally, I recommend she see her MD before starting any of these. \"    Patient was prescribed Saxenda on 5/17/2019, this is the safest anti-obesity medication for this patient based on her medical history and current BMI. However her insurance will not cover this medication and therefore she was started on a more affordable out of pocket alternative, Contrave. She developed HAs, N/V on 6/12/2019 and stopped the medication at that time. Since then she has waited to restart the Contrave. She reports she started the Contrave again, she took it for about 3 weeks. She felt like she was eating more, was not effective. She reports constant headaches and made her \"feel awful\". She states the Bontril helped much better with her appetite. Her frustration has also been that she has felt somewhat down and eating for stress somewhat. Restarted Bontril at her last visit on 8/16/2019. She has done well with this, she reports her hunger is well controlled. She reports it is \"a little bit less effective\" but it is still helping her to maintain her diet protocol. She denies any adverse effects from this and would like to continue with this at this time.       Weight Metrics 10/22/2019 10/22/2019 9/28/2019 9/24/2019 9/24/2019 8/16/2019 8/16/2019   Weight - 158 lb 1.6 oz 159 lb - 161 lb 8 oz - 166 lb   Neck Circ (inches) - - - - - - -   Waist Measure Inches 34.5 - - 34.5 - 34 -   Body Fat % 34.6 - - 33 - 33.9 -   BMI - 29.87 kg/m2 30.04 kg/m2 - 30.52 kg/m2 - 31.37 kg/m2     Overall dryness:  Dryness noted by the patient, cause likely lupus, will evaluate after lab results, consider systemic medication to help with overall dryness (scalp dry/itchy, vaginal itching, dryness of face and arms). Labs reviewed in the office, patient would like to proceed with a systemic medication. Labs:  Reviewed with the patient today in the office on 10/22/2019: Your lab results have been reviewed and are as follows:    Urine Test:  Your urine analysis is normal for you. Cholesterol Panel: Much better! Total Cholesterol is 121 (goal <200). Triglycerides are 151. (goal <150)  Your HDL or \"good\" cholesterol is 39. (goal >40). Your LDL or \"bad\" cholesterol is 52.  (goal <100). CMP:  Fasting blood sugar is normal at 83, your hemoglobin A1C is 5.1. You do not have diabetes. Kidney function is better than 1 year ago. Your electrolytes are normal.   Your liver function is normal.     Thyroid:  Your thyroid function is normal.    Vitamin D is normal. Try to get 10-15 minutes of sunlight daily without burning and try to eat foods such as low fat dairy which is Vitamin D fortified. CBC:  Your red blood cell count is normal.   Your white blood cell count is normal.   Your platelet count is normal.   You are not anemic and your hemoglobin is 13.0. Your vitamin B12 is normal.  Your Iron levels are normal, iron stores have improved.   Your Ferritin is normal.    Orders Only on 09/25/2019   Component Date Value Ref Range Status    Specific Gravity 10/17/2019 1.021  1.005 - 1.030 Final    pH (UA) 10/17/2019 6.0  5.0 - 7.5 Final    Color 10/17/2019 Yellow  Yellow Final    Appearance 10/17/2019 Clear  Clear Final    Leukocyte Esterase 10/17/2019 Negative  Negative Final    Protein 10/17/2019 3+* Negative/Trace Final    Glucose 10/17/2019 Negative  Negative Final    Ketone 10/17/2019 Negative  Negative Final    Blood 10/17/2019 1+* Negative Final    Bilirubin 10/17/2019 Negative  Negative Final    Urobilinogen 10/17/2019 0.2  0.2 - 1.0 mg/dL Final    Nitrites 10/17/2019 Negative  Negative Final    Microscopic Examination 10/17/2019 See additional order Final    Microscopic was indicated and was performed.  Cholesterol, total 10/17/2019 121  100 - 199 mg/dL Final    Triglyceride 10/17/2019 151* 0 - 149 mg/dL Final    HDL Cholesterol 10/17/2019 39* >39 mg/dL Final    VLDL, calculated 10/17/2019 30  5 - 40 mg/dL Final    LDL, calculated 10/17/2019 52  0 - 99 mg/dL Final    Glucose 10/17/2019 83  65 - 99 mg/dL Final    BUN 10/17/2019 18  6 - 20 mg/dL Final    Creatinine 10/17/2019 1.50* 0.57 - 1.00 mg/dL Final    GFR est non-AA 10/17/2019 44* >59 mL/min/1.73 Final    GFR est AA 10/17/2019 51* >59 mL/min/1.73 Final    BUN/Creatinine ratio 10/17/2019 12  9 - 23 Final    Sodium 10/17/2019 140  134 - 144 mmol/L Final    Potassium 10/17/2019 4.2  3.5 - 5.2 mmol/L Final    Chloride 10/17/2019 103  96 - 106 mmol/L Final    CO2 10/17/2019 24  20 - 29 mmol/L Final    Calcium 10/17/2019 9.7  8.7 - 10.2 mg/dL Final    Protein, total 10/17/2019 7.2  6.0 - 8.5 g/dL Final    Albumin 10/17/2019 4.3  3.5 - 5.5 g/dL Final    GLOBULIN, TOTAL 10/17/2019 2.9  1.5 - 4.5 g/dL Final    A-G Ratio 10/17/2019 1.5  1.2 - 2.2 Final    Bilirubin, total 10/17/2019 0.8  0.0 - 1.2 mg/dL Final    Alk. phosphatase 10/17/2019 57  39 - 117 IU/L Final    AST (SGOT) 10/17/2019 14  0 - 40 IU/L Final    ALT (SGPT) 10/17/2019 13  0 - 32 IU/L Final    TSH 10/17/2019 1.540  0.450 - 4.500 uIU/mL Final    VITAMIN D, 25-HYDROXY 10/17/2019 70.4  30.0 - 100.0 ng/mL Final    Comment: Vitamin D deficiency has been defined by the [address] practice guideline as a  level of serum 25-OH vitamin D less than 20 ng/mL (1,2). The Endocrine Society went on to further define vitamin D  insufficiency as a level between 21 and 29 ng/mL (2). 1. IOM ([place] of [employer]). 2010. Dietary reference     intakes for calcium and D. [facility]: The     [employer].   2. Ira DEL CID, Chintan OLIVER, Rosemary HICKS, et al.     Evaluation, treatment, and prevention of vitamin D     deficiency: an Endocrine Society clinical practice     guideline. JCEM. 2011 Jul; 96(8):1391-30.  Hemoglobin A1c 10/17/2019 5.1  4.8 - 5.6 % Final    Comment:          Prediabetes: 5.7 - 6.4           Diabetes: >6.4           Glycemic control for adults with diabetes: <7.0      Estimated average glucose 10/17/2019 100  mg/dL Final    WBC 10/17/2019 6.2  3.4 - 10.8 x10E3/uL Final    RBC 10/17/2019 4.25  3.77 - 5.28 x10E6/uL Final    HGB 10/17/2019 13.0  11.1 - 15.9 g/dL Final    HCT 10/17/2019 37.9  34.0 - 46.6 % Final    MCV 10/17/2019 89  79 - 97 fL Final    MCH 10/17/2019 30.6  26.6 - 33.0 pg Final    MCHC 10/17/2019 34.3  31.5 - 35.7 g/dL Final    RDW 10/17/2019 12.7  12.3 - 15.4 % Final    PLATELET 55/01/0415 847  150 - 450 x10E3/uL Final    NEUTROPHILS 10/17/2019 69  Not Estab. % Final    Lymphocytes 10/17/2019 20  Not Estab. % Final    MONOCYTES 10/17/2019 6  Not Estab. % Final    EOSINOPHILS 10/17/2019 4  Not Estab. % Final    BASOPHILS 10/17/2019 1  Not Estab. % Final    ABS. NEUTROPHILS 10/17/2019 4.3  1.4 - 7.0 x10E3/uL Final    Abs Lymphocytes 10/17/2019 1.2  0.7 - 3.1 x10E3/uL Final    ABS. MONOCYTES 10/17/2019 0.4  0.1 - 0.9 x10E3/uL Final    ABS. EOSINOPHILS 10/17/2019 0.2  0.0 - 0.4 x10E3/uL Final    ABS. BASOPHILS 10/17/2019 0.1  0.0 - 0.2 x10E3/uL Final    IMMATURE GRANULOCYTES 10/17/2019 0  Not Estab. % Final    ABS. IMM. GRANS.  10/17/2019 0.0  0.0 - 0.1 x10E3/uL Final    Vitamin B12 10/17/2019 676  232 - 1,245 pg/mL Final    TIBC 10/17/2019 227* 250 - 450 ug/dL Final    UIBC 10/17/2019 153  131 - 425 ug/dL Final    Iron 10/17/2019 74  27 - 159 ug/dL Final    Iron % saturation 10/17/2019 33  15 - 55 % Final    Ferritin 10/17/2019 154* 15 - 150 ng/mL Final    WBC 10/17/2019 0-5  0 - 5 /hpf Final    RBC 10/17/2019 3-10* 0 - 2 /hpf Final    Epithelial cells 10/17/2019 >10* 0 - 10 /hpf Final    Casts 10/17/2019 Present* None seen /lpf Final    Cast type 10/17/2019 Hyaline casts  N/A Final    Mucus 10/17/2019 Present  Not Estab. Final    Bacteria 10/17/2019 Few  None seen/Few Final    INTERPRETATION 10/17/2019 Note   Final    Supplemental report is available.  PDF IMAGE 80/33/5381 Not applicable   Final    Interpretation 10/17/2019 Note   Final    Supplemental report is available. Review of Systems  A comprehensive review of systems was negative except for that written in the HPI. Patient Active Problem List   Diagnosis Code    Systemic lupus erythematosus ([facility]) M32.9    Hypertension I10    Nephrotic syndrome N04.9    Long-term use of immunosuppressant medication Z79.899    Current use of steroid medication Z79.52    Lupus nephritis ([facility]) M32.14    Hypermobility syndrome M35.7    Branch retinal artery occlusion H34.239    Depression, major, recurrent ([facility]) F33.9    Anxiety disorder F41.9    Diplopia H53.2    Carpal instability of left wrist with volar intercalated segment instability M25.332    Carpal instability of right wrist M25.331    Raynaud's syndrome I73.00    Acute blood loss anemia D62    History of thromboembolism Z86.718    Fatigue R53.83    Hypercholesterolemia E78.00    Vitamin D deficiency E55.9    Thrombocytopenia ([facility])  D69.6    SAMARA (generalized anxiety disorder) F41.1    Nonintractable episodic headache R51    Celiac disease K90.0    Encounter for weight management Z76.89    Insomnia G47.00    Seasonal allergic rhinitis J30.2    Xerosis of skin L85.3    Overweight (BMI 25.0-29. 9) E66.3    Weight loss R63.4       Past Medical History:   Diagnosis Date    Anemia associated with acute blood loss 06/2018    blood transfusion x 2. Txd w iron. Dr. Arline Grace.  Arthritis     Branch retinal artery occlusion approx 2013    Right, as of 5/26/16 resolved and on Coumadin.  Celiac disease 2012    Dr. Tanya West.     Chronic renal insufficiency, stage II (mild)     secondary to lupus nephritis:  Dr. Mallika Camacho    Depression, major, recurrent ([facility] [address] 03/10/2014    Reji Akins [facility].  Diffuse proliferative lupus glomerulonephritis (HCC)     Dr Mirza Cherry Diplopia     Dr. Leonel Castro.  Dizziness 2014    Dr. Dawood Swartz, neuro.  Elevated cortisol level (HCC)     Fatigue     intermittent due to lupus    SAMARA (generalized anxiety disorder) 03/10/2014    DIGNA Gonzalez    Gallstones     Dr. Lorene Christie Gastritis     as of 16 asymptomatic    Headache     Dr. Pérez Filter Hypertension 2000s    due to CKD. Dr. Laura Jordan    Left tibialis posterior tendonitis     due to injury on sand. Dr. Edgar Bradshaw.  Long term current use of anticoagulant therapy     Ovarian cyst 2016    Dr. Clayton Torrez Pregnancy induced hypertension     Proteinuria     Dr. Mirza Cherry PUD (peptic ulcer disease)     Dr. Melissa Cooney syndrome     Dr. Trinh Magdaleno. Dr. Jose Manuel Forbes.  Sicca syndrome (HCC)     Dr. Trinh Magdaleno.  Systemic lupus erythematosus with glomerular disease ([facility] [address]     as of 16 \"aching and swelling of joints\" per patient. Dr Gurdeep Juarez. Dr. Raúl Recinos. Past Surgical History:   Procedure Laterality Date    HX ACL RECONSTRUCTION Left approx     Dr. Cecelia Aguirre.  HX  SECTION      HX ENDOSCOPY  2012, 2017    Dr. Jesica Pena.  HX LAP CHOLECYSTECTOMY  2017    due to gallstones. Dr. Denise Gomez Right 10/22/2014    hand to thumb due to thumb dislocation. Dr. Guadalupe Oliver.  HX OTHER SURGICAL  2012    kidney biopsy; benign    HX OTHER SURGICAL Right 10/22/2014    right Posterior Interosseous Nerve Neurectomy, right Anterior Interosseous Nerve Neurectomy. Dr. Flavio Barros HX RENAL BIOPSY  2018    CT Guided Renal Biopsy with Conscious Sedation. due to CKD/SLE.  HX SEPTOPLASTY  2016    w inf turbinate resection. due to turbinate hypertrophy.   Dr. Modesto Vega Cross       Social History     Tobacco Use    Smoking status: Never Smoker    Smokeless tobacco: Never Used   Substance Use Topics    Alcohol use: No    Drug use: No       Family History   Problem Relation Age of Onset    Glaucoma Father     Hypertension Father            Current Outpatient Medications on File Prior to Visit   Medication Sig Dispense Refill    methocarbamol (ROBAXIN) 500 mg tablet Take 1 Tab by mouth nightly as needed for Pain. 7 Tab 0    fluticasone propionate (FLONASE) 50 mcg/actuation nasal spray 2 Sprays by Both Nostrils route daily. 1 Bottle 5    levonorgestrel (MIRENA) 20 mcg/24 hours (5 yrs) 52 mg IUD Mirena 20 mcg/24 hours (5 yrs) 52 mg intrauterine device   remove 9/8/21      ergocalciferol (ERGOCALCIFEROL) 50,000 unit capsule TAKE ONE CAPSULE BY MOUTH ONCE A WEEK  0    NIFEdipine ER (PROCARDIA XL) 30 mg ER tablet       warfarin (COUMADIN) 6 mg tablet Take 4 mg by mouth daily. M, W, F, Sa, Damian is 6mg once daily; Tu, Th is 4mg once daily      acetaminophen (ACETAMINOPHEN EXTRA STRENGTH) 500 mg tablet Take 1 Tab by mouth every six (6) hours as needed for Pain. 30 Tab 0    hydroxychloroquine (PLAQUENIL) 200 mg tablet Take 1 tab po qam and 1/2 tab po qpm 45 Tab 9    simvastatin (ZOCOR) 20 mg tablet TAKE 1 TABLET BY MOUTH ONCE DAILY  3    losartan (COZAAR) 100 mg tablet Take 50 mg by mouth nightly.  atenolol (TENORMIN) 50 mg tablet Take  by mouth nightly.  mycophenolate (CELLCEPT) 500 mg tablet Take 1,500 mg by mouth two (2) times a day.  [DISCONTINUED] phendimetrazine tartrate 105 mg cpER Take 105 mg by mouth daily. Max Daily Amount: 105 mg. 30 Cap 0     No current facility-administered medications on file prior to visit.         Allergies   Allergen Reactions    Gluten Diarrhea    Fiorinal-Codeine #3 [Codeine-Butalbital-Asa-Caff] Nausea Only       PE:  Visit Vitals  /77 (BP 1 Location: Left arm, BP Patient Position: Sitting)   Pulse 68   Temp 99.8 °F (37.7 °C) (Oral)   Resp 19   Ht 5' 1\" (1.549 m)   Wt 158 lb 1.6 oz (71.7 kg)   SpO2 98%   BMI 29.87 kg/m²       Gen: alert, oriented, no acute distress  Head: normocephalic, atraumatic  Ears: external auditory canals clear, TMs without erythema or effusion  Eyes: pupils equal round reactive to light, sclera clear, conjunctiva clear  Oral: moist mucus membranes, no oral lesions, no pharyngeal inflammation or exudate  Neck: symmetric normal sized thyroid, no carotid bruits, no jugular vein distention  Resp: no increase work of breathing, lungs clear to ausculation bilaterally, no wheezing, rales or rhonchi  CV: S1, S2 normal.  No murmurs, rubs, or gallops. Abd: soft, not tender, not distended. No hepatosplenomegaly. Normal bowel sounds. No hernias. No abdominal or renal bruits. Neuro: cranial nerves intact, normal strength and movement in all extremities, reflexes and sensation intact and symmetric. Skin: no lesion or rash  Extremities: no cyanosis or edema    No results found for this visit on 10/22/19. Assessment/Plan:    ICD-10-CM ICD-9-CM    1. Annual physical exam Z00.00 V70.0    2. Xerosis of skin L85.3 706.8 pilocarpine (SALAGEN, PILOCARPINE,) 5 mg tablet   3. Lupus nephritis (HCC) M32.14 710.0      583.81    4. Systemic lupus erythematosus with glomerular disease, unspecified SLE type ([facility] M32.14 710.0      583.81    5. Encounter for weight management Z76.89 V65.49 phendimetrazine tartrate 105 mg cpER   6. Overweight (BMI 25.0-29. 9) E66.3 278.02 phendimetrazine tartrate 105 mg cpER   7. Hypercholesterolemia E78.00 272.0    8. Vitamin D deficiency E55.9 268.9    9. Hypertension secondary to other renal disorders I15.1 405.91     N28.89 593.89    10. Celiac disease K90.0 579.0    11. Nephrotic syndrome N04.9 581.9    12. Weight loss R63.4 783. 21 phendimetrazine tartrate 105 mg cpER   13. Abnormal finding of blood chemistry  R79.9 790.6      Diagnoses and all orders for this visit:    1.  Annual physical exam    2. Xerosis of skin  -     pilocarpine (SALAGEN, PILOCARPINE,) 5 mg tablet; Take 1 Tab by mouth daily. 3. Lupus nephritis ([facility] [address]    4. Systemic lupus erythematosus with glomerular disease, unspecified SLE type ([facility] [address]    5. Encounter for weight management  -     phendimetrazine tartrate 105 mg cpER; Take 105 mg by mouth daily. Max Daily Amount: 105 mg.    6. Overweight (BMI 25.0-29.9)  -     phendimetrazine tartrate 105 mg cpER; Take 105 mg by mouth daily. Max Daily Amount: 105 mg.    7. Hypercholesterolemia    8. Vitamin D deficiency    9. Hypertension secondary to other renal disorders    10. Celiac disease    11. Nephrotic syndrome    12. Weight loss  -     phendimetrazine tartrate 105 mg cpER; Take 105 mg by mouth daily. Max Daily Amount: 105 mg. 13. Abnormal finding of blood chemistry       Follow-up and Dispositions    · Return in about 4 weeks (around 11/19/2019) for Medication Check, Weight Mgmt, Bontril F/U.       lab results and schedule of future lab studies reviewed with patient  reviewed diet, exercise and weight control  reviewed medications and side effects in detail     lose weight, increase physical activity, continue present plan, call if any problems     Health Maintenance reviewed - reviewed, HPV #2 given today in the office, HPV #3 due in 2/2020, flu vaccine given today in the office, otherwise UTD.     lose weight, increase physical activity, continue present plan, call if any problems    Health Maintenance reviewed - reviewed, UTD. Recommended healthy diet low in carbohydrates, fats, sodium and cholesterol. Recommended regular cardiovascular exercise 3-6 times per week for 30-60 minutes daily. Chart is reviewed and updated today in the office. Records requested for other providers patient has seen and is currently seeing. Patient was offered a choice/choices in the treatment plan today. Patient expresses understanding of the plan and agrees with recommendations.   Verbal and written instructions (see AVS) provided. See patient instructions for more. Patient expresses understanding of diagnosis and treatment plan.

## 2019-11-07 ENCOUNTER — APPOINTMENT (OUTPATIENT)
Dept: NUTRITION | Age: 39
End: 2019-11-07

## 2019-11-13 DIAGNOSIS — L85.3 XEROSIS OF SKIN: ICD-10-CM

## 2019-11-13 RX ORDER — PILOCARPINE HYDROCHLORIDE 5 MG/1
TABLET, FILM COATED ORAL
Qty: 30 TAB | Refills: 1 | Status: SHIPPED | OUTPATIENT
Start: 2019-11-13 | End: 2019-12-10 | Stop reason: SDUPTHER

## 2019-12-04 ENCOUNTER — OFFICE VISIT (OUTPATIENT)
Dept: FAMILY MEDICINE CLINIC | Age: 39
End: 2019-12-04

## 2019-12-04 VITALS
HEIGHT: 60 IN | RESPIRATION RATE: 18 BRPM | OXYGEN SATURATION: 100 % | DIASTOLIC BLOOD PRESSURE: 80 MMHG | BODY MASS INDEX: 30.73 KG/M2 | WEIGHT: 156.5 LBS | HEART RATE: 66 BPM | SYSTOLIC BLOOD PRESSURE: 118 MMHG | TEMPERATURE: 98.3 F

## 2019-12-04 DIAGNOSIS — M54.50 ACUTE MIDLINE LOW BACK PAIN WITHOUT SCIATICA: Primary | ICD-10-CM

## 2019-12-04 DIAGNOSIS — Z76.89 ENCOUNTER FOR WEIGHT MANAGEMENT: ICD-10-CM

## 2019-12-04 DIAGNOSIS — E66.3 OVERWEIGHT (BMI 25.0-29.9): ICD-10-CM

## 2019-12-04 DIAGNOSIS — M19.90 ARTHRITIS: ICD-10-CM

## 2019-12-04 DIAGNOSIS — M79.642 PAIN IN BOTH HANDS: ICD-10-CM

## 2019-12-04 DIAGNOSIS — R63.4 WEIGHT LOSS: ICD-10-CM

## 2019-12-04 DIAGNOSIS — M79.641 PAIN IN BOTH HANDS: ICD-10-CM

## 2019-12-04 RX ORDER — PHENDIMETRAZINE TARTRATE 105 MG/1
105 CAPSULE, EXTENDED RELEASE ORAL DAILY
Qty: 30 CAP | Refills: 0 | Status: SHIPPED | OUTPATIENT
Start: 2019-12-04 | End: 2020-01-07 | Stop reason: SDUPTHER

## 2019-12-04 RX ORDER — PANTOPRAZOLE SODIUM 40 MG/1
TABLET, DELAYED RELEASE ORAL
Refills: 3 | COMMUNITY
Start: 2019-11-04

## 2019-12-04 RX ORDER — DICLOFENAC SODIUM 10 MG/G
4 GEL TOPICAL 4 TIMES DAILY
Qty: 100 G | Refills: 1 | Status: SHIPPED | OUTPATIENT
Start: 2019-12-04 | End: 2020-01-07 | Stop reason: SDUPTHER

## 2019-12-04 RX ORDER — LINACLOTIDE 72 UG/1
CAPSULE, GELATIN COATED ORAL
Refills: 3 | COMMUNITY
Start: 2019-11-04

## 2019-12-04 NOTE — PROGRESS NOTES
Chief Complaint   Patient presents with    Weight Management         HPI:  The patient is a 46 y.o. female who presents today for a follow up appointment. No hospital, ER or specialist visits since last primary care visit except as noted below. Weight Mgmt:  The patient is a 41y.o. year old female who presents today for initial medical weight loss visit. Pt's initial weight is 172 pounds with a BMI of 33.59. Patient's goal weight is 130 pounds. The patient's weight today is 156 pounds with a BMI of 30.60. She has lost 2 pounds since her last visit. The patient's bariatric comorbidities include HTN, lupus, nephrotic syndrome, anxiety, depression, arthritis. The patient's reason for medical weight loss is feel better, help with lupus related comorbidities, help with all of her medical problems. The patient's quality of life goals are to be able to do \"a lot of things that she used to do before her diagnosis of lupus at the age of 20\". Pt with hypertension, nephrotic syndrome, SLE, vit d deficiency, hx of thromboembolism, and obesity presents to the office with a BP of 114/80. For BP, she uses Atenolol 50 mg and Losartan 100 mg daily, tolerating well. For cholesterol, she uses Zocor 20 mg daily, tolerating well. For RAD, she uses Warfin 6 mg daily, rx'd by Dr. Shani Jalloh who she sees regularly. The patient's diet typically includes: B - eggs, coffee (every once in a while), beans, rice, fruit, sometimes skip breakfast; L - chicken, salad, rice, tortilla, steak, soups, S - not really a snacker, D - does not eat much for dinner, small sandwich, fruit, salad. Started seeing a nutritionist with [facility], seen on 8/29/2019, 9/12/2019. Doing well with gluten free diet. She is being followed monthly by Hua Bianchi RD.          The patient is drinking the following: soda (just cut these out, occasionally but not even when out to eat, coffee, mostly water, OJ infrequently, cranberry juice for urinary symptoms - has one glass/day         The patient's exercise at this time includes: walking (tracking her steps on her phone), she has started to increase this with a goal of exercising 3x/week. The patient is currently weighing: \"I try not to\". She feels she is usually in the same range. \"Sometimes but not every day\"         The patient has not tried appetite suppressants in the past, but she has tried some food interventions. She has previously tried the following medications: \"fit teas, honey/cinnnamon teas\", apple cider vinegar. She has tried restrictive diets. She patient has not tried appetite suppressants in the past, but she has tried some food interventions. She has previously tried the following medications: \"fit teas, honey/cinnnamon teas\", apple cider vinegar. She has tried restrictive diets. Started going to the gym 3x/week. She is not drinking soda or juices, mostly water, she is feeling stressed and feels like \"giving up\" because she has not been able to successfully lose weight despite all of her efforts. She drinks 64 oz of water daily. She averages 8 hours of sleep nightly. Sometimes, she has difficulty sleeping through the night and wakes up multiple times a night. Denies caffeine consumption. She was started on Bontril in the fall and she felt that it worked initially but then [employer] got used to it\". She was changed to Bontril TID. She does not feel this was helping. She was forgetting to take the lunch hour dose. She was changed back to the XR Bontril at her last visit on 4/10/2019. Her hunger is well controlled in terms of volume but she still reports she is hungry overall. Consider Phentermine once renal function established and Nephrology consult done, no data that shows safety with this medication however.  Patient gave appetite suppressant medications to Dr. Chhaya Greer for review as follows:    Dr. Zaria Hart notes on 5/6/2019:   \"-Belviq: may be ok but not well studied in patients with renal disease. \"Use with caution\" should definitely watch for accumulation of metabolites. I would not take it.  -Qysmia: dose adjustment needed.  -Saxenda: looks okay. -Contrave: dose adjustment needed. -Phentermine: I strongly recommend AGAINST it. Finally, I recommend she see her MD before starting any of these. \"    Patient was prescribed Saxenda on 5/17/2019, this is the safest anti-obesity medication for this patient based on her medical history and current BMI. However her insurance will not cover this medication and therefore she was started on a more affordable out of pocket alternative, Contrave. She developed HAs, N/V on 6/12/2019 and stopped the medication at that time. Since then she has waited to restart the Contrave. She reports she started the Contrave again, she took it for about 3 weeks. She felt like she was eating more, was not effective. She reports constant headaches and made her \"feel awful\". She states the Bontril helped much better with her appetite. Her frustration has also been that she has felt somewhat down and eating for stress somewhat. Restarted Bontril at her last visit on 8/16/2019. She has done well with this, she reports her hunger is well controlled. She reports it is \"a little bit less effective\" but it is still helping her to maintain her diet protocol. She denies any adverse effects from this and would like to continue with this at this time. She still feels the Bontril is helping her appetite. She has been out of her medication x 1 week, we had to reschedule her appointment. She denies adverse effects. She is eating lesser portions. She reports her metabolism has improved with the medication. She denies adverse effects still, except some mild intermittent constipation and insomnia. She reports she has had more energy with the medication and has increased her day to day activity.     Weight Metrics 12/4/2019 12/4/2019 10/22/2019 10/22/2019 9/28/2019 9/24/2019 9/24/2019   Weight - 156 lb 8 oz - 158 lb 1.6 oz 159 lb - 161 lb 8 oz   Neck Circ (inches) 14 - - - - - -   Waist Measure Inches 37.5 - 34.5 - - 34.5 -   Body Fat % 34.2 - 34.6 - - 33 -   BMI - 30.6 kg/m2 - 29.87 kg/m2 30.04 kg/m2 - 30.52 kg/m2     LBP:  Patient has a hx of mild intermittent LBP but recently over the past week it has become more constant and more painful. She has not taken anything for this pain. She reports the pain as sharp 3/10 pain, elevates to 5-6/10 pain with standing. Rest improves    Review of Systems  A comprehensive review of systems was negative except for that written in the HPI. Patient Active Problem List   Diagnosis Code    Systemic lupus erythematosus ([facility]) M32.9    Hypertension I10    Nephrotic syndrome N04.9    Long-term use of immunosuppressant medication Z79.899    Current use of steroid medication Z79.52    Lupus nephritis ([facility]) M32.14    Hypermobility syndrome M35.7    Branch retinal artery occlusion H34.239    Depression, major, recurrent ([facility]) F33.9    Anxiety disorder F41.9    Diplopia H53.2    Carpal instability of left wrist with volar intercalated segment instability M25.332    Carpal instability of right wrist M25.331    Raynaud's syndrome I73.00    Acute blood loss anemia D62    History of thromboembolism Z86.718    Fatigue R53.83    Hypercholesterolemia E78.00    Vitamin D deficiency E55.9    Thrombocytopenia ([facility])  D69.6    SAMARA (generalized anxiety disorder) F41.1    Nonintractable episodic headache R51    Celiac disease K90.0    Encounter for weight management Z76.89    Insomnia G47.00    Seasonal allergic rhinitis J30.2    Xerosis of skin L85.3    Overweight (BMI 25.0-29. 9) E66.3    Weight loss R63.4    Acute midline low back pain without sciatica M54.5    Pain in both hands M79.641, M79.642    Arthritis M19.90       Past Medical History:   Diagnosis Date    Anemia associated with acute blood loss 06/2018 blood transfusion x 2. Txd w iron. Dr. Sarah Rodriguez.  Arthritis     Branch retinal artery occlusion approx 2013    Right, as of 17 resolved and on Coumadin.  Celiac disease     Dr. Tan Willard.  Chronic renal insufficiency, stage II (mild)     secondary to lupus nephritis:  Dr. Radha Raymond    Depression, major, recurrent ([facility] [address] 03/10/2014    Flip Saltness, LCSW.  Diffuse proliferative lupus glomerulonephritis (HCC)     Dr Cierra Collado Diplopia     Dr. Azalia Bryant.  Dizziness 2014    Dr. Roberto Ford, neuro.  Elevated cortisol level ([facility])     Fatigue     intermittent due to lupus    SAMARA (generalized anxiety disorder) 03/10/2014    Flip Saltness, LCSW    Gallstones     Dr. Chely Camejo Gastritis     as of 16 asymptomatic    Headache     Dr. Sachin Palma Hypertension s    due to CKD. Dr. Demetra Sadler    Left tibialis posterior tendonitis     due to injury on sand. Dr. Mick Lopes.  Long term current use of anticoagulant therapy     Ovarian cyst 2016    Dr. Pate Finely Pregnancy induced hypertension     Proteinuria     Dr. Cierra Collado PUD (peptic ulcer disease)     Dr. Micaela Davila syndrome     Dr. Desean Anna. Dr. Levi Prader.  Sicca syndrome ([facility])     Dr. Desean Anna.  [facility]     as of 16 \"aching and swelling of joints\" per patient. Dr Safia Lane. Dr. Casper Lopez. Past Surgical History:   Procedure Laterality Date    HX ACL RECONSTRUCTION Left approx     Dr. Shira Villalta.  HX  SECTION      HX ENDOSCOPY  2012, 2017    Dr. Benoit Neely.  HX LAP CHOLECYSTECTOMY  2017    due to gallstones. Dr. Shilpa Escobar Right 10/22/2014    hand to thumb due to thumb dislocation. Dr. Karen Kumar.     HX OTHER SURGICAL  2012    kidney biopsy; benign    HX OTHER SURGICAL Right 10/22/2014    right Posterior Interosseous Nerve Neurectomy, right Anterior Interosseous Nerve Neurectomy. Dr. Emmie Merino HX RENAL BIOPSY  1/7/14, 2018    CT Guided Renal Biopsy with Conscious Sedation. due to CKD/SLE.  HX SEPTOPLASTY  06/03/2016    w inf turbinate resection. due to turbinate hypertrophy. Dr. Nohemi Reddy History     Tobacco Use    Smoking status: Never Smoker    Smokeless tobacco: Never Used   Substance Use Topics    Alcohol use: No    Drug use: No       Family History   Problem Relation Age of Onset    Glaucoma Father     Hypertension Father        Outpatient Medications Marked as Taking for the 12/4/19 encounter (Office Visit) with Marcin Barboza NP   Medication Sig Dispense Refill    LINZESS 72 mcg cap capsule TAKE 1 CAPSULE BY MOUTH EVERY DAY BEFORE BREAKFAST  3    pantoprazole (PROTONIX) 40 mg tablet TAKE 1 TABLET BY MOUTH ONCE A DAY BEFORE BREAKFAST  3    diclofenac (VOLTAREN) 1 % gel Apply 4 g to affected area four (4) times daily. 100 g 1    phendimetrazine tartrate 105 mg cpER Take 105 mg by mouth daily. Max Daily Amount: 105 mg. 30 Cap 0    pilocarpine (SALAGEN) 5 mg tablet TAKE 1 TABLET BY MOUTH EVERY DAY 30 Tab 1    methocarbamol (ROBAXIN) 500 mg tablet Take 1 Tab by mouth nightly as needed for Pain. 7 Tab 0    fluticasone propionate (FLONASE) 50 mcg/actuation nasal spray 2 Sprays by Both Nostrils route daily. 1 Bottle 5    levonorgestrel (MIRENA) 20 mcg/24 hours (5 yrs) 52 mg IUD Mirena 20 mcg/24 hours (5 yrs) 52 mg intrauterine device   remove 9/8/21      ergocalciferol (ERGOCALCIFEROL) 50,000 unit capsule TAKE ONE CAPSULE BY MOUTH ONCE A WEEK  0    NIFEdipine ER (PROCARDIA XL) 30 mg ER tablet       warfarin (COUMADIN) 6 mg tablet Take 4 mg by mouth daily. M, W, F, Sa, Damian is 6mg once daily; Tu, Th is 4mg once daily      acetaminophen (ACETAMINOPHEN EXTRA STRENGTH) 500 mg tablet Take 1 Tab by mouth every six (6) hours as needed for Pain.  30 Tab 0    hydroxychloroquine (PLAQUENIL) 200 mg tablet Take 1 tab po qam and 1/2 tab po qpm 45 Tab 9    simvastatin (ZOCOR) 20 mg tablet TAKE 1 TABLET BY MOUTH ONCE DAILY  3    losartan (COZAAR) 100 mg tablet Take 50 mg by mouth nightly.  atenolol (TENORMIN) 50 mg tablet Take  by mouth nightly.  mycophenolate (CELLCEPT) 500 mg tablet Take 1,500 mg by mouth two (2) times a day. Current Outpatient Medications on File Prior to Visit   Medication Sig Dispense Refill    LINZESS 72 mcg cap capsule TAKE 1 CAPSULE BY MOUTH EVERY DAY BEFORE BREAKFAST  3    pantoprazole (PROTONIX) 40 mg tablet TAKE 1 TABLET BY MOUTH ONCE A DAY BEFORE BREAKFAST  3    pilocarpine (SALAGEN) 5 mg tablet TAKE 1 TABLET BY MOUTH EVERY DAY 30 Tab 1    methocarbamol (ROBAXIN) 500 mg tablet Take 1 Tab by mouth nightly as needed for Pain. 7 Tab 0    fluticasone propionate (FLONASE) 50 mcg/actuation nasal spray 2 Sprays by Both Nostrils route daily. 1 Bottle 5    levonorgestrel (MIRENA) 20 mcg/24 hours (5 yrs) 52 mg IUD Mirena 20 mcg/24 hours (5 yrs) 52 mg intrauterine device   remove 9/8/21      ergocalciferol (ERGOCALCIFEROL) 50,000 unit capsule TAKE ONE CAPSULE BY MOUTH ONCE A WEEK  0    NIFEdipine ER (PROCARDIA XL) 30 mg ER tablet       warfarin (COUMADIN) 6 mg tablet Take 4 mg by mouth daily. M, W, F, Sa, Damian is 6mg once daily; Tu, Th is 4mg once daily      acetaminophen (ACETAMINOPHEN EXTRA STRENGTH) 500 mg tablet Take 1 Tab by mouth every six (6) hours as needed for Pain. 30 Tab 0    hydroxychloroquine (PLAQUENIL) 200 mg tablet Take 1 tab po qam and 1/2 tab po qpm 45 Tab 9    simvastatin (ZOCOR) 20 mg tablet TAKE 1 TABLET BY MOUTH ONCE DAILY  3    losartan (COZAAR) 100 mg tablet Take 50 mg by mouth nightly.  atenolol (TENORMIN) 50 mg tablet Take  by mouth nightly.  mycophenolate (CELLCEPT) 500 mg tablet Take 1,500 mg by mouth two (2) times a day.  [DISCONTINUED] phendimetrazine tartrate 105 mg cpER Take 105 mg by mouth daily.  Max Daily Amount: 105 mg. 30 Cap 0     No current facility-administered medications on file prior to visit. Allergies   Allergen Reactions    Gluten Diarrhea    Fiorinal-Codeine #3 [Codeine-Butalbital-Asa-Caff] Nausea Only       PE:  Visit Vitals  /80 (BP 1 Location: Left arm, BP Patient Position: Sitting)   Pulse 66   Temp 98.3 °F (36.8 °C) (Oral)   Resp 18   Ht 4' 11.96\" (1.523 m)   Wt 156 lb 8 oz (71 kg)   SpO2 100%   BMI 30.60 kg/m²       Gen: alert, oriented, no acute distress  Head: normocephalic, atraumatic  Ears: external auditory canals clear, TMs without erythema or effusion  Eyes: pupils equal round reactive to light, sclera clear, conjunctiva clear  Oral: moist mucus membranes, no oral lesions, no pharyngeal inflammation or exudate  Neck: symmetric normal sized thyroid, no carotid bruits, no jugular vein distention  Resp: no increase work of breathing, lungs clear to ausculation bilaterally, no wheezing, rales or rhonchi  CV: S1, S2 normal.  No murmurs, rubs, or gallops. Abd: soft, not tender, not distended. No hepatosplenomegaly. Normal bowel sounds. No hernias. No abdominal or renal bruits. Neuro: cranial nerves intact, normal strength and movement in all extremities, reflexes and sensation intact and symmetric. Skin: no lesion or rash  Extremities: no cyanosis or edema    No results found for this visit on 12/04/19. Assessment/Plan:    ICD-10-CM ICD-9-CM    1. Acute midline low back pain without sciatica M54.5 724.2 XR SPINE LUMB 2 OR 3 V      diclofenac (VOLTAREN) 1 % gel   2. Pain in both hands M79.641 729.5 diclofenac (VOLTAREN) 1 % gel    M79.642     3. Arthritis M19.90 716.90 diclofenac (VOLTAREN) 1 % gel   4. Encounter for weight management Z76.89 V65.49 phendimetrazine tartrate 105 mg cpER   5. Overweight (BMI 25.0-29. 9) E66.3 278.02 phendimetrazine tartrate 105 mg cpER   6. Weight loss R63.4 783. 21 phendimetrazine tartrate 105 mg cpER     Diagnoses and all orders for this visit:    1. Acute midline low back pain without sciatica  -     XR SPINE LUMB 2 OR 3 V; Future  -     diclofenac (VOLTAREN) 1 % gel; Apply 4 g to affected area four (4) times daily. 2. Pain in both hands  -     diclofenac (VOLTAREN) 1 % gel; Apply 4 g to affected area four (4) times daily. 3. Arthritis  -     diclofenac (VOLTAREN) 1 % gel; Apply 4 g to affected area four (4) times daily. 4. Encounter for weight management  -     phendimetrazine tartrate 105 mg cpER; Take 105 mg by mouth daily. Max Daily Amount: 105 mg.    5. Overweight (BMI 25.0-29.9)  -     phendimetrazine tartrate 105 mg cpER; Take 105 mg by mouth daily. Max Daily Amount: 105 mg.    6. Weight loss  -     phendimetrazine tartrate 105 mg cpER; Take 105 mg by mouth daily. Max Daily Amount: 105 mg. Follow-up and Dispositions    · Return in about 4 weeks (around 1/1/2020) for Medication Check, Weight Mgmt. reviewed diet, exercise and weight control  reviewed medications and side effects in detail    lose weight, increase physical activity, continue present plan, call if any problems    Health Maintenance reviewed - reviewed. HPV #3 due in 2/2020, otherwise UTD. Recommended healthy diet low in carbohydrates, fats, sodium and cholesterol. Recommended regular cardiovascular exercise 3-6 times per week for 30-60 minutes daily. Chart is reviewed and updated today in the office. Records requested for other providers patient has seen and is currently seeing. Patient was offered a choice/choices in the treatment plan today. Patient expresses understanding of the plan and agrees with recommendations. Verbal and written instructions (see AVS) provided. See patient instructions for more. Patient expresses understanding of diagnosis and treatment plan.

## 2019-12-04 NOTE — PATIENT INSTRUCTIONS
Back Pain: Care Instructions Your Care Instructions Back pain has many possible causes. It is often related to problems with muscles and ligaments of the back. It may also be related to problems with the nerves, discs, or bones of the back. Moving, lifting, standing, sitting, or sleeping in an awkward way can strain the back. Sometimes you don't notice the injury until later. Arthritis is another common cause of back pain. Although it may hurt a lot, back pain usually improves on its own within several weeks. Most people recover in 12 weeks or less. Using good home treatment and being careful not to stress your back can help you feel better sooner. Follow-up care is a key part of your treatment and safety. Be sure to make and go to all appointments, and call your doctor if you are having problems. It's also a good idea to know your test results and keep a list of the medicines you take. How can you care for yourself at home? · Sit or lie in positions that are most comfortable and reduce your pain. Try one of these positions when you lie down: ? Lie on your back with your knees bent and supported by large pillows. ? Lie on the floor with your legs on the seat of a sofa or chair. ? Lie on your side with your knees and hips bent and a pillow between your legs. ? Lie on your stomach if it does not make pain worse. · Do not sit up in bed, and avoid soft couches and twisted positions. Bed rest can help relieve pain at first, but it delays healing. Avoid bed rest after the first day of back pain. · Change positions every 30 minutes. If you must sit for long periods of time, take breaks from sitting. Get up and walk around, or lie in a comfortable position. · Try using a heating pad on a low or medium setting for 15 to 20 minutes every 2 or 3 hours. Try a warm shower in place of one session with the heating pad. · You can also try an ice pack for 10 to 15 minutes every 2 to 3 hours. Put a thin cloth between the ice pack and your skin. · Take pain medicines exactly as directed. ? If the doctor gave you a prescription medicine for pain, take it as prescribed. ? If you are not taking a prescription pain medicine, ask your doctor if you can take an over-the-counter medicine. · Take short walks several times a day. You can start with 5 to 10 minutes, 3 or 4 times a day, and work up to longer walks. Walk on level surfaces and avoid hills and stairs until your back is better. · Return to work and other activities as soon as you can. Continued rest without activity is usually not good for your back. · To prevent future back pain, do exercises to stretch and strengthen your back and stomach. Learn how to use good posture, safe lifting techniques, and proper body mechanics. When should you call for help? Call your doctor now or seek immediate medical care if: 
  · You have new or worsening numbness in your legs.  
  · You have new or worsening weakness in your legs. (This could make it hard to stand up.)  
  · You lose control of your bladder or bowels.  
 Watch closely for changes in your health, and be sure to contact your doctor if: 
  · You have a fever, lose weight, or don't feel well.  
  · You do not get better as expected. Where can you learn more? Go to http://kodak-dodie.info/. Enter R530 in the search box to learn more about \"Back Pain: Care Instructions. \" Current as of: June 26, 2019 Content Version: 12.2 © 3985-0063 [employer]. Care instructions adapted under license by [employer] (which disclaims liability or warranty for this information). If you have questions about a medical condition or this instruction, always ask your healthcare professional. [place] [address] any warranty or liability for your use of this information. Low Back Pain: Exercises Introduction Here are some examples of exercises for you to try. The exercises may be suggested for a condition or for rehabilitation. Start each exercise slowly. Ease off the exercises if you start to have pain. You will be told when to start these exercises and which ones will work best for you. How to do the exercises Press-up 1. Lie on your stomach, supporting your body with your forearms. 2. Press your elbows down into the floor to raise your upper back. As you do this, relax your stomach muscles and allow your back to arch without using your back muscles. As your press up, do not let your hips or pelvis come off the floor. 3. Hold for 15 to 30 seconds, then relax. 4. Repeat 2 to 4 times. Alternate arm and leg (bird dog) exercise 1. Start on the floor, on your hands and knees. 2. Tighten your belly muscles. 3. Raise one leg off the floor, and hold it straight out behind you. Be careful not to let your hip drop down, because that will twist your trunk. 4. Hold for about 6 seconds, then lower your leg and switch to the other leg. 5. Repeat 8 to 12 times on each leg. 6. Over time, work up to holding for 10 to 30 seconds each time. 7. If you feel stable and secure with your leg raised, try raising the opposite arm straight out in front of you at the same time. Knee-to-chest exercise 1. Lie on your back with your knees bent and your feet flat on the floor. 2. Bring one knee to your chest, keeping the other foot flat on the floor (or keeping the other leg straight, whichever feels better on your lower back). 3. Keep your lower back pressed to the floor. Hold for at least 15 to 30 seconds. 4. Relax, and lower the knee to the starting position. 5. Repeat with the other leg. Repeat 2 to 4 times with each leg. 6. To get more stretch, put your other leg flat on the floor while pulling your knee to your chest. 
 
Curl-ups 1.  Lie on the floor on your back with your knees bent at a 90-degree angle. Your feet should be flat on the floor, about 12 inches from your buttocks. 2. Cross your arms over your chest. If this bothers your neck, try putting your hands behind your neck (not your head), with your elbows spread apart. 3. Slowly tighten your belly muscles and raise your shoulder blades off the floor. 4. Keep your head in line with your body, and do not press your chin to your chest. 
5. Hold this position for 1 or 2 seconds, then slowly lower yourself back down to the floor. 6. Repeat 8 to 12 times. Pelvic tilt exercise 1. Lie on your back with your knees bent. 2. \"Brace\" your stomach. This means to tighten your muscles by pulling in and imagining your belly button moving toward your spine. You should feel like your back is pressing to the floor and your hips and pelvis are rocking back. 3. Hold for about 6 seconds while you breathe smoothly. 4. Repeat 8 to 12 times. Heel dig bridging 1. Lie on your back with both knees bent and your ankles bent so that only your heels are digging into the floor. Your knees should be bent about 90 degrees. 2. Then push your heels into the floor, squeeze your buttocks, and lift your hips off the floor until your shoulders, hips, and knees are all in a straight line. 3. Hold for about 6 seconds as you continue to breathe normally, and then slowly lower your hips back down to the floor and rest for up to 10 seconds. 4. Do 8 to 12 repetitions. Hamstring stretch in doorway 1. Lie on your back in a doorway, with one leg through the open door. 2. Slide your leg up the wall to straighten your knee. You should feel a gentle stretch down the back of your leg. 3. Hold the stretch for at least 15 to 30 seconds. Do not arch your back, point your toes, or bend either knee. Keep one heel touching the floor and the other heel touching the wall. 4. Repeat with your other leg. 5. Do 2 to 4 times for each leg. Hip flexor stretch 1. Kneel on the floor with one knee bent and one leg behind you. Place your forward knee over your foot. Keep your other knee touching the floor. 2. Slowly push your hips forward until you feel a stretch in the upper thigh of your rear leg. 3. Hold the stretch for at least 15 to 30 seconds. Repeat with your other leg. 4. Do 2 to 4 times on each side. Wall sit 1. Stand with your back 10 to 12 inches away from a wall. 2. Lean into the wall until your back is flat against it. 3. Slowly slide down until your knees are slightly bent, pressing your lower back into the wall. 4. Hold for about 6 seconds, then slide back up the wall. 5. Repeat 8 to 12 times. Follow-up care is a key part of your treatment and safety. Be sure to make and go to all appointments, and call your doctor if you are having problems. It's also a good idea to know your test results and keep a list of the medicines you take. Where can you learn more? Go to http://kodak-dodie.info/. Enter D005 in the search box to learn more about \"Low Back Pain: Exercises. \" Current as of: June 26, 2019 Content Version: 12.2 © 8940-7809 [employer]. Care instructions adapted under license by [email or web address] (which disclaims liability or warranty for this information). If you have questions about a medical condition or this instruction, always ask your healthcare professional. [address] any warranty or liability for your use of this information.

## 2019-12-04 NOTE — PROGRESS NOTES
Room 5    Identified pt with two pt identifiers(name and ). Reviewed record in preparation for visit and have obtained necessary documentation. All patient medications has been reviewed. Chief Complaint   Patient presents with    Weight Management     Weight Management  34.2% body fat  29.5 BMI  47.3% water  1314 resting  14in neck  37.5in waist    Health Maintenance Due   Topic    PAP AKA CERVICAL CYTOLOGY        Vitals:    19 0941   BP: 118/80   Pulse: 66   Resp: 18   Temp: 98.3 °F (36.8 °C)   TempSrc: Oral   SpO2: 100%   Weight: 156 lb 8 oz (71 kg)   Height: 4' 11.96\" (1.523 m)   PainSc:   0 - No pain       Wt Readings from Last 3 Encounters:   19 156 lb 8 oz (71 kg)   10/22/19 158 lb 1.6 oz (71.7 kg)   19 159 lb (72.1 kg)     Temp Readings from Last 3 Encounters:   19 98.3 °F (36.8 °C) (Oral)   10/22/19 99.8 °F (37.7 °C) (Oral)   19 97.9 °F (36.6 °C)     BP Readings from Last 3 Encounters:   19 118/80   10/22/19 112/77   19 122/80     Pulse Readings from Last 3 Encounters:   19 66   10/22/19 68   19 68       Lab Results   Component Value Date/Time    Hemoglobin A1c 5.1 10/17/2019 08:50 AM       Coordination of Care Questionnaire:   1) Have you been to an emergency room, urgent care, or hospitalized since your last visit?   no       2. Have seen or consulted any other health care provider since your last visit? NO    3) Do you have an Advanced Directive/ Living Will in place? YES  If yes, do we have a copy on file YES  If no, would you like information NO    Patient is accompanied by self I have received verbal consent from Aaliyah Carter to discuss any/all medical information while they are present in the room.

## 2019-12-06 ENCOUNTER — TELEPHONE (OUTPATIENT)
Dept: FAMILY MEDICINE CLINIC | Age: 39
End: 2019-12-06

## 2019-12-06 NOTE — TELEPHONE ENCOUNTER
----- Message from Doug Yañez sent at 12/5/2019  5:02 PM EST -----  Regarding: YANNA Alonso/Telephone  Caller's first and last name: Yudelka Gay  Reason for call: status of pre-authorization form  Callback required yes/no and why: yes  Best contact number(s): [phone]  Details to clarify the request: checking in on status of a pre-authorization form for a cream medication.

## 2019-12-10 DIAGNOSIS — L85.3 XEROSIS OF SKIN: ICD-10-CM

## 2019-12-11 RX ORDER — PILOCARPINE HYDROCHLORIDE 5 MG/1
TABLET, FILM COATED ORAL
Qty: 30 TAB | Refills: 1 | Status: SHIPPED | OUTPATIENT
Start: 2019-12-11 | End: 2020-01-07

## 2019-12-12 NOTE — TELEPHONE ENCOUNTER
----- Message from Page Ivory sent at 12/11/2019  3:53 PM EST -----  Regarding: YANNA Alonso/Telephone  Caller's first and last name: Angela Allen  Reason for call: preauthorization  Callback required yes/no and why: yes   Best contact number(s): [phone]  Details to clarify the request: The [facility] pharmacy Pt goes to sent a preauthorization for cream medication last week to the office. It needs send it to the insurance company so that way the meds can be covered. Send it to [employer].  The number to the [facility] is [phone]

## 2020-01-07 ENCOUNTER — OFFICE VISIT (OUTPATIENT)
Dept: FAMILY MEDICINE CLINIC | Age: 40
End: 2020-01-07

## 2020-01-07 VITALS
OXYGEN SATURATION: 100 % | SYSTOLIC BLOOD PRESSURE: 115 MMHG | WEIGHT: 157.9 LBS | DIASTOLIC BLOOD PRESSURE: 79 MMHG | RESPIRATION RATE: 16 BRPM | TEMPERATURE: 97.2 F | HEART RATE: 63 BPM | BODY MASS INDEX: 31 KG/M2 | HEIGHT: 60 IN

## 2020-01-07 DIAGNOSIS — M19.90 ARTHRITIS: ICD-10-CM

## 2020-01-07 DIAGNOSIS — M32.14 SYSTEMIC LUPUS ERYTHEMATOSUS WITH GLOMERULAR DISEASE, UNSPECIFIED SLE TYPE (HCC): Chronic | ICD-10-CM

## 2020-01-07 DIAGNOSIS — R51.9 SINUS HEADACHE: ICD-10-CM

## 2020-01-07 DIAGNOSIS — M32.14 LUPUS NEPHRITIS (HCC): ICD-10-CM

## 2020-01-07 DIAGNOSIS — M79.641 PAIN IN BOTH HANDS: ICD-10-CM

## 2020-01-07 DIAGNOSIS — G89.29 CHRONIC MIDLINE LOW BACK PAIN WITHOUT SCIATICA: ICD-10-CM

## 2020-01-07 DIAGNOSIS — J30.2 SEASONAL ALLERGIC RHINITIS, UNSPECIFIED TRIGGER: Primary | ICD-10-CM

## 2020-01-07 DIAGNOSIS — M79.642 PAIN IN BOTH HANDS: ICD-10-CM

## 2020-01-07 DIAGNOSIS — M54.50 ACUTE MIDLINE LOW BACK PAIN WITHOUT SCIATICA: ICD-10-CM

## 2020-01-07 DIAGNOSIS — Z76.89 ENCOUNTER FOR WEIGHT MANAGEMENT: ICD-10-CM

## 2020-01-07 DIAGNOSIS — M54.50 CHRONIC MIDLINE LOW BACK PAIN WITHOUT SCIATICA: ICD-10-CM

## 2020-01-07 DIAGNOSIS — E66.3 OVERWEIGHT (BMI 25.0-29.9): ICD-10-CM

## 2020-01-07 DIAGNOSIS — R05.9 COUGH: ICD-10-CM

## 2020-01-07 DIAGNOSIS — E66.09 CLASS 1 OBESITY DUE TO EXCESS CALORIES WITH SERIOUS COMORBIDITY AND BODY MASS INDEX (BMI) OF 30.0 TO 30.9 IN ADULT: ICD-10-CM

## 2020-01-07 DIAGNOSIS — L85.3 XEROSIS OF SKIN: ICD-10-CM

## 2020-01-07 DIAGNOSIS — R63.4 WEIGHT LOSS: ICD-10-CM

## 2020-01-07 RX ORDER — WARFARIN 4 MG/1
4 TABLET ORAL DAILY
COMMUNITY
Start: 2019-12-01

## 2020-01-07 RX ORDER — PHENDIMETRAZINE TARTRATE 105 MG/1
105 CAPSULE, EXTENDED RELEASE ORAL DAILY
Qty: 30 CAP | Refills: 0 | Status: SHIPPED | OUTPATIENT
Start: 2020-01-07 | End: 2020-02-04 | Stop reason: SDUPTHER

## 2020-01-07 RX ORDER — MONTELUKAST SODIUM 10 MG/1
10 TABLET ORAL DAILY
Qty: 90 TAB | Refills: 0 | Status: SHIPPED | OUTPATIENT
Start: 2020-01-07 | End: 2020-02-04 | Stop reason: SDUPTHER

## 2020-01-07 RX ORDER — CLOBETASOL PROPIONATE 0.46 MG/ML
SOLUTION TOPICAL
COMMUNITY
Start: 2019-12-27

## 2020-01-07 RX ORDER — DICLOFENAC SODIUM 10 MG/G
4 GEL TOPICAL 4 TIMES DAILY
Qty: 100 G | Refills: 1 | Status: SHIPPED | OUTPATIENT
Start: 2020-01-07 | End: 2020-07-08 | Stop reason: ALTCHOICE

## 2020-01-07 NOTE — PROGRESS NOTES
Chief Complaint   Patient presents with    Weight Management     1. Have you been to the ER, urgent care clinic since your last visit? Hospitalized since your last visit? No    2. Have you seen or consulted any other health care providers outside of the [address] [place] [address] since your last visit? Include any pap smears or colon screening.  No    157.9 weight   35.3 body fat  30.6 body mass  46.8 water  1308 energy

## 2020-01-07 NOTE — PATIENT INSTRUCTIONS
Allergies: Care Instructions  Your Care Instructions    Allergies occur when your body's defense system (immune system) overreacts to certain substances. The immune system treats a harmless substance as if it were a harmful germ or virus. Many things can cause this overreaction, including pollens, medicine, food, dust, animal dander, and mold. Allergies can be mild or severe. Mild allergies can be managed with home treatment. But medicine may be needed to prevent problems. Managing your allergies is an important part of staying healthy. Your doctor may suggest that you have allergy testing to help find out what is causing your allergies. When you know what things trigger your symptoms, you can avoid them. This can prevent allergy symptoms and other health problems. For severe allergies that cause reactions that affect your whole body (anaphylactic reactions), your doctor may prescribe a shot of epinephrine to carry with you in case you have a severe reaction. Learn how to give yourself the shot and keep it with you at all times. Make sure it is not . Follow-up care is a key part of your treatment and safety. Be sure to make and go to all appointments, and call your doctor if you are having problems. It's also a good idea to know your test results and keep a list of the medicines you take. How can you care for yourself at home? · If you have been told by your doctor that dust or dust mites are causing your allergy, decrease the dust around your bed:  ? Wash sheets, pillowcases, and other bedding in hot water every week. ? Use dust-proof covers for pillows, duvets, and mattresses. Avoid plastic covers because they tear easily and do not \"breathe. \" Wash as instructed on the label. ? Do not use any blankets and pillows that you do not need. ? Use blankets that you can wash in your washing machine. ? Consider removing drapes and carpets, which attract and hold dust, from your bedroom.   · If you are allergic to house dust and mites, do not use home humidifiers. Your doctor can suggest ways you can control dust and mites. · Look for signs of cockroaches. Cockroaches cause allergic reactions. Use cockroach baits to get rid of them. Then, clean your home well. Cockroaches like areas where grocery bags, newspapers, empty bottles, or cardboard boxes are stored. Do not keep these inside your home, and keep trash and food containers sealed. Seal off any spots where cockroaches might enter your home. · If you are allergic to mold, get rid of furniture, rugs, and drapes that smell musty. Check for mold in the bathroom. · If you are allergic to outdoor pollen or mold spores, use air-conditioning. Change or clean all filters every month. Keep windows closed. · If you are allergic to pollen, stay inside when pollen counts are high. Use a vacuum  with a HEPA filter or a double-thickness filter at least two times each week. · Stay inside when air pollution is bad. Avoid paint fumes, perfumes, and other strong odors. · Avoid conditions that make your allergies worse. Stay away from smoke. Do not smoke or let anyone else smoke in your house. Do not use fireplaces or wood-burning stoves. · If you are allergic to your pets, change the air filter in your furnace every month. Use high-efficiency filters. · If you are allergic to pet dander, keep pets outside or out of your bedroom. Old carpet and cloth furniture can hold a lot of animal dander. You may need to replace them. When should you call for help? Give an epinephrine shot if:    · You think you are having a severe allergic reaction.     · You have symptoms in more than one body area, such as mild nausea and an itchy mouth.    After giving an epinephrine shot call 911, even if you feel better.   Call 911 if:    · You have symptoms of a severe allergic reaction. These may include:  ? Sudden raised, red areas (hives) all over your body. ?  Swelling of the throat, mouth, lips, or tongue. ? Trouble breathing. ? Passing out (losing consciousness). Or you may feel very lightheaded or suddenly feel weak, confused, or restless.     · You have been given an epinephrine shot, even if you feel better.    Call your doctor now or seek immediate medical care if:    · You have symptoms of an allergic reaction, such as:  ? A rash or hives (raised, red areas on the skin). ? Itching. ? Swelling. ? Belly pain, nausea, or vomiting.    Watch closely for changes in your health, and be sure to contact your doctor if:    · You do not get better as expected. Where can you learn more? Go to http://kodak-dodie.info/. Enter A525 in the search box to learn more about \"Allergies: Care Instructions. \"  Current as of: April 7, 2019  Content Version: 12.2  © 0704-0397 [employer]. Care instructions adapted under license by [employer] (which disclaims liability or warranty for this information). If you have questions about a medical condition or this instruction, always ask your healthcare professional. [address] any warranty or liability for your use of this information. Back Pain: Care Instructions  Your Care Instructions    Back pain has many possible causes. It is often related to problems with muscles and ligaments of the back. It may also be related to problems with the nerves, discs, or bones of the back. Moving, lifting, standing, sitting, or sleeping in an awkward way can strain the back. Sometimes you don't notice the injury until later. Arthritis is another common cause of back pain. Although it may hurt a lot, back pain usually improves on its own within several weeks. Most people recover in 12 weeks or less. Using good home treatment and being careful not to stress your back can help you feel better sooner. Follow-up care is a key part of your treatment and safety.  Be sure to make and go to all appointments, and call your doctor if you are having problems. It's also a good idea to know your test results and keep a list of the medicines you take. How can you care for yourself at home? · Sit or lie in positions that are most comfortable and reduce your pain. Try one of these positions when you lie down:  ? Lie on your back with your knees bent and supported by large pillows. ? Lie on the floor with your legs on the seat of a sofa or chair. ? Lie on your side with your knees and hips bent and a pillow between your legs. ? Lie on your stomach if it does not make pain worse. · Do not sit up in bed, and avoid soft couches and twisted positions. Bed rest can help relieve pain at first, but it delays healing. Avoid bed rest after the first day of back pain. · Change positions every 30 minutes. If you must sit for long periods of time, take breaks from sitting. Get up and walk around, or lie in a comfortable position. · Try using a heating pad on a low or medium setting for 15 to 20 minutes every 2 or 3 hours. Try a warm shower in place of one session with the heating pad. · You can also try an ice pack for 10 to 15 minutes every 2 to 3 hours. Put a thin cloth between the ice pack and your skin. · Take pain medicines exactly as directed. ? If the doctor gave you a prescription medicine for pain, take it as prescribed. ? If you are not taking a prescription pain medicine, ask your doctor if you can take an over-the-counter medicine. · Take short walks several times a day. You can start with 5 to 10 minutes, 3 or 4 times a day, and work up to longer walks. Walk on level surfaces and avoid hills and stairs until your back is better. · Return to work and other activities as soon as you can. Continued rest without activity is usually not good for your back. · To prevent future back pain, do exercises to stretch and strengthen your back and stomach.  Learn how to use good posture, safe lifting techniques, and proper body mechanics. When should you call for help? Call your doctor now or seek immediate medical care if:    · You have new or worsening numbness in your legs.     · You have new or worsening weakness in your legs. (This could make it hard to stand up.)     · You lose control of your bladder or bowels.    Watch closely for changes in your health, and be sure to contact your doctor if:    · You have a fever, lose weight, or don't feel well.     · You do not get better as expected. Where can you learn more? Go to http://kodak-dodie.info/. Enter P104 in the search box to learn more about \"Back Pain: Care Instructions. \"  Current as of: June 26, 2019  Content Version: 12.2  © 3823-6604 [employer]. Care instructions adapted under license by [employer] (which disclaims liability or warranty for this information). If you have questions about a medical condition or this instruction, always ask your healthcare professional. [place] [address] any warranty or liability for your use of this information.

## 2020-01-07 NOTE — PROGRESS NOTES
Chief Complaint   Patient presents with    Weight Management         HPI:  The patient is a 46 y.o. female who presents today for a follow up appointment. No hospital, ER or specialist visits since last primary care visit except as noted below. Weight Mgmt:  The patient is a 41y.o. year old female who presents today for initial medical weight loss visit. Pt's initial weight is 172 pounds with a BMI of 33.59. Patient's goal weight is 130 pounds. The patient's weight today is 157 pounds with a BMI of 30.60. She has gained 1 pound since her last visit. The patient's bariatric comorbidities include HTN, lupus, nephrotic syndrome, anxiety, depression, arthritis. The patient's reason for medical weight loss is feel better, help with lupus related comorbidities, help with all of her medical problems. The patient's quality of life goals are to be able to do \"a lot of things that she used to do before her diagnosis of lupus at the age of 20\". Pt with hypertension, nephrotic syndrome, SLE, vit d deficiency, hx of thromboembolism, and obesity presents to the office with a BP of 114/80. For BP, she uses Atenolol 50 mg and Losartan 100 mg daily, tolerating well. For cholesterol, she uses Zocor 20 mg daily, tolerating well. For RAD, she uses Warfin 6 mg daily, rx'd by Dr. Charlene Felton who she sees regularly. The patient's diet typically includes: B - eggs, coffee (every once in a while), beans, rice, fruit, sometimes skip breakfast; L - chicken, salad, rice, tortilla, steak, soups, S - not really a snacker, D - does not eat much for dinner, small sandwich, fruit, salad. Started seeing a nutritionist with [facility], seen on 8/29/2019, 9/12/2019. Doing well with gluten free diet. She is being followed monthly by Jose Manuel Dennison RD. She has not needed to see her recently, feels she has the tools she needs.   She has struggled with her diet plan over the holidays and reports she has \"eaten more than she should\" over the past month. She plans to get back on track. The patient is drinking the following: soda (just cut these out, occasionally but not even when out to eat, coffee, mostly water, OJ infrequently, cranberry juice for urinary symptoms - has one glass/day         The patient's exercise at this time includes: walking (tracking her steps on her phone), she has started to increase this with a goal of exercising 3x/week. The patient is currently weighing: \"I try not to\". She feels she is usually in the same range. \"Sometimes but not every day\"         The patient has not tried appetite suppressants in the past, but she has tried some food interventions. She has previously tried the following medications: \"fit teas, honey/cinnnamon teas\", apple cider vinegar. She has tried restrictive diets. She patient has not tried appetite suppressants in the past, but she has tried some food interventions. She has previously tried the following medications: \"fit teas, honey/cinnnamon teas\", apple cider vinegar. She has tried restrictive diets. Started going to the gym 3x/week. She is not drinking soda or juices, mostly water, she is feeling stressed and feels like \"giving up\" because she has not been able to successfully lose weight despite all of her efforts. She drinks 64 oz of water daily. She averages 8 hours of sleep nightly. Sometimes, she has difficulty sleeping through the night and wakes up multiple times a night. Denies caffeine consumption. She was started on Bontril in the fall and she felt that it worked initially but then [facility] got used to it\". She was changed to Bontril TID. She does not feel this was helping. She was forgetting to take the lunch hour dose. She was changed back to the XR Bontril at her last visit on 4/10/2019. Her hunger is well controlled in terms of volume but she still reports she is hungry overall.   Consider Phentermine once renal function established and Nephrology consult done, no data that shows safety with this medication however. Patient gave appetite suppressant medications to Dr. Lexy More for review as follows:    Dr. Jad Don notes on 5/6/2019:   \"-Belviq: may be ok but not well studied in patients with renal disease. \"Use with caution\" should definitely watch for accumulation of metabolites. I would not take it.  -Qysmia: dose adjustment needed.  -Saxenda: looks okay. -Contrave: dose adjustment needed. -Phentermine: I strongly recommend AGAINST it. Finally, I recommend she see her MD before starting any of these. \"    Patient was prescribed Saxenda on 5/17/2019, this is the safest anti-obesity medication for this patient based on her medical history and current BMI. However her insurance will not cover this medication and therefore she was started on a more affordable out of pocket alternative, Contrave. She developed HAs, N/V on 6/12/2019 and stopped the medication at that time. Since then she has waited to restart the Contrave. She reports she started the Contrave again, she took it for about 3 weeks. She felt like she was eating more, was not effective. She reports constant headaches and made her \"feel awful\". She states the Bontril helped much better with her appetite. Her frustration has also been that she has felt somewhat down and eating for stress somewhat. Restarted Bontril at her last visit on 8/16/2019. She has done well with this, she reports her hunger is well controlled. She reports it is \"a little bit less effective\" but it is still helping her to maintain her diet protocol. She denies any adverse effects from this and would like to continue with this at this time. She still feels the Bontril is helping her appetite. She has been out of her medication x 1 week, we had to reschedule her appointment. She denies adverse effects. She is eating lesser portions. She reports her metabolism has improved with the medication.   She denies adverse effects still, except some mild intermittent constipation and insomnia. She reports she has had more energy with the medication and has increased her day to day activity. She has struggled with her diet over the christmas holiday. She reports Bontril has helped her maintain her weight however despite having more cravings/increased appetite. Weight Metrics 1/7/2020 12/4/2019 12/4/2019 10/22/2019 10/22/2019 9/28/2019 9/24/2019   Weight 157 lb 14.4 oz - 156 lb 8 oz - 158 lb 1.6 oz 159 lb -   Neck Circ (inches) - 14 - - - - -   Waist Measure Inches - 37.5 - 34.5 - - 34.5   Body Fat % - 34.2 - 34.6 - - 33   BMI 30.85 kg/m2 - 30.6 kg/m2 - 29.87 kg/m2 30.04 kg/m2 -     LBP:  Patient has a hx of mild intermittent LBP but recently over the past week it has become more constant and more painful. She has not taken anything for this pain. She reports the pain as sharp 3/10 pain, elevates to 5-6/10 pain with standing. Rest improves. She saw her Rheumatology doctor after her last visit. He thinks it is more musculoskeletal pain. She has not done her x-ray of the low back, Rheumatology said to hold off and try PT first.  She did not start the Diclofenac gel d/t insurance PA being required. She has not heard back from our office about PA to date. Allergy Symptoms:  Patient reports that since the 1st of the year she has had sinus headache, nasal drainage, change in smelling sensation. She reports she could not tolerate Pilocarpine for overall dryness from her lupus. She has tried Afrin consistently, Flonase, and OTC Sudafed without any improvement. Review of Systems  A comprehensive review of systems was negative except for that written in the HPI.     Patient Active Problem List   Diagnosis Code    Systemic lupus erythematosus (HCC) M32.9    Hypertension I10    Nephrotic syndrome N04.9    Long-term use of immunosuppressant medication Z79.899    Current use of steroid medication Z79.52    Lupus nephritis ([facility] M32.14    Hypermobility syndrome M35.7    Branch retinal artery occlusion H34.239    Depression, major, recurrent (HCC) F33.9    Anxiety disorder F41.9    Diplopia H53.2    Carpal instability of left wrist with volar intercalated segment instability M25.332    Carpal instability of right wrist M25.331    Raynaud's syndrome I73.00    Acute blood loss anemia D62    History of thromboembolism Z86.718    Class 1 obesity due to excess calories with serious comorbidity and body mass index (BMI) of 30.0 to 30.9 in adult E66.09, Z68.30    Fatigue R53.83    Hypercholesterolemia E78.00    Vitamin D deficiency E55.9    Thrombocytopenia (HCC)  D69.6    SAMARA (generalized anxiety disorder) F41.1    Nonintractable episodic headache R51    Celiac disease K90.0    Encounter for weight management Z76.89    Insomnia G47.00    Seasonal allergic rhinitis J30.2    Xerosis of skin L85.3    Overweight (BMI 25.0-29. 9) E66.3    Weight loss R63.4    Acute midline low back pain without sciatica M54.5    Pain in both hands M79.641, M79.642    Arthritis M19.90    Chronic midline low back pain without sciatica M54.5, G89.29    Sinus headache R51    Cough R05       Past Medical History:   Diagnosis Date    Anemia associated with acute blood loss 06/2018    blood transfusion x 2. Txd w iron. Dr. Jw Lozoya.  Arthritis     Branch retinal artery occlusion approx 2013    Right, as of 5/26/16 resolved and on Coumadin.  Celiac disease 2012    Dr. Ofe Butcher.  Chronic renal insufficiency, stage II (mild)     secondary to lupus nephritis:  Dr. Fercho Warner    Depression, major, recurrent ([facility] [address] 03/10/2014    Rossi Valera, LCSW.  Diffuse proliferative lupus glomerulonephritis (HCC)     Dr Sanket Hess Diplopia 2014    Dr. Miramontes Estimable.  Dizziness 07/2014    Dr. Justine Romero, neuro.     Elevated cortisol level (HCC)     Fatigue     intermittent due to lupus    SAMARA (generalized anxiety disorder) 03/10/2014    Henrry Santiago, [facility]    Gallstones     Dr. Jesús Hamm Gastritis     as of 16 asymptomatic    Headache     Dr. Analy Centeno Hypertension s    due to CKD. Dr. Kateryna Zimmerman    Left tibialis posterior tendonitis     due to injury on sand. Dr. Kandis Franco.  Long term current use of anticoagulant therapy     Ovarian cyst 2016    Dr. Micheline Lutz Pregnancy induced hypertension     Proteinuria     Dr. Ignacio Turk PUD (peptic ulcer disease)     Dr. Bartlett Dash syndrome     Dr. Felicita Bowie. Dr. Jules Martinez.  Sicca syndrome (HCC)     Dr. Felicita Bowie.  Systemic lupus erythematosus with glomerular disease ([facility] [address]     as of 16 \"aching and swelling of joints\" per patient. Dr Alyx Petit. Dr. Wagner Avalos. Past Surgical History:   Procedure Laterality Date    HX ACL RECONSTRUCTION Left approx     Dr. Noel Freeman.  HX  SECTION      HX ENDOSCOPY  2012, 2017    Dr. Virgie Farrar.  HX LAP CHOLECYSTECTOMY  2017    due to gallstones. Dr. Adelita Ladd Right 10/22/2014    hand to thumb due to thumb dislocation. Dr. Jose Manuel Francis.  HX OTHER SURGICAL  2012    kidney biopsy; benign    HX OTHER SURGICAL Right 10/22/2014    right Posterior Interosseous Nerve Neurectomy, right Anterior Interosseous Nerve Neurectomy. Dr. Penny Pantoja HX RENAL BIOPSY  2018    CT Guided Renal Biopsy with Conscious Sedation. due to CKD/SLE.  HX SEPTOPLASTY  2016    w inf turbinate resection. due to turbinate hypertrophy.   Dr. Gilliam Grow History     Tobacco Use    Smoking status: Never Smoker    Smokeless tobacco: Never Used   Substance Use Topics    Alcohol use: No    Drug use: No       Family History   Problem Relation Age of Onset    Glaucoma Father     Hypertension Father        Outpatient Medications Marked as Taking for the 20 encounter (Office Visit) with Ernst Palmer, NP   Medication Sig Dispense Refill    warfarin (COUMADIN) 4 mg tablet Take 4 mg by mouth daily. All days expect Tue Thur Sat Pt takes 6mg.  clobetasol (TEMOVATE) 0.05 % external solution Apply  to affected area daily as needed.  montelukast (SINGULAIR) 10 mg tablet Take 1 Tab by mouth daily. 90 Tab 0    phendimetrazine tartrate 105 mg cpER Take 105 mg by mouth daily. Max Daily Amount: 105 mg. 30 Cap 0    diclofenac (VOLTAREN) 1 % gel Apply 4 g to affected area four (4) times daily. 100 g 1    LINZESS 72 mcg cap capsule TAKE 1 CAPSULE BY MOUTH EVERY DAY BEFORE BREAKFAST  3    pantoprazole (PROTONIX) 40 mg tablet TAKE 1 TABLET BY MOUTH ONCE A DAY BEFORE BREAKFAST  3    methocarbamol (ROBAXIN) 500 mg tablet Take 1 Tab by mouth nightly as needed for Pain. 7 Tab 0    levonorgestrel (MIRENA) 20 mcg/24 hours (5 yrs) 52 mg IUD Mirena 20 mcg/24 hours (5 yrs) 52 mg intrauterine device   remove 9/8/21      ergocalciferol (ERGOCALCIFEROL) 50,000 unit capsule TAKE ONE CAPSULE BY MOUTH ONCE A WEEK  0    NIFEdipine ER (PROCARDIA XL) 30 mg ER tablet       warfarin (COUMADIN) 6 mg tablet Take 4 mg by mouth daily. Brigida Vasquez, Sat is 6mg once daily all other days 4mg.  acetaminophen (ACETAMINOPHEN EXTRA STRENGTH) 500 mg tablet Take 1 Tab by mouth every six (6) hours as needed for Pain. 30 Tab 0    hydroxychloroquine (PLAQUENIL) 200 mg tablet Take 1 tab po qam and 1/2 tab po qpm 45 Tab 9    simvastatin (ZOCOR) 20 mg tablet TAKE 1 TABLET BY MOUTH ONCE DAILY  3    losartan (COZAAR) 100 mg tablet Take 50 mg by mouth nightly.  atenolol (TENORMIN) 50 mg tablet Take 50 mg by mouth nightly.  mycophenolate (CELLCEPT) 500 mg tablet Take 1,500 mg by mouth two (2) times a day. Current Outpatient Medications on File Prior to Visit   Medication Sig Dispense Refill    warfarin (COUMADIN) 4 mg tablet Take 4 mg by mouth daily. All days expect Tue Thur Sat Pt takes 6mg.       clobetasol (TEMOVATE) 0.05 % external solution Apply  to affected area daily as needed.  LINZESS 72 mcg cap capsule TAKE 1 CAPSULE BY MOUTH EVERY DAY BEFORE BREAKFAST  3    pantoprazole (PROTONIX) 40 mg tablet TAKE 1 TABLET BY MOUTH ONCE A DAY BEFORE BREAKFAST  3    methocarbamol (ROBAXIN) 500 mg tablet Take 1 Tab by mouth nightly as needed for Pain. 7 Tab 0    levonorgestrel (MIRENA) 20 mcg/24 hours (5 yrs) 52 mg IUD Mirena 20 mcg/24 hours (5 yrs) 52 mg intrauterine device   remove 9/8/21      ergocalciferol (ERGOCALCIFEROL) 50,000 unit capsule TAKE ONE CAPSULE BY MOUTH ONCE A WEEK  0    NIFEdipine ER (PROCARDIA XL) 30 mg ER tablet       warfarin (COUMADIN) 6 mg tablet Take 4 mg by mouth daily. Tue, Thur, Sat is 6mg once daily all other days 4mg.  acetaminophen (ACETAMINOPHEN EXTRA STRENGTH) 500 mg tablet Take 1 Tab by mouth every six (6) hours as needed for Pain. 30 Tab 0    hydroxychloroquine (PLAQUENIL) 200 mg tablet Take 1 tab po qam and 1/2 tab po qpm 45 Tab 9    simvastatin (ZOCOR) 20 mg tablet TAKE 1 TABLET BY MOUTH ONCE DAILY  3    losartan (COZAAR) 100 mg tablet Take 50 mg by mouth nightly.  atenolol (TENORMIN) 50 mg tablet Take 50 mg by mouth nightly.  mycophenolate (CELLCEPT) 500 mg tablet Take 1,500 mg by mouth two (2) times a day.  [DISCONTINUED] pilocarpine (SALAGEN) 5 mg tablet TAKE 1 TABLET BY MOUTH EVERY DAY 30 Tab 1    [DISCONTINUED] diclofenac (VOLTAREN) 1 % gel Apply 4 g to affected area four (4) times daily. 100 g 1    [DISCONTINUED] phendimetrazine tartrate 105 mg cpER Take 105 mg by mouth daily. Max Daily Amount: 105 mg. 30 Cap 0    [DISCONTINUED] fluticasone propionate (FLONASE) 50 mcg/actuation nasal spray 2 Sprays by Both Nostrils route daily. 1 Bottle 5     No current facility-administered medications on file prior to visit.         Allergies   Allergen Reactions    Gluten Diarrhea    Fiorinal-Codeine #3 [Codeine-Butalbital-Asa-Caff] Nausea Only       PE:  Visit Vitals  /79 (BP 1 Location: Left arm, BP Patient Position: Sitting)   Pulse 63   Temp 97.2 °F (36.2 °C) (Oral)   Resp 16   Ht 4' 11.99\" (1.524 m)   Wt 157 lb 14.4 oz (71.6 kg)   SpO2 100%   BMI 30.85 kg/m²       Gen: alert, oriented, no acute distress  Head: normocephalic, atraumatic  Ears: external auditory canals clear, TMs without erythema or effusion  Eyes: pupils equal round reactive to light, sclera clear, conjunctiva clear  Oral: moist mucus membranes, no oral lesions, no pharyngeal inflammation or exudate  Neck: symmetric normal sized thyroid, no carotid bruits, no jugular vein distention  Resp: no increase work of breathing, lungs clear to ausculation bilaterally, no wheezing, rales or rhonchi  CV: S1, S2 normal.  No murmurs, rubs, or gallops. Abd: soft, not tender, not distended. No hepatosplenomegaly. Normal bowel sounds. No hernias. No abdominal or renal bruits. Neuro: cranial nerves intact, normal strength and movement in all extremities, reflexes and sensation intact and symmetric. Skin: no lesion or rash  Extremities: no cyanosis or edema    No results found for this visit on 01/07/20. Assessment/Plan:    ICD-10-CM ICD-9-CM    1. Seasonal allergic rhinitis, unspecified trigger J30.2 477.9 montelukast (SINGULAIR) 10 mg tablet   2. Cough R05 786.2 montelukast (SINGULAIR) 10 mg tablet   3. Sinus headache R51 784.0 montelukast (SINGULAIR) 10 mg tablet   4. Lupus nephritis ([facility] [address] M32.14 710.0      583.81    5. Systemic lupus erythematosus with glomerular disease, unspecified SLE type ([facility] [address] M32.14 710.0      583.81    6. Encounter for weight management Z76.89 V65.49 phendimetrazine tartrate 105 mg cpER   7. Class 1 obesity due to excess calories with serious comorbidity and body mass index (BMI) of 30.0 to 30.9 in adult E66.09 278.00     Z68.30 V85.30    8. Xerosis of skin L85.3 706.8    9. Chronic midline low back pain without sciatica M54.5 724.2     G89.29 338.29    10.  Overweight (BMI 25.0-29. 9) E66.3 278.02 phendimetrazine tartrate 105 mg cpER   11. Weight loss R63.4 783. 21 phendimetrazine tartrate 105 mg cpER   12. Acute midline low back pain without sciatica M54.5 724.2 diclofenac (VOLTAREN) 1 % gel   13. Pain in both hands M79.641 729.5 diclofenac (VOLTAREN) 1 % gel    M79.642     14. Arthritis M19.90 716.90 diclofenac (VOLTAREN) 1 % gel     Diagnoses and all orders for this visit:    1. Seasonal allergic rhinitis, unspecified trigger  -     montelukast (SINGULAIR) 10 mg tablet; Take 1 Tab by mouth daily. 2. Cough  -     montelukast (SINGULAIR) 10 mg tablet; Take 1 Tab by mouth daily. 3. Sinus headache  -     montelukast (SINGULAIR) 10 mg tablet; Take 1 Tab by mouth daily. 4. [facility]    5. Systemic lupus erythematosus with glomerular disease, unspecified SLE type ([facility]    6. Encounter for weight management  -     phendimetrazine tartrate 105 mg cpER; Take 105 mg by mouth daily. Max Daily Amount: 105 mg.    7. Class 1 obesity due to excess calories with serious comorbidity and body mass index (BMI) of 30.0 to 30.9 in adult    8. Xerosis of skin    9. Chronic midline low back pain without sciatica    10. Overweight (BMI 25.0-29.9)  -     phendimetrazine tartrate 105 mg cpER; Take 105 mg by mouth daily. Max Daily Amount: 105 mg. 11. Weight loss  -     phendimetrazine tartrate 105 mg cpER; Take 105 mg by mouth daily. Max Daily Amount: 105 mg. 12. Acute midline low back pain without sciatica  -     diclofenac (VOLTAREN) 1 % gel; Apply 4 g to affected area four (4) times daily. 13. Pain in both hands  -     diclofenac (VOLTAREN) 1 % gel; Apply 4 g to affected area four (4) times daily. 14. Arthritis  -     diclofenac (VOLTAREN) 1 % gel; Apply 4 g to affected area four (4) times daily.       Follow-up and Dispositions    · Return in about 4 weeks (around 2/4/2020) for Medication Check, Weight Mgmt, Allergies, LBP.       reviewed diet, exercise and weight control  reviewed medications and side effects in detail - stop Flonase, stop Afrin, start Singulair daily as prescribed. We will need to check on her PA request, resent Diclofenac gel rx. Patient to notify us if she does not get a response from pharmacy and/or us. lose weight, increase physical activity, continue present plan, call if any problems    Health Maintenance reviewed - reviewed, HPV #3 due in 2/2020, otherwise UTD. Recommended healthy diet low in carbohydrates, fats, sodium and cholesterol. Recommended regular cardiovascular exercise 3-6 times per week for 30-60 minutes daily. Chart is reviewed and updated today in the office. Records requested for other providers patient has seen and is currently seeing. Patient was offered a choice/choices in the treatment plan today. Patient expresses understanding of the plan and agrees with recommendations. Verbal and written instructions (see AVS) provided. See patient instructions for more. Patient expresses understanding of diagnosis and treatment plan.

## 2020-02-04 ENCOUNTER — OFFICE VISIT (OUTPATIENT)
Dept: FAMILY MEDICINE CLINIC | Age: 40
End: 2020-02-04

## 2020-02-04 VITALS
TEMPERATURE: 98 F | SYSTOLIC BLOOD PRESSURE: 120 MMHG | DIASTOLIC BLOOD PRESSURE: 89 MMHG | RESPIRATION RATE: 18 BRPM | HEIGHT: 60 IN | OXYGEN SATURATION: 100 % | BODY MASS INDEX: 31.35 KG/M2 | HEART RATE: 67 BPM | WEIGHT: 159.7 LBS

## 2020-02-04 DIAGNOSIS — R63.4 WEIGHT LOSS: ICD-10-CM

## 2020-02-04 DIAGNOSIS — Z79.60 LONG-TERM USE OF IMMUNOSUPPRESSANT MEDICATION: ICD-10-CM

## 2020-02-04 DIAGNOSIS — R51.9 SINUS HEADACHE: ICD-10-CM

## 2020-02-04 DIAGNOSIS — F41.9 ANXIETY DISORDER, UNSPECIFIED TYPE: ICD-10-CM

## 2020-02-04 DIAGNOSIS — E66.3 OVERWEIGHT (BMI 25.0-29.9): ICD-10-CM

## 2020-02-04 DIAGNOSIS — N04.9 NEPHROTIC SYNDROME: Primary | ICD-10-CM

## 2020-02-04 DIAGNOSIS — Z76.89 ENCOUNTER FOR WEIGHT MANAGEMENT: ICD-10-CM

## 2020-02-04 DIAGNOSIS — R05.9 COUGH: ICD-10-CM

## 2020-02-04 DIAGNOSIS — E66.09 CLASS 1 OBESITY DUE TO EXCESS CALORIES WITH SERIOUS COMORBIDITY AND BODY MASS INDEX (BMI) OF 30.0 TO 30.9 IN ADULT: ICD-10-CM

## 2020-02-04 DIAGNOSIS — J30.9 ALLERGIC RHINITIS, UNSPECIFIED SEASONALITY, UNSPECIFIED TRIGGER: ICD-10-CM

## 2020-02-04 DIAGNOSIS — M32.14 SYSTEMIC LUPUS ERYTHEMATOSUS WITH GLOMERULAR DISEASE, UNSPECIFIED SLE TYPE (HCC): Chronic | ICD-10-CM

## 2020-02-04 DIAGNOSIS — R73.01 IMPAIRED FASTING GLUCOSE: ICD-10-CM

## 2020-02-04 DIAGNOSIS — Z23 ENCOUNTER FOR IMMUNIZATION: ICD-10-CM

## 2020-02-04 DIAGNOSIS — M32.14 LUPUS NEPHRITIS (HCC): ICD-10-CM

## 2020-02-04 DIAGNOSIS — J30.2 SEASONAL ALLERGIC RHINITIS, UNSPECIFIED TRIGGER: ICD-10-CM

## 2020-02-04 RX ORDER — MYCOPHENOLATE MOFETIL 250 MG/1
750 CAPSULE ORAL DAILY
COMMUNITY
Start: 2020-01-17

## 2020-02-04 RX ORDER — MOMETASONE FUROATE 50 UG/1
2 SPRAY, METERED NASAL DAILY
Qty: 1 CONTAINER | Refills: 2 | Status: SHIPPED | OUTPATIENT
Start: 2020-02-04 | End: 2020-05-02

## 2020-02-04 RX ORDER — MONTELUKAST SODIUM 10 MG/1
10 TABLET ORAL DAILY
Qty: 90 TAB | Refills: 3 | Status: SHIPPED | OUTPATIENT
Start: 2020-02-04

## 2020-02-04 RX ORDER — PHENDIMETRAZINE TARTRATE 105 MG/1
105 CAPSULE, EXTENDED RELEASE ORAL DAILY
Qty: 30 CAP | Refills: 0 | Status: SHIPPED | OUTPATIENT
Start: 2020-02-04 | End: 2020-03-03 | Stop reason: ALTCHOICE

## 2020-02-04 NOTE — PROGRESS NOTES
Quynh Long is a 46 y.o. female  Chief Complaint   Patient presents with    Follow-up     4 month follow up    Weight Management    Other     medication check      There are no preventive care reminders to display for this patient. Visit Vitals  /89   Pulse 67   Temp 98 °F (36.7 °C) (Oral)   Resp 18   Ht 5' (1.524 m)   Wt 159 lb 11.2 oz (72.4 kg)   SpO2 100%   BMI 31.19 kg/m²     1. Have you been to the ER, urgent care clinic since your last visit? Hospitalized since your last visit? No    2. Have you seen or consulted any other health care providers outside of the [address] [place] [address] since your last visit? Include any pap smears or colon screening.   Yes     Water: 47.0  Resting Energy: 1329

## 2020-02-04 NOTE — PATIENT INSTRUCTIONS
Vaccine Information Statement HPV (Human Papillomavirus) Vaccine: What You Need to Know Many Vaccine Information Statements are available in [place] and other languages. See www.immunize.org/vis Hojas de información sobre vacunas están disponibles en español y en muchos otros idiomas. Visite www.immunize.org/vis 1. Why get vaccinated? HPV (Human papillomavirus) vaccine can prevent infection with some types of human papillomavirus. HPV infections can cause certain types of cancers including:  cervical, vaginal and vulvar cancers in women,  
 penile cancer in men, and 
 anal cancers in both men and women. HPV vaccine prevents infection from the HPV types that cause over 90% of these cancers. HPV is spread through intimate skin-to-skin or sexual contact. HPV infections are so common that nearly all men and women will get at least one type of HPV at some time in their lives. Most HPV infections go away by themselves within 2 years. But sometimes HPV infections will last longer and can cause cancers later in life. 2. HPV vaccine HPV vaccine is routinely recommended for adolescents at 6or 15years of age to ensure they are protected before they are exposed to the virus. HPV vaccine may be given beginning at age 5 years, and as late as age 39 years. Most people older than 26 years will not benefit from HPV vaccination. Talk with your health care provider if you want more information. Most children who get the first dose before 13years of age need 2 doses of HPV vaccine. Anyone who gets the first dose on or after 13years of age, and younger people with certain immunocompromising conditions, need 3 doses. Your health care provider can give you more information. HPV vaccine may be given at the same time as other vaccines. 3. Talk with your health care provider Tell your vaccine provider if the person getting the vaccine:  Has had an allergic reaction after a previous dose of HPV vaccine, or has any severe, life-threatening allergies.  Is pregnant. In some cases, your health care provider may decide to postpone HPV vaccination to a future visit. People with minor illnesses, such as a cold, may be vaccinated. People who are moderately or severely ill should usually wait until they recover before getting HPV vaccine. Your health care provider can give you more information. 4. Risks of a vaccine reaction  Soreness, redness, or swelling where the shot is given can happen after HPV vaccine.  Fever or headache can happen after HPV vaccine. People sometimes faint after medical procedures, including vaccination. Tell your provider if you feel dizzy or have vision changes or ringing in the ears. As with any medicine, there is a very remote chance of a vaccine causing a severe allergic reaction, other serious injury, or death. 5. What if there is a serious problem? An allergic reaction could occur after the vaccinated person leaves the clinic. If you see signs of a severe allergic reaction (hives, swelling of the face and throat, difficulty breathing, a fast heartbeat, dizziness, or weakness), call 9-1-1 and get the person to the nearest hospital. 
 
For other signs that concern you, call your health care provider. Adverse reactions should be reported to the Vaccine Adverse Event Reporting System (VAERS). Your health care provider will usually file this report, or you can do it yourself. Visit the VAERS website at www.vaers. hhs.gov or call 5-366.466.2961. VAERS is only for reporting reactions, and VAERS staff do not give medical advice. 6. The National Vaccine Injury Compensation Program 
 
The [facility] Vaccine Injury Compensation Program (VICP) is a federal program that was created to compensate people who may have been injured by certain vaccines.  Visit the VICP website at www.hrsa.gov/vaccinecompensation or call 6-642.901.3900 to learn about the program and about filing a claim. There is a time limit to file a claim for compensation. 7. How can I learn more?  Ask your health care provider.  Call your local or state health department.  Contact the Centers for Disease Control and Prevention (CDC): 
- Call 5-977.861.4456 (1-800-CDC-INFO) or 
- Visit CDCs website at www.cdc.gov/vaccines Vaccine Information Statement (Interim) HPV Vaccine 10/30/2019 
43 KESHA Silvestre  [id number] Department of [facility] and [employer] Centers for Disease Control and Prevention Office Use Only Learning About Low-Carbohydrate Diets for Weight Loss What is a low-carbohydrate diet? Low-carb diets avoid foods that are high in carbohydrate. These high-carb foods include pasta, bread, rice, cereal, fruits, and starchy vegetables. Instead, these diets usually have you eat foods that are high in fat and protein. Many people lose weight quickly on a low-carb diet. But the early weight loss is water. People on this diet often gain the weight back after they start eating carbs again. Not all diet plans are safe or work well. A lot of the evidence shows that low-carb diets aren't healthy. That's because these diets often don't include healthy foods like fruits and vegetables. Losing weight safely means balancing protein, fat, and carbs with every meal and snack. And low-carb diets don't always provide the vitamins, minerals, and fiber you need. If you have a serious medical condition, talk to your doctor before you try any diet. These conditions include kidney disease, heart disease, type 2 diabetes, high cholesterol, and high blood pressure. If you are pregnant, it may not be safe for your baby if you are on a low-carb diet. How can you lose weight safely? You might have heard that a diet plan helped another person lose weight. But that doesn't mean that it will work for you. It is very hard to stay on a diet that includes lots of big changes in your eating habits. If you want to get to a healthy weight and stay there, making healthy lifestyle changes will often work better than dieting. These steps can help. · Make a plan for change. Work with your doctor to create a plan that is right for you. · See a dietitian. He or she can show you how to make healthy changes in your eating habits. · Manage stress. If you have a lot of stress in your life, it can be hard to focus on making healthy changes to your daily habits. · Track your food and activity. You are likely to do better at losing weight if you keep track of what you eat and what you do. Follow-up care is a key part of your treatment and safety. Be sure to make and go to all appointments, and call your doctor if you are having problems. It's also a good idea to know your test results and keep a list of the medicines you take. Where can you learn more? Go to http://kodak-dodie.info/. Enter A121 in the search box to learn more about \"Learning About Low-Carbohydrate Diets for Weight Loss. \" Current as of: November 7, 2018 Content Version: 12.2 © 0842-6506 [employer], [employer]. Care instructions adapted under license by [employer] (which disclaims liability or warranty for this information). If you have questions about a medical condition or this instruction, always ask your healthcare professional. [place] [address] any warranty or liability for your use of this information. Using a Nasal Steroid Spray: Care Instructions Your Care Instructions Your doctor may suggest using a corticosteroid nasal spray for your allergy symptoms or sinus problems. These sprays reduce the swelling inside the nose and sinuses. Unlike decongestant nasal sprays, steroid sprays won't lead to more swelling when you stop taking them. These sprays start working in a few days, but it may take several weeks before you get the full effect. Most side effects are minor. The most common complaint is a burning feeling in the nose right after the spray is used. Some people get nosebleeds. Follow-up care is a key part of your treatment and safety. Be sure to make and go to all appointments, and call your doctor if you are having problems. It's also a good idea to know your test results and keep a list of the medicines you take. How can you care for yourself at home? Here are some tips for using these sprays: 
· You may need to prime the sprayer before you use it. This means spraying it into the air a few times to make sure you get the right amount of medicine. Follow the directions on the label. · Blow your nose before you spray. This will help clear out your nostrils. · Gently sniff the medicine into your nose as you spray. Don't snort, or the medicine will go all the way into your throat where it won't do much good. · Aim the nozzle straight toward the outer wall of your nostril. This will help keep the medicine from irritating the inner walls of your nose, especially your septum (the wall that separates your left and right nostrils). · Don't blow your nose for 10 minutes or so after you spray. And try not to sneeze. · Be safe with medicines. Use this medicine exactly as prescribed. Call your doctor if you think you are having a problem with your medicine. · Clean your sprayer once a week. Read the label to learn how. When should you call for help? Watch closely for changes in your health, and be sure to contact your doctor if you have any problems. Where can you learn more? Go to http://kodak-dodie.info/. Enter X064 in the search box to learn more about \"Using a Nasal Steroid Spray: Care Instructions. \" Current as of: October 21, 2018 Content Version: 12.2 © 0858-6345 [employer]. Care instructions adapted under license by [employer] (which disclaims liability or warranty for this information). If you have questions about a medical condition or this instruction, always ask your healthcare professional. [address] any warranty or liability for your use of this information.

## 2020-02-04 NOTE — PROGRESS NOTES
Chief Complaint   Patient presents with    Follow-up     4 month follow up    Weight Management    Other     medication check          HPI:  The patient is a 46 y.o. female who presents today for a follow up appointment. No hospital, ER or specialist visits since last primary care visit except as noted below. Weight Mgmt:  The patient is a 41y.o. year old female who presents today for initial medical weight loss visit. Pt's initial weight is 172 pounds with a BMI of 33.59 on 10/19/2018. Patient's goal weight is 130, but she would be happy at 140 pounds. The patient's weight today is 159 pounds with a BMI of 30.60. She has gained 2 pounds since her last visit. She has been stalled around 159 pounds since approximately 9/2019. The patient's bariatric comorbidities include HTN, lupus, nephrotic syndrome, anxiety, depression, arthritis. The patient's reason for medical weight loss is feel better, help with lupus related comorbidities, help with all of her medical problems. The patient's quality of life goals are to be able to do \"a lot of things that she used to do before her diagnosis of lupus at the age of 20\". Pt with hypertension, nephrotic syndrome, SLE, vit d deficiency, hx of thromboembolism, and obesity presents to the office with a BP of 114/80. For BP, she uses Atenolol 50 mg and Losartan 100 mg daily, tolerating well. For cholesterol, she uses Zocor 20 mg daily, tolerating well. For RAD, she uses Warfin 6 mg daily, rx'd by Dr. Makenzie Ziegler who she sees regularly. The patient's diet typically includes: B - eggs, coffee (every once in a while), beans, rice, fruit, sometimes skip breakfast; L - chicken, salad, rice, tortilla, steak, soups, S - not really a snacker, D - does not eat much for dinner, small sandwich, fruit, salad. Started seeing a nutritionist with [employer], seen on 8/29/2019, 9/12/2019. Doing well with gluten free diet. She was being followed monthly by Alayna Mcginnis RD.   She has not seen her recently, feels she has the tools she needs. She struggled with her diet plan over the holidays and reports she had \"eaten more than she should\". She is now back on track and has been overall making good choices. The patient is drinking the following: soda (just cut these out, occasionally but not even when out to eat, coffee, mostly water, OJ infrequently, cranberry juice for urinary symptoms - has one glass/day; She is not drinking soda or juices, mostly water, she is feeling stressed and feels like \"giving up\" because she has not been able to successfully lose weight despite all of her efforts. She drinks 64 oz of water daily. The patient's exercise at this time includes: walking (tracking her steps on her phone), she has started to increase this with a goal of exercising 3x/week. She is going to the gym (cardio/weights/treadmill/bike) 3x/week approximately 45 minutes starting this past month. The patient is currently weighing: \"I try not to\". She feels she is usually in the same range. \"Sometimes but not every day\"         Sleep: She averages 8 hours of sleep nightly. Sometimes, she has difficulty sleeping through the night and wakes up multiple times a night. Denies caffeine consumption. The patient has not tried appetite suppressants in the past, but she has tried some food interventions. She has previously tried the following medications: \"fit teas, honey/cinnnamon teas\", apple cider vinegar. She has tried restrictive diets. She patient has not tried appetite suppressants in the past, but she has tried some food interventions. She has previously tried the following medications: \"fit teas, honey/cinnnamon teas\", apple cider vinegar. She has tried restrictive diets. She was started on Bontril in the fall and she felt that it worked initially but then [facility]\". She was changed to Bontril TID. She does not feel this was helping.   She was forgetting to take the lunch hour dose. She was changed back to the XR Bontril at her last visit on 4/10/2019. Her hunger is well controlled in terms of volume but she still reports she is hungry overall. Consider Phentermine once renal function established and Nephrology consult done, no data that shows safety with this medication however. Patient gave appetite suppressant medications to Dr. Berta Daugherty for review as follows:  Dr. Laila Vila notes on 5/6/2019:   \"-Belviq: may be ok but not well studied in patients with renal disease. \"Use with caution\" should definitely watch for accumulation of metabolites. I would not take it.  -Qysmia: dose adjustment needed.  -Saxenda: looks okay. -Contrave: dose adjustment needed. -Phentermine: I strongly recommend AGAINST it. Finally, I recommend she see her MD before starting any of these. \"  Patient was prescribed Saxenda on 5/17/2019, this is the safest anti-obesity medication for this patient based on her medical history and current BMI. However her insurance will not cover this medication and therefore she was started on a more affordable out of pocket alternative, Contrave. She developed HAs, N/V on 6/12/2019 and stopped the medication at that time. Since then she has waited to restart the Contrave. She reports she started the Contrave again, she took it for about 3 weeks. She felt like she was eating more, was not effective. She reports constant headaches and made her \"feel awful\". She states the Bontril helped much better with her appetite. Her frustration has also been that she has felt somewhat down and eating for stress somewhat. Restarted Bontril at her last visit on 8/16/2019. She has done well with this, she reports her hunger is well controlled. She reports it is \"a little bit less effective\" but it is still helping her to maintain her diet protocol. She denies any adverse effects from this and would like to continue with this at this time.   She still feels the Bontril is helping her appetite. She denies adverse effects. She is eating lesser portions. She reports her metabolism has improved with the medication. She denies adverse effects still, except some mild intermittent constipation and insomnia. She reports she has had more energy with the medication and has increased her day to day activity. She has struggled with her diet over the christmas holiday. She reports Bontril has helped her maintain her weight however despite having more cravings/increased appetite. She continues to feel like the Bontril is controlling her appetite well but she is continuing to struggle with weight loss despite an improved diet and going to the gym 3x/week. Weight Metrics 2/4/2020 2/4/2020 1/7/2020 12/4/2019 12/4/2019 10/22/2019 10/22/2019   Weight - 159 lb 11.2 oz 157 lb 14.4 oz - 156 lb 8 oz - 158 lb 1.6 oz   Neck Circ (inches) - - - 14 - - -   Waist Measure Inches 37 - - 37.5 - 34.5 -   Body Fat % 34.9 - - 34.2 - 34.6 -   BMI - 31.19 kg/m2 30.85 kg/m2 - 30.6 kg/m2 - 29.87 kg/m2     LBP/Arthritis:  Patient has a hx of mild intermittent LBP but recently over the past week it has become more constant and more painful. She has not taken anything for this pain. She reports the pain as sharp 3/10 pain, elevates to 5-6/10 pain with standing. Rest improves. She saw her Rheumatology doctor after her last visit. He thinks it is more musculoskeletal pain. She has not done her x-ray of the low back, Rheumatology said to hold off and try PT first.  She did not start the Diclofenac gel d/t insurance PA being required. She has not heard back from our office about PA to date. She reports she never heard back about the PA for the prescribed Diclofenac gel. She went ahead and purchased this with a discount card from the pharmacy and paid $21. She feels the gel has helped her with all types of pain.     Allergy Symptoms:  Patient reports that since the 1st of the year she has had sinus headache, nasal drainage, change in smelling sensation. She reports she could not tolerate Pilocarpine for overall dryness from her lupus. She has tried Afrin consistently, Flonase, and OTC Sudafed without any improvement. She was started on Singulair at her last visit on 1/7/2020. She reports her allergy symptoms have markedly improved however she still has some nasal congestion intermittently. She is no longer taking Afrin, Flonase or Sudafed. Review of Systems  A comprehensive review of systems was negative except for that written in the HPI.     Patient Active Problem List   Diagnosis Code    Systemic lupus erythematosus ([facility]) M32.9    Hypertension I10    Nephrotic syndrome N04.9    Long-term use of immunosuppressant medication Z79.899    Current use of steroid medication Z79.52    Lupus nephritis ([facility]) M32.14    Hypermobility syndrome M35.7    Branch retinal artery occlusion H34.239    Depression, major, recurrent ([facility]) F33.9    Anxiety disorder F41.9    Diplopia H53.2    Carpal instability of left wrist with volar intercalated segment instability M25.332    Carpal instability of right wrist M25.331    Raynaud's syndrome I73.00    Acute blood loss anemia D62    History of thromboembolism Z86.718    Class 1 obesity due to excess calories with serious comorbidity and body mass index (BMI) of 30.0 to 30.9 in adult E66.09, Z68.30    Fatigue R53.83    Hypercholesterolemia E78.00    Vitamin D deficiency E55.9    Thrombocytopenia ([facility])  D69.6    SAMARA (generalized anxiety disorder) F41.1    Nonintractable episodic headache R51    Celiac disease K90.0    Encounter for weight management Z76.89    Insomnia G47.00    Seasonal allergic rhinitis J30.2    Xerosis of skin L85.3    Weight loss R63.4    Acute midline low back pain without sciatica M54.5    Pain in both hands M79.641, M79.642    Arthritis M19.90    Chronic midline low back pain without sciatica M54.5, G89.29    Sinus headache R51    Cough R05       Past Medical History:   Diagnosis Date    Anemia associated with acute blood loss 2018    blood transfusion x 2. Txd w iron. Dr. Donald Osorio.  Arthritis     Branch retinal artery occlusion approx     Right, as of 17 resolved and on Coumadin.  Celiac disease     Dr. Hans Betts.  Chronic renal insufficiency, stage II (mild)     secondary to lupus nephritis:  Dr. Odonnell Heading    Depression, major, recurrent ([facility] [address] 03/10/2014    Martinez De Souza, LCSW.  Diffuse proliferative lupus glomerulonephritis (HCC)     Dr Marcia Loredo Diplopia     Dr. Justice Abdi.  Dizziness 2014    Dr. Axel iWsdom, neuro.  Elevated cortisol level ([facility])     Fatigue     intermittent due to lupus    SAMARA (generalized anxiety disorder) 03/10/2014    Martinez De Souza, LCSW    Gallstones     Dr. Helena Beltran Gastritis     as of 17 asymptomatic    Headache     Dr. Ayah Mckoy Hypertension s    due to CKD. Dr. Yuki Robledo    Left tibialis posterior tendonitis     due to injury on sand. Dr. Soo Painter.  Long term current use of anticoagulant therapy     Ovarian cyst 2016    Dr. Megan Hernandez Pregnancy induced hypertension     Proteinuria     Dr. Marcia Loredo PUD (peptic ulcer disease)     Dr. Hendricks Mt syndrome     Dr. Naman Curry. Dr. Johana Munoz.  Sicca syndrome (HCC)     Dr. Naman Curry.  Systemic lupus erythematosus with glomerular disease ([facility]     as of 16 \"aching and swelling of joints\" per patient. Dr Shukri Nguyen. Dr. Trae Thomas. Past Surgical History:   Procedure Laterality Date    HX ACL RECONSTRUCTION Left approx     Dr. Lefty Chávez.  HX  SECTION  2011    HX ENDOSCOPY  2012, 2017    Dr. Dylan Obrien.  HX LAP CHOLECYSTECTOMY  2017    due to gallstones. Dr. Mariaa Carranza Right 10/22/2014    hand to thumb due to thumb dislocation.   Dr. Elizabeth Suero Danette.  HX OTHER SURGICAL  2012    kidney biopsy; benign    HX OTHER SURGICAL Right 10/22/2014    right Posterior Interosseous Nerve Neurectomy, right Anterior Interosseous Nerve Neurectomy. Dr. Humza Gibbs HX RENAL BIOPSY  1/7/14, 2018    CT Guided Renal Biopsy with Conscious Sedation. due to CKD/SLE.  HX SEPTOPLASTY  06/03/2016    w inf turbinate resection. due to turbinate hypertrophy. Dr. Brito Expose History     Tobacco Use    Smoking status: Never Smoker    Smokeless tobacco: Never Used   Substance Use Topics    Alcohol use: No    Drug use: No       Family History   Problem Relation Age of Onset    Glaucoma Father     Hypertension Father        Outpatient Medications Marked as Taking for the 2/4/20 encounter (Office Visit) with Silvia Saha NP   Medication Sig Dispense Refill    mycophenolate mofetil (CELLCEPT) 250 mg capsule Take 750 mg by mouth daily.  montelukast (SINGULAIR) 10 mg tablet Take 1 Tab by mouth daily. 90 Tab 3    mometasone (NASONEX) 50 mcg/actuation nasal spray 2 Sprays by Both Nostrils route daily. 1 Container 2    liraglutide (VICTOZA) 0.6 mg/0.1 mL (18 mg/3 mL) pnij Inject 0.6mg subcutaneously daily x 1 week, then increase to 1.2mg daily, then increase to 1.8mg daily 5 Adjustable Dose Pre-filled Pen Syringe 2    pen needle, diabetic (NOVOTWIST) 32 gauge x 1/5\" ndle 30 UNSPECIFIED by Does Not Apply route daily. 30 Pen Needle 2    phendimetrazine tartrate 105 mg cpER Take 105 mg by mouth daily. Max Daily Amount: 105 mg. 30 Cap 0    warfarin (COUMADIN) 4 mg tablet Take 4 mg by mouth daily. All days expect Tue, Thur Sat Pt takes 6mg.  clobetasol (TEMOVATE) 0.05 % external solution Apply  to affected area daily as needed.  diclofenac (VOLTAREN) 1 % gel Apply 4 g to affected area four (4) times daily.  100 g 1    LINZESS 72 mcg cap capsule TAKE 1 CAPSULE BY MOUTH EVERY DAY BEFORE BREAKFAST  3    pantoprazole (PROTONIX) 40 mg tablet TAKE 1 TABLET BY MOUTH ONCE A DAY BEFORE BREAKFAST  3    methocarbamol (ROBAXIN) 500 mg tablet Take 1 Tab by mouth nightly as needed for Pain. 7 Tab 0    levonorgestrel (MIRENA) 20 mcg/24 hours (5 yrs) 52 mg IUD Mirena 20 mcg/24 hours (5 yrs) 52 mg intrauterine device   remove 9/8/21      ergocalciferol (ERGOCALCIFEROL) 50,000 unit capsule TAKE ONE CAPSULE BY MOUTH ONCE A WEEK  0    NIFEdipine ER (PROCARDIA XL) 30 mg ER tablet       warfarin (COUMADIN) 6 mg tablet Take 4 mg by mouth daily. Tue, Thur, Sat is 6mg once daily all other days 4mg.  acetaminophen (ACETAMINOPHEN EXTRA STRENGTH) 500 mg tablet Take 1 Tab by mouth every six (6) hours as needed for Pain. 30 Tab 0    hydroxychloroquine (PLAQUENIL) 200 mg tablet Take 1 tab po qam and 1/2 tab po qpm 45 Tab 9    simvastatin (ZOCOR) 20 mg tablet TAKE 1 TABLET BY MOUTH ONCE DAILY  3    losartan (COZAAR) 100 mg tablet Take 50 mg by mouth nightly.  atenolol (TENORMIN) 50 mg tablet Take 50 mg by mouth nightly. Current Outpatient Medications on File Prior to Visit   Medication Sig Dispense Refill    mycophenolate mofetil (CELLCEPT) 250 mg capsule Take 750 mg by mouth daily.  warfarin (COUMADIN) 4 mg tablet Take 4 mg by mouth daily. All days expect Tue, Thur Sat Pt takes 6mg.  clobetasol (TEMOVATE) 0.05 % external solution Apply  to affected area daily as needed.  diclofenac (VOLTAREN) 1 % gel Apply 4 g to affected area four (4) times daily. 100 g 1    LINZESS 72 mcg cap capsule TAKE 1 CAPSULE BY MOUTH EVERY DAY BEFORE BREAKFAST  3    pantoprazole (PROTONIX) 40 mg tablet TAKE 1 TABLET BY MOUTH ONCE A DAY BEFORE BREAKFAST  3    methocarbamol (ROBAXIN) 500 mg tablet Take 1 Tab by mouth nightly as needed for Pain.  7 Tab 0    levonorgestrel (MIRENA) 20 mcg/24 hours (5 yrs) 52 mg IUD Mirena 20 mcg/24 hours (5 yrs) 52 mg intrauterine device   remove 9/8/21      ergocalciferol (ERGOCALCIFEROL) 50,000 unit capsule TAKE ONE CAPSULE BY MOUTH ONCE A WEEK  0    NIFEdipine ER (PROCARDIA XL) 30 mg ER tablet       warfarin (COUMADIN) 6 mg tablet Take 4 mg by mouth daily. Tue, Thur, Sat is 6mg once daily all other days 4mg.  acetaminophen (ACETAMINOPHEN EXTRA STRENGTH) 500 mg tablet Take 1 Tab by mouth every six (6) hours as needed for Pain. 30 Tab 0    hydroxychloroquine (PLAQUENIL) 200 mg tablet Take 1 tab po qam and 1/2 tab po qpm 45 Tab 9    simvastatin (ZOCOR) 20 mg tablet TAKE 1 TABLET BY MOUTH ONCE DAILY  3    losartan (COZAAR) 100 mg tablet Take 50 mg by mouth nightly.  atenolol (TENORMIN) 50 mg tablet Take 50 mg by mouth nightly.  [DISCONTINUED] montelukast (SINGULAIR) 10 mg tablet Take 1 Tab by mouth daily. 90 Tab 0    [DISCONTINUED] phendimetrazine tartrate 105 mg cpER Take 105 mg by mouth daily. Max Daily Amount: 105 mg. 30 Cap 0    [DISCONTINUED] mycophenolate (CELLCEPT) 500 mg tablet Take 1,500 mg by mouth two (2) times a day. No current facility-administered medications on file prior to visit. Allergies   Allergen Reactions    Gluten Diarrhea    Fiorinal-Codeine #3 [Codeine-Butalbital-Asa-Caff] Nausea Only       PE:  Visit Vitals  /89   Pulse 67   Temp 98 °F (36.7 °C) (Oral)   Resp 18   Ht 5' (1.524 m)   Wt 159 lb 11.2 oz (72.4 kg)   SpO2 100%   BMI 31.19 kg/m²       Gen: alert, oriented, no acute distress  Head: normocephalic, atraumatic  Ears: external auditory canals clear, TMs without erythema or effusion  Eyes: pupils equal round reactive to light, sclera clear, conjunctiva clear  Oral: moist mucus membranes, no oral lesions, no pharyngeal inflammation or exudate  Neck: symmetric normal sized thyroid, no carotid bruits, no jugular vein distention  Resp: no increase work of breathing, lungs clear to ausculation bilaterally, no wheezing, rales or rhonchi  CV: S1, S2 normal.  No murmurs, rubs, or gallops. Abd: soft, not tender, not distended. No hepatosplenomegaly.  Normal bowel sounds. No hernias. No abdominal or renal bruits. Neuro: cranial nerves intact, normal strength and movement in all extremities, reflexes and sensation intact and symmetric. Skin: no lesion or rash  Extremities: no cyanosis or edema    No results found for this visit on 02/04/20. Assessment/Plan:    ICD-10-CM ICD-9-CM    1. Nephrotic syndrome N04.9 581.9    2. Lupus nephritis ([facility] M32.14 710.0      583.81    3. Systemic lupus erythematosus with glomerular disease, unspecified SLE type ([facility] M32.14 710.0      583.81    4. Long-term use of immunosuppressant medication Z79.899 V58.69    5. Anxiety disorder, unspecified type F41.9 300.00    6. Class 1 obesity due to excess calories with serious comorbidity and body mass index (BMI) of 30.0 to 30.9 in adult E66.09 278.00 liraglutide (VICTOZA) 0.6 mg/0.1 mL (18 mg/3 mL) pnij    Z68.30 V85.30 pen needle, diabetic (NOVOTWIST) 32 gauge x 1/5\" ndle      phendimetrazine tartrate 105 mg cpER   7. Encounter for weight management Z76.89 V65.49 liraglutide (VICTOZA) 0.6 mg/0.1 mL (18 mg/3 mL) pnij      pen needle, diabetic (NOVOTWIST) 32 gauge x 1/5\" ndle      phendimetrazine tartrate 105 mg cpER   8. Encounter for immunization Z23 V03.89 HUMAN PAPILLOMA VIRUS NONAVALENT HPV 3 DOSE IM (GARDASIL 9)      [place] IMMUNIZ ADMIN,1 SINGLE/COMB VAC/TOXOID   9. Seasonal allergic rhinitis, unspecified trigger J30.2 477.9 montelukast (SINGULAIR) 10 mg tablet      mometasone (NASONEX) 50 mcg/actuation nasal spray   10. Cough R05 786.2 montelukast (SINGULAIR) 10 mg tablet   11. Sinus headache R51 784.0 montelukast (SINGULAIR) 10 mg tablet   12. Allergic rhinitis, unspecified seasonality, unspecified trigger J30.9 477.9    13. Overweight (BMI 25.0-29. 9) E66.3 278.02    14. Weight loss R63.4 783.21    15.  Impaired fasting glucose R73.01 790.21 liraglutide (VICTOZA) 0.6 mg/0.1 mL (18 mg/3 mL) pnij      pen needle, diabetic (NOVOTWIST) 32 gauge x 1/5\" ndle     Diagnoses and all orders for this visit: 1. Nephrotic syndrome    2. Lupus nephritis ([facility] [address]    3. Systemic lupus erythematosus with glomerular disease, unspecified SLE type ([facility]    4. Long-term use of immunosuppressant medication    5. Anxiety disorder, unspecified type    6. Class 1 obesity due to excess calories with serious comorbidity and body mass index (BMI) of 30.0 to 30.9 in adult  -     liraglutide (VICTOZA) 0.6 mg/0.1 mL (18 mg/3 mL) pnij; Inject 0.6mg subcutaneously daily x 1 week, then increase to 1.2mg daily, then increase to 1.8mg daily  -     pen needle, diabetic (NOVOTWIST) 32 gauge x 1/5\" ndle; 30 UNSPECIFIED by Does Not Apply route daily. -     phendimetrazine tartrate 105 mg cpER; Take 105 mg by mouth daily. Max Daily Amount: 105 mg.    7. Encounter for weight management  -     liraglutide (VICTOZA) 0.6 mg/0.1 mL (18 mg/3 mL) pnij; Inject 0.6mg subcutaneously daily x 1 week, then increase to 1.2mg daily, then increase to 1.8mg daily  -     pen needle, diabetic (NOVOTWIST) 32 gauge x 1/5\" ndle; 30 UNSPECIFIED by Does Not Apply route daily. -     phendimetrazine tartrate 105 mg cpER; Take 105 mg by mouth daily. Max Daily Amount: 105 mg.    8. Encounter for immunization  -     HUMAN PAPILLOMA VIRUS NONAVALENT HPV 3 DOSE IM (GARDASIL 9)  -     [place] IMMUNIZ ADMIN,1 SINGLE/COMB VAC/TOXOID    9. Seasonal allergic rhinitis, unspecified trigger  -     montelukast (SINGULAIR) 10 mg tablet; Take 1 Tab by mouth daily. -     mometasone (NASONEX) 50 mcg/actuation nasal spray; 2 Sprays by Both Nostrils route daily. 10. Cough  -     montelukast (SINGULAIR) 10 mg tablet; Take 1 Tab by mouth daily. 11. Sinus headache  -     montelukast (SINGULAIR) 10 mg tablet; Take 1 Tab by mouth daily. 12. Allergic rhinitis, unspecified seasonality, unspecified trigger    13. Overweight (BMI 25.0-29.9)    14. Weight loss    15. Impaired fasting glucose  -     liraglutide (VICTOZA) 0.6 mg/0.1 mL (18 mg/3 mL) pnij;  Inject 0.6mg subcutaneously daily x 1 week, then increase to 1.2mg daily, then increase to 1.8mg daily  -     pen needle, diabetic (NOVOTWIST) 32 gauge x 1/5\" ndle; 30 UNSPECIFIED by Does Not Apply route daily. Follow-up and Dispositions    · Return in about 4 weeks (around 3/3/2020) for Medication Check, Weight Mgmt, Allergies, Bontril & ? Victoza #1 Start. lab results and schedule of future lab studies reviewed with patient  reviewed diet, exercise and weight control  reviewed medications and side effects in detail    lose weight, increase physical activity, call if any problems    Health Maintenance reviewed - reviewed, HPV #3 given today completing the Gardasil vaccination series, otherwise UTD. Recommended healthy diet low in carbohydrates, fats, sodium and cholesterol. Recommended regular cardiovascular exercise 3-6 times per week for 30-60 minutes daily. Chart is reviewed and updated today in the office. Records requested for other providers patient has seen and is currently seeing. Patient was offered a choice/choices in the treatment plan today. Patient expresses understanding of the plan and agrees with recommendations. Verbal and written instructions (see AVS) provided. See patient instructions for more. Patient expresses understanding of diagnosis and treatment plan.

## 2020-03-03 ENCOUNTER — OFFICE VISIT (OUTPATIENT)
Dept: FAMILY MEDICINE CLINIC | Age: 40
End: 2020-03-03

## 2020-03-03 VITALS
OXYGEN SATURATION: 98 % | SYSTOLIC BLOOD PRESSURE: 119 MMHG | DIASTOLIC BLOOD PRESSURE: 85 MMHG | RESPIRATION RATE: 18 BRPM | HEART RATE: 77 BPM | TEMPERATURE: 98 F | HEIGHT: 61 IN | WEIGHT: 150.9 LBS | BODY MASS INDEX: 28.49 KG/M2

## 2020-03-03 DIAGNOSIS — R63.4 WEIGHT LOSS: ICD-10-CM

## 2020-03-03 DIAGNOSIS — I15.1 HYPERTENSION SECONDARY TO OTHER RENAL DISORDERS: ICD-10-CM

## 2020-03-03 DIAGNOSIS — N28.89 HYPERTENSION SECONDARY TO OTHER RENAL DISORDERS: ICD-10-CM

## 2020-03-03 DIAGNOSIS — E55.9 VITAMIN D DEFICIENCY: ICD-10-CM

## 2020-03-03 DIAGNOSIS — R73.01 IMPAIRED FASTING GLUCOSE: Primary | ICD-10-CM

## 2020-03-03 DIAGNOSIS — Z76.89 ENCOUNTER FOR WEIGHT MANAGEMENT: ICD-10-CM

## 2020-03-03 DIAGNOSIS — M32.14 SYSTEMIC LUPUS ERYTHEMATOSUS WITH GLOMERULAR DISEASE, UNSPECIFIED SLE TYPE (HCC): ICD-10-CM

## 2020-03-03 DIAGNOSIS — E66.09 CLASS 1 OBESITY DUE TO EXCESS CALORIES WITH SERIOUS COMORBIDITY AND BODY MASS INDEX (BMI) OF 30.0 TO 30.9 IN ADULT: ICD-10-CM

## 2020-03-03 PROBLEM — K59.03 DRUG-INDUCED CONSTIPATION: Status: ACTIVE | Noted: 2020-03-03

## 2020-03-03 PROBLEM — N83.202 CYST OF LEFT OVARY: Status: ACTIVE | Noted: 2020-02-18

## 2020-03-03 NOTE — PROGRESS NOTES
Chief Complaint   Patient presents with    Weight Management     1 month f/u         HPI:  The patient is a 46 y.o. female who presents today for a follow up appointment. No hospital, ER or specialist visits since last primary care visit except as noted below. Weight Mgmt:  The patient is a 41y.o. year old female who presents today for initial medical weight loss visit. Pt's initial weight is 172 pounds with a BMI of 33.59 on 10/19/2018. Patient's goal weight is 130, but she would be happy at 140 pounds. The patient's weight today is 150 pounds with a BMI of 29.47. She has lost 9 pounds since her last visit. She had been stalled around 159 pounds since approximately 9/2019. The patient's bariatric comorbidities include HTN, lupus, nephrotic syndrome, anxiety, depression, arthritis. The patient's reason for medical weight loss is feel better, help with lupus related comorbidities, help with all of her medical problems. The patient's quality of life goals are to be able to do \"a lot of things that she used to do before her diagnosis of lupus at the age of 20\". Pt with hypertension, nephrotic syndrome, SLE, vit d deficiency, hx of thromboembolism, and obesity presents to the office with a BP of 114/80. For BP, she uses Atenolol 50 mg and Losartan 100 mg daily, tolerating well. For cholesterol, she uses Zocor 20 mg daily, tolerating well. For RAD, she uses Warfin 6 mg daily, rx'd by Dr. Juan Ramon Villalpando who she sees regularly. The patient's diet typically includes: B - eggs, coffee (every once in a while), beans, rice, fruit, sometimes skip breakfast; L - chicken, salad, rice, tortilla, steak, soups, S - not really a snacker, D - does not eat much for dinner, small sandwich, fruit, salad. Started seeing a nutritionist with [employer], seen on 8/29/2019, 9/12/2019. Doing well with gluten free diet. She was being followed monthly by Hesham Bañuelos RD.   She has not seen her recently, feels she has the tools she needs.  She struggled with her diet plan over the holidays and reports she had \"eaten more than she should\". She is now back on track and has been overall making good choices. The patient is drinking the following: soda (just cut these out, occasionally but not even when out to eat, coffee, mostly water, OJ infrequently, cranberry juice for urinary symptoms - has one glass/day; She is not drinking soda or juices, mostly water, she is feeling stressed and feels like \"giving up\" because she has not been able to successfully lose weight despite all of her efforts. She drinks 64 oz of water daily. The patient's exercise at this time includes: walking (tracking her steps on her phone), she has started to increase this with a goal of exercising 3x/week. She is going to the gym (cardio/weights/treadmill/bike) 3x/week approximately 45 minutes starting this past month. The patient is currently weighing: \"I try not to\". She feels she is usually in the same range. \"Sometimes but not every day\"         Sleep: She averages 8 hours of sleep nightly. Sometimes, she has difficulty sleeping through the night and wakes up multiple times a night. Denies caffeine consumption. The patient has not tried appetite suppressants in the past, but she has tried some food interventions. She has previously tried the following medications: \"fit teas, honey/cinnnamon teas\", apple cider vinegar. She has tried restrictive diets. She patient has not tried appetite suppressants in the past, but she has tried some food interventions. She has previously tried the following medications: \"fit teas, honey/cinnnamon teas\", apple cider vinegar. She has tried restrictive diets. She was started on Bontril in the fall and she felt that it worked initially but then [employer] got used to it\". She was changed to Bontril TID. She does not feel this was helping. She was forgetting to take the lunch hour dose.   She was changed back to the XR Bontril at her last visit on 4/10/2019. Her hunger is well controlled in terms of volume but she still reports she is hungry overall. Consider Phentermine once renal function established and Nephrology consult done, no data that shows safety with this medication however. Patient gave appetite suppressant medications to Dr. Valerie Lackey for review as follows:  Dr. Irvin Ge notes on 5/6/2019:   \"-Belviq: may be ok but not well studied in patients with renal disease. \"Use with caution\" should definitely watch for accumulation of metabolites. I would not take it.  -Qysmia: dose adjustment needed.  -Saxenda: looks okay. -Contrave: dose adjustment needed. -Phentermine: I strongly recommend AGAINST it. Finally, I recommend she see her MD before starting any of these. \"  Patient was prescribed Saxenda on 5/17/2019, this is the safest anti-obesity medication for this patient based on her medical history and current BMI. However her insurance will not cover this medication and therefore she was started on a more affordable out of pocket alternative, Contrave. She developed HAs, N/V on 6/12/2019 and stopped the medication at that time. Since then she has waited to restart the Contrave. She reports she started the Contrave again, she took it for about 3 weeks. She felt like she was eating more, was not effective. She reports constant headaches and made her \"feel awful\". She states the Bontril helped much better with her appetite. Her frustration has also been that she has felt somewhat down and eating for stress somewhat. Restarted Bontril at her last visit on 8/16/2019. She has done well with this, she reports her hunger is well controlled. She reports it is \"a little bit less effective\" but it is still helping her to maintain her diet protocol. She denies any adverse effects from this and would like to continue with this at this time. She still feels the Bontril is helping her appetite. She denies adverse effects. She is eating lesser portions. She reports her metabolism has improved with the medication. She denies adverse effects still, except some mild intermittent constipation and insomnia. She reports she has had more energy with the medication and has increased her day to day activity. She has struggled with her diet over the christmas holiday. She reports Bontril has helped her maintain her weight however despite having more cravings/increased appetite. She continues to feel like the Bontril is controlling her appetite well but she is continuing to struggle with weight loss despite an improved diet and going to the gym 3x/week. She was started on Victoza after her last visit. She is also taking Bontril. She reports feeling \"sickly and nauseated\". She is currently taking 1.8mg. She reports her symptoms she had starting the medication has resolved for the most part. She reports that she has no appetite in the afternoon, she has decreased to only eating twice daily. She has been on the current dose of 1.8mg x 2 weeks. She is not eating dinner until 7 or 8pm because she is not hungry. She reports getting full very quickly after she starts eating. Reports constipation, she is going but she is having abdominal cramps with minimal results. Weight Metrics 3/3/2020 3/3/2020 2/4/2020 2/4/2020 1/7/2020 12/4/2019 12/4/2019   Weight - 150 lb 14.4 oz - 159 lb 11.2 oz 157 lb 14.4 oz - 156 lb 8 oz   Neck Circ (inches) - - - - - 14 -   Waist Measure Inches 35 - 37 - - 37.5 -   Body Fat % 33.7 - 34.9 - - 34.2 -   BMI - 28.51 kg/m2 - 31.19 kg/m2 30.85 kg/m2 - 30.6 kg/m2     LBP/Arthritis:  Patient has a hx of mild intermittent LBP but recently over the past week it has become more constant and more painful. She has not taken anything for this pain. She reports the pain as sharp 3/10 pain, elevates to 5-6/10 pain with standing. Rest improves.   She saw her Rheumatology doctor after her last visit. He thinks it is more musculoskeletal pain. She has not done her x-ray of the low back, Rheumatology said to hold off and try PT first.  She did not start the Diclofenac gel d/t insurance PA being required. She has not heard back from our office about PA to date. She reports she never heard back about the PA for the prescribed Diclofenac gel. She went ahead and purchased this with a discount card from the pharmacy and paid $21. She feels the gel has helped her with all types of pain. Allergy Symptoms:  Patient reports that since the 1st of the year she has had sinus headache, nasal drainage, change in smelling sensation. She reports she could not tolerate Pilocarpine for overall dryness from her lupus. She has tried Afrin consistently, Flonase, and OTC Sudafed without any improvement. She was started on Singulair at her last visit on 1/7/2020. She reports her allergy symptoms have markedly improved however she still has some nasal congestion intermittently. She is no longer taking Afrin, Flonase or Sudafed. She was changed to Singulair and Flonase with good relief. Review of Systems  A comprehensive review of systems was negative except for that written in the HPI.     Patient Active Problem List   Diagnosis Code    Systemic lupus erythematosus ([facility]) M32.9    Hypertension I10    Nephrotic syndrome N04.9    Long-term use of immunosuppressant medication Z79.899    Current use of steroid medication Z79.52    Lupus nephritis ([facility]) M32.14    Hypermobility syndrome M35.7    Branch retinal artery occlusion H34.239    Depression, major, recurrent ([facility]) F33.9    Anxiety disorder F41.9    Diplopia H53.2    Carpal instability of left wrist with volar intercalated segment instability M25.332    Carpal instability of right wrist M25.331    Raynaud's syndrome I73.00    Acute blood loss anemia D62    History of thromboembolism Z86.718    Class 1 obesity due to excess calories with serious comorbidity and body mass index (BMI) of 30.0 to 30.9 in adult E66.09, Z68.30    Fatigue R53.83    Hypercholesterolemia E78.00    Vitamin D deficiency E55.9    Thrombocytopenia (HCC)  D69.6    SAMARA (generalized anxiety disorder) F41.1    Nonintractable episodic headache R51    Celiac disease K90.0    Encounter for weight management Z76.89    Insomnia G47.00    Seasonal allergic rhinitis J30.2    Xerosis of skin L85.3    Weight loss R63.4    Acute midline low back pain without sciatica M54.5    Pain in both hands M79.641, M79.642    Arthritis M19.90    Chronic midline low back pain without sciatica M54.5, G89.29    Sinus headache R51    Cough R05    Drug-induced constipation K59.03    Cyst of left ovary N83.202    Impaired fasting glucose R73.01       Past Medical History:   Diagnosis Date    Anemia associated with acute blood loss 06/2018    blood transfusion x 2. Txd w iron. Dr. Remedios Elizabeth.  Arthritis     Branch retinal artery occlusion approx 2013    Right, as of 5/26/16 resolved and on Coumadin.  Celiac disease 2012    Dr. Jose Urban.  Chronic renal insufficiency, stage II (mild)     secondary to lupus nephritis:  Dr. Dacia Downs    Depression, major, recurrent ([facility] [address] 03/10/2014    Duke Franks LCSW.  Diffuse proliferative lupus glomerulonephritis (HCC)     Dr Roberta Holly Diplopia 2014    Dr. Norberto Hollis.  Dizziness 07/2014    Dr. Hector Clement, neuro.  Elevated cortisol level (HCC)     Fatigue     intermittent due to lupus    SAMARA (generalized anxiety disorder) 03/10/2014    Duke Frnaks LCSW    Gallstones 2017    Dr. Marlon Brewster Gastritis     as of 5/26/16 asymptomatic    Headache 2014    Dr. Prasanna Murcia Hypertension 2000s    due to CKD. Dr. Nasim Ruelas    Left tibialis posterior tendonitis 2017    due to injury on sand. Dr. John Thomas.     Long term current use of anticoagulant therapy     Ovarian cyst 05/26/2016    Dr. Nasreen Tran Pregnancy induced hypertension     Proteinuria     Dr. Randall Ra    PUD (peptic ulcer disease)     Dr. Caleen Nyhan syndrome     Dr. Rupinder Melchor.  Sicca syndrome (HCC)     Dr. Rupinder Tucker  Systemic lupus erythematosus with glomerular disease ([facility] [address]     as of 16 \"aching and swelling of joints\" per patient. Dr Jalen Greer. Dr. Kelli Holman. Past Surgical History:   Procedure Laterality Date    HX ACL RECONSTRUCTION Left approx     Dr. Veronika Sousa.  HX  SECTION      HX ENDOSCOPY  2012, 2017    Dr. Gunjan Mooney.  HX LAP CHOLECYSTECTOMY  2017    due to gallstones. Dr. Belen Sidhu Right 10/22/2014    hand to thumb due to thumb dislocation. Dr. Mary Nettles.  HX OTHER SURGICAL  2012    kidney biopsy; benign    HX OTHER SURGICAL Right 10/22/2014    right Posterior Interosseous Nerve Neurectomy, right Anterior Interosseous Nerve Neurectomy. Dr. Rupa Calvillo HX RENAL BIOPSY  2018    CT Guided Renal Biopsy with Conscious Sedation. due to CKD/SLE.  HX SEPTOPLASTY  2016    w inf turbinate resection. due to turbinate hypertrophy. Dr. Aiden Ma History     Tobacco Use    Smoking status: Never Smoker    Smokeless tobacco: Never Used   Substance Use Topics    Alcohol use: No    Drug use: No       Family History   Problem Relation Age of Onset    Glaucoma Father     Hypertension Father        Outpatient Medications Marked as Taking for the 3/3/20 encounter (Office Visit) with Mick Arteaga NP   Medication Sig Dispense Refill    liraglutide (VICTOZA) 0.6 mg/0.1 mL (18 mg/3 mL) pnij 1.8 mg by SubCUTAneous route daily. 5 Adjustable Dose Pre-filled Pen Syringe 2    mycophenolate mofetil (CELLCEPT) 250 mg capsule Take 750 mg by mouth daily.  montelukast (SINGULAIR) 10 mg tablet Take 1 Tab by mouth daily.  90 Tab 3    mometasone (NASONEX) 50 mcg/actuation nasal spray 2 Sprays by Both Nostrils route daily. 1 Container 2    pen needle, diabetic (NOVOTWIST) 32 gauge x 1/5\" ndle 30 UNSPECIFIED by Does Not Apply route daily. 30 Pen Needle 2    warfarin (COUMADIN) 4 mg tablet Take 4 mg by mouth daily. All days expect Tue, Thur Sat Pt takes 6mg.  clobetasol (TEMOVATE) 0.05 % external solution Apply  to affected area daily as needed.  diclofenac (VOLTAREN) 1 % gel Apply 4 g to affected area four (4) times daily. 100 g 1    LINZESS 72 mcg cap capsule TAKE 1 CAPSULE BY MOUTH EVERY DAY BEFORE BREAKFAST  3    pantoprazole (PROTONIX) 40 mg tablet TAKE 1 TABLET BY MOUTH ONCE A DAY BEFORE BREAKFAST  3    methocarbamol (ROBAXIN) 500 mg tablet Take 1 Tab by mouth nightly as needed for Pain. 7 Tab 0    levonorgestrel (MIRENA) 20 mcg/24 hours (5 yrs) 52 mg IUD Mirena 20 mcg/24 hours (5 yrs) 52 mg intrauterine device   remove 9/8/21      NIFEdipine ER (PROCARDIA XL) 30 mg ER tablet       warfarin (COUMADIN) 6 mg tablet Take 4 mg by mouth daily. Tue, Thur, Sat is 6mg once daily all other days 4mg.  acetaminophen (ACETAMINOPHEN EXTRA STRENGTH) 500 mg tablet Take 1 Tab by mouth every six (6) hours as needed for Pain. 30 Tab 0    hydroxychloroquine (PLAQUENIL) 200 mg tablet Take 1 tab po qam and 1/2 tab po qpm 45 Tab 9    simvastatin (ZOCOR) 20 mg tablet TAKE 1 TABLET BY MOUTH ONCE DAILY  3    losartan (COZAAR) 100 mg tablet Take 50 mg by mouth nightly.  atenolol (TENORMIN) 50 mg tablet Take 50 mg by mouth nightly. Current Outpatient Medications on File Prior to Visit   Medication Sig Dispense Refill    mycophenolate mofetil (CELLCEPT) 250 mg capsule Take 750 mg by mouth daily.  montelukast (SINGULAIR) 10 mg tablet Take 1 Tab by mouth daily. 90 Tab 3    mometasone (NASONEX) 50 mcg/actuation nasal spray 2 Sprays by Both Nostrils route daily. 1 Container 2    pen needle, diabetic (NOVOTWIST) 32 gauge x 1/5\" ndle 30 UNSPECIFIED by Does Not Apply route daily.  30 Pen Needle 2    warfarin (COUMADIN) 4 mg tablet Take 4 mg by mouth daily. All days expect Tue, Thur Sat Pt takes 6mg.  clobetasol (TEMOVATE) 0.05 % external solution Apply  to affected area daily as needed.  diclofenac (VOLTAREN) 1 % gel Apply 4 g to affected area four (4) times daily. 100 g 1    LINZESS 72 mcg cap capsule TAKE 1 CAPSULE BY MOUTH EVERY DAY BEFORE BREAKFAST  3    pantoprazole (PROTONIX) 40 mg tablet TAKE 1 TABLET BY MOUTH ONCE A DAY BEFORE BREAKFAST  3    methocarbamol (ROBAXIN) 500 mg tablet Take 1 Tab by mouth nightly as needed for Pain. 7 Tab 0    levonorgestrel (MIRENA) 20 mcg/24 hours (5 yrs) 52 mg IUD Mirena 20 mcg/24 hours (5 yrs) 52 mg intrauterine device   remove 9/8/21      NIFEdipine ER (PROCARDIA XL) 30 mg ER tablet       warfarin (COUMADIN) 6 mg tablet Take 4 mg by mouth daily. Tue, Thur, Sat is 6mg once daily all other days 4mg.  acetaminophen (ACETAMINOPHEN EXTRA STRENGTH) 500 mg tablet Take 1 Tab by mouth every six (6) hours as needed for Pain. 30 Tab 0    hydroxychloroquine (PLAQUENIL) 200 mg tablet Take 1 tab po qam and 1/2 tab po qpm 45 Tab 9    simvastatin (ZOCOR) 20 mg tablet TAKE 1 TABLET BY MOUTH ONCE DAILY  3    losartan (COZAAR) 100 mg tablet Take 50 mg by mouth nightly.  atenolol (TENORMIN) 50 mg tablet Take 50 mg by mouth nightly.  [DISCONTINUED] liraglutide (VICTOZA) 0.6 mg/0.1 mL (18 mg/3 mL) pnij Inject 0.6mg subcutaneously daily x 1 week, then increase to 1.2mg daily, then increase to 1.8mg daily 5 Adjustable Dose Pre-filled Pen Syringe 2    [DISCONTINUED] phendimetrazine tartrate 105 mg cpER Take 105 mg by mouth daily. Max Daily Amount: 105 mg. 30 Cap 0    [DISCONTINUED] ergocalciferol (ERGOCALCIFEROL) 50,000 unit capsule TAKE ONE CAPSULE BY MOUTH ONCE A WEEK  0     No current facility-administered medications on file prior to visit.         Allergies   Allergen Reactions    Gluten Diarrhea    Fiorinal-Codeine #3 [Codeine-Butalbital-Asa-Caff] Nausea Only       PE:  Visit Vitals  /85   Pulse 77   Temp 98 °F (36.7 °C) (Oral)   Resp 18   Ht 5' 1\" (1.549 m)   Wt 150 lb 14.4 oz (68.4 kg)   SpO2 98%   BMI 28.51 kg/m²       Gen: alert, oriented, no acute distress  Head: normocephalic, atraumatic  Ears: external auditory canals clear, TMs without erythema or effusion  Eyes: pupils equal round reactive to light, sclera clear, conjunctiva clear  Oral: moist mucus membranes, no oral lesions, no pharyngeal inflammation or exudate  Neck: symmetric normal sized thyroid, no carotid bruits, no jugular vein distention  Resp: no increase work of breathing, lungs clear to ausculation bilaterally, no wheezing, rales or rhonchi  CV: S1, S2 normal.  No murmurs, rubs, or gallops. Abd: soft, not tender, not distended. No hepatosplenomegaly. Normal bowel sounds. No hernias. No abdominal or renal bruits. Neuro: cranial nerves intact, normal strength and movement in all extremities, reflexes and sensation intact and symmetric. Skin: no lesion or rash  Extremities: no cyanosis or edema    No results found for this visit on 03/03/20. Assessment/Plan:    ICD-10-CM ICD-9-CM    1. Impaired fasting glucose R73.01 790.21 liraglutide (VICTOZA) 0.6 mg/0.1 mL (18 mg/3 mL) pnij   2. Class 1 obesity due to excess calories with serious comorbidity and body mass index (BMI) of 30.0 to 30.9 in adult E66.09 278.00 liraglutide (VICTOZA) 0.6 mg/0.1 mL (18 mg/3 mL) pnij    Z68.30 V85.30    3. Encounter for weight management Z76.89 V65.49 liraglutide (VICTOZA) 0.6 mg/0.1 mL (18 mg/3 mL) pnij   4. Systemic lupus erythematosus with glomerular disease, unspecified SLE type ([facility] M32.14 710.0      583.81    5. Hypertension secondary to other renal disorders I15.1 405.91     N28.89 593.89    6. Weight loss R63.4 783.21    7. Vitamin D deficiency E55.9 268.9      Diagnoses and all orders for this visit:    1.  Impaired fasting glucose  -     liraglutide (VICTOZA) 0.6 mg/0.1 mL (18 mg/3 mL) pnij; 1.8 mg by SubCUTAneous route daily. 2. Class 1 obesity due to excess calories with serious comorbidity and body mass index (BMI) of 30.0 to 30.9 in adult  -     liraglutide (VICTOZA) 0.6 mg/0.1 mL (18 mg/3 mL) pnij; 1.8 mg by SubCUTAneous route daily. 3. Encounter for weight management  -     liraglutide (VICTOZA) 0.6 mg/0.1 mL (18 mg/3 mL) pnij; 1.8 mg by SubCUTAneous route daily. 4. Systemic lupus erythematosus with glomerular disease, unspecified SLE type ([facility] [address]    5. Hypertension secondary to other renal disorders    6. Weight loss    7. Vitamin D deficiency      Follow-up and Dispositions    · Return in about 3 weeks (around 3/24/2020) for Medication Check, Weight Mgmt. reviewed diet, exercise and weight control  reviewed medications and side effects in detail    Patient to stop Bontril at this time. She will continue Victoza at 1.8mg. She will decrease to 1.2mg if she feels like her appetite is too blunted. lose weight, increase physical activity, call if any problems    Health Maintenance reviewed - reviewed, UTD. Recommended healthy diet low in carbohydrates, fats, sodium and cholesterol. Recommended regular cardiovascular exercise 3-6 times per week for 30-60 minutes daily. Chart is reviewed and updated today in the office. Records requested for other providers patient has seen and is currently seeing. Patient was offered a choice/choices in the treatment plan today. Patient expresses understanding of the plan and agrees with recommendations. Verbal and written instructions (see AVS) provided. See patient instructions for more. Patient expresses understanding of diagnosis and treatment plan.

## 2020-03-03 NOTE — PATIENT INSTRUCTIONS
Constipation: 
 
Constipation is a common problem for many patients. Many medications, lower daily  fluid intake, etc may cause acute constipation. Constipation should be relieved in most cases by one or more of the following suggestions: 
 
1. Increase your daily fluid intake. Aim for a minimum of 48-64oz daily. 2.  Increased mobility. Move as much as possible. Walking not only improves circulation but it is also helpful for gastrointestinal motility. 3.  Stool softeners (non habit forming). A.  Colace (Docusate) - Over the Counter, may take 100mg capsule up to 3 times daily for a maximum of 300mg daily. 4.  Laxatives - If stool softeners are not providing relief OR if you have not had a BM in 3 or more days. It is essential that you Laxative use for extended periods of time may be habit forming. Please ask your provider if you are concerned about how long you have been taking them. A.  Miralax - mix 1 capful into any drink of your choice daily for up to 7 days. Can increase to two capfuls a day 
 OR 
 B. Milk of Magnesia - Take 30mL (2 Tbsp) to 60mL (4Tbsp) daily. OR 
 C. Senna (PUSH) (senokot) 8.5mg tablets. Take 1-2 every night. 5.  Rectal Suppositories/Fleets Enema - When the use of stool softeners or laxatives have been ineffective in relieving your constipation symptoms you may find a rectal suppository or fleets enema effective. Occasionally stool has hardened at the base of the rectum making the above interventions ineffective. A suppository or enema can remove this hardened stool so the above interventions will then be effective. GOAL: one soft bowel movement daily If the above methods are ineffective, please call our office to schedule an appointment to discuss next steps to help you relieve your constipation. You may require a prescription medication to eliminate your constipation. Constipation: Care Instructions Your Care Instructions Constipation means that you have a hard time passing stools (bowel movements). People pass stools from 3 times a day to once every 3 days. What is normal for you may be different. Constipation may occur with pain in the rectum and cramping. The pain may get worse when you try to pass stools. Sometimes there are small amounts of bright red blood on toilet paper or the surface of stools. This is because of enlarged veins near the rectum (hemorrhoids). A few changes in your diet and lifestyle may help you avoid ongoing constipation. Your doctor may also prescribe medicine to help loosen your stool. Some medicines can cause constipation. These include pain medicines and antidepressants. Tell your doctor about all the medicines you take. Your doctor may want to make a medicine change to ease your symptoms. Follow-up care is a key part of your treatment and safety. Be sure to make and go to all appointments, and call your doctor if you are having problems. It's also a good idea to know your test results and keep a list of the medicines you take. How can you care for yourself at home? · Drink plenty of fluids, enough so that your urine is light yellow or clear like water. If you have kidney, heart, or liver disease and have to limit fluids, talk with your doctor before you increase the amount of fluids you drink. · Include high-fiber foods in your diet each day. These include fruits, vegetables, beans, and whole grains. · Get at least 30 minutes of exercise on most days of the week. Walking is a good choice. You also may want to do other activities, such as running, swimming, cycling, or playing tennis or team sports. · Take a fiber supplement, such as Citrucel or Metamucil, every day. Read and follow all instructions on the label. · Schedule time each day for a bowel movement. A daily routine may help. Take your time having your bowel movement. · Support your feet with a small step stool when you sit on the toilet. This helps flex your hips and places your pelvis in a squatting position. · Your doctor may recommend an over-the-counter laxative to relieve your constipation. Examples are Milk of Magnesia and MiraLax. Read and follow all instructions on the label. Do not use laxatives on a long-term basis. When should you call for help? Call your doctor now or seek immediate medical care if: 
  · You have new or worse belly pain.  
  · You have new or worse nausea or vomiting.  
  · You have blood in your stools.  
 Watch closely for changes in your health, and be sure to contact your doctor if: 
  · Your constipation is getting worse.  
  · You do not get better as expected. Where can you learn more? Go to http://kodak-dodie.info/. Enter 21 [phone] in the search box to learn more about \"Constipation: Care Instructions. \" Current as of: June 26, 2019 Content Version: 12.2 © 0448-0541 [employer]. Care instructions adapted under license by [employer] (which disclaims liability or warranty for this information). If you have questions about a medical condition or this instruction, always ask your healthcare professional. [place] [address] any warranty or liability for your use of this information. Prediabetes: Care Instructions Your Care Instructions Prediabetes is a warning sign that you are at risk for getting type 2 diabetes. It means that your blood sugar is higher than it should be. The food you eat turns into sugar, which your body uses for energy. Normally, an organ called the pancreas makes insulin, which allows the sugar in your blood to get into your body's cells. But when your body can't use insulin the right way, the sugar doesn't move into cells. It stays in your blood instead. This is called insulin resistance. The buildup of sugar in the blood causes prediabetes. The good news is that lifestyle changes may help you get your blood sugar back to normal and help you avoid or delay diabetes. Follow-up care is a key part of your treatment and safety. Be sure to make and go to all appointments, and call your doctor if you are having problems. It's also a good idea to know your test results and keep a list of the medicines you take. How can you care for yourself at home? · Watch your weight. A healthy weight helps your body use insulin properly. · Limit the amount of calories, sweets, and unhealthy fat you eat. Ask your doctor if you should see a dietitian. A registered dietitian can help you create meal plans that fit your lifestyle. · Get at least 30 minutes of exercise on most days of the week. Exercise helps control your blood sugar. It also helps you maintain a healthy weight. Walking is a good choice. You also may want to do other activities, such as running, swimming, cycling, or playing tennis or team sports. · Do not smoke. Smoking can make prediabetes worse. If you need help quitting, talk to your doctor about stop-smoking programs and medicines. These can increase your chances of quitting for good. · If your doctor prescribed medicines, take them exactly as prescribed. Call your doctor if you think you are having a problem with your medicine. You will get more details on the specific medicines your doctor prescribes. When should you call for help? Watch closely for changes in your health, and be sure to contact your doctor if: 
  · You have any symptoms of diabetes. These may include: 
? Being thirsty more often. ? Urinating more. ? Being hungrier. ? Losing weight. ? Being very tired. ? Having blurry vision.  
  · You have a wound that will not heal.  
  · You have an infection that will not go away.  
  · You have problems with your blood pressure.  
  · You want more information about diabetes and how you can keep from getting it. Where can you learn more? Go to http://kodak-dodie.info/. Enter I222 in the search box to learn more about \"Prediabetes: Care Instructions. \" Current as of: April 16, 2019 Content Version: 12.2 © 9726-6456 [employer]. Care instructions adapted under license by [employer] (which disclaims liability or warranty for this information). If you have questions about a medical condition or this instruction, always ask your healthcare professional. [address] any warranty or liability for your use of this information.

## 2020-03-03 NOTE — PROGRESS NOTES
Chava Hector is a 46 y.o. female  Chief Complaint   Patient presents with    Weight Management     1 month f/u     There are no preventive care reminders to display for this patient. Visit Vitals  /85   Pulse 92   Temp 98 °F (36.7 °C) (Oral)   Resp 18   Ht (P) 5' (1.524 m)   Wt 150 lb 14.4 oz (68.4 kg)   SpO2 98%   BMI (P) 29.47 kg/m²     1. Have you been to the ER, urgent care clinic since your last visit? Hospitalized since your last visit? No     2. Have you seen or consulted any other health care providers outside of the [address] [place] [address] since your last visit? Include any pap smears or colon screening.  No     Water: 47.6  [employer]: 1276

## 2020-03-25 ENCOUNTER — TELEPHONE (OUTPATIENT)
Dept: FAMILY MEDICINE CLINIC | Age: 40
End: 2020-03-25

## 2020-03-25 DIAGNOSIS — R73.01 IMPAIRED FASTING GLUCOSE: ICD-10-CM

## 2020-03-25 DIAGNOSIS — E66.09 CLASS 1 OBESITY DUE TO EXCESS CALORIES WITH SERIOUS COMORBIDITY AND BODY MASS INDEX (BMI) OF 30.0 TO 30.9 IN ADULT: ICD-10-CM

## 2020-03-25 DIAGNOSIS — Z76.89 ENCOUNTER FOR WEIGHT MANAGEMENT: ICD-10-CM

## 2020-03-25 NOTE — TELEPHONE ENCOUNTER
----- Message from Baron Arevalo sent at 3/24/2020  9:56 AM EDT -----  Regarding:  [place], [address] [facility] Message/Vendor Calls    Caller's first and last name: Robert Reddy [[id number]]      Reason for call: RX Consultation      Callback required yes/no and why: Yes      Best contact number(s):  [phone]      Details to clarify the request:      Baron Arevalo

## 2020-03-25 NOTE — TELEPHONE ENCOUNTER
----- Message from Javier López sent at 3/25/2020  9:43 AM EDT -----  Regarding: Dr. Anjelica Church: [phone]   Pt is requesting a call back regarding switching back to a certain weight loss medication.   Best Contact: [phone]

## 2020-03-26 NOTE — TELEPHONE ENCOUNTER
Pt called, ID verified. Pt asked if she wanted to switch back to Bontril and continue with Victozia. Pt state she want to go back to Bontril and take Victozia together. She feel now that she has reached a plateau and this would help. Advised that Bontril ER has been discontinued and she will need to take TID 30 min before each meal. Pt state she will comply with recommendations. Advised will be sent to pharmacy tomorrow by end of day. Pt verbalized understanding of call.

## 2020-03-27 RX ORDER — PHENDIMETRAZINE TARTRATE 35 MG/1
35 TABLET ORAL 3 TIMES DAILY
Qty: 90 TAB | Refills: 1 | Status: SHIPPED | OUTPATIENT
Start: 2020-03-27 | End: 2020-07-08 | Stop reason: ALTCHOICE

## 2020-03-27 NOTE — TELEPHONE ENCOUNTER
Patient wishes to restart Bontril at this time. Unfortunately the extended release Bontril she was previously on has been discontinued by the  so we are switching to Bontril TID. Patient advised to take the new doses 30-60 minutes before her three daily meals. Patient verbalized good understanding. Patient will also continue with Victoza 1.8mg injected daily as well. Patient to f/u with Dr. Dlyon Elliott in 1-2 months for weight management as I am leaving the practice.

## 2020-04-01 ENCOUNTER — TELEPHONE (OUTPATIENT)
Dept: FAMILY MEDICINE CLINIC | Age: 40
End: 2020-04-01

## 2020-04-01 NOTE — TELEPHONE ENCOUNTER
----- Message from Suraj Guthrie sent at 3/31/2020 12:16 PM EDT -----  Regarding: [facility]-MAIN OFFICE-PCP  Referral    Caller (first and last name if not the patient or from practice):      Caller's relationship to patient (if not from a practice):      Name of caller (if calling from a practice):      Name of practice: [place] [address], [facility]. Specialist's title, first, and last name: [facility]ler [facility] [address] [place] [address], [address] [facility]      Office Phone Number: [phone]      Fax number: [phone]      Date and time of appointment: 04.03.2020 @ 11am      Reason for appointment: Follow up      Details to clarify the request:  best contact number for pt [phone] [address]

## 2020-04-02 NOTE — TELEPHONE ENCOUNTER
Dr. Neeru Ross office called. Pt need authorization from her insurance company. Referral coordinator advised.

## 2020-04-15 LAB — CREATININE, EXTERNAL: 1.61

## 2020-07-08 ENCOUNTER — VIRTUAL VISIT (OUTPATIENT)
Dept: FAMILY MEDICINE CLINIC | Age: 40
End: 2020-07-08

## 2020-07-08 DIAGNOSIS — M32.14 LUPUS NEPHRITIS (HCC): ICD-10-CM

## 2020-07-08 DIAGNOSIS — R73.01 IMPAIRED FASTING GLUCOSE: Primary | ICD-10-CM

## 2020-07-08 DIAGNOSIS — E66.3 OVERWEIGHT WITH BODY MASS INDEX (BMI) OF 28 TO 28.9 IN ADULT: ICD-10-CM

## 2020-07-08 DIAGNOSIS — I15.1 HYPERTENSION SECONDARY TO OTHER RENAL DISORDERS: ICD-10-CM

## 2020-07-08 DIAGNOSIS — Z86.718 HISTORY OF THROMBOEMBOLISM: ICD-10-CM

## 2020-07-08 DIAGNOSIS — E78.00 HYPERCHOLESTEROLEMIA: ICD-10-CM

## 2020-07-08 DIAGNOSIS — M32.14 SYSTEMIC LUPUS ERYTHEMATOSUS WITH GLOMERULAR DISEASE, UNSPECIFIED SLE TYPE (HCC): ICD-10-CM

## 2020-07-08 DIAGNOSIS — H34.231 RETINAL ARTERY BRANCH OCCLUSION OF RIGHT EYE: ICD-10-CM

## 2020-07-08 DIAGNOSIS — Z90.49 HX LAPAROSCOPIC CHOLECYSTECTOMY: ICD-10-CM

## 2020-07-08 DIAGNOSIS — Z72.821 INADEQUATE SLEEP HYGIENE: ICD-10-CM

## 2020-07-08 DIAGNOSIS — N28.89 HYPERTENSION SECONDARY TO OTHER RENAL DISORDERS: ICD-10-CM

## 2020-07-08 DIAGNOSIS — K90.0 CELIAC DISEASE: ICD-10-CM

## 2020-07-08 DIAGNOSIS — Z79.01 CHRONIC ANTICOAGULATION: ICD-10-CM

## 2020-07-08 DIAGNOSIS — E55.9 VITAMIN D DEFICIENCY: ICD-10-CM

## 2020-07-08 RX ORDER — HYDROCORTISONE 25 MG/ML
LOTION TOPICAL
COMMUNITY
Start: 2020-06-16

## 2020-07-08 RX ORDER — SELENIUM SULFIDE 22.5 MG/ML
SHAMPOO TOPICAL
COMMUNITY
Start: 2020-05-01
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
## 2020-07-08 NOTE — PROGRESS NOTES
Trevor Henderson is a 46 y.o. female  HIPAA verified by two patient identifiers. Health Maintenance Due   Topic    Medicare Yearly Exam      Chief Complaint   Patient presents with    Weight Management     No flowsheet data found. Pain Scale: /10  Pain Location:        1. Have you been to the ER, urgent care clinic since your last visit? Hospitalized since your last visit? No    2. Have you seen or consulted any other health care providers outside of the [address] [place] [address] since your last visit? Include any pap smears or colon screening.  No      # [phone]

## 2020-07-08 NOTE — PATIENT INSTRUCTIONS
Tips for weight loss: Do not skip meals. Eat a protein and vegetable for each meal. Aim for 67 oz or more of water daily. Avoid white foods and sugar sweetened beverages and foods. Aim for 7-8 hours of sleep nightly. Learning About Obesity What is obesity? Obesity means having a body mass index (BMI) of 30 or above. BMI is a number that is calculated from your weight and your height. How do you know if your weight is in the obesity range? To know if your weight is in the obesity range, your doctor looks at your body mass index (BMI) and waist size. BMI is a number that is calculated from your weight and your height. To figure out your BMI for yourself, you can use an online tool, such as http://www.lyle.com/ on the [employer] of [employer]. If your BMI is 30.0 or higher, it falls within the obesity range. Keep in mind that BMI and waist size are only guides. They are not tools to determine your ideal body weight. What causes obesity? When you take in more calories than you burn off, you gain weight. How you eat, how active you are, and other things affect how your body uses calories and whether you gain weight. If you have family members who have too much body fat, you may have inherited a tendency to gain weight. And your family also helps form your eating and lifestyle habits, which can lead to obesity. Also, our busy lives make it harder to plan and cook healthy meals. For many of us, it's easier to reach for prepared foods, go out to eat, or go to the drive-through. But these foods are often high in saturated fat and calories. Portions are often too large. What can you do to reach a healthy weight? Focus on health, not diets. Diets are hard to stay on and don't work in the long run. It is very hard to stay with a diet that includes lots of big changes in your eating habits. Instead of a diet, focus on lifestyle changes that will improve your health and achieve the right balance of energy and calories. To lose weight, you need to burn more calories than you take in. You can do it by eating healthy foods in reasonable amounts and becoming more active, even a little bit every day. Making small changes over time can add up to a lot. Make a plan for change. Many people have found that naming their reasons for change and staying focused on their plan can make a big difference. Work with your doctor to create a plan that is right for you. · Ask yourself: Viola Noonan are my personal, most powerful reasons for wanting this change? What will my life look like when I've made the change? \" · Set your long-term goal. Make it specific, such as \"I will lose x pounds. \" 
· Break your long-term goal into smaller, short-term goals. Make these small steps specific and within your reach, things you know you can do. These steps are what keep you going from day to day. Talk with your doctor about other weight-loss options. If you have a BMI in a certain range and have not been able to lose weight with diet and exercise, medicine or surgery may be an option for you. Before your doctor will prescribe medicines or surgery, he or she will probably want you to be more active and follow your healthy eating plan for a period of time. These habits are key lifelong changes for managing your weight, with or without other medical treatment. And these changes can help you avoid weight-related health problems. How can you stay on your plan for change? Be ready. Choose to start during a time when there are few events that might trigger slip-ups, like holidays, social events, and high-stress periods. Decide on your first few steps. Most people have more success when they make small changes, one step at a time.  For example, you might switch a daily candy bar to a piece of fruit, walk 10 minutes more, or add more vegetables to a meal. 
Line up your support people. Make sure you're not going to be alone as you make this change. Connect with people who understand how important it is to you. Ask family members and friends for help in keeping with your plan. And think about who could make it harder for you, and how to handle them. Try tracking. People who keep track of what they eat, feel, and do are better at losing weight. Try writing down things like: · What and how much you eat. · How you feel before and after each meal. 
· Details about each meal (like eating out or at home, eating alone, or with friends or family). · What you do to be active. Look and plan. As you track, look for patterns that you may want to change. Take note of: · When you eat and whether you skip meals. · How often you eat out. · How many fruits and vegetables you eat. · When you eat beyond feeling full. · When and why you eat for reasons other than being hungry. When you stray from your plan, don't get upset. Figure out what made you slip up and how you can fix it. Can you take medicines or have surgery to lose weight? If you have a BMI in a certain range and have not been able to lose weight with diet and exercise, medicine or surgery may be an option for you. If you have a BMI of at least 30.0 (or a BMI of at least 27.0 and another health problem related to your weight), ask your doctor about weight-loss medicines. They work by making you feel less hungry, making you feel full more quickly, or changing how you digest fat. Medicines are used along with diet changes and more physical activity to help you make lasting changes. If you have a BMI of 40.0 or more (or a BMI of 35.0 or more and another health problem related to your weight), your doctor may talk with you about surgery. Weight-loss surgery has risks, and you will need to work with your doctor to compare the risk of having obesity with the risks of surgery. With any option you choose, you will still need to eat a healthy diet and get regular exercise. Follow-up care is a key part of your treatment and safety. Be sure to make and go to all appointments, and call your doctor if you are having problems. It's also a good idea to know your test results and keep a list of the medicines you take. Where can you learn more? Go to http://kodak-dodie.info/ Enter N111 in the search box to learn more about \"Learning About Obesity. \" Current as of: December 11, 2019               Content Version: 12.5 © 7842-1839 [employer]. Care instructions adapted under license by [employer] (which disclaims liability or warranty for this information). If you have questions about a medical condition or this instruction, always ask your healthcare professional. [address] any warranty or liability for your use of this information. WEIGHT LOSS PLAN 1. Read food labels and count calories. Goal 4166-1336 calories daily for women and 3156-8115 calories daily for men. Achieve this by decreasing caloric intake by 500 calories by weekly increments. NOTE:  1 pound = 3500 calories! Www.loseit. com Www.myfitnesspal.com Www.[employer]. Mad Mimi Www.lmbangfinder. gov Weight watchers mobile Calories needed to lose 1-2 pounds a week = 10 x your weight in pounds 2. Increase water intake. Per [employer] Weight loss Program, it is important to drink 1/2 your body weight in ounces of water daily. Decrease your water consumption 2-3 hours before bedtime to prevent sleep disturbance from frequent urination. 3.  Decrease sugary beverages. Each can or glass of soda increases your risk of obesity by 60%. Can lose 10 pounds in a month by avoiding any soda. 12 oz can of soda = 140 calories, 16 oz cup of sweet tea = 200 calories 16 oz orange juice = 200 calories, 10 oz apple juice = 150 calories 32 oz sports drink = 200 calories, 16 oz punch = 240 calories 3.5 oz alcohol = 100-150 calories 4. Avoid High Fructose Corn Syrup products. This ingredient makes products highly addictive! 5. Exercise 30 minutes daily 5 days weekly, minimally. If you burn 3500 calories that equals a pound! Use a pedometer to count steps. Visit www. [employer] for a free pedometer and diet recommendations. Your maximum heart rate = 220 - your age Never exercise at your maximum heart rate. See handout for target heart rate. 6.  Decrease carbohydrates (white bread, pasta, rice, potatoes, sweet foods and sweet drinks like soda, tea, coffee, juice and sports drinks). Increase fiber and protein. Goal:   calories daily of carbohydrates Try CHROMIUM PICONATE 200 MG THREE TIMES DAILY,  
 to decrease Premenstrual carbohydrate cravings. 7.  Eat 3-5 small meals daily, include lots of protein (beans/legumes, nuts, lean meat, eggs) and green vegetables with each. (Breakfast, lunch, dinner with 2 healthy snacks) 8. Get proper rest 7-8 hours uninterrupted. When you get less than 6 hours, it triggers hunger by affecting your Grehlin:Leptin ratio and this results in weight gain. 9.  Watch your food portions. Green leafy vegetables should cover 1/2 of the plate, lean meat 1/4 of the plate, starchy vegetable 1/4 of the plate. Use smaller plates. 10.  Do not eat until you are full. Eat until you are no longer hungry. If you are not sure, try drinking a glass of water before getting your second serving of food. 11.  Do not weigh yourself daily. Wait until your next office visit. Use how you feel and  how your clothes fit as measurements of success. 12.  Address your spirituality to draw strength from above during your journey. Remember \"I am fearfully and wonderfully made. Marvelous in His eyes. \" 
 
13. Set realistic, appropriate and achievable weight loss goals: RECOMMENDED TARGET WEIGHT LOSS:  Initial weight loss of 5-10% of your initial body weight achieved over 6 months or a decrease of 2 BMI units. MINIMUM GOAL OF WEIGHT LOSS:  Reduce body weight and maintain a lower body weight. Prevent weight gain. RELATED WEBSITES:     
Www.obesityaction. org 
(and consider joining the Obesity Action Coalition for $25/year. The [facility] mission is to elevated and empower those affected by obesity through education, advocacy and support. Quarterly journals included in membership fee. ) Www.[employer] 
www.[employer] RELATED DIETS:  Dr. Marea Fabry Weight loss challenge, Mediterranean diet, Amanda Diet, DxUpClose Watchers, Paleo Diet (anti-inflammatory diet) EXERCISE 150 MINUTES WEEKLY. THIS CAN BE ACHIEVED BY WORKING OUT OR WALKING A MINIMUM OF 30 MINUTES FOR 5 DAYS WEEKLY. YOU CAN EXERCISE IN INCREMENTS OF 10-15 MINUTES UP TO 3 TIMES A DAY. Consider performing \"brainless exercise. \"  Choose your favorite tv program.  Five minutes before and for 5 minutes after the tv program, stretch your body. While the program is on, walk in place watching the show. When commercials come on, rest or walk around the house to do other things. When the program begins, return to walking in place. When you are able keep walking during the commercials and add light weights to your ankles or hands. By the end of the show, you would have walked 30 minutes. If you need shorter spurts of exercise, walk during the commercials and rest during the show. Drink to glasses of water prior to any exercise to prevent dehydration and to improve the results of the work out. Your RESTING HEART RATE is the number of times your heart beats per minute when you are not exerting yourself. The more fit you are, the lower your resting heart rate will be.  
 
Your MAXIMUM HEART RATE is the number of times per minute your heart pumps when it is working at 100% capacity. NEVER EXERCISE AT YOUR MAXIMUM HEART RATE. MAX HEART RATE = 220 - your age For example, in a 48year old, the maximum heart rate is 220-50 = 170 beats per minute Your TARGET HEART RATE is the number of beats per minute our heart should pump during aerobic exercise. Reaching your target heart rate indicates that your body is receiving maximum cardiovascular and fat burning benefits. If you are fit, your TARGET HEART RATE = 70-85% of your maximum Heart rate For a 48year old with vigorous intensity physical activity, Target heart rate= 170 bpm x .70 = 119 bpm 
   Target heart rate = 170 bpm x .85=  145 bpm 
 
    Therefore, your target heart rate during physical activity is 119-145 If you are not fit, TARGET HEART RATE = 50-70% of your maximum Heart rate MODERATE CONSISTENT AEROBIC EXERCISE OR WALKING FOR AT LEAST 150 MINUTES WEEKLY IS AN ESSENTIAL PART OF ANY WEIGHT LOSS OF WEIGHT MAINTENANCE PROGRAM. During WEIGHT LOSS PHASE, at least 75% of your exercise needs to be walking or moderate aerobic activity and 25% or 0% can be Isometric or Resistance type exercises (the latter can be deferred to the weight maintenance phase.) During WEIGHT MAINTENANCE PHASE, at least 50% of your exercise needs to be aerobic and 50% Isometric or Resistance type exercises. BENEFITS OF MODERATE INTENSITY EXERCISE MOST DAYS OF THE WEEK: 
 
 1. Insulin Resistance improves 30-85% 2. Abdominal Fat decreases by 30% 3. Inflammatory Markers decrease by 30% (therefore pain decreases) 4. Systolic and Diastolic Blood Pressure decrease by 4 mmHg 5. HDL improves by 5% 6. Triglycerides decrease by 15% 7. Shift from small dense LDL to large dense LDL (therefore decrease Insulin Resistance) 8. Decrease coagulability

## 2020-09-10 ENCOUNTER — TELEPHONE (OUTPATIENT)
Dept: FAMILY MEDICINE CLINIC | Age: 40
End: 2020-09-10

## 2020-09-10 NOTE — TELEPHONE ENCOUNTER
The patient is having foot surgery Monday. The office called and said that the patient has listed Dr. Maricruz Sterling as her PCP. The office called and requested the most recent labs faxed to their office if Dr. Maricruz Sterling is her PCP. Please call [phone]      [address]  Website   Directions   Save   4.34 [facility] center in [place], [place]   COVID-19 info: [place]. Mad Mimi   Address: [address]   Hours:   Open ?  Closes 5PM                                     Phone: [phone]

## 2020-09-11 NOTE — TELEPHONE ENCOUNTER
Called patient. ID verified with Name and . Spoke with patient. Informed patient that we received a call in regards to her surgery. Informed patient that [address] called stating that Dr. Lakesha Willams was the primary care physician and they were trying to retreive lab orders that were placed. Informed patient that Dr. Lakesha Willams was her physician for weight management and not primary care physician. Patient states that she was aware of that and she did not tell the surgery center that Dr. Lakesha Willams was. Patient states that she was aware of the MD who submitted lab orders and that she does not need Dr. Lakesha Willams to do anything at this time. MD notified. No further actions are required at this time.

## 2020-10-28 DIAGNOSIS — J30.2 SEASONAL ALLERGIC RHINITIS, UNSPECIFIED TRIGGER: ICD-10-CM

## 2020-10-30 RX ORDER — MOMETASONE FUROATE 50 UG/1
SPRAY, METERED NASAL
Qty: 3 CONTAINER | Refills: 0 | Status: SHIPPED | OUTPATIENT
Start: 2020-10-30

## 2021-03-04 ENCOUNTER — OFFICE VISIT (OUTPATIENT)
Dept: URGENT CARE | Age: 41
End: 2021-03-04
Payer: MEDICARE

## 2021-03-04 VITALS — RESPIRATION RATE: 15 BRPM | HEART RATE: 54 BPM | OXYGEN SATURATION: 100 % | TEMPERATURE: 98.3 F

## 2021-03-04 DIAGNOSIS — J02.9 SORE THROAT: ICD-10-CM

## 2021-03-04 DIAGNOSIS — R51.9 ACUTE NONINTRACTABLE HEADACHE, UNSPECIFIED HEADACHE TYPE: ICD-10-CM

## 2021-03-04 DIAGNOSIS — Z11.59 SCREENING FOR VIRAL DISEASE: ICD-10-CM

## 2021-03-04 DIAGNOSIS — R52 BODY ACHES: ICD-10-CM

## 2021-03-04 DIAGNOSIS — R09.81 SINUS CONGESTION: ICD-10-CM

## 2021-03-04 DIAGNOSIS — R53.83 FATIGUE, UNSPECIFIED TYPE: ICD-10-CM

## 2021-03-04 DIAGNOSIS — U07.1 COVID-19: Primary | ICD-10-CM

## 2021-03-04 LAB
FLUAV+FLUBV AG NOSE QL IA.RAPID: NEGATIVE
FLUAV+FLUBV AG NOSE QL IA.RAPID: NEGATIVE
S PYO AG THROAT QL: NEGATIVE
SARS-COV-2 POC: POSITIVE
VALID INTERNAL CONTROL?: YES
VALID INTERNAL CONTROL?: YES

## 2021-03-04 PROCEDURE — 99213 OFFICE O/P EST LOW 20 MIN: CPT | Performed by: FAMILY MEDICINE

## 2021-03-04 PROCEDURE — G8421 BMI NOT CALCULATED: HCPCS | Performed by: FAMILY MEDICINE

## 2021-03-04 PROCEDURE — G8427 DOCREV CUR MEDS BY ELIG CLIN: HCPCS | Performed by: FAMILY MEDICINE

## 2021-03-04 PROCEDURE — 87426 SARSCOV CORONAVIRUS AG IA: CPT | Performed by: FAMILY MEDICINE

## 2021-03-04 PROCEDURE — 87804 INFLUENZA ASSAY W/OPTIC: CPT | Performed by: FAMILY MEDICINE

## 2021-03-04 PROCEDURE — 87880 STREP A ASSAY W/OPTIC: CPT | Performed by: FAMILY MEDICINE

## 2021-03-04 PROCEDURE — G8756 NO BP MEASURE DOC: HCPCS | Performed by: FAMILY MEDICINE

## 2021-03-04 PROCEDURE — G9717 DOC PT DX DEP/BP F/U NT REQ: HCPCS | Performed by: FAMILY MEDICINE
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
## 2022-03-18 PROBLEM — Z86.718 HISTORY OF THROMBOEMBOLISM: Status: ACTIVE | Noted: 2018-06-12

## 2022-03-18 PROBLEM — R51.9 SINUS HEADACHE: Status: ACTIVE | Noted: 2020-01-07

## 2022-03-18 PROBLEM — R05.9 COUGH: Status: ACTIVE | Noted: 2020-01-07

## 2022-03-18 PROBLEM — G47.00 INSOMNIA: Status: ACTIVE | Noted: 2019-06-19

## 2022-03-18 PROBLEM — E66.3 OVERWEIGHT WITH BODY MASS INDEX (BMI) OF 28 TO 28.9 IN ADULT: Status: ACTIVE | Noted: 2020-07-08

## 2022-03-19 PROBLEM — M54.50 CHRONIC MIDLINE LOW BACK PAIN WITHOUT SCIATICA: Status: ACTIVE | Noted: 2020-01-07

## 2022-03-19 PROBLEM — E66.09 CLASS 1 OBESITY DUE TO EXCESS CALORIES WITH SERIOUS COMORBIDITY AND BODY MASS INDEX (BMI) OF 30.0 TO 30.9 IN ADULT: Status: ACTIVE | Noted: 2018-10-19

## 2022-03-19 PROBLEM — M19.90 ARTHRITIS: Status: ACTIVE | Noted: 2019-12-04

## 2022-03-19 PROBLEM — M79.641 PAIN IN BOTH HANDS: Status: ACTIVE | Noted: 2019-12-04

## 2022-03-19 PROBLEM — D69.6 THROMBOCYTOPENIA (HCC): Status: ACTIVE | Noted: 2018-10-19

## 2022-03-19 PROBLEM — Z90.49 HX LAPAROSCOPIC CHOLECYSTECTOMY: Status: ACTIVE | Noted: 2020-07-08

## 2022-03-19 PROBLEM — E55.9 VITAMIN D DEFICIENCY: Status: ACTIVE | Noted: 2018-10-19

## 2022-03-19 PROBLEM — R73.01 IMPAIRED FASTING GLUCOSE: Status: ACTIVE | Noted: 2020-03-03

## 2022-03-19 PROBLEM — K59.03 DRUG-INDUCED CONSTIPATION: Status: ACTIVE | Noted: 2020-03-03

## 2022-03-19 PROBLEM — E78.00 HYPERCHOLESTEROLEMIA: Status: ACTIVE | Noted: 2018-10-19

## 2022-03-19 PROBLEM — G89.29 CHRONIC MIDLINE LOW BACK PAIN WITHOUT SCIATICA: Status: ACTIVE | Noted: 2020-01-07

## 2022-03-19 PROBLEM — N83.202 CYST OF LEFT OVARY: Status: ACTIVE | Noted: 2020-02-18

## 2022-03-19 PROBLEM — D62 ACUTE BLOOD LOSS ANEMIA: Status: ACTIVE | Noted: 2018-06-12

## 2022-03-19 PROBLEM — R63.4 WEIGHT LOSS: Status: ACTIVE | Noted: 2019-10-22

## 2022-03-19 PROBLEM — R53.83 FATIGUE: Status: ACTIVE | Noted: 2018-10-19

## 2022-03-19 PROBLEM — Z72.821 INADEQUATE SLEEP HYGIENE: Status: ACTIVE | Noted: 2020-07-08

## 2022-03-19 PROBLEM — M79.642 PAIN IN BOTH HANDS: Status: ACTIVE | Noted: 2019-12-04

## 2022-03-19 PROBLEM — Z79.01 CHRONIC ANTICOAGULATION: Status: ACTIVE | Noted: 2020-07-08

## 2022-03-20 PROBLEM — L85.3 XEROSIS OF SKIN: Status: ACTIVE | Noted: 2019-10-22

## 2022-03-20 PROBLEM — Z76.89 ENCOUNTER FOR WEIGHT MANAGEMENT: Status: ACTIVE | Noted: 2019-05-17

## 2022-03-20 PROBLEM — M54.50 ACUTE MIDLINE LOW BACK PAIN WITHOUT SCIATICA: Status: ACTIVE | Noted: 2019-12-04

## 2022-03-20 PROBLEM — J30.2 SEASONAL ALLERGIC RHINITIS: Status: ACTIVE | Noted: 2019-09-24

## 2022-05-07 NOTE — ED NOTES
Patient states that she \"does not feel good\". She is unable to elaborate on exactly what she is feeling. BP has dropped a little at 84/67. MD informed ordersd receives. Wt Readings from Last 3 Encounters:   05/06/22 94 9 kg (209 lb 3 2 oz)   05/02/22 101 kg (222 lb 14 2 oz)   03/14/21 93 kg (205 lb)     Echo ordered 5/3 given severe edema + murmur on exam  · Monitor intake and output  · Monitor daily weights  · Does not appear to be on any diuretics at home  · Given multiple days of IVF after normalization of creatinine for EDUARDO she may have some volume overload  · Placed on IV lasix BID, now on oral Lasix daily  · Cardiology signed off   · Increased beta-blocker to 37 5 b i d   Due to tachycardia

## 2023-05-10 RX ORDER — MYCOPHENOLATE MOFETIL 250 MG/1
750 CAPSULE ORAL DAILY
COMMUNITY
Start: 2020-01-17

## 2023-05-10 RX ORDER — ACETAMINOPHEN 500 MG
TABLET ORAL EVERY 6 HOURS PRN
COMMUNITY
Start: 2017-07-10

## 2023-05-10 RX ORDER — ATENOLOL 50 MG/1
50 TABLET ORAL NIGHTLY
COMMUNITY

## 2023-05-10 RX ORDER — SELENIUM SULFIDE 22.5 MG/ML
SHAMPOO TOPICAL
COMMUNITY
Start: 2020-05-01

## 2023-05-10 RX ORDER — CLOBETASOL PROPIONATE 0.46 MG/ML
SOLUTION TOPICAL DAILY PRN
COMMUNITY
Start: 2019-12-27

## 2023-05-10 RX ORDER — SIMVASTATIN 20 MG
1 TABLET ORAL DAILY
COMMUNITY
Start: 2016-10-09

## 2023-05-10 RX ORDER — HYDROCORTISONE 25 MG/ML
LOTION TOPICAL
COMMUNITY
Start: 2020-06-16

## 2023-05-10 RX ORDER — NIFEDIPINE 30 MG/1
TABLET, EXTENDED RELEASE ORAL
COMMUNITY
Start: 2019-02-11

## 2023-05-10 RX ORDER — PANTOPRAZOLE SODIUM 40 MG/1
TABLET, DELAYED RELEASE ORAL
COMMUNITY
Start: 2019-11-04

## 2023-05-10 RX ORDER — MOMETASONE FUROATE 50 UG/1
SPRAY, METERED NASAL
COMMUNITY
Start: 2020-10-30

## 2023-05-10 RX ORDER — MONTELUKAST SODIUM 10 MG/1
TABLET ORAL DAILY
COMMUNITY
Start: 2020-02-04

## 2023-05-10 RX ORDER — LOSARTAN POTASSIUM 100 MG/1
50 TABLET ORAL NIGHTLY
COMMUNITY

## 2023-05-10 RX ORDER — WARFARIN SODIUM 4 MG/1
TABLET ORAL DAILY
COMMUNITY
Start: 2019-12-01

## 2023-05-10 RX ORDER — HYDROXYCHLOROQUINE SULFATE 200 MG/1
TABLET, FILM COATED ORAL
COMMUNITY
Start: 2017-06-22

## 2024-04-12 ENCOUNTER — HOSPITAL ENCOUNTER (OUTPATIENT)
Facility: HOSPITAL | Age: 44
Discharge: HOME | End: 2024-04-12
Attending: INTERNAL MEDICINE
Payer: COMMERCIAL

## 2024-04-12 DIAGNOSIS — E55.9 AVITAMINOSIS D: ICD-10-CM

## 2024-04-12 DIAGNOSIS — M54.9 DORSALGIA: ICD-10-CM

## 2024-04-12 DIAGNOSIS — M32.9 SYSTEMIC LUPUS ERYTHEMATOSUS, UNSPECIFIED SLE TYPE, UNSPECIFIED ORGAN INVOLVEMENT STATUS (HCC): ICD-10-CM

## 2024-04-12 DIAGNOSIS — Z92.241 PERSONAL HISTORY OF CORTICOSTEROID THERAPY: ICD-10-CM

## 2024-04-12 DIAGNOSIS — M32.14 LUPUS GLOMERULONEPHRITIS (HCC): ICD-10-CM

## 2024-04-12 DIAGNOSIS — Z79.899 NEED FOR PROPHYLACTIC CHEMOTHERAPY: ICD-10-CM

## 2024-04-12 DIAGNOSIS — M79.7 SCAPULOHUMERAL FIBROSITIS: ICD-10-CM

## 2024-04-12 PROCEDURE — 77080 DXA BONE DENSITY AXIAL: CPT

## (undated) DEVICE — TROCAR ENDOSCP L100MM DIA5MM BLDELSS STBL SL THRD OPT VW

## (undated) DEVICE — BLADELESS OPTICAL TROCAR WITH FIXATION CANNULA: Brand: VERSAPORT

## (undated) DEVICE — SUTURE MCRYL SZ 4-0 L27IN ABSRB UD L19MM PS-2 1/2 CIR PRIM Y426H

## (undated) DEVICE — DERMABOND SKIN ADH 0.7ML -- DERMABOND ADVANCED 12/BX

## (undated) DEVICE — SOLUTION IV 500ML 0.9% SOD CHL FLX CONT

## (undated) DEVICE — Device

## (undated) DEVICE — SURGICAL PROCEDURE KIT GEN LAPAROSCOPY LF

## (undated) DEVICE — GLOVE SURG SZ 7.5 L11.2IN THK9.8MIL STRW LTX POLYMER BEAD

## (undated) DEVICE — 1200 GUARD II KIT W/5MM TUBE W/O VAC TUBE: Brand: GUARDIAN

## (undated) DEVICE — DEVON™ KNEE AND BODY STRAP 60" X 3" (1.5 M X 7.6 CM): Brand: DEVON

## (undated) DEVICE — REM POLYHESIVE ADULT PATIENT RETURN ELECTRODE: Brand: VALLEYLAB

## (undated) DEVICE — Z CONVERTED USE 2107985 COVER FLROSCP W36XL28IN 4 SIDE ADH

## (undated) DEVICE — HANDLE LT SNAP ON ULT DURABLE LENS FOR TRUMPF ALC DISPOSABLE

## (undated) DEVICE — KIT INFECTION CTRL ST FRAN --

## (undated) DEVICE — NEEDLE HYPO 22GA L1.5IN BLK S STL HUB POLYPR SHLD REG BVL

## (undated) DEVICE — (D)SYR 10ML 1/5ML GRAD NSAF -- PKGING CHANGE USE ITEM 338027

## (undated) DEVICE — KENDALL SCD EXPRESS SLEEVES, KNEE LENGTH, MEDIUM: Brand: KENDALL SCD

## (undated) DEVICE — BLADELESS OPTICAL TROCAR WITH FIXATION CANNULA: Brand: VERSAONE

## (undated) DEVICE — SUTURE SZ 0 27IN 5/8 CIR UR-6  TAPER PT VIOLET ABSRB VICRYL J603H

## (undated) DEVICE — APPLIER CLP M L L11.4IN DIA10MM ENDOSCP ROT MULT FOR LIG

## (undated) DEVICE — CATHETER URET 4FR L70CM POLYUR OLV FLX TIP KINK RESIST W/

## (undated) DEVICE — SPECIMEN RETRIEVAL POUCH: Brand: ENDO CATCH GOLD

## (undated) DEVICE — (D)PREP SKN CHLRAPRP APPL 26ML -- CONVERT TO ITEM 371833